# Patient Record
Sex: MALE | Race: ASIAN | NOT HISPANIC OR LATINO | Employment: OTHER | URBAN - METROPOLITAN AREA
[De-identification: names, ages, dates, MRNs, and addresses within clinical notes are randomized per-mention and may not be internally consistent; named-entity substitution may affect disease eponyms.]

---

## 2017-01-21 ENCOUNTER — TRANSFERRED RECORDS (OUTPATIENT)
Dept: CARDIOLOGY | Facility: CLINIC | Age: 82
End: 2017-01-21

## 2017-01-23 ENCOUNTER — TELEPHONE (OUTPATIENT)
Dept: FAMILY MEDICINE | Facility: CLINIC | Age: 82
End: 2017-01-23

## 2017-01-23 NOTE — TELEPHONE ENCOUNTER
Forms received via mail for health care summary   Pt need physical appointment , spoke to son on 01.23.17 Maeve Coleman Medical Assistant   Pt son will call back to schedule

## 2017-02-02 ENCOUNTER — OFFICE VISIT (OUTPATIENT)
Dept: FAMILY MEDICINE | Facility: CLINIC | Age: 82
End: 2017-02-02
Payer: COMMERCIAL

## 2017-02-02 ENCOUNTER — TELEPHONE (OUTPATIENT)
Dept: FAMILY MEDICINE | Facility: CLINIC | Age: 82
End: 2017-02-02

## 2017-02-02 VITALS
BODY MASS INDEX: 24.59 KG/M2 | DIASTOLIC BLOOD PRESSURE: 70 MMHG | HEART RATE: 84 BPM | SYSTOLIC BLOOD PRESSURE: 119 MMHG | OXYGEN SATURATION: 99 % | WEIGHT: 153 LBS | HEIGHT: 66 IN | TEMPERATURE: 98 F

## 2017-02-02 DIAGNOSIS — I48.91 ATRIAL FIBRILLATION, UNSPECIFIED TYPE (H): ICD-10-CM

## 2017-02-02 DIAGNOSIS — I10 HYPERTENSION GOAL BP (BLOOD PRESSURE) < 140/90: ICD-10-CM

## 2017-02-02 DIAGNOSIS — Z00.00 MEDICARE ANNUAL WELLNESS VISIT, SUBSEQUENT: ICD-10-CM

## 2017-02-02 DIAGNOSIS — E11.21 TYPE 2 DIABETES MELLITUS WITH DIABETIC NEPHROPATHY, WITHOUT LONG-TERM CURRENT USE OF INSULIN (H): Primary | ICD-10-CM

## 2017-02-02 DIAGNOSIS — L25.8 CONTACT DERMATITIS DUE TO OTHER AGENT, UNSPECIFIED CONTACT DERMATITIS TYPE: ICD-10-CM

## 2017-02-02 PROCEDURE — 99397 PER PM REEVAL EST PAT 65+ YR: CPT | Performed by: FAMILY MEDICINE

## 2017-02-02 PROCEDURE — 93000 ELECTROCARDIOGRAM COMPLETE: CPT | Performed by: FAMILY MEDICINE

## 2017-02-02 RX ORDER — HYDROCORTISONE VALERATE CREAM 2 MG/G
CREAM TOPICAL
Qty: 30 G | Refills: 0 | Status: SHIPPED | OUTPATIENT
Start: 2017-02-02 | End: 2017-03-22

## 2017-02-02 NOTE — PROGRESS NOTES
SUBJECTIVE:                                                            Manfred Caraballo is a 81 year old male who presents for Preventive Visit.    Are you in the first 12 months of your Medicare Part B coverage?  No    Healthy Habits:    Do you get at least three servings of calcium containing foods daily (dairy, green leafy vegetables, etc.)? yes    Amount of exercise or daily activities, outside of work: 7 day(s) per week walking inside the house and I attend the senior group and use the treadmill     Problems taking medications regularly No    Medication side effects: No    Have you had an eye exam in the past two years? yes    Do you see a dentist twice per year? Every 2 years - once     Do you have sleep apnea, excessive snoring or daytime drowsiness?no    Skin:  Patient reports pruritic bumps on arms which started on the left arm one month ago, but also began on the right arm two weeks ago.    Diabetes:  Patient reports he is managing his diabetes. He checks his blood glucose levels everyday, ranges in the low 100s.     Appetite:  Patient reports he has a decreased appetite, and does not think he eats well. He notes some weight loss.     Patient needs paperwork filled out forHis adult . He enjoys going.    Patient denies any palpitations. He denies any chest pain. He went to the ER recently was found to be in A. Fib. He has not followed up with cardiology.    COGNITIVE SCREEN  1) Repeat 3 items (Banana, Sunrise, Chair)    2) Clock draw: NORMAL  3) 3 item recall: Recalls 2 objects   Results: NORMAL clock, 1-2 items recalled: COGNITIVE IMPAIRMENT LESS LIKELY    Mini-CogTM Copyright DEBBIE Guzman. Licensed by the author for use in [facility]; reprinted with permission (fina@[facility]). All rights reserved.      Lab work done at [facility] 12/21/16     All Histories reviewed and updated in [facility] as appropriate.  Social History   Substance Use Topics     Smoking status: Former Smoker -- 0.50 packs/day  for 20 years     Types: Cigarettes     Quit date: 05/13/1981     Smokeless tobacco: Never Used      Comment: quit in 1981      Alcohol Use: No      Comment: quit in 1981       No alcohol use     Today's PHQ-2 Score:   PHQ-2 ( 1999 [employer]) 2/2/2017 11/17/2016   Q1: Little interest or pleasure in doing things 0 0   Q2: Feeling down, depressed or hopeless 0 0   PHQ-2 Score 0 0       Do you feel safe in your environment - Yes    Do you have a Health Care Directive?: No: Advance care planning reviewed with patient; information given to patient to review.    Current providers sharing in care for this patient include:   Patient Care Team:  Weiler, Karen, MD as PCP - General (Family Practice)      Hearing impairment: Yes, Difficulty following a conversation in a noisy restaurant or crowded room.    Feel that people are mumbling or not speaking clearly.    Left ear is worse     Ability to successfully perform activities of daily living: Yes, no assistance needed     Fall risk:       Home safety:  none identified      The following health maintenance items are reviewed in Epic and correct as of today:  Health Maintenance   Topic Date Due     TETANUS Q10 YR  02/09/2015     COLONOSCOPY Q5 YR INBASKET MESSAGE  05/20/2015     MICROALBUMIN Q1 YEAR( NO INBASKET)  11/20/2016     FOOT EXAM Q1 YEAR( NO INBASKET)  05/13/2017     BMP Q1 YR (NO INBASKET)  05/13/2017     FALL RISK ASSESSMENT  05/13/2017     LIPID MONITORING Q1 YEAR( NO INBASKET)  05/13/2017     PSA Q1 YR  05/13/2017     A1C Q6 MO( NO INBASKET)  05/17/2017     EYE EXAM Q1 YEAR( NO INBASKET)  06/22/2017     INFLUENZA VACCINE (SYSTEM ASSIGNED)  09/01/2017     COPD ACTION PLAN Q1 YR  11/17/2017     HEMOGLOBIN Q1 YR (NO INBASKET)  11/17/2017     TSH W/ FREE T4 REFLEX Q2 YEAR (NO INBASKET)  11/20/2017     WELLNESS VISIT Q1 YR (NO INBASKET)  02/02/2018     ADVANCE DIRECTIVE PLANNING Q5 YRS (NO INBASKET)  05/13/2021     SPIROMETRY ONETIME  Completed     PNEUMOCOCCAL  Completed  "      ROS:  C: NEGATIVE for fever, chills, change in weight  I: NEGATIVE for worrisome rashes, moles or lesions  E: NEGATIVE for vision changes or irritation  E/M: NEGATIVE for ear, mouth and throat problems  R: NEGATIVE for significant cough or SOB  B: NEGATIVE for masses, tenderness or discharge  CV: NEGATIVE for chest pain, palpitations or peripheral edema  GI: NEGATIVE for nausea, abdominal pain, heartburn, or change in bowel habits  : NEGATIVE for frequency, dysuria, or hematuria  M: NEGATIVE for significant arthralgias or myalgia  N: NEGATIVE for weakness, dizziness or paresthesias  E: NEGATIVE for temperature intolerance, skin/hair changes  H: NEGATIVE for bleeding problems  P: NEGATIVE for changes in mood or affect  SKIN: POSITIVE for pruritic bumps on arms and legs    Problem list, Medication list, Allergies, and Medical/Social/Surgical histories reviewed in [facility] and updated as appropriate.    This document serves as a record of the services and decisions personally performed and made by Karen Weiler, MD. It was created on her behalf by Edda Gonzalez, a trained medical scribe. The creation of this document is based the provider's statements to the medical scribe.  Edda Gonzalez February 2, 2017 10:07 AM    OBJECTIVE:                                                            /70 mmHg  Pulse 84  Temp(Src) 98  F (36.7  C) (Oral)  Ht 5' 5.5\" (1.664 m)  Wt 153 lb (69.4 kg)  BMI 25.06 kg/m2  SpO2 99% Estimated body mass index is 25.06 kg/(m^2) as calculated from the following:    Height as of this encounter: 5' 5.5\" (1.664 m).    Weight as of this encounter: 153 lb (69.4 kg).  EXAM:   GENERAL: healthy, alert and no distress  EYES: Eyes grossly normal to inspection, PERRL and conjunctivae and sclerae normal  HENT: ear canals and TM's normal, nose and mouth without ulcers or lesions  NECK: no adenopathy, no asymmetry, masses, or scars and thyroid normal to palpation  RESP: lungs clear to auscultation - no " "rales, rhonchi or wheezes  CV: irregularly irregular, no S3 or S4, no murmur, click or rub, no peripheral edema and peripheral pulses strong  ABDOMEN: soft, nontender, no hepatosplenomegaly, no masses and bowel sounds normal  MS: no gross musculoskeletal defects noted, no edema  SKIN: no suspicious lesions or rashes  NEURO: Normal strength and tone, mentation intact and speech normal  PSYCH: mentation appears normal, affect normal/bright    ASSESSMENT / PLAN:                                                            (E11.21) Type 2 diabetes mellitus with diabetic nephropathy, without long-term current use of insulin (H)  (primary encounter diagnosis)  Comment: Diabetes has been under good control.      (Z00.00) Medicare annual wellness visit, subsequent  Paperwork filled out today for adult .    (I10) Hypertension goal BP (blood pressure) < 140/90  Comment: Blood pressure is under good control.    (L25.8) Contact dermatitis due to other agent, unspecified contact dermatitis type  Plan: hydrocortisone (WESTCORT) 0.2 % cream          (I48.91) Atrial fibrillation, unspecified type (H)  Comment: rate controlled. Patient is asymptomatic. We'll have him see cardiology.  Plan: EKG 12-lead complete w/read - Clinics,         CARDIOLOGY EVAL ADULT REFERRAL            End of Life Planning:  Patient currently has an advanced directive: No.  I have verified the patient's ablity to prepare an advanced directive/make health care decisions.  Literature was provided to assist patient in preparing an advanced directive.    COUNSELING:  Reviewed preventive health counseling, as reflected in patient instructions       Vision screening       Hearing screening      Estimated body mass index is 25.06 kg/(m^2) as calculated from the following:    Height as of this encounter: 5' 5.5\" (1.664 m).    Weight as of this encounter: 153 lb (69.4 kg).     reports that he quit smoking about 35 years ago. His smoking use included Cigarettes. " He has a 10 pack-year smoking history. He has never used smokeless tobacco.      Appropriate preventive services were discussed with this patient, including applicable screening as appropriate for cardiovascular disease, diabetes, osteopenia/osteoporosis, and glaucoma.  As appropriate for age/gender, discussed screening for colorectal cancer, prostate cancer, breast cancer, and cervical cancer. Checklist reviewing preventive services available has been given to the patient.    Reviewed patients plan of care and provided an AVS. The Basic Care Plan (routine screening as documented in Health Maintenance) for Manfred meets the Care Plan requirement. This Care Plan has been established and reviewed with the Patient and .    Counseling Resources:  ATP IV Guidelines  Pooled Cohorts Equation Calculator  Breast Cancer Risk Calculator  FRAX Risk Assessment  ICSI Preventive Guidelines  Dietary Guidelines for Americans, 2010  USDA's MyPlate  ASA Prophylaxis  Lung CA Screening    The information in this document, created by the medical scribe for me, accurately reflects the services I personally performed and the decisions made by me. I have reviewed and approved this document for accuracy prior to leaving the patient care area.  2/2/2017 10:07 AM       Karen Weiler, MD  [facility]

## 2017-02-02 NOTE — TELEPHONE ENCOUNTER
Per request from MD GREGG, placed call to patient's son, Phuc. Patient was referred to Cardiology today - Phuc is typically in charge of making appointments for patient, but was not with patient at his appointment today. Would like to make sure Phuc has Cardiology's contact information to get patient scheduled for a consult.    Phuc verbalized understanding and agrees with plan.    Will call back if further questions or concerns.    Kaur Hernandez RN, BSN

## 2017-02-02 NOTE — NURSING NOTE
"Chief Complaint   Patient presents with     Wellness Visit       Initial /70 mmHg  Pulse 84  Temp(Src) 98  F (36.7  C) (Oral)  Ht 5' 5.5\" (1.664 m)  Wt 153 lb (69.4 kg)  BMI 25.06 kg/m2  SpO2 99% Estimated body mass index is 25.06 kg/(m^2) as calculated from the following:    Height as of this encounter: 5' 5.5\" (1.664 m).    Weight as of this encounter: 153 lb (69.4 kg).  BP completed using cuff size: makayla Coleman Medical Assistant      "

## 2017-02-05 PROCEDURE — G0179 MD RECERTIFICATION HHA PT: HCPCS | Performed by: FAMILY MEDICINE

## 2017-02-15 ENCOUNTER — PRE VISIT (OUTPATIENT)
Dept: CARDIOLOGY | Facility: CLINIC | Age: 82
End: 2017-02-15

## 2017-02-17 ENCOUNTER — OFFICE VISIT (OUTPATIENT)
Dept: CARDIOLOGY | Facility: CLINIC | Age: 82
End: 2017-02-17
Attending: FAMILY MEDICINE
Payer: COMMERCIAL

## 2017-02-17 ENCOUNTER — TELEPHONE (OUTPATIENT)
Dept: FAMILY MEDICINE | Facility: CLINIC | Age: 82
End: 2017-02-17

## 2017-02-17 VITALS
HEIGHT: 66 IN | DIASTOLIC BLOOD PRESSURE: 72 MMHG | HEART RATE: 91 BPM | BODY MASS INDEX: 25.04 KG/M2 | WEIGHT: 155.8 LBS | SYSTOLIC BLOOD PRESSURE: 132 MMHG

## 2017-02-17 DIAGNOSIS — I48.19 PERSISTENT ATRIAL FIBRILLATION (H): ICD-10-CM

## 2017-02-17 DIAGNOSIS — R00.2 PALPITATIONS: Primary | ICD-10-CM

## 2017-02-17 PROCEDURE — 99204 OFFICE O/P NEW MOD 45 MIN: CPT | Performed by: INTERNAL MEDICINE

## 2017-02-17 PROCEDURE — 93000 ELECTROCARDIOGRAM COMPLETE: CPT | Performed by: INTERNAL MEDICINE

## 2017-02-17 RX ORDER — DILTIAZEM HYDROCHLORIDE 120 MG/1
120 CAPSULE, COATED, EXTENDED RELEASE ORAL DAILY
Qty: 90 CAPSULE | Refills: 3 | Status: SHIPPED | OUTPATIENT
Start: 2017-02-17 | End: 2018-02-05

## 2017-02-17 NOTE — TELEPHONE ENCOUNTER
Date Received: 02/17/17    Sent by: FAX    For: HOME HEALTH CERTIFICATION AND PLAN OF CARE      Last Office Visit: 02/02/17    Return By: FAX    Placed in K[facility] In Box

## 2017-02-17 NOTE — PROGRESS NOTES
"HPI and Plan:   See dictation  [id number]    Physical Exam:  Vitals: /72 (BP Location: Right arm, Cuff Size: Adult Regular)  Pulse 91  Ht 1.664 m (5' 5.5\")  Wt 70.7 kg (155 lb 12.8 oz)  BMI 25.53 kg/m2    Constitutional:  AAO x3.  Pt is in NAD.  HEAD: normocephalic.  SKIN: Skin normal color, texture and turgor with no lesions or eruptions.  Eyes: PERRL, EOMI.  ENT:  Supple, normal JVP. No lymphadenopathy or thyroid enlargement.  Chest:  Course in right base.  Cardiac:  IIR, variable sound of S1 and Normal S2.   No murmurs rubs or gallop.    Abdomen:  Normal BS.  Soft, non-tender and non-distended.  No rebound or guarding.    Extremities:  Pedious pulses palpable B/L.  No LE edema noticed.   Neurological: Strength and sensation grossly symmetric and intact throughout.       CURRENT MEDICATIONS:  Current Outpatient Prescriptions   Medication Sig Dispense Refill     diltiazem (CARDIZEM CD) 120 MG 24 hr capsule Take 1 capsule (120 mg) by mouth daily 90 capsule 3     hydrocortisone (WESTCORT) 0.2 % cream Apply sparingly to affected area three times daily for 14 days. 30 g 0     potassium chloride SA (K-DUR/KLOR-CON M) 20 MEQ CR tablet Take 1 tablet (20 mEq) by mouth daily 90 tablet 1     albuterol (PROAIR HFA, PROVENTIL HFA, VENTOLIN HFA) 108 (90 BASE) MCG/ACT inhaler Inhale 2 puffs into the lungs every 6 hours as needed for shortness of breath / dyspnea 1 Inhaler 11     polyethylene glycol (MIRALAX) powder Take 17 g (1 capful) by mouth daily 510 g 1     losartan-hydrochlorothiazide (HYZAAR) 100-25 MG per tablet Take 1 tablet by mouth daily 90 tablet 1     ipratropium - albuterol 0.5 mg/2.5 mg/3 mL (DUONEB) 0.5-2.5 (3) MG/3ML nebulization Take 1 vial (3 mLs) by nebulization every 6 hours as needed for shortness of breath / dyspnea or wheezing 30 vial 1     metFORMIN (GLUCOPHAGE) 1000 MG tablet Take 1 tablet (1,000 mg) by mouth 2 times daily (with meals) 180 tablet 3     atorvastatin (LIPITOR) 20 MG tablet TAKE 0.5 " TABLETS (10 MG) BY MOUTH DAILY 90 tablet 0     guaiFENesin-codeine (ROBITUSSIN AC) 100-10 MG/5ML SOLN Take 10 mLs by mouth every 4 hours as needed for cough 120 mL 0     blood glucose monitoring (NO BRAND SPECIFIED) test strip Use to test blood sugars 1  times daily or as directed 100 each 3     pantoprazole (PROTONIX) 40 MG enteric coated tablet TAKE 1 TABLET (40 MG) BY MOUTH DAILY 90 tablet 3     fluticasone (FLONASE) 50 MCG/ACT nasal spray Spray 1-2 sprays into both nostrils daily 16 g 3     KEITH CONTOUR test strip USE TO TEST BLOOD SUGAR 1 TIMES DAILY OR AS DIRECTED. 300 strip 1     blood glucose monitoring (KEITH CONTOUR MONITOR) meter device kit Use to test blood sugars  1 times daily or as directed. 1 kit 0     glucose blood VI test strips (ACCU-CHEK COMFORT CURVE) strip 1 strip by In Vitro route 2 times daily Or whatever meter he has please 100 strip 3     ASPIRIN NOT PRESCRIBED (INTENTIONAL) not prescribed - ulcer history 1 Tab 0       ALLERGIES     Allergies   Allergen Reactions     Aspirin      Salicylates      ASA/ Ulcers       PAST MEDICAL HISTORY:  Past Medical History   Diagnosis Date     Acute duodenal ulcer with hemorrhage, without mention of obstruction 11/15/03     and pyloric also - required hosp and transfusion 2 units- H. pylori negative     Allergic rhinitis, cause unspecified      Calculus of gallbladder without mention of cholecystitis or obstruction 11/15/03     no residual pain- transient cholestasis for 2 days     Colon Polyps normal 5/2010 2008 = one hyperplastic & one tubular adenoma - no high grade dysplasis - repeat in 2013      Diverticulosis of colon (without mention of hemorrhage) 11/03     Dyspepsia and other specified disorders of function of stomach      dyspepsia,  h/o bleeding stomach ulcer in 1985     Esophageal reflux      Genital herpes, unspecified      Paroxysmal atrial fibrillation (H) 12/21/2016     [facility].     Pneumonia, organism unspecified  11/15/03     right middle and lower lobe     Pure hypercholesterolemia      Pure hyperglyceridemia      Tear meniscus knee 2009     degenerative on right      Type II or unspecified type diabetes mellitus without mention of complication, not stated as uncontrolled at age 63     Unspecified essential hypertension        PAST SURGICAL HISTORY:  Past Surgical History   Procedure Laterality Date     Hc colonoscopy thru stoma, diagnostic  11/03     for GI bleed - diverticulosis      Hc ugi endoscopy diag w or w/o brush/wash  11/03     for GI bleed - showed pyloric and duodenal  ulcer      Hc colonoscopy thru stoma, diagnostic  5/2010     normal - repeat in 5 years        FAMILY HISTORY:  Family History   Problem Relation Age of Onset     DIABETES No family hx of      C.A.D. No family hx of      Hypertension No family hx of      Cancer - colorectal No family hx of      Prostate Cancer No family hx of        SOCIAL HISTORY:  Social History     Social History     Marital status:      Spouse name: Constanza     Number of children: 7     Years of education: N/A     Occupational History     retired - was in food processing - packaging hamburger, etc.        None      Social History Main Topics     Smoking status: Former Smoker     Packs/day: 0.50     Years: 20.00     Types: Cigarettes     Quit date: 5/13/1981     Smokeless tobacco: Never Used      Comment: quit in 1981      Alcohol use No      Comment: quit in 1981     Drug use: No      Comment: no herbal meds either     Sexual activity: Yes     Partners: Female      Comment:      Other Topics Concern      Service Yes     was in  in [facility] for 5 years and  for 15 years     Blood Transfusions Yes     in 1985 after bleeding stomach ulcer     Caffeine Concern No     stopped all coffee and tea after ulcers 11/03     Exercise Yes     walks at least one mile every day     Seat Belt Yes     always     Self-Exams Yes     KARINA encouraged monthly      Parent/Sibling W/ Cabg, Mi Or Angioplasty Before 65f 55m? No     Social History Narrative    no longer taking one 81mg asa qday - secondary to bleeding  ulcers 11/03    PSA checking every 6 months - one year.                    Review of Systems:  Skin:  not assessed     Eyes:  not assessed    ENT:  not assessed    Respiratory:  Positive for shortness of breath  Cardiovascular:  Negative    Gastroenterology: Negative    Genitourinary:  not assessed    Musculoskeletal:  not assessed    Neurologic:  Negative    Psychiatric:  Negative    Heme/Lymph/Imm:  not assessed    Endocrine:  Positive for diabetes      Recent Lab Results:  LIPID RESULTS:  Lab Results   Component Value Date    CHOL 149 05/13/2016    HDL 46 05/13/2016    LDL 57 05/13/2016    LDL 77 03/10/2010    TRIG 231 (H) 05/13/2016    CHOLHDLRATIO 4.2 05/07/2015       LIVER ENZYME RESULTS:  Lab Results   Component Value Date    AST 18 05/13/2016    ALT 22 05/13/2016       CBC RESULTS:  Lab Results   Component Value Date    WBC 10.1 11/17/2016    RBC 5.40 11/17/2016    HGB 13.1 (L) 11/17/2016    HCT 42.1 11/17/2016    MCV 78 11/17/2016    MCH 24.3 (L) 11/17/2016    MCHC 31.1 (L) 11/17/2016    RDW 12.8 11/17/2016     11/17/2016       BMP RESULTS:  Lab Results   Component Value Date     (L) 05/13/2016    POTASSIUM 4.0 05/13/2016    CHLORIDE 93 (L) 05/13/2016    CO2 29 05/13/2016    ANIONGAP 7 05/13/2016     (H) 05/13/2016    BUN 12 05/13/2016    CR 0.90 05/13/2016    GFRESTIMATED 81 05/13/2016    GFRESTBLACK >90   GFR Calc   05/13/2016    BRITTNY 9.3 05/13/2016        A1C RESULTS:  Lab Results   Component Value Date    A1C 5.8 11/17/2016       INR RESULTS:  No results found for: INR      ECHOCARDIOGRAM  No results found for this or any previous visit (from the past 8760 hour(s)).      Orders Placed This Encounter   Procedures     Follow-Up with Cardiac Advanced Practice Provider     EKG 12-lead complete w/read - Clinics (performed  today)     Holter Monitor 48 hour - Adult     Echocardiogram     Orders Placed This Encounter   Medications     diltiazem (CARDIZEM CD) 120 MG 24 hr capsule     Sig: Take 1 capsule (120 mg) by mouth daily     Dispense:  90 capsule     Refill:  3     There are no discontinued medications.      Encounter Diagnoses   Name Primary?     Palpitations Yes     Persistent atrial fibrillation (H)          CC  Karen Weiler, MD  [facility]  [address] [place] [address]

## 2017-02-17 NOTE — LETTER
2/17/2017    Karen Weiler, MD  [facility]  [address] [place] [address]    RE: Manfred Caraballo       Dear Colleague,    I had the pleasure of seeing Manfred Caraballo in the [facility].    REASON FOR VISIT:  Evaluation of persistent atrial fibrillation.      Ms. Caraballo is a delightful 82-year-old gentleman with a history of hypertension, hyperlipidemia, diabetes, chronic kidney disease and COPD who is here for evaluation of atrial fibrillation.      The patient informs in the last 3 weeks he started noticing dyspnea on exertion and palpitations.  He saw his primary care physician, which confirmed that he was having atrial fibrillation (EKG revealed AFib with heart rate of 90 beats per minute and left anterior fascicular block).  He was then referred here for further evaluation.      At the moment, he is doing well.  He actually informs that his symptoms are now improved.  He denies any other symptoms such as chest pain, lightheadedness, near-syncope or syncopal episode.      EKG done here confirmed AFib with controlled ventricular response.     Outpatient Encounter Prescriptions as of 2/17/2017   Medication Sig Dispense Refill     diltiazem (CARDIZEM CD) 120 MG 24 hr capsule Take 1 capsule (120 mg) by mouth daily 90 capsule 3     hydrocortisone (WESTCORT) 0.2 % cream Apply sparingly to affected area three times daily for 14 days. 30 g 0     potassium chloride SA (K-DUR/KLOR-CON M) 20 MEQ CR tablet Take 1 tablet (20 mEq) by mouth daily 90 tablet 1     albuterol (PROAIR HFA, PROVENTIL HFA, VENTOLIN HFA) 108 (90 BASE) MCG/ACT inhaler Inhale 2 puffs into the lungs every 6 hours as needed for shortness of breath / dyspnea 1 Inhaler 11     polyethylene glycol (MIRALAX) powder Take 17 g (1 capful) by mouth daily 510 g 1     losartan-hydrochlorothiazide (HYZAAR) 100-25 MG per tablet Take 1 tablet by mouth daily 90 tablet 1     ipratropium - albuterol 0.5 mg/2.5 mg/3 mL  (DUONEB) 0.5-2.5 (3) MG/3ML nebulization Take 1 vial (3 mLs) by nebulization every 6 hours as needed for shortness of breath / dyspnea or wheezing 30 vial 1     metFORMIN (GLUCOPHAGE) 1000 MG tablet Take 1 tablet (1,000 mg) by mouth 2 times daily (with meals) 180 tablet 3     atorvastatin (LIPITOR) 20 MG tablet TAKE 0.5 TABLETS (10 MG) BY MOUTH DAILY 90 tablet 0     guaiFENesin-codeine (ROBITUSSIN AC) 100-10 MG/5ML SOLN Take 10 mLs by mouth every 4 hours as needed for cough 120 mL 0     blood glucose monitoring (NO BRAND SPECIFIED) test strip Use to test blood sugars 1  times daily or as directed 100 each 3     pantoprazole (PROTONIX) 40 MG enteric coated tablet TAKE 1 TABLET (40 MG) BY MOUTH DAILY 90 tablet 3     fluticasone (FLONASE) 50 MCG/ACT nasal spray Spray 1-2 sprays into both nostrils daily 16 g 3     KEITH CONTOUR test strip USE TO TEST BLOOD SUGAR 1 TIMES DAILY OR AS DIRECTED. 300 strip 1     blood glucose monitoring (KEITH CONTOUR MONITOR) meter device kit Use to test blood sugars  1 times daily or as directed. 1 kit 0     glucose blood VI test strips (ACCU-CHEK COMFORT CURVE) strip 1 strip by In Vitro route 2 times daily Or whatever meter he has please 100 strip 3     ASPIRIN NOT PRESCRIBED (INTENTIONAL) not prescribed - ulcer history 1 Tab 0     No facility-administered encounter medications on file as of 2/17/2017.       ASSESSMENT AND PLAN:   1.  Persistent atrial fibrillation.  Heart rate seems to be well controlled.  He is not taking any AV yesenia agents.  I recommended starting diltiazem 120 mg daily.  We would like to obtain a 48-hour Holter to evaluate heart rates.  He should follow up here in 3 months to reevaluate his symptoms.   2.  Embolic prevention.  CHADS-VASc score of 4.  I recommended anticoagulation.  We went over NOACs versus Coumadin.  I gave him the name of several different NOACs.  He is going to research about price and get back to us.   3.  Hypertension.  Blood pressure is well  controlled.     Again, thank you for allowing me to participate in the care of your patient.      Sincerely,    Tato Au MD

## 2017-02-17 NOTE — MR AVS SNAPSHOT
After Visit Summary   2/17/2017    Manfred Caraballo    MRN: [id number]           Patient Information     Date Of Birth          1935        Visit Information        Provider Department      2/17/2017 1:30 PM Tato Au MD; [employer] [facility] [place]        Today's Diagnoses     Palpitations    -  1    Persistent atrial fibrillation (H)           Follow-ups after your visit        Additional Services     Follow-Up with Cardiac Advanced Practice Provider                 Your next 10 appointments already scheduled     Feb 28, 2017  9:00 AM CST   Ech Complete with [id number]   [facility] ([facility])    [address]  [facility] [id number] [phone]           1.  Please bring or wear a comfortable two-piece outfit. 2.  You may eat, drink and take your normal medicines. 3.  For any questions that cannot be answered, please contact the ordering physician ***Please check-in at the [place] Registration Office located in [address] in the [facility] building. When you are finished registering, please go to [address] and have a seat. The technician will call your name for the test.            Feb 28, 2017 10:00 AM CST   Holter Monitor with RSCC DEVICE [facility] ([facility])    [address]  [place] [facility] [id number] [phone]           LOCATION - [address], [address] [place] [address] **Please check-in at the [place] Registration Office, [address], in the [facility] building. When you are finished registering, please go to [address] and have a seat.            May 17, 2017 12:30 PM CDT   UMP EP RETURN with WILBERT Rincon CNP   [facility] [place] ([facility])    [address]  [place] [facility] [place]  "[phone]   [phone]              Future tests that were ordered for you today     Open Future Orders        Priority Expected Expires Ordered    Follow-Up with Cardiac Advanced Practice Provider Routine 2017    Holter Monitor 48 hour - Adult Routine 2017    Echocardiogram Routine 2017            Who to contact     If you have questions or need follow up information about today's clinic visit or your schedule please contact [facility] [place] directly at [phone].  Normal or non-critical lab and imaging results will be communicated to you by iMemorieshart, letter or phone within 4 business days after the clinic has received the results. If you do not hear from us within 7 days, please contact the clinic through iMemorieshart or phone. If you have a critical or abnormal lab result, we will notify you by phone as soon as possible.  Submit refill requests through [employer] or call your pharmacy and they will forward the refill request to us. Please allow 3 business days for your refill to be completed.          Additional Information About Your Visit        iMemorieshart Information     [employer] lets you send messages to your doctor, view your test results, renew your prescriptions, schedule appointments and more. To sign up, go to [email or web address] . Click on \"Log in\" on the left side of the screen, which will take you to the Welcome page. Then click on \"Sign up Now\" on the right side of the page.     You will be asked to enter the access code listed below, as well as some personal information. Please follow the directions to create your username and password.     Your access code is: [email or web address]  Expires: 2017  2:31 PM     Your access code will  in 90 days. If you need help or a new code, please call your [facility] clinic or [phone].        Care EveryWhere ID     This is your Care EveryWhere ID. This " "could be used by other organizations to access your [place] medical records  [id number]        Your Vitals Were     Pulse Height BMI (Body Mass Index)             91 1.664 m (5' 5.5\") 25.53 kg/m2          Blood Pressure from Last 3 Encounters:   02/17/17 132/72   02/02/17 119/70   11/17/16 138/88    Weight from Last 3 Encounters:   02/17/17 70.7 kg (155 lb 12.8 oz)   02/02/17 69.4 kg (153 lb)   11/17/16 72.6 kg (160 lb)              We Performed the Following     EKG 12-lead complete w/read - Clinics (performed today)          Today's Medication Changes          These changes are accurate as of: 2/17/17  2:31 PM.  If you have any questions, ask your nurse or doctor.               Start taking these medicines.        Dose/Directions    diltiazem 120 MG 24 hr capsule   Commonly known as:  CARDIZEM CD   Used for:  Persistent atrial fibrillation (H)   Started by:  Tato Au MD        Dose:  120 mg   Take 1 capsule (120 mg) by mouth daily   Quantity:  90 capsule   Refills:  3            Where to get your medicines      These medications were sent to [facility]/pharmacy #[id number] - [place] - [address]  [address], [place] [address]     Phone:  [phone]     diltiazem 120 MG 24 hr capsule                Primary Care Provider Office Phone # Fax #    Karen Weiler, [phone]       [facility] [address] [address]        Thank you!     Thank you for choosing [facility] AT [place]  for your care. Our goal is always to provide you with excellent care. Hearing back from our patients is one way we can continue to improve our services. Please take a few minutes to complete the written survey that you may receive in the mail after your visit with us. Thank you!             Your Updated Medication List - Protect others around you: Learn how to safely use, store and throw away your medicines at www.disposemymeds.org.          This " list is accurate as of: 2/17/17  2:31 PM.  Always use your most recent med list.                   Brand Name Dispense Instructions for use    albuterol 108 (90 BASE) MCG/ACT Inhaler    PROAIR HFA/PROVENTIL HFA/VENTOLIN HFA    1 Inhaler    Inhale 2 puffs into the lungs every 6 hours as needed for shortness of breath / dyspnea       ASPIRIN NOT PRESCRIBED    INTENTIONAL    1 Tab    not prescribed - ulcer history       atorvastatin 20 MG tablet    LIPITOR    90 tablet    TAKE 0.5 TABLETS (10 MG) BY MOUTH DAILY       blood glucose monitoring meter device kit     1 kit    Use to test blood sugars  1 times daily or as directed.       * blood glucose monitoring test strip    ACCU-CHEK COMFORT CURVE    100 strip    1 strip by In Vitro route 2 times daily Or whatever meter he has please       * [employer] X-Change CONTOUR test strip   Generic drug:  blood glucose monitoring     300 strip    USE TO TEST BLOOD SUGAR 1 TIMES DAILY OR AS DIRECTED.       * blood glucose monitoring test strip    no brand specified    100 each    Use to test blood sugars 1  times daily or as directed       diltiazem 120 MG 24 hr capsule    CARDIZEM CD    90 capsule    Take 1 capsule (120 mg) by mouth daily       fluticasone 50 MCG/ACT spray    FLONASE    16 g    Spray 1-2 sprays into both nostrils daily       guaiFENesin-codeine 100-10 MG/5ML Soln solution    ROBITUSSIN AC    120 mL    Take 10 mLs by mouth every 4 hours as needed for cough       hydrocortisone 0.2 % cream    WESTCORT    30 g    Apply sparingly to affected area three times daily for 14 days.       ipratropium - albuterol 0.5 mg/2.5 mg/3 mL 0.5-2.5 (3) MG/3ML neb solution    DUONEB    30 vial    Take 1 vial (3 mLs) by nebulization every 6 hours as needed for shortness of breath / dyspnea or wheezing       losartan-hydrochlorothiazide 100-25 MG per tablet    HYZAAR    90 tablet    Take 1 tablet by mouth daily       metFORMIN 1000 MG tablet    GLUCOPHAGE    180 tablet    Take 1 tablet (1,000 mg) by  mouth 2 times daily (with meals)       pantoprazole 40 MG EC tablet    PROTONIX    90 tablet    TAKE 1 TABLET (40 MG) BY MOUTH DAILY       polyethylene glycol powder    MIRALAX    510 g    Take 17 g (1 capful) by mouth daily       potassium chloride SA 20 MEQ CR tablet    K-DUR/KLOR-CON M    90 tablet    Take 1 tablet (20 mEq) by mouth daily       * Notice:  This list has 3 medication(s) that are the same as other medications prescribed for you. Read the directions carefully, and ask your doctor or other care provider to review them with you.

## 2017-02-18 NOTE — PROGRESS NOTES
REASON FOR VISIT:  Evaluation of persistent atrial fibrillation.      HISTORY OF PRESENT ILLNESS:  Ms. Caraballo is a delightful 82-year-old gentleman with a history of hypertension, hyperlipidemia, diabetes, chronic kidney disease and COPD who is here for evaluation of atrial fibrillation.      The patient informs in the last 3 weeks he started noticing dyspnea on exertion and palpitations.  He saw his primary care physician, which confirmed that he was having atrial fibrillation (EKG revealed AFib with heart rate of 90 beats per minute and left anterior fascicular block).  He was then referred here for further evaluation.      At the moment, he is doing well.  He actually informs that his symptoms are now improved.  He denies any other symptoms such as chest pain, lightheadedness, near-syncope or syncopal episode.      EKG done here confirmed AFib with controlled ventricular response.      ASSESSMENT AND PLAN:   1.  Persistent atrial fibrillation.  Heart rate seems to be well controlled.  He is not taking any AV yesenia agents.  I recommended starting diltiazem 120 mg daily.  We would like to obtain a 48-hour Holter to evaluate heart rates.  He should follow up here in 3 months to reevaluate his symptoms.   2.  Embolic prevention.  CHADS-VASc score of 4.  I recommended anticoagulation.  We went over NOACs versus Coumadin.  I gave him the name of several different NOACs.  He is going to research about price and get back to us.   3.  Hypertension.  Blood pressure is well controlled.         JUWAN SETH MD             D: 2017 14:30   T: 2017 21:27   MT: ADELFO      Name:     ALVA CARABALLO   MRN:      [id number]        Account:      [id number]   :      1935           Service Date: 2017      Document: [id number]

## 2017-02-21 ENCOUNTER — TELEPHONE (OUTPATIENT)
Dept: CARDIOLOGY | Facility: CLINIC | Age: 82
End: 2017-02-21

## 2017-02-21 DIAGNOSIS — I48.91 ATRIAL FIBRILLATION (H): Primary | ICD-10-CM

## 2017-02-21 NOTE — TELEPHONE ENCOUNTER
Son calling to report pts insurance covers Xarelto for OAC. Sent Rx to [facility] pharmacy in [place].

## 2017-02-28 ENCOUNTER — HOSPITAL ENCOUNTER (OUTPATIENT)
Dept: CARDIOLOGY | Facility: CLINIC | Age: 82
Discharge: HOME | End: 2017-02-28
Attending: INTERNAL MEDICINE | Admitting: INTERNAL MEDICINE
Payer: COMMERCIAL

## 2017-02-28 DIAGNOSIS — R00.2 PALPITATIONS: ICD-10-CM

## 2017-02-28 PROCEDURE — 93227 XTRNL ECG REC<48 HR R&I: CPT | Performed by: INTERNAL MEDICINE

## 2017-02-28 PROCEDURE — 93226 XTRNL ECG REC<48 HR SCAN A/R: CPT

## 2017-02-28 PROCEDURE — 93306 TTE W/DOPPLER COMPLETE: CPT | Mod: 26 | Performed by: INTERNAL MEDICINE

## 2017-02-28 PROCEDURE — 93306 TTE W/DOPPLER COMPLETE: CPT

## 2017-03-03 ENCOUNTER — TELEPHONE (OUTPATIENT)
Dept: CARDIOLOGY | Facility: CLINIC | Age: 82
End: 2017-03-03

## 2017-03-03 NOTE — TELEPHONE ENCOUNTER
Echo showed normal LV function.  Please update pt on results.       Tato Au MD      Called and left message on son Phuc' phone with normal echo results.

## 2017-03-06 ENCOUNTER — TELEPHONE (OUTPATIENT)
Dept: FAMILY MEDICINE | Facility: CLINIC | Age: 82
End: 2017-03-06

## 2017-03-06 NOTE — TELEPHONE ENCOUNTER
Date Received: 03/06/17    Sent by: fax    For: orders     Last Office Visit: 02/02/17    Return By: fax    Placed in KW In Box

## 2017-03-07 ENCOUNTER — TELEPHONE (OUTPATIENT)
Dept: CARDIOLOGY | Facility: CLINIC | Age: 82
End: 2017-03-07

## 2017-03-07 NOTE — TELEPHONE ENCOUNTER
Message  Received: Today       Tato Au MD  P [facility] Heart Ep Nurse                     Holter revealed Afib with good control.  We will continue current therapy.  Please update pt on results.  Tato Au       Writer called pt's son, Phuc, with normal results as above, and he will update his father. AMARA Bingham.

## 2017-03-10 PROBLEM — Z87.01 HISTORY OF PNEUMONIA: Status: ACTIVE | Noted: 2017-03-10

## 2017-03-22 DIAGNOSIS — L25.8 CONTACT DERMATITIS DUE TO OTHER AGENT, UNSPECIFIED CONTACT DERMATITIS TYPE: ICD-10-CM

## 2017-03-22 RX ORDER — HYDROCORTISONE VALERATE CREAM 2 MG/G
CREAM TOPICAL
Qty: 30 G | Refills: 0 | Status: SHIPPED | OUTPATIENT
Start: 2017-03-22 | End: 2017-05-31

## 2017-03-22 NOTE — TELEPHONE ENCOUNTER
hydrocortisone (WESTCORT) 0.2 % cream      Last Written Prescription Date: 2/2/2017  Last Fill Quantity: 30 g,  # refills: 0   Last Office Visit with FMG, U[facility] or [facility] prescribing provider: 2/2/2017

## 2017-03-22 NOTE — TELEPHONE ENCOUNTER
appt up to date.  Refill request approved per [facility] protocol    Bree Begum RN

## 2017-03-30 ENCOUNTER — TELEPHONE (OUTPATIENT)
Dept: FAMILY MEDICINE | Facility: CLINIC | Age: 82
End: 2017-03-30

## 2017-03-30 NOTE — TELEPHONE ENCOUNTER
Date Forms was received: March 30, 2017    Forms received by: Fax     Last office visit: 2-2-17    Purpose of Form:  Home Health orders    When the form is due:  asap    How the form needs to be returned for patient:  Fax    Form currently placed  KW forms file  Roula Pablo REDMOND

## 2017-04-05 ENCOUNTER — TELEPHONE (OUTPATIENT)
Dept: FAMILY MEDICINE | Facility: CLINIC | Age: 82
End: 2017-04-05

## 2017-04-05 NOTE — TELEPHONE ENCOUNTER
Date Received: 04/05/17    Sent by: fax    For: physical health for [facility]      Last Office Visit: 02/02/17    Return By: fax/mail     Placed in KW In Box

## 2017-04-27 ENCOUNTER — TELEPHONE (OUTPATIENT)
Dept: FAMILY MEDICINE | Facility: CLINIC | Age: 82
End: 2017-04-27

## 2017-04-27 NOTE — TELEPHONE ENCOUNTER
Date Received: 04/24/17    Sent by: fax    For: orders      Last Office Visit: 02/17/17    Return By: fax    Placed in KW In Box

## 2017-04-29 DIAGNOSIS — K21.9 GASTROESOPHAGEAL REFLUX DISEASE WITHOUT ESOPHAGITIS: ICD-10-CM

## 2017-04-29 DIAGNOSIS — E11.21 TYPE 2 DIABETES MELLITUS WITH DIABETIC NEPHROPATHY (H): ICD-10-CM

## 2017-04-29 DIAGNOSIS — I10 HYPERTENSION GOAL BP (BLOOD PRESSURE) < 140/90: ICD-10-CM

## 2017-05-01 NOTE — TELEPHONE ENCOUNTER
Metformin         Last Written Prescription Date: 5/13/2016  Last Fill Quantity: 180, # refills: 3  Last Office Visit with FMG, P or  Health prescribing provider:  2/2/2017   Next 5 appointments (look out 90 days)     May 31, 2017  3:00 PM CDT   Office Visit with Karen Weiler, MD   [facility] [place] ([facility])    [address] [place] [address] [phone]                   BP Readings from Last 3 Encounters:   02/17/17 132/72   02/02/17 119/70   11/17/16 138/88     Lab Results   Component Value Date    MICROL 61 11/20/2015     Lab Results   Component Value Date    UMALCR 94.98 11/20/2015     Creatinine   Date Value Ref Range Status   05/13/2016 0.90 0.66 - 1.25 mg/dL Final   ]  GFR Estimate   Date Value Ref Range Status   05/13/2016 81 >60 mL/min/1.7m2 Final     Comment:     Non  GFR Calc   11/20/2015 76 >60 mL/min/1.7m2 Final     Comment:     Non  GFR Calc   05/07/2015 88 >60 mL/min/1.7m2 Final     Comment:     Non  GFR Calc     GFR Estimate If Black   Date Value Ref Range Status   05/13/2016 >90   GFR Calc   >60 mL/min/1.7m2 Final   11/20/2015 >90   GFR Calc   >60 mL/min/1.7m2 Final   05/07/2015 >90   GFR Calc   >60 mL/min/1.7m2 Final     Lab Results   Component Value Date    CHOL 149 05/13/2016     Lab Results   Component Value Date    HDL 46 05/13/2016     Lab Results   Component Value Date    LDL 57 05/13/2016     Lab Results   Component Value Date    TRIG 231 05/13/2016     Lab Results   Component Value Date    CHOLHDLRATIO 4.2 05/07/2015     Lab Results   Component Value Date    AST 18 05/13/2016     Lab Results   Component Value Date    ALT 22 05/13/2016     Lab Results   Component Value Date    A1C 5.8 11/17/2016    A1C 6.5 05/13/2016    A1C 6.8 11/20/2015    A1C 6.6 05/07/2015    A1C 6.0 11/13/2014     Potassium   Date Value Ref Range Status   05/13/2016 4.0 3.4 - 5.3 mmol/L  Final     Losartan-HCTZ      Last Written Prescription Date: 11/4/2016  Last Fill Quantity: 90, # refills: 1    Last Office Visit with FMG, UMP or [facility] prescribing provider:  2/2/2017   Future Office Visit:    Next 5 appointments (look out 90 days)     May 31, 2017  3:00 PM CDT   Office Visit with Karen Weiler, MD   [facility] [place] ([facility] [place])    [address]  [place] [address] [phone]                    BP Readings from Last 3 Encounters:   02/17/17 132/72   02/02/17 119/70   11/17/16 138/88

## 2017-05-02 RX ORDER — PANTOPRAZOLE SODIUM 40 MG/1
TABLET, DELAYED RELEASE ORAL
Qty: 90 TABLET | Refills: 2 | Status: SHIPPED | OUTPATIENT
Start: 2017-05-02 | End: 2018-01-23

## 2017-05-02 RX ORDER — LOSARTAN POTASSIUM AND HYDROCHLOROTHIAZIDE 25; 100 MG/1; MG/1
TABLET ORAL
Qty: 90 TABLET | Refills: 0 | Status: SHIPPED | OUTPATIENT
Start: 2017-05-02 | End: 2017-06-21

## 2017-05-02 NOTE — TELEPHONE ENCOUNTER
Medication is being filled for 1 time refill only due to:  Patient needs to be seen because due for diabetes check.   Kaur Hernandez RN, BSN  [facility] [place]

## 2017-05-03 DIAGNOSIS — Z53.9 DIAGNOSIS NOT YET DEFINED: Primary | ICD-10-CM

## 2017-05-03 PROCEDURE — G0180 MD CERTIFICATION HHA PATIENT: HCPCS | Performed by: FAMILY MEDICINE

## 2017-05-15 ENCOUNTER — PRE VISIT (OUTPATIENT)
Dept: CARDIOLOGY | Facility: CLINIC | Age: 82
End: 2017-05-15

## 2017-05-30 ENCOUNTER — TELEPHONE (OUTPATIENT)
Dept: FAMILY MEDICINE | Facility: CLINIC | Age: 82
End: 2017-05-30

## 2017-05-30 NOTE — TELEPHONE ENCOUNTER
Date Forms was received: May 30, 2017    Forms received by: Fax    Last office visit: 2-2-17    Purpose of Form:  Orders for enteral/nutritional supplies    When the form is due:  asap    How the form needs to be returned for patient:  Fax    Form currently placed  KW forms  Roula Shah MA

## 2017-05-31 DIAGNOSIS — L25.8 CONTACT DERMATITIS DUE TO OTHER AGENT, UNSPECIFIED CONTACT DERMATITIS TYPE: ICD-10-CM

## 2017-06-01 RX ORDER — HYDROCORTISONE VALERATE CREAM 2 MG/G
CREAM TOPICAL
Qty: 30 G | Refills: 0 | Status: SHIPPED | OUTPATIENT
Start: 2017-06-01 | End: 2023-01-20

## 2017-06-01 NOTE — TELEPHONE ENCOUNTER
hydrocortisone (WESTCORT) 0.2 % cream      Last Written Prescription Date: 3/22/2017  Last Fill Quantity: 30 g,  # refills: 0   Last Office Visit with G, UMP or [facility] prescribing provider: 2/17/2017                                         Next 5 appointments (look out 90 days)     Jun 21, 2017  9:00 AM CDT   PHYSICAL with Karen Weiler, MD   [facility] [place] [facility]    [address] [place] [address] [phone]

## 2017-06-04 DIAGNOSIS — E11.21 TYPE 2 DIABETES MELLITUS WITH DIABETIC NEPHROPATHY, WITHOUT LONG-TERM CURRENT USE OF INSULIN (H): Primary | ICD-10-CM

## 2017-06-05 NOTE — TELEPHONE ENCOUNTER
Medication was discontinued - why does pt need this?     Called # [phone] with      Wilian  is not available at this time   Will attempt later   Paradise Khan RN, BSN  [facility]

## 2017-06-05 NOTE — TELEPHONE ENCOUNTER
chlorproPAMIDE (DIABINESE) 250 MG TABS (Discontinued)         Last Written Prescription Date: 5/13/2016  Last Fill Quantity: 90 tablet, # refills: 3  Last Office Visit with G, P or [facility] prescribing provider:  2/2/2017   Next 5 appointments (look out 90 days)     Jun 21, 2017  9:00 AM CDT   PHYSICAL with Karen Weiler, MD   [facility] [place] ([facility])    [address]  [place] [place] [id number] [phone]                   BP Readings from Last 3 Encounters:   02/17/17 132/72   02/02/17 119/70   11/17/16 138/88     Lab Results   Component Value Date    MICROL 61 11/20/2015     Lab Results   Component Value Date    UMALCR 94.98 11/20/2015     Creatinine   Date Value Ref Range Status   05/13/2016 0.90 0.66 - 1.25 mg/dL Final   ]  GFR Estimate   Date Value Ref Range Status   05/13/2016 81 >60 mL/min/1.7m2 Final     Comment:     Non  GFR Calc   11/20/2015 76 >60 mL/min/1.7m2 Final     Comment:     Non  GFR Calc   05/07/2015 88 >60 mL/min/1.7m2 Final     Comment:     Non  GFR Calc     GFR Estimate If Black   Date Value Ref Range Status   05/13/2016 >90   GFR Calc   >60 mL/min/1.7m2 Final   11/20/2015 >90   GFR Calc   >60 mL/min/1.7m2 Final   05/07/2015 >90   GFR Calc   >60 mL/min/1.7m2 Final     Lab Results   Component Value Date    CHOL 149 05/13/2016     Lab Results   Component Value Date    HDL 46 05/13/2016     Lab Results   Component Value Date    LDL 57 05/13/2016     Lab Results   Component Value Date    TRIG 231 05/13/2016     Lab Results   Component Value Date    CHOLHDLRATIO 4.2 05/07/2015     Lab Results   Component Value Date    AST 18 05/13/2016     Lab Results   Component Value Date    ALT 22 05/13/2016     Lab Results   Component Value Date    A1C 5.8 11/17/2016    A1C 6.5 05/13/2016    A1C 6.8 11/20/2015    A1C 6.6 05/07/2015    A1C 6.0 11/13/2014     Potassium   Date Value Ref  Range Status   05/13/2016 4.0 3.4 - 5.3 mmol/L Final       Routing refill request to provider for review/approval because:  Drug not active on patient's medication list

## 2017-06-08 NOTE — TELEPHONE ENCOUNTER
was unavailble again     Please review and advise     Thank you     Paradise Khan RN, BSN  [facility]

## 2017-06-14 DIAGNOSIS — E78.5 HYPERLIPIDEMIA WITH TARGET LDL LESS THAN 100: ICD-10-CM

## 2017-06-14 NOTE — TELEPHONE ENCOUNTER
atorvastatin (LIPITOR) 20 MG tablet     Last Written Prescription Date: 5/13/2016  Last Fill Quantity: 90 tablet, # refills: 0  Last Office Visit with G, P or [facility] prescribing provider: 2/2/2017  Next 5 appointments (look out 90 days)     Jun 21, 2017  9:00 AM CDT   PHYSICAL with Karen Weiler, MD   [facility] ([facility]    [address] [facility] [address]   [phone]                   Lab Results   Component Value Date    CHOL 149 05/13/2016     Lab Results   Component Value Date    HDL 46 05/13/2016     Lab Results   Component Value Date    LDL 57 05/13/2016     Lab Results   Component Value Date    TRIG 231 05/13/2016     Lab Results   Component Value Date    CHOLHDLRATIO 4.2 05/07/2015

## 2017-06-15 RX ORDER — ATORVASTATIN CALCIUM 20 MG/1
TABLET, FILM COATED ORAL
Qty: 15 TABLET | Refills: 0 | Status: SHIPPED | OUTPATIENT
Start: 2017-06-15 | End: 2017-06-21

## 2017-06-15 NOTE — TELEPHONE ENCOUNTER
Due for an Office visit for further refills, only fill for 30 days     Paradise Khan RN, BSN  [facility]

## 2017-06-21 ENCOUNTER — OFFICE VISIT (OUTPATIENT)
Dept: FAMILY MEDICINE | Facility: CLINIC | Age: 82
End: 2017-06-21
Payer: COMMERCIAL

## 2017-06-21 VITALS
SYSTOLIC BLOOD PRESSURE: 126 MMHG | DIASTOLIC BLOOD PRESSURE: 64 MMHG | WEIGHT: 156 LBS | TEMPERATURE: 97.8 F | HEIGHT: 66 IN | BODY MASS INDEX: 25.07 KG/M2 | HEART RATE: 72 BPM | OXYGEN SATURATION: 98 %

## 2017-06-21 DIAGNOSIS — M25.562 CHRONIC PAIN OF LEFT KNEE: ICD-10-CM

## 2017-06-21 DIAGNOSIS — I48.19 PERSISTENT ATRIAL FIBRILLATION (H): ICD-10-CM

## 2017-06-21 DIAGNOSIS — E11.21 TYPE 2 DIABETES MELLITUS WITH DIABETIC NEPHROPATHY, WITHOUT LONG-TERM CURRENT USE OF INSULIN (H): ICD-10-CM

## 2017-06-21 DIAGNOSIS — Z00.00 MEDICARE ANNUAL WELLNESS VISIT, SUBSEQUENT: Primary | ICD-10-CM

## 2017-06-21 DIAGNOSIS — G89.29 CHRONIC PAIN OF LEFT KNEE: ICD-10-CM

## 2017-06-21 DIAGNOSIS — E78.5 HYPERLIPIDEMIA WITH TARGET LDL LESS THAN 100: ICD-10-CM

## 2017-06-21 DIAGNOSIS — I10 HYPERTENSION GOAL BP (BLOOD PRESSURE) < 140/90: ICD-10-CM

## 2017-06-21 LAB
ALBUMIN SERPL-MCNC: 4.3 G/DL (ref 3.4–5)
ALP SERPL-CCNC: 78 U/L (ref 40–150)
ALT SERPL W P-5'-P-CCNC: 25 U/L (ref 0–70)
ANION GAP SERPL CALCULATED.3IONS-SCNC: 12 MMOL/L (ref 3–14)
AST SERPL W P-5'-P-CCNC: 16 U/L (ref 0–45)
BILIRUB SERPL-MCNC: 0.6 MG/DL (ref 0.2–1.3)
BUN SERPL-MCNC: 13 MG/DL (ref 7–30)
CALCIUM SERPL-MCNC: 9.5 MG/DL (ref 8.5–10.1)
CHLORIDE SERPL-SCNC: 93 MMOL/L (ref 94–109)
CHOLEST SERPL-MCNC: 128 MG/DL
CO2 SERPL-SCNC: 26 MMOL/L (ref 20–32)
CREAT SERPL-MCNC: 1.01 MG/DL (ref 0.66–1.25)
CREAT UR-MCNC: 39 MG/DL
ERYTHROCYTE [DISTWIDTH] IN BLOOD BY AUTOMATED COUNT: 12.3 % (ref 10–15)
GFR SERPL CREATININE-BSD FRML MDRD: 71 ML/MIN/1.7M2
GLUCOSE SERPL-MCNC: 90 MG/DL (ref 70–99)
HBA1C MFR BLD: 6.2 % (ref 4.3–6)
HCT VFR BLD AUTO: 42 % (ref 40–53)
HDLC SERPL-MCNC: 39 MG/DL
HGB BLD-MCNC: 13.5 G/DL (ref 13.3–17.7)
LDLC SERPL CALC-MCNC: 35 MG/DL
MCH RBC QN AUTO: 24 PG (ref 26.5–33)
MCHC RBC AUTO-ENTMCNC: 32.1 G/DL (ref 31.5–36.5)
MCV RBC AUTO: 75 FL (ref 78–100)
MICROALBUMIN UR-MCNC: 73 MG/L
MICROALBUMIN/CREAT UR: 186 MG/G CR (ref 0–17)
NONHDLC SERPL-MCNC: 89 MG/DL
PLATELET # BLD AUTO: 231 10E9/L (ref 150–450)
POTASSIUM SERPL-SCNC: 4 MMOL/L (ref 3.4–5.3)
PROT SERPL-MCNC: 7.9 G/DL (ref 6.8–8.8)
RBC # BLD AUTO: 5.62 10E12/L (ref 4.4–5.9)
SODIUM SERPL-SCNC: 131 MMOL/L (ref 133–144)
TRIGL SERPL-MCNC: 269 MG/DL
TSH SERPL DL<=0.005 MIU/L-ACNC: 1.72 MU/L (ref 0.4–4)
WBC # BLD AUTO: 6.6 10E9/L (ref 4–11)

## 2017-06-21 PROCEDURE — 99397 PER PM REEVAL EST PAT 65+ YR: CPT | Performed by: FAMILY MEDICINE

## 2017-06-21 PROCEDURE — 83036 HEMOGLOBIN GLYCOSYLATED A1C: CPT | Performed by: FAMILY MEDICINE

## 2017-06-21 PROCEDURE — 85027 COMPLETE CBC AUTOMATED: CPT | Performed by: FAMILY MEDICINE

## 2017-06-21 PROCEDURE — 82043 UR ALBUMIN QUANTITATIVE: CPT | Performed by: FAMILY MEDICINE

## 2017-06-21 PROCEDURE — 80061 LIPID PANEL: CPT | Performed by: FAMILY MEDICINE

## 2017-06-21 PROCEDURE — 36415 COLL VENOUS BLD VENIPUNCTURE: CPT | Performed by: FAMILY MEDICINE

## 2017-06-21 PROCEDURE — 80053 COMPREHEN METABOLIC PANEL: CPT | Performed by: FAMILY MEDICINE

## 2017-06-21 PROCEDURE — 84443 ASSAY THYROID STIM HORMONE: CPT | Performed by: FAMILY MEDICINE

## 2017-06-21 RX ORDER — ACETAMINOPHEN 325 MG/1
650 TABLET ORAL EVERY 6 HOURS PRN
Qty: 100 TABLET | Refills: 0 | Status: SHIPPED | OUTPATIENT
Start: 2017-06-21 | End: 2017-07-19

## 2017-06-21 RX ORDER — ATORVASTATIN CALCIUM 20 MG/1
TABLET, FILM COATED ORAL
Qty: 45 TABLET | Refills: 1 | Status: SHIPPED | OUTPATIENT
Start: 2017-06-21 | End: 2017-12-11

## 2017-06-21 RX ORDER — LOSARTAN POTASSIUM AND HYDROCHLOROTHIAZIDE 25; 100 MG/1; MG/1
1 TABLET ORAL DAILY
Qty: 90 TABLET | Refills: 1 | Status: SHIPPED | OUTPATIENT
Start: 2017-06-21 | End: 2017-12-11

## 2017-06-21 NOTE — LETTER
[facility] [place]          [address] [place] [address]                                           [phone]  June 23, 2017     aMnfred Caraballo  [address] [place] [id number]      Dear Manfred,    The results of your recent tests were reviewed and are as follows:    Dr. Weiler has reviewed your recent labs:   -Sodium level was slightly low, as it has been in the past. Kidney and liver tests were normal.   -Cholesterol levels are at your goal levels.  ADVISE: Continuing your medication, a regular exercise program with at least 30 minutes of aerobic exercise 3-4 days/week, and a low saturated fat/low carbohydrate diet are helpful to maintain this.  Rechecking your fasting cholesterol panel in 12 months is recommended.   -TSH (thyroid stimulating hormone) level is normal which indicates normal thyroid function.   -Normal red blood cell (hgb) levels, normal white blood cell count and normal platelet levels.   -A1C (test of diabetes control the last 2-3 months) is at your goal.   -Microalbumin (urine protein) level is elevated. 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 Recheck in 1 year     Enclosed is a copy of the results.     Thank you for choosing [facility].  We appreciate the opportunity to serve you and look forward to supporting your healthcare needs in the future.    If you have any questions or concerns, please call me or my staff at [phone].      Sincerely,    YEISON Paredes

## 2017-06-21 NOTE — NURSING NOTE
"Chief Complaint   Patient presents with     Wellness Visit       Initial /64  Pulse 72  Temp 97.8  F (36.6  C) (Oral)  Ht 5' 5.5\" (1.664 m)  Wt 156 lb (70.8 kg)  SpO2 98%  BMI 25.56 kg/m2 Estimated body mass index is 25.56 kg/(m^2) as calculated from the following:    Height as of this encounter: 5' 5.5\" (1.664 m).    Weight as of this encounter: 156 lb (70.8 kg).  Medication Reconciliation: complete   Maeve Coleman Certified Medical Assistant      "

## 2017-06-21 NOTE — PROGRESS NOTES
SUBJECTIVE:                                                            Manfred Caraballo is a 82 year old male who presents with an  for Preventive Visit.    Are you in the first 12 months of your Medicare Part B coverage?  No    Healthy Habits:    Do you get at least three servings of calcium containing foods daily (dairy, green leafy vegetables, etc.)? yes    Amount of exercise or daily activities, outside of work: 3 day(s) per week    Problems taking medications regularly No    Medication side effects: No    Have you had an eye exam in the past two years? yes    Do you see a dentist twice per year? no    Do you have sleep apnea, excessive snoring or daytime drowsiness?no    COGNITIVE SCREEN  1) Repeat 3 items (Banana, Sunrise, Chair)    2) Clock draw: NORMAL  3) 3 item recall: Recalls 2 objects   Results: NORMAL clock, 1-2 items recalled: COGNITIVE IMPAIRMENT LESS LIKELY    Mini-CogTM Copyright S Thomas. Licensed by the author for use in [facility]; reprinted with permission (fina@[facility]). All rights reserved.      Pt reports he is feeling well, denies chest pain or SOB, was given meds for is cardiovascular, and has helped, still taking them. He is seeing cardiologist in 1 year. Is taking Xarelto with no side affects for blood thinner. Pt has A. Fib and is taking Xarelto to help prevent CVA. He reports his blood sugar levels are around 103, lowest was 98. He is sleeping well, lives with his wife and kids. He is going to adult  3X weekly and likes the social life. Pt needs med refills. Denies problems with his current medications. Pt notes he has back and knee pain, has taken Tylenol for relief which helps, has had injections in his knee couple times. BP looks good today. No urinary problems, abdominal pain, or feet swelling.         Reviewed and updated as needed this visit by clinical staff  Tobacco  Allergies  Meds  Med Hx  Surg Hx  Fam Hx  Soc Hx        Reviewed and  updated as needed this visit by Provider        Social History   Substance Use Topics     Smoking status: Former Smoker     Packs/day: 0.50     Years: 20.00     Types: Cigarettes     Quit date: 5/13/1981     Smokeless tobacco: Never Used      Comment: quit in 1981      Alcohol use No      Comment: quit in 1981       No alcohol use     Today's PHQ-2 Score:   PHQ-2 ( 1999 Pf[employer]) 6/21/2017 2/2/2017   Q1: Little interest or pleasure in doing things 0 0   Q2: Feeling down, depressed or hopeless 0 0   PHQ-2 Score 0 0       Do you feel safe in your environment - Yes    Do you have a Health Care Directive?: No: Advance care planning was reviewed with patient; patient declined at this time.    Current providers sharing in care for this patient include:   Patient Care Team:  Weiler, Karen, MD as PCP - General (Family Practice)      Hearing impairment: No    Ability to successfully perform activities of daily living: Yes, no assistance needed     Fall risk:  Fallen 2 or more times in the past year?: No  Any fall with injury in the past year?: No    Home safety:  none identified      The following health maintenance items are reviewed in Epic and correct as of today:  Health Maintenance   Topic Date Due     TETANUS Q10 YR  02/09/2015     COLONOSCOPY Q5 YR  05/20/2015     MICROALBUMIN Q1 YEAR  11/20/2016     FOOT EXAM Q1 YEAR  05/13/2017     BMP Q1 YR  05/13/2017     FALL RISK ASSESSMENT  05/13/2017     LIPID MONITORING Q1 YEAR  05/13/2017     PSA Q1 YR  05/13/2017     A1C Q6 MO  05/17/2017     EYE EXAM Q1 YEAR  06/22/2017     INFLUENZA VACCINE (SYSTEM ASSIGNED)  09/01/2017     COPD ACTION PLAN Q1 YR  11/17/2017     HEMOGLOBIN Q1 YR  11/17/2017     TSH W/ FREE T4 REFLEX Q2 YEAR  11/20/2017     WELLNESS VISIT Q1 YR  06/21/2018     ADVANCE DIRECTIVE PLANNING Q5 YRS  05/13/2021     SPIROMETRY ONETIME  Completed     PNEUMOCOCCAL  Completed         Pneumonia Vaccine: Completed     ROS:  Constitutional, HEENT, cardiovascular,  "pulmonary, gi and gu systems are negative, except as otherwise noted.    This document serves as a record of the services and decisions personally performed and made by Karen Weiler, MD. It was created on her behalf by Eddie Dias, a trained medical scribe. The creation of this document is based on the provider's statements to the medical scribe.  Eddie Dias 10:00 AM June 21, 2017    Problem list, Medication list, Allergies, and Medical/Social/Surgical histories reviewed in [facility] and updated as appropriate.  OBJECTIVE:                                                            /64  Pulse 72  Temp 97.8  F (36.6  C) (Oral)  Ht 1.664 m (5' 5.5\")  Wt 70.8 kg (156 lb)  SpO2 98%  BMI 25.56 kg/m2 Estimated body mass index is 25.56 kg/(m^2) as calculated from the following:    Height as of this encounter: 1.664 m (5' 5.5\").    Weight as of this encounter: 70.8 kg (156 lb).  EXAM:   GENERAL APPEARANCE: healthy, alert and no distress  EYES: Eyes grossly normal to inspection, PERRL and conjunctivae and sclerae normal  HENT: ear canals and TM's normal, nose and mouth without ulcers or lesions, oropharynx clear and oral mucous membranes moist  NECK: no adenopathy, no asymmetry, masses, or scars and thyroid normal to palpation  RESP: lungs clear to auscultation - no rales, rhonchi or wheezes  CV: regular rate and rhythm, normal S1 S2, no S3 or S4, no murmur, click or rub, no peripheral edema and peripheral pulses strong  ABDOMEN: soft, nontender, no hepatosplenomegaly, no masses and bowel sounds normal  MS: no musculoskeletal defects are noted and gait is age appropriate without ataxia  SKIN: no suspicious lesions or rashes  NEURO: Normal strength and tone, sensory exam grossly normal, mentation intact and speech normal  PSYCH: mentation appears normal and affect normal/bright    Lab Results   Component Value Date    A1C 6.2 06/21/2017    A1C 5.8 11/17/2016    A1C 6.5 05/13/2016    A1C 6.8 11/20/2015    A1C 6.6 " 05/07/2015     Other labs currently updating.  ASSESSMENT / PLAN:                                                              (I10) Hypertension goal BP (blood pressure) < 140/90  (primary encounter diagnosis)  Comment: BP was good today (126/64). Meds refilled. Pt BP is well controlled with current medication.  Plan: CBC with platelets, Albumin Random Urine         Quantitative, losartan-hydrochlorothiazide         (HYZAAR) 100-25 MG per tablet        Pt will continue taking Hyzaar as prescribed to control his BP. Follow up if needed. Labs pending, will follow up with results.    (E78.5) Hyperlipidemia with target LDL less than 100  Comment: Patient's hyperlipidemia is well controlled with Lipitor, meds refilled.  Plan: Lipid panel reflex to direct LDL, atorvastatin         (LIPITOR) 20 MG tablet        Pt will continue with Lipitor as prescribed to control his cholesterol levels. Follow up if needed. Labs pending, will follow up with results.    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 His diabetes is well controlled with Metformin. Meds refilled.  Plan: TSH with free T4 reflex, Hemoglobin A1c,         Comprehensive metabolic panel (BMP + Alb, Alk         Phos, ALT, AST, Total. Bili, TP), Albumin         Random Urine Quantitative, metFORMIN         (GLUCOPHAGE) 1000 MG tablet        Labs pending, will follow up with results. Pt will continue with Metformin to control his diabetes. Follow up if needed.    (I48.1) Persistent atrial fibrillation (H)  Comment: Pt is seeing cardiologist Feels better  Plan: Pt will continue with Xarelto to thin his blood for CVA prevention, A. Fib is rate controlled.  advised pt he cannot take Ibuprofen while being on Xarelto, pt was in agreement with this.    (Z00.00) Medicare annual wellness visit, subsequent  Comment: Patient is doing well.  Plan: Pt will follow up in 1  "year for an annual wellness visit due 6/21/18.    (M25.562,  G89.29) Chronic pain of left knee  Comment: Pt has had knee injections in the past with relief, but declined at this time for an additional referral due to his knee pain being tolerable.  Plan: acetaminophen (TYLENOL) 325 MG tablet        Will give pt Tylenol prescription, will take as prescribed and as needed for his knee pain. Advised pt he cannot take Ibuprofen for knee pain while being on Xarelto. Advised pt to call clinic for a knee injection referral if his knee pain worsens.      End of Life Planning:  Patient currently has an advanced directive: No.  I have verified the patient's ablity to prepare an advanced directive/make health care decisions.  Literature was provided to assist patient in preparing an advanced directive.    COUNSELING:  Reviewed preventive health counseling, as reflected in patient instructions       Regular exercise       Healthy diet/nutrition       Advanced Planning       Estimated body mass index is 25.56 kg/(m^2) as calculated from the following:    Height as of this encounter: 1.664 m (5' 5.5\").    Weight as of this encounter: 70.8 kg (156 lb).      reports that he quit smoking about 36 years ago. His smoking use included Cigarettes. He has a 10.00 pack-year smoking history. He has never used smokeless tobacco.      Appropriate preventive services were discussed with this patient, including applicable screening as appropriate for cardiovascular disease, diabetes, osteopenia/osteoporosis, and glaucoma.  As appropriate for age/gender, discussed screening for colorectal cancer, prostate cancer, breast cancer, and cervical cancer. Checklist reviewing preventive services available has been given to the patient.    Reviewed patients plan of care and provided an AVS. The Basic Care Plan (routine screening as documented in Health Maintenance) for [facility] meets the Care Plan requirement. This Care Plan has been established and " reviewed with the Patient.    Counseling Resources:  ATP IV Guidelines  Pooled Cohorts Equation Calculator  Breast Cancer Risk Calculator  FRAX Risk Assessment  ICSI Preventive Guidelines  Dietary Guidelines for Americans, 2010  USDA's MyPlate  ASA Prophylaxis  Lung CA Screening    The information in this document, created by the medical scribe for me, accurately reflects the services I personally performed and the decisions made by me. I have reviewed and approved this document for accuracy prior to leaving the patient care area.  June 21, 2017 9:59 AM    Karen Weiler, MD  [facility]

## 2017-06-21 NOTE — MR AVS SNAPSHOT
After Visit Summary   6/21/2017    Manfred Caraballo    MRN: [id number]           Patient Information     Date Of Birth          1935        Visit Information        Provider Department      6/21/2017 8:45 AM Weiler, Karen, MD; [employer] [facility] [place]        Today's Diagnoses     Medicare annual wellness visit, subsequent    -  1    Hypertension goal BP (blood pressure) < 140/90        Hyperlipidemia with target LDL less than 100        Type 2 diabetes mellitus with diabetic nephropathy, without long-term current use of insulin (H)        Persistent atrial fibrillation (H)        Chronic pain of left knee          Care Instructions      Preventive Health Recommendations:       Male Ages 65 and over    Yearly exam:             See your health care provider every year in order to  o   Review health changes.   o   Discuss preventive care.    o   Review your medicines if your doctor has prescribed any.    Talk with your health care provider about whether you should have a test to screen for prostate cancer (PSA).    Every 3 years, have a diabetes test (fasting glucose). If you are at risk for diabetes, you should have this test more often.    Every 5 years, have a cholesterol test. Have this test more often if you are at risk for high cholesterol or heart disease.     Every 10 years, have a colonoscopy. Or, have a yearly FIT test (stool test). These exams will check for colon cancer.    Talk to with your health care provider about screening for Abdominal Aortic Aneurysm if you have a family history of AAA or have a history of smoking.  Shots:     Get a flu shot each year.     Get a tetanus shot every 10 years.     Talk to your doctor about your pneumonia vaccines. There are now two you should receive - Pneumovax (PPSV 23) and Prevnar (PCV 13).    Talk to your doctor about a shingles vaccine.     Talk to your doctor about the hepatitis B vaccine.  Nutrition:     Eat at least  5 servings of fruits and vegetables each day.     Eat whole-grain bread, whole-wheat pasta and brown rice instead of white grains and rice.     Talk to your doctor about Calcium and Vitamin D.   Lifestyle    Exercise for at least 150 minutes a week (30 minutes a day, 5 days a week). This will help you control your weight and prevent disease.     Limit alcohol to one drink per day.     No smoking.     Wear sunscreen to prevent skin cancer.     See your dentist every six months for an exam and cleaning.     See your eye doctor every 1 to 2 years to screen for conditions such as glaucoma, macular degeneration and cataracts.          Follow-ups after your visit        Your next 10 appointments already scheduled     Dec 11, 2017 11:00 AM CST   Office Visit with Karen Weiler, MD   [facility] [place] [facility]    [address] [address] [phone]           Bring a current list of meds and any records pertaining to this visit.  For Physicals, please bring immunization records and any forms needing to be filled out.  Please arrive 10 minutes early to complete paperwork.              Who to contact     If you have questions or need follow up information about today's clinic visit or your schedule please contact [facility] [place] directly at [phone].  Normal or non-critical lab and imaging results will be communicated to you by [employer], letter or phone within 4 business days after the clinic has received the results. If you do not hear from us within 7 days, please contact the clinic through [employer] or phone. If you have a critical or abnormal lab result, we will notify you by phone as soon as possible.  Submit refill requests through [employer] or call your pharmacy and they will forward the refill request to us. Please allow 3 business days for your refill to be completed.          Additional Information About Your Visit        [employer] Information     [employer] lets you send  "messages to your doctor, view your test results, renew your prescriptions, schedule appointments and more. To sign up, go to [email or web address] . Click on \"Log in\" on the left side of the screen, which will take you to the Welcome page. Then click on \"Sign up Now\" on the right side of the page.     You will be asked to enter the access code listed below, as well as some personal information. Please follow the directions to create your username and password.     Your access code is: [email or web address]  Expires: 2017 10:05 PM     Your access code will  in 90 days. If you need help or a new code, please call your [place] clinic or [phone].        Care EveryWhere ID     This is your Care EveryWhere ID. This could be used by other organizations to access your [place] medical records  [id number]        Your Vitals Were     Pulse Temperature Height Pulse Oximetry BMI (Body Mass Index)       72 97.8  F (36.6  C) (Oral) 5' 5.5\" (1.664 m) 98% 25.56 kg/m2        Blood Pressure from Last 3 Encounters:   17 126/64   17 132/72   17 119/70    Weight from Last 3 Encounters:   17 156 lb (70.8 kg)   17 155 lb 12.8 oz (70.7 kg)   17 153 lb (69.4 kg)              We Performed the Following     Albumin Random Urine Quantitative     CBC with platelets     Comprehensive metabolic panel (BMP + Alb, Alk Phos, ALT, AST, Total. Bili, TP)     Hemoglobin A1c     Lipid panel reflex to direct LDL     TSH with free T4 reflex          Today's Medication Changes          These changes are accurate as of: 17 11:59 PM.  If you have any questions, ask your nurse or doctor.               Start taking these medicines.        Dose/Directions    acetaminophen 325 MG tablet   Commonly known as:  TYLENOL   Used for:  Chronic pain of left knee   Started by:  Weiler, Karen, MD        Dose:  650 mg   Take 2 tablets (650 mg) by mouth every 6 hours as needed for mild pain   Quantity:  100 tablet "   Refills:  0         These medicines have changed or have updated prescriptions.        Dose/Directions    atorvastatin 20 MG tablet   Commonly known as:  LIPITOR   This may have changed:  See the new instructions.   Used for:  Hyperlipidemia with target LDL less than 100   Changed by:  Weiler, Karen, MD        TAKE 0.5 TABLETS (10 MG) BY MOUTH DAILY   Quantity:  45 tablet   Refills:  1       losartan-hydrochlorothiazide 100-25 MG per tablet   Commonly known as:  HYZAAR   This may have changed:  See the new instructions.   Used for:  Hypertension goal BP (blood pressure) < 140/90   Changed by:  Weiler, Karen, MD        Dose:  1 tablet   Take 1 tablet by mouth daily   Quantity:  90 tablet   Refills:  1       metFORMIN 1000 MG tablet   Commonly known as:  GLUCOPHAGE   This may have changed:  See the new instructions.   Used for:  Type 2 diabetes mellitus with diabetic nephropathy, without long-term current use of insulin (H)   Changed by:  Weiler, Karen, MD        TAKE 1 TABLET BY MOUTH 2 TIMES DAILY (WITH MEALS)   Quantity:  180 tablet   Refills:  1            Where to get your medicines      These medications were sent to [facility]/pharmacy #[id number] - [place] - [address]  [address] [place] [address]     Phone:  [phone]     acetaminophen 325 MG tablet    atorvastatin 20 MG tablet    losartan-hydrochlorothiazide 100-25 MG per tablet    metFORMIN 1000 MG tablet                Primary Care Provider Office Phone # Fax #    Karen Weiler, [phone]       [facility] [place] [address]  [place] [address]        Equal Access to Services     CHARLOTTE WHITING AH: Hadii kenneth watson hadgeovannao Soreynaldo, waaxda luqadaha, qaybta kaalmada adeegyada, gali hall. So [facility] [phone].    ATENCIÓN: Si habla español, tiene a padilla disposición servicios gratuitos de asistencia lingüística. Llchris al [phone].    We comply with applicable federal civil rights laws and  [place] laws. We do not discriminate on the basis of race, color, national origin, age, disability sex, sexual orientation or gender identity.            Thank you!     Thank you for choosing [facility] [place]  for your care. Our goal is always to provide you with excellent care. Hearing back from our patients is one way we can continue to improve our services. Please take a few minutes to complete the written survey that you may receive in the mail after your visit with us. Thank you!             Your Updated Medication List - Protect others around you: Learn how to safely use, store and throw away your medicines at www.disposemymeds.org.          This list is accurate as of: 6/21/17 11:59 PM.  Always use your most recent med list.                   Brand Name Dispense Instructions for use Diagnosis    acetaminophen 325 MG tablet    TYLENOL    100 tablet    Take 2 tablets (650 mg) by mouth every 6 hours as needed for mild pain    Chronic pain of left knee       albuterol 108 (90 BASE) MCG/ACT Inhaler    PROAIR HFA/PROVENTIL HFA/VENTOLIN HFA    1 Inhaler    Inhale 2 puffs into the lungs every 6 hours as needed for shortness of breath / dyspnea    Chronic obstructive pulmonary disease, unspecified COPD type (H)       ASPIRIN NOT PRESCRIBED    INTENTIONAL    1 Tab    not prescribed - ulcer history    Type II or unspecified type diabetes mellitus without mention of complication, not stated as uncontrolled       atorvastatin 20 MG tablet    LIPITOR    45 tablet    TAKE 0.5 TABLETS (10 MG) BY MOUTH DAILY    Hyperlipidemia with target LDL less than 100       blood glucose monitoring meter device kit     1 kit    Use to test blood sugars  1 times daily or as directed.    Type 2 diabetes mellitus (H)       * blood glucose monitoring test strip    ACCU-CHEK COMFORT CURVE    100 strip    1 strip by In Vitro route 2 times daily Or whatever meter he has please    Type 2 diabetes, HbA1C goal < 8% (H)       * KEITH  CONTOUR test strip   Generic drug:  blood glucose monitoring     300 strip    USE TO TEST BLOOD SUGAR 1 TIMES DAILY OR AS DIRECTED.    Type 2 diabetes mellitus with diabetic nephropathy (H)       * blood glucose monitoring test strip    no brand specified    100 each    Use to test blood sugars 1  times daily or as directed    Type 2 diabetes mellitus with diabetic nephropathy (H)       diltiazem 120 MG 24 hr capsule    CARDIZEM CD    90 capsule    Take 1 capsule (120 mg) by mouth daily    Persistent atrial fibrillation (H)       fluticasone 50 MCG/ACT spray    FLONASE    16 g    Spray 1-2 sprays into both nostrils daily    Chronic obstructive pulmonary disease, unspecified COPD type (H)       guaiFENesin-codeine 100-10 MG/5ML Soln solution    ROBITUSSIN AC    120 mL    Take 10 mLs by mouth every 4 hours as needed for cough    Cough       hydrocortisone 0.2 % cream    WESTCORT    30 g    APPLY SPARINGLY TO AFFECTED AREA THREE TIMES DAILY FOR 14 DAYS.    Contact dermatitis due to other agent, unspecified contact dermatitis type       ipratropium - albuterol 0.5 mg/2.5 mg/3 mL 0.5-2.5 (3) MG/3ML neb solution    DUONEB    30 vial    Take 1 vial (3 mLs) by nebulization every 6 hours as needed for shortness of breath / dyspnea or wheezing    Cough       losartan-hydrochlorothiazide 100-25 MG per tablet    HYZAAR    90 tablet    Take 1 tablet by mouth daily    Hypertension goal BP (blood pressure) < 140/90       metFORMIN 1000 MG tablet    GLUCOPHAGE    180 tablet    TAKE 1 TABLET BY MOUTH 2 TIMES DAILY (WITH MEALS)    Type 2 diabetes mellitus with diabetic nephropathy, without long-term current use of insulin (H)       pantoprazole 40 MG EC tablet    PROTONIX    90 tablet    TAKE 1 TABLET (40 MG) BY MOUTH DAILY    Gastroesophageal reflux disease without esophagitis       polyethylene glycol powder    MIRALAX    510 g    Take 17 g (1 capful) by mouth daily    Constipation, unspecified constipation type       potassium  chloride SA 20 MEQ CR tablet    K-DUR/KLOR-CON M    90 tablet    Take 1 tablet (20 mEq) by mouth daily    Hypertension goal BP (blood pressure) < 140/90       rivaroxaban ANTICOAGULANT 20 MG Tabs tablet    XARELTO    30 tablet    Take 1 tablet (20 mg) by mouth daily (with dinner)    Atrial fibrillation (H)       * Notice:  This list has 3 medication(s) that are the same as other medications prescribed for you. Read the directions carefully, and ask your doctor or other care provider to review them with you.

## 2017-06-22 NOTE — PROGRESS NOTES
Please send the following letter:    Dear Manfred,    Dr. Weiler has reviewed your recent labs:  -Sodium level was slightly low, as it has been in the past. Kidney and liver tests were normal.  -Cholesterol levels are at your goal levels.  ADVISE: Continuing your medication, a regular exercise program with at least 30 minutes of aerobic exercise 3-4 days/week, and a low saturated fat/low carbohydrate diet are helpful to maintain this.  Rechecking your fasting cholesterol panel in 12 months is recommended.  -TSH (thyroid stimulating hormone) level is normal which indicates normal thyroid function.  -Normal red blood cell (hgb) levels, normal white blood cell count and normal platelet levels.  -A1C (test of diabetes control the last 2-3 months) is at your goal.   -Microalbumin (urine protein) level is elevated. 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 Recheck in 1 year    Please contact the clinic if you have additional questions.  Thank you.    Sincerely,    Susan Dias PA-C  Physician extender for Dr. Karen Weiler

## 2017-06-24 DIAGNOSIS — I10 HYPERTENSION GOAL BP (BLOOD PRESSURE) < 140/90: ICD-10-CM

## 2017-06-26 DIAGNOSIS — K59.00 CONSTIPATION, UNSPECIFIED CONSTIPATION TYPE: ICD-10-CM

## 2017-06-26 RX ORDER — POTASSIUM CHLORIDE 1500 MG/1
TABLET, EXTENDED RELEASE ORAL
Qty: 90 TABLET | Refills: 3 | Status: SHIPPED | OUTPATIENT
Start: 2017-06-26 | End: 2018-09-07

## 2017-06-26 NOTE — TELEPHONE ENCOUNTER
potassium chloride SA (K-DUR/KLOR-CON M) 20 MEQ CR tablet   Last Written Prescription Date: 12/29/16  Last Fill Quantity: 90, # refills: 1  Last Office Visit with [facility], [facility] or [facility] prescribing provider: 6/21/17    Prescription approved per [facility] Refill Protocol.  Ada Evans RN- Triage [employer]         Potassium   Date Value Ref Range Status   06/21/2017 4.0 3.4 - 5.3 mmol/L Final     Creatinine   Date Value Ref Range Status   06/21/2017 1.01 0.66 - 1.25 mg/dL Final     BP Readings from Last 3 Encounters:   06/21/17 126/64   02/17/17 132/72   02/02/17 119/70

## 2017-06-27 ENCOUNTER — TELEPHONE (OUTPATIENT)
Dept: FAMILY MEDICINE | Facility: CLINIC | Age: 82
End: 2017-06-27

## 2017-06-27 RX ORDER — POLYETHYLENE GLYCOL 3350 17 G/17G
POWDER, FOR SOLUTION ORAL
Qty: 527 G | Refills: 3 | Status: SHIPPED | OUTPATIENT
Start: 2017-06-27 | End: 2018-08-27

## 2017-06-27 NOTE — TELEPHONE ENCOUNTER
Date Forms was received: June 27, 2017    Forms received by: Fax    Last office visit: 6/21/2017    Purpose of Form:  Nursing Home Orders Plan of Care and Home Health Certification    When the form is due:  ASAP    How the form needs to be returned for patient:  Fax    Form currently placed  KW inbox

## 2017-06-27 NOTE — TELEPHONE ENCOUNTER
Miralax      Last Written Prescription Date: 11/17/2017  Last Fill Quantity: 510,  # refills: 1   Last Office Visit with [facility], [facility] or [facility] prescribing provider: 6/21/2017    Prescription approved per [facility], [facility] or [facility] refill protocol.  Sherry Long RN  Triage [employer]

## 2017-06-28 PROCEDURE — G0179 MD RECERTIFICATION HHA PT: HCPCS | Performed by: FAMILY MEDICINE

## 2017-07-12 ENCOUNTER — TRANSFERRED RECORDS (OUTPATIENT)
Dept: HEALTH INFORMATION MANAGEMENT | Facility: CLINIC | Age: 82
End: 2017-07-12

## 2017-07-19 DIAGNOSIS — M25.562 CHRONIC PAIN OF LEFT KNEE: ICD-10-CM

## 2017-07-19 DIAGNOSIS — G89.29 CHRONIC PAIN OF LEFT KNEE: ICD-10-CM

## 2017-07-19 NOTE — TELEPHONE ENCOUNTER
acetaminophen (TYLENOL) 325 MG tablet      Last Written Prescription Date: 6/21/2017  Last Fill Quantity: 100 tablet,  # refills: 0   Last Office Visit with [facility], [facility] or [facility] prescribing provider: 6/21/2017                                         Next 5 appointments (look out 90 days)     Jul 31, 2017 10:40 AM CDT   Office Visit with Amandeep Rosario Jr., MD, [facility]    [address] [facility] [id number] [phone]

## 2017-07-21 RX ORDER — ACETAMINOPHEN 325 MG/1
TABLET ORAL
Qty: 100 TABLET | Refills: 0 | Status: SHIPPED | OUTPATIENT
Start: 2017-07-21 | End: 2017-09-19

## 2017-07-21 NOTE — TELEPHONE ENCOUNTER
Medication is being filled for 1 time refill only due to:  for coverage until next scheduled appt     Bree Begum RN

## 2017-07-31 ENCOUNTER — OFFICE VISIT (OUTPATIENT)
Dept: FAMILY MEDICINE | Facility: CLINIC | Age: 82
End: 2017-07-31
Payer: COMMERCIAL

## 2017-07-31 ENCOUNTER — RADIANT APPOINTMENT (OUTPATIENT)
Dept: GENERAL RADIOLOGY | Facility: CLINIC | Age: 82
End: 2017-07-31
Attending: FAMILY MEDICINE
Payer: COMMERCIAL

## 2017-07-31 VITALS
DIASTOLIC BLOOD PRESSURE: 70 MMHG | BODY MASS INDEX: 25.83 KG/M2 | TEMPERATURE: 98.2 F | HEIGHT: 65 IN | SYSTOLIC BLOOD PRESSURE: 124 MMHG | HEART RATE: 93 BPM | WEIGHT: 155 LBS | OXYGEN SATURATION: 97 %

## 2017-07-31 DIAGNOSIS — M25.562 CHRONIC PAIN OF LEFT KNEE: Primary | ICD-10-CM

## 2017-07-31 DIAGNOSIS — G89.29 CHRONIC PAIN OF LEFT KNEE: Primary | ICD-10-CM

## 2017-07-31 DIAGNOSIS — Z23 NEED FOR TDAP VACCINATION: ICD-10-CM

## 2017-07-31 DIAGNOSIS — M25.562 CHRONIC PAIN OF LEFT KNEE: ICD-10-CM

## 2017-07-31 DIAGNOSIS — G89.29 CHRONIC PAIN OF LEFT KNEE: ICD-10-CM

## 2017-07-31 PROCEDURE — 90471 IMMUNIZATION ADMIN: CPT | Performed by: FAMILY MEDICINE

## 2017-07-31 PROCEDURE — 90715 TDAP VACCINE 7 YRS/> IM: CPT | Performed by: FAMILY MEDICINE

## 2017-07-31 PROCEDURE — 73562 X-RAY EXAM OF KNEE 3: CPT | Mod: LT

## 2017-07-31 PROCEDURE — 99214 OFFICE O/P EST MOD 30 MIN: CPT | Mod: 25 | Performed by: FAMILY MEDICINE

## 2017-07-31 NOTE — PROGRESS NOTES
"  SUBJECTIVE:                                                    Manfred Caraballo is a 82 year old male who presents to clinic today for the following health issues:      Knee Pain    Onset: 2 week(s) ago     Description:   Left knee  Location: lateral (to the side)  Character: Dull ache  Any injury: no       Describe injury:  na  Did pain occur at time of injury: not applicable  Was activity continued after injury: not applicable  Able to bear weight immediately after injury: not applicable    Accompanying Signs and Symptoms:         Swelling: YES          Popping: YES          Locking: no        Does knee feel like it is going to give out: YES - perhaps that it's weak more than it's going to give out.            Redness or warmth: no    History:          Any previous injury to knee(s): no        Any other joint pain: YES Rt knee    Any previous knee surgery: no  Any previous MRI or X-ray: none  Therapies tried and outcome: Herbal ointment and tylenol with minor relief    Reports injection in his right knee about 8 years ago that doing marvelous job of taking care of his pain. He is hoping he can have an injection today with similar results.        Problem list and histories reviewed & adjusted, as indicated.  Additional history: as documented    Labs reviewed in EPIC    Reviewed and updated as needed this visit by clinical staff  Tobacco  Allergies  Meds  Med Hx  Soc Hx      Reviewed and updated as needed this visit by Provider         ROS:  Constitutional, HEENT, cardiovascular, pulmonary, gi and gu systems are negative, except as otherwise noted.      OBJECTIVE:   /70  Pulse 93  Temp 98.2  F (36.8  C) (Oral)  Ht 5' 5\" (1.651 m)  Wt 155 lb (70.3 kg)  SpO2 97%  BMI 25.79 kg/m2  Body mass index is 25.79 kg/(m^2).  GENERAL APPEARANCE: healthy, alert, no distress and appears significantly younger than stated age.  ORTHO: Left Knee Exam: Inspection: AP/lateral alignment normal, small effusion, No quad " atrophy  Tender: medial joint line, popliteal region  Non-tender: MCL, LCL, lateral joint line, medial femoral condyle, lateral femoral condyle  Active Range of Motion: full flexion, no pain with flexion, full extension, no pain with extension  Strength: not tested  Special tests: normal Valgus stress test, normal Varus, negative Lachman's test, positive Israel's, no apprehension with lateral stress of the patella    Also examined: hip full range of motion     SKIN: no suspicious lesions or rashes  Diagnostic Test Results:  Xray - mild osteoarthritis.  No previous for comparison.    ASSESSMENT/PLAN:             1. Chronic pain of left knee  I'm concerned that this may be a medial meniscus tear rather than osteoarthritis of the knee. Therefore, I'm apprehensive simply to inject him with corticosteroids today. Instead, I have urged him to proceed with an MRI of the knee. Should this demonstrate a medial meniscal tear as feared, would refer him to orthopedics for further evaluation. If his MRI is negative for a tear, I advised him I'd be happy to see him back and could inject his knee or weakness to have one of our sports medicine physicians do this for us.  - XR Knee Left 3 Views; Future  - MR Knee Left w/o Contrast; Future    2. Need for Tdap vaccination  Tdap is updated today.  - TDAP VACCINE (ADACEL)    See Patient Instructions    Amandeep Rosario Jr, MD  [facility]

## 2017-07-31 NOTE — NURSING NOTE
"Chief Complaint   Patient presents with     Knee Pain       Initial /70  Pulse 93  Temp 98.2  F (36.8  C) (Oral)  Ht 5' 5\" (1.651 m)  Wt 155 lb (70.3 kg)  SpO2 97%  BMI 25.79 kg/m2 Estimated body mass index is 25.79 kg/(m^2) as calculated from the following:    Height as of this encounter: 5' 5\" (1.651 m).    Weight as of this encounter: 155 lb (70.3 kg).  Medication Reconciliation: complete   Prior to injection verified patient identity using patient's name and date of birth.  Per orders of Dr. Lopez, injection of TDaP given by Karine Chacko. Patient instructed to remain in clinic for 20 minutes afterwards, and to report any adverse reaction to me immediately. VIS given. Karine Chacko CMA        "

## 2017-08-08 ENCOUNTER — HOSPITAL ENCOUNTER (OUTPATIENT)
Dept: MRI IMAGING | Facility: CLINIC | Age: 82
Discharge: HOME | End: 2017-08-08
Attending: FAMILY MEDICINE | Admitting: FAMILY MEDICINE
Payer: COMMERCIAL

## 2017-08-08 DIAGNOSIS — G89.29 CHRONIC PAIN OF LEFT KNEE: ICD-10-CM

## 2017-08-08 DIAGNOSIS — M25.562 CHRONIC PAIN OF LEFT KNEE: ICD-10-CM

## 2017-08-08 DIAGNOSIS — S83.242A TEAR OF MEDIAL MENISCUS OF LEFT KNEE, UNSPECIFIED TEAR TYPE, UNSPECIFIED WHETHER OLD OR CURRENT TEAR, INITIAL ENCOUNTER: Primary | ICD-10-CM

## 2017-08-08 PROCEDURE — 73721 MRI JNT OF LWR EXTRE W/O DYE: CPT | Mod: LT

## 2017-08-08 NOTE — PROGRESS NOTES
Please send the following letter:    Mr. Caraballo,    As we feared, the MRI of your left knee shows a tear in the medial meniscus (cartilage) of the knee.  I think this is what is causing your pain and swelling.  I've referred you to our orthopedic surgeons Wilbert Duenas and Piter to see if they think surgery will improve things.  Someone from their office will be contacting you to schedule an appointment with one of them.      If you have further questions about the interpretation of your labs, [email or web address] is a good website to check out for further information.      Please contact the clinic if you have additional questions.  Thank you.    Sincerely,    Amandeep Rosario MD

## 2017-08-08 NOTE — LETTER
Manfred Caraballo  [address] [place] [id number]        August 8, 2017          Dear ,    We are writing to inform you of your test results.    As we feared, the MRI of your left knee shows a tear in the medial meniscus (cartilage) of the knee.  I think this is what is causing your pain and swelling.  I've referred you to our orthopedic surgeons Wilbert Duenas and Piter to see if they think surgery will improve things.  Someone from their office will be contacting you to schedule an appointment with one of them.       If you have further questions about the interpretation of your labs, [email or web address] is a good website to check out for further information.     Resulted Orders   MR Knee Left w/o Contrast    Narrative    MR KNEE LEFT WITHOUT CONTRAST   8/8/2017 8:39 AM    HISTORY: Pain in left knee. Other chronic pain.    TECHNIQUE: Sagittal proton density and T2, coronal T1, and coronal and  transverse fat suppressed T2 weighted images.    FINDINGS:   Medial Meniscus: There is a horizontal tear in the posterior horn  extending from the tip of the meniscus to the peripheral third.  Peripheral intrasubstance degeneration is also noted in the body. No  displaced meniscal flap. There is a lobulated septated cyst extending  caudally from the joint line beneath and posterior to the MCL which  could represent a dissecting meniscal cyst.       Lateral Meniscus: No tear, displaced fragment, or extrusion.       Anterior Cruciate Ligament: The ACL is mildly thickened. This could be  related to mild or early mucoid degeneration. Intact fibers are  present from the femur to the tibia.     Posterior Cruciate Ligament: Intact. No thickening or intrasubstance  signal abnormality.     Medial Collateral Ligament: No sprain or tear identified.    Lateral Collateral Ligament Complex, Popliteus Tendon: The fibular  collateral ligament, biceps femoris tendon, popliteal tendon, and  iliotibial band are  intact.    Osseous Structures and Cartilaginous Surfaces: Moderate diffuse  articular cartilage thinning is noted along the weightbearing aspect  of the medial femoral condyle and medial tibial plateau. Chondral  surfaces in the patellofemoral and lateral compartments appear within  normal limits.    Extensor Mechanism: The quadriceps and infrapatellar tendons are  intact. The medial and lateral patellar retinacula appear  unremarkable.    Joint Space: There is a moderate joint effusion and small popliteal  cyst.    Additional Findings: No semimembranosus-tibial collateral ligament or  pes anserine bursitis.      Impression    IMPRESSION:    1. Medial meniscus posterior horn horizontal tear. Adjacent caudally  dissecting cyst likely represents the meniscal cyst. No displaced  meniscal flap.  2. ACL mild thickening, likely representing mild or early  intrasubstance mucoid degeneration. Intact fibers are present from the  femur to the tibia.  3. Medial compartment grade II-III chondromalacia.  4. Joint effusion and popliteal cyst.    DEBO BRADLEY MD       If you have any questions or concerns, please call the clinic at the number listed above.       Sincerely,    Amandeep Rosario MD

## 2017-08-24 ENCOUNTER — TELEPHONE (OUTPATIENT)
Dept: FAMILY MEDICINE | Facility: CLINIC | Age: 82
End: 2017-08-24

## 2017-08-24 PROCEDURE — G0179 MD RECERTIFICATION HHA PT: HCPCS | Performed by: FAMILY MEDICINE

## 2017-09-13 ENCOUNTER — TELEPHONE (OUTPATIENT)
Dept: FAMILY MEDICINE | Facility: CLINIC | Age: 82
End: 2017-09-13

## 2017-09-13 DIAGNOSIS — S83.242A: Primary | ICD-10-CM

## 2017-09-13 NOTE — TELEPHONE ENCOUNTER
Please call son and let him know the referral has been placed and he can call and make an appointment for patient.  [phone]    Karen Weiler, MD

## 2017-09-13 NOTE — TELEPHONE ENCOUNTER
Reason for Call: Request for an order or referral:    Order or referral being requested: Orthopedic Surgeon, Dr. Duenas and Piter      Date needed: as soon as possible    Has the patient been seen by the PCP for this problem? YES    Additional comments: Pts son states that he is waiting for a referal for an orthopedic surgeron    Phone number Patient can be reached at:  Cell number on file:    Telephone Information:   Mobile [phone]       Best Time:  anytime    Can we leave a detailed message on this number?  YES    Call taken on 9/13/2017 at 3:05 PM by Betsy Burkett

## 2017-09-13 NOTE — TELEPHONE ENCOUNTER
Dr. Weiler please see below patient says he is waiting for ortho referral. Do not see in last OV notes. Dr. Rosario did see patient for knee pain. Please advise. Thank you, Darleen Garcia R.N.

## 2017-09-19 ENCOUNTER — OFFICE VISIT (OUTPATIENT)
Dept: ORTHOPEDICS | Facility: CLINIC | Age: 82
End: 2017-09-19
Payer: COMMERCIAL

## 2017-09-19 VITALS — HEART RATE: 80 BPM | BODY MASS INDEX: 25.83 KG/M2 | WEIGHT: 155 LBS | HEIGHT: 65 IN

## 2017-09-19 DIAGNOSIS — M17.12 PRIMARY LOCALIZED OSTEOARTHROSIS, LOWER LEG, LEFT: ICD-10-CM

## 2017-09-19 DIAGNOSIS — G89.29 CHRONIC PAIN OF LEFT KNEE: ICD-10-CM

## 2017-09-19 DIAGNOSIS — S83.242A TEAR OF MEDIAL MENISCUS OF LEFT KNEE, CURRENT, UNSPECIFIED TEAR TYPE, INITIAL ENCOUNTER: Primary | ICD-10-CM

## 2017-09-19 DIAGNOSIS — M25.562 CHRONIC PAIN OF LEFT KNEE: ICD-10-CM

## 2017-09-19 DIAGNOSIS — M71.22 BAKER'S CYST, LEFT: ICD-10-CM

## 2017-09-19 PROCEDURE — 99203 OFFICE O/P NEW LOW 30 MIN: CPT | Performed by: ORTHOPAEDIC SURGERY

## 2017-09-19 NOTE — NURSING NOTE
"Chief Complaint   Patient presents with     Musculoskeletal Problem     left knee pain       Initial Pulse 80  Ht 5' 5\" (1.651 m)  Wt 155 lb (70.3 kg)  BMI 25.79 kg/m2 Estimated body mass index is 25.79 kg/(m^2) as calculated from the following:    Height as of this encounter: 5' 5\" (1.651 m).    Weight as of this encounter: 155 lb (70.3 kg).  Medication Reconciliation: complete     Andres Carbajal MS, ATC      "

## 2017-09-19 NOTE — TELEPHONE ENCOUNTER
[facility] ACETAMINOPHEN 325 MG tablet      Last Written Prescription Date: 7/21/2017  Last Fill Quantity: 100 tablet, # refills: 0  Last Office Visit with [facility], [facility] or [facility] prescribing provider: 7/31/2017   Next 5 appointments (look out 90 days)     Dec 11, 2017 11:00 AM CST   Office Visit with Karen Weiler, MD   [facility] ([facility])    [address]  [facility] [id number] [phone]                   BP Readings from Last 3 Encounters:   07/31/17 124/70   06/21/17 126/64   02/17/17 132/72     Lab Results   Component Value Date    AST 16 06/21/2017     Lab Results   Component Value Date    ALT 25 06/21/2017     Creatinine   Date Value Ref Range Status   06/21/2017 1.01 0.66 - 1.25 mg/dL Final

## 2017-09-19 NOTE — PATIENT INSTRUCTIONS
What Is Osteoarthritis?    There are about 100 different types of arthritis. In general, arthritis means problems with the joints. A joint is a point in the body where two or more bones come together. Arthritis may also cause problems in the tissue near the joints, including muscles, tendons, and ligaments. And, in some types of arthritis, the entire body can be affected.  Osteoarthritis (OA) is sometimes called degenerative joint disease, or wear-and-tear arthritis. It's the most common type of arthritis. In OA, the cartilage wears away. Cartilage is a slick tissue that covers the ends of the bones. It acts as a cushion and allows them to glide smoothly against each other. When the cartilage wears away, bone rubs against bone. This causes pain, swelling, stiffness, and difficulty moving. Risk factors for developing OA include obesity, being older than 40, past joint trauma, repetitive joint use, and a family history of OA.  Symptoms  OA can affect any joint. Weight-bearing joints, such as the hips and knees, are often affected. Common symptoms are joint pain and stiffness. Pain and stiffness may get worse with periods of inactivity or overuse. For example, you may have more stiffness first thing in the morning, usually for less than 30 minutes. Or you may have stiffness after sitting for a long period of time. This might be while sitting at a movie. You may also have more pain in your hips or knees if you walk farther than you usually do.  Other common symptoms are:    Weak muscles    Unstable or wobbly joints    Grinding or crackling noises with motion    Joints with swelling or bumps    Loss of range of motion, or the ability to bend and straighten them  If you have any of these joint changes, make an appointment to see your healthcare provider. The two of you can work together to create a treatment plan that may help lessen your pain and stiffness and prevent symptoms from getting worse.  Date Last Reviewed:  2/14/2016 2000-2017 [employer]. [address] [place] [address], [place] [address]. All rights reserved. This information is not intended as a substitute for professional medical care. Always follow your healthcare professional's instructions.        Understanding Baker s Cyst (Popliteal Cyst)  A Baker s cyst (popliteal cyst) is a fluid-filled sac that forms behind the knee.  Parts of the knee  The knee is a complex joint that has many parts. The lower end of the thighbone (femur) rotates on the upper end of the shinbone (tibia). There are several small bursae around the knee joint. These are small sacs filled with a special fluid (synovial fluid) that cushions the rest of the joint. Between the bones is a space that also contains this fluid.  What causes a Baker s cyst?  It is caused when extra fluid from the knee joint flows into the small bursa that sits behind the knee. When this sac fills with too much fluid, it s called a Baker s cyst. This might happen when an injury or disease irritates the knee joint.   In adults, other problems with the knee joint often cause the Baker s cyst. Injury or a knee disorder can change the normal structure of the knee joint. This can cause a cyst to form.  The synovial fluid inside the joint space may build up as a result of injury or disease. As the pressure builds up, the fluid may bulge into the back of the knee. This can cause the cyst.  Symptoms of a Baker s cyst  A Baker s cyst often doesn t cause symptoms. A cyst will more often be seen on an imaging test, like MRI, done for other reasons. If you do have symptoms, they may include:    Pain in the back of the knee    Knee stiffness    Sense of swelling or fullness behind the knee, especially when you straighten your leg    A swelling behind the knee that goes away when you bend your knee  These symptoms tend to get worse when standing for a long time, or being active.  Diagnosing a Baker s cyst  Your healthcare  provider will ask you about your medical history and your symptoms. He or she will give you a physical exam, which will include a careful exam of your knee. It s important to make sure your symptoms are caused by a Baker s cyst and not a tumor or a blood clot.  If the cause of your symptoms is not clear, you may have imaging tests, such as:    Ultrasound, to look at the cyst in more detail    X-ray, to get more information about the bones of the joint    MRI, if the diagnosis is still unclear after ultrasound, or if your health care provider is considering surgery  Date Last Reviewed: 4/1/2017 2000-2017 [employer]. [address] [place] [address], [place] [address]. All rights reserved. This information is not intended as a substitute for professional medical care. Always follow your healthcare professional's instructions.

## 2017-09-19 NOTE — LETTER
9/19/2017         RE: Manfred Caraballo  [address] [facility] [address]        Dear Colleague,    Thank you for referring your patient, Manfred Caraballo, to the [facility] ORTHOPEDIC SURGERY. Please see a copy of my visit note below.    HISTORY OF PRESENT ILLNESS:    Manfred Caraballo is a 82 year old male who is seen as self referral for left knee pain    Present symptoms: was walking about 3 weeks ago, felt crack in knee.  Had immediate pain and swelling.  Pain has been improving 8/10 currently, down from 10/10, swelling has been improving.  Was seen by primary care, sent for MRI, here for MRI.  Cold pack improves pain  Right after the event in question, he noted significant swelling  Not only in the knee but also in the calf. Swelling has gotten better gradually.  He still feels the swelling has persisted.  He used to be able to walk 3 miles without difficulty before the event. Now his very limited. He feels unsteady even with a cane.  He denies any loose body sensation.He denies specific medial or lateral joint line pain. Most of his pain is in the popliteal space.    Orthopedic PMH: Previous medial meniscus tear of the right knee    Past Medical History:   Diagnosis Date     Acute duodenal ulcer with hemorrhage, without mention of obstruction 11/15/03    and pyloric also - required hosp and transfusion 2 units- H. pylori negative     Allergic rhinitis, cause unspecified      Calculus of gallbladder without mention of cholecystitis or obstruction 11/15/03    no residual pain- transient cholestasis for 2 days     Colon Polyps normal 5/2010 2008 = one hyperplastic & one tubular adenoma - no high grade dysplasis - repeat in 2013      Diverticulosis of colon (without mention of hemorrhage) 11/03     Dyspepsia and other specified disorders of function of stomach     dyspepsia,  h/o bleeding stomach ulcer in 1985     Esophageal reflux      Genital herpes, unspecified      Paroxysmal atrial fibrillation  (H) 12/21/2016    [facility].     Pneumonia, organism 11/15/03    right middle and lower lobe     Pure hypercholesterolemia      Pure hyperglyceridemia      Tear meniscus knee 2009    degenerative on right      Type II or unspecified type diabetes mellitus without mention of complication, not stated as uncontrolled at age 63     Unspecified essential hypertension        Past Surgical History:   Procedure Laterality Date     HC COLONOSCOPY THRU STOMA, DIAGNOSTIC  11/03    for GI bleed - diverticulosis      HC COLONOSCOPY THRU STOMA, DIAGNOSTIC  5/2010    normal - repeat in 5 years      HC UGI ENDOSCOPY DIAG W OR W/O BRUSH/WASH  11/03    for GI bleed - showed pyloric and duodenal  ulcer        Family History   Problem Relation Age of Onset     DIABETES No family hx of      C.A.D. No family hx of      Hypertension No family hx of      Cancer - colorectal No family hx of      Prostate Cancer No family hx of        Social History     Social History     Marital status:      Spouse name: Constanza     Number of children: 7     Years of education: N/A     Occupational History     retired - was in food processing - packaging hamburger, etc.        None      Social History Main Topics     Smoking status: Former Smoker     Packs/day: 0.50     Years: 20.00     Types: Cigarettes     Quit date: 5/13/1981     Smokeless tobacco: Never Used      Comment: quit in 1981      Alcohol use No      Comment: quit in 1981     Drug use: No      Comment: no herbal meds either     Sexual activity: Yes     Partners: Female      Comment:      Other Topics Concern      Service Yes     was in  in [facility] for 5 years and  for 15 years     Blood Transfusions Yes     in 1985 after bleeding stomach ulcer     Caffeine Concern No     stopped all coffee and tea after ulcers 11/03     Exercise Yes     walks at least one mile every day     Seat Belt Yes     always     Self-Exams Yes     KARINA encouraged monthly      Parent/Sibling W/ Cabg, Mi Or Angioplasty Before 65f 55m? No     Social History Narrative    no longer taking one 81mg asa qday - secondary to bleeding  ulcers 11/03    PSA checking every 6 months - one year.                    Current Outpatient Prescriptions   Medication Sig Dispense Refill     [facility] ACETAMINOPHEN 325 MG tablet TAKE 2 TABLETS (650 MG) BY MOUTH EVERY 6 HOURS AS NEEDED FOR MILD PAIN 100 tablet 0     polyethylene glycol (MIRALAX/GLYCOLAX) powder MIX 17 GRAMS (1 CAPFUL) OF POWDER IN 8 OUNCES LIQUID AND DRINK ONCE DAILY 527 g 3     POTASSIUM CHLORIDE 20 MEQ CR tablet TAKE 1 TABLET BY MOUTH DAILY 90 tablet 3     atorvastatin (LIPITOR) 20 MG tablet TAKE 0.5 TABLETS (10 MG) BY MOUTH DAILY 45 tablet 1     losartan-hydrochlorothiazide (HYZAAR) 100-25 MG per tablet Take 1 tablet by mouth daily 90 tablet 1     metFORMIN (GLUCOPHAGE) 1000 MG tablet TAKE 1 TABLET BY MOUTH 2 TIMES DAILY (WITH MEALS) 180 tablet 1     hydrocortisone (WESTCORT) 0.2 % cream APPLY SPARINGLY TO AFFECTED AREA THREE TIMES DAILY FOR 14 DAYS. 30 g 0     pantoprazole (PROTONIX) 40 MG EC tablet TAKE 1 TABLET (40 MG) BY MOUTH DAILY 90 tablet 2     rivaroxaban ANTICOAGULANT (XARELTO) 20 MG TABS tablet Take 1 tablet (20 mg) by mouth daily (with dinner) 30 tablet 11     diltiazem (CARDIZEM CD) 120 MG 24 hr capsule Take 1 capsule (120 mg) by mouth daily 90 capsule 3     albuterol (PROAIR HFA, PROVENTIL HFA, VENTOLIN HFA) 108 (90 BASE) MCG/ACT inhaler Inhale 2 puffs into the lungs every 6 hours as needed for shortness of breath / dyspnea 1 Inhaler 11     ipratropium - albuterol 0.5 mg/2.5 mg/3 mL (DUONEB) 0.5-2.5 (3) MG/3ML nebulization Take 1 vial (3 mLs) by nebulization every 6 hours as needed for shortness of breath / dyspnea or wheezing 30 vial 1     guaiFENesin-codeine (ROBITUSSIN AC) 100-10 MG/5ML SOLN Take 10 mLs by mouth every 4 hours as needed for cough 120 mL 0     blood glucose monitoring (NO BRAND SPECIFIED) test strip Use to test blood  "sugars 1  times daily or as directed 100 each 3     fluticasone (FLONASE) 50 MCG/ACT nasal spray Spray 1-2 sprays into both nostrils daily 16 g 3     KEITH CONTOUR test strip USE TO TEST BLOOD SUGAR 1 TIMES DAILY OR AS DIRECTED. 300 strip 1     blood glucose monitoring (KEITH CONTOUR MONITOR) meter device kit Use to test blood sugars  1 times daily or as directed. 1 kit 0     glucose blood VI test strips (ACCU-CHEK COMFORT CURVE) strip 1 strip by In Vitro route 2 times daily Or whatever meter he has please 100 strip 3     ASPIRIN NOT PRESCRIBED (INTENTIONAL) not prescribed - ulcer history 1 Tab 0       Allergies   Allergen Reactions     Aspirin      Salicylates      ASA/ Ulcers       REVIEW OF SYSTEMS:  CONSTITUTIONAL:  NEGATIVE for fever, chills, change in weight  INTEGUMENTARY/SKIN:  NEGATIVE for worrisome rashes, moles or lesions  EYES:  NEGATIVE for vision changes or irritation  ENT/MOUTH:  Allergic rhinitis  RESP:  History pneumonia  BREAST:  NEGATIVE for masses, tenderness or discharge  CV:  Paroxysmal atrial fibrillation  GI:  Reflux and ulcer  :  Negative   MUSCULOSKELETAL:  See HPI above  NEURO:  NEGATIVE for weakness, dizziness or paresthesias  ENDOCRINE:  NEGATIVE for temperature intolerance, skin/hair changes  HEME/ALLERGY/IMMUNE:  NEGATIVE for bleeding problems  PSYCHIATRIC:  NEGATIVE for changes in mood or affect      PHYSICAL EXAM:  Pulse 80  Ht 5' 5\" (1.651 m)  Wt 155 lb (70.3 kg)  BMI 25.79 kg/m2  Body mass index is 25.79 kg/(m^2).   GENERAL APPEARANCE: healthy, alert and no distress   SKIN: no suspicious lesions or rashes  NEURO: Normal strength and tone, mentation intact and speech normal  VASCULAR: Good pulses, and capillary refill   LYMPH: no lymphadenopathy   PSYCH:  mentation appears normal and affect normal/bright    MSK:  Young looking 82-year-old demented  He walks with a significant limp  Obvious swelling of the knee as well as proximal calf, left  Limited range of motion by about 10  " in both directions, left knee  Pain is mostly in the popliteal space including hamstring structures and proximal gastroc  Specific patellofemoral pain with a compression is not noted  No specific medial or lateral joint line pain  Ligaments are stable  Extensor mechanism is intact  Left calf is somewhat tender         MRI scan of left knee, August 8, 2017:    IMPRESSION:    1. Medial meniscus posterior horn horizontal tear. Adjacent caudally  dissecting cyst likely represents the meniscal cyst. No displaced  meniscal flap.  2. ACL mild thickening, likely representing mild or early  intrasubstance mucoid degeneration. Intact fibers are present from the  femur to the tibia.  3. Medial compartment grade II-III chondromalacia.  4. Joint effusion and popliteal cyst.     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 MRI scan images and a plain x-ray images were visualized From  August 8, 2017 and July 31, 2017 respectively. We also discussed the nature of her osteoarthritis and popliteal cyst. A written information regarding these pathologies was provided.  At the end of her discussion, I did not see any indication for immediate surgical intervention. The next step might be a consideration of cortisone injection if he has continuing pain.  However at the moment, the pain is mostly from reactive muscle tightness secondary to ruptured Baker's cyst.  Further observation with stretching exercises for the next 6-8 weeks would be appropriate.  He does not feel confident with using a cane. For that reason a prescription for a walker will be provided. All the questions were answered.      Imaging Interpretation:   None taken today    Dominguez Duenas MD  Department of Orthopedic Surgery         Disclaimer: This note consists of symbols derived from keyboarding, dictation and/or voice recognition software. As a result, there may be errors in the script that have gone undetected. Please consider this when interpreting information found in this chart.      Again, thank you for allowing me to participate in the care of your patient.        Sincerely,        Dominguez Duenas MD

## 2017-09-19 NOTE — MR AVS SNAPSHOT
After Visit Summary   9/19/2017    Manfred Caraballo    MRN: [id number]           Patient Information     Date Of Birth          1935        Visit Information        Provider Department      9/19/2017 9:15 AM Dominguez Duenas MD; [facility] ORTHOPEDIC SURGERY        Care Instructions      What Is Osteoarthritis?    There are about 100 different types of arthritis. In general, arthritis means problems with the joints. A joint is a point in the body where two or more bones come together. Arthritis may also cause problems in the tissue near the joints, including muscles, tendons, and ligaments. And, in some types of arthritis, the entire body can be affected.  Osteoarthritis (OA) is sometimes called degenerative joint disease, or wear-and-tear arthritis. It's the most common type of arthritis. In OA, the cartilage wears away. Cartilage is a slick tissue that covers the ends of the bones. It acts as a cushion and allows them to glide smoothly against each other. When the cartilage wears away, bone rubs against bone. This causes pain, swelling, stiffness, and difficulty moving. Risk factors for developing OA include obesity, being older than 40, past joint trauma, repetitive joint use, and a family history of OA.  Symptoms  OA can affect any joint. Weight-bearing joints, such as the hips and knees, are often affected. Common symptoms are joint pain and stiffness. Pain and stiffness may get worse with periods of inactivity or overuse. For example, you may have more stiffness first thing in the morning, usually for less than 30 minutes. Or you may have stiffness after sitting for a long period of time. This might be while sitting at a movie. You may also have more pain in your hips or knees if you walk farther than you usually do.  Other common symptoms are:    Weak muscles    Unstable or wobbly joints    Grinding or crackling noises with motion    Joints with swelling or  bumps    Loss of range of motion, or the ability to bend and straighten them  If you have any of these joint changes, make an appointment to see your healthcare provider. The two of you can work together to create a treatment plan that may help lessen your pain and stiffness and prevent symptoms from getting worse.  Date Last Reviewed: 2/14/2016 2000-2017 [employer]. [address] [place] [address], [place] [address]. All rights reserved. This information is not intended as a substitute for professional medical care. Always follow your healthcare professional's instructions.        Understanding Baker s Cyst (Popliteal Cyst)  A Baker s cyst (popliteal cyst) is a fluid-filled sac that forms behind the knee.  Parts of the knee  The knee is a complex joint that has many parts. The lower end of the thighbone (femur) rotates on the upper end of the shinbone (tibia). There are several small bursae around the knee joint. These are small sacs filled with a special fluid (synovial fluid) that cushions the rest of the joint. Between the bones is a space that also contains this fluid.  What causes a Baker s cyst?  It is caused when extra fluid from the knee joint flows into the small bursa that sits behind the knee. When this sac fills with too much fluid, it s called a Baker s cyst. This might happen when an injury or disease irritates the knee joint.   In adults, other problems with the knee joint often cause the Baker s cyst. Injury or a knee disorder can change the normal structure of the knee joint. This can cause a cyst to form.  The synovial fluid inside the joint space may build up as a result of injury or disease. As the pressure builds up, the fluid may bulge into the back of the knee. This can cause the cyst.  Symptoms of a Baker s cyst  A Baker s cyst often doesn t cause symptoms. A cyst will more often be seen on an imaging test, like MRI, done for other reasons. If you do have symptoms, they may  include:    Pain in the back of the knee    Knee stiffness    Sense of swelling or fullness behind the knee, especially when you straighten your leg    A swelling behind the knee that goes away when you bend your knee  These symptoms tend to get worse when standing for a long time, or being active.  Diagnosing a Baker s cyst  Your healthcare provider will ask you about your medical history and your symptoms. He or she will give you a physical exam, which will include a careful exam of your knee. It s important to make sure your symptoms are caused by a Baker s cyst and not a tumor or a blood clot.  If the cause of your symptoms is not clear, you may have imaging tests, such as:    Ultrasound, to look at the cyst in more detail    X-ray, to get more information about the bones of the joint    MRI, if the diagnosis is still unclear after ultrasound, or if your health care provider is considering surgery  Date Last Reviewed: 4/1/2017 2000-2017 [employer]. [address] [place] [address]. All rights reserved. This information is not intended as a substitute for professional medical care. Always follow your healthcare professional's instructions.                Follow-ups after your visit        Your next 10 appointments already scheduled     Dec 11, 2017 11:00 AM CST   [facility]    [address] [facility] [address] [phone]           Bring a current list of meds and any records pertaining to this visit. For Physicals, please bring immunization records and any forms needing to be filled out. Please arrive 10 minutes early to complete paperwork.              Who to contact     If you have questions or need follow up information about today's clinic visit or your schedule please contact [facility] ORTHOPEDIC SURGERY directly at [phone].  Normal or non-critical lab and imaging results will be  "communicated to you by MyChart, letter or phone within 4 business days after the clinic has received the results. If you do not hear from us within 7 days, please contact the clinic through MyChart or phone. If you have a critical or abnormal lab result, we will notify you by phone as soon as possible.  Submit refill requests through [employer] or call your pharmacy and they will forward the refill request to us. Please allow 3 business days for your refill to be completed.          Additional Information About Your Visit        Care EveryWhere ID     This is your Care EveryWhere ID. This could be used by other organizations to access your [place] medical records  [id number]        Your Vitals Were     Pulse Height BMI (Body Mass Index)             80 5' 5\" (1.651 m) 25.79 kg/m2          Blood Pressure from Last 3 Encounters:   07/31/17 124/70   06/21/17 126/64   02/17/17 132/72    Weight from Last 3 Encounters:   09/19/17 155 lb (70.3 kg)   07/31/17 155 lb (70.3 kg)   06/21/17 156 lb (70.8 kg)              Today, you had the following     No orders found for display       Primary Care Provider Office Phone # Fax #    Karen Weiler, [phone] [address]  [place] [address]        Equal Access to Services     SHAI WHITING AH: Hadii kenneth ku hadasho Soomaali, waaxda luqadaha, qaybta kaalmada adeegyada, gali powell haymichelle mathias . So [facility] [phone].    ATENCIÓN: Si habla español, tiene a padilla disposición servicios gratuitos de asistencia lingüística. Blankame al [phone].    We comply with applicable federal civil rights laws and [place] laws. We do not discriminate on the basis of race, color, national origin, age, disability sex, sexual orientation or gender identity.            Thank you!     Thank you for choosing [facility] ORTHOPEDIC SURGERY  for your care. Our goal is always to provide you with excellent care. Hearing back from our patients is one way we can " continue to improve our services. Please take a few minutes to complete the written survey that you may receive in the mail after your visit with us. Thank you!             Your Updated Medication List - Protect others around you: Learn how to safely use, store and throw away your medicines at www.disposemymeds.org.          This list is accurate as of: 9/19/17  9:38 AM.  Always use your most recent med list.                   Brand Name Dispense Instructions for use Diagnosis    albuterol 108 (90 BASE) MCG/ACT Inhaler    PROAIR HFA/PROVENTIL HFA/VENTOLIN HFA    1 Inhaler    Inhale 2 puffs into the lungs every 6 hours as needed for shortness of breath / dyspnea    Chronic obstructive pulmonary disease, unspecified COPD type (H)       ASPIRIN NOT PRESCRIBED    INTENTIONAL    1 Tab    not prescribed - ulcer history    Type II or unspecified type diabetes mellitus without mention of complication, not stated as uncontrolled       atorvastatin 20 MG tablet    LIPITOR    45 tablet    TAKE 0.5 TABLETS (10 MG) BY MOUTH DAILY    Hyperlipidemia with target LDL less than 100       blood glucose monitoring meter device kit     1 kit    Use to test blood sugars  1 times daily or as directed.    Type 2 diabetes mellitus (H)       * blood glucose monitoring test strip    ACCU-CHEK COMFORT CURVE    100 strip    1 strip by In Vitro route 2 times daily Or whatever meter he has please    Type 2 diabetes, HbA1C goal < 8% (H)       * KEITH CONTOUR test strip   Generic drug:  blood glucose monitoring     300 strip    USE TO TEST BLOOD SUGAR 1 TIMES DAILY OR AS DIRECTED.    Type 2 diabetes mellitus with diabetic nephropathy (H)       * blood glucose monitoring test strip    no brand specified    100 each    Use to test blood sugars 1  times daily or as directed    Type 2 diabetes mellitus with diabetic nephropathy (H)       [facility] ACETAMINOPHEN 325 MG tablet   Generic drug:  acetaminophen     100 tablet    TAKE 2 TABLETS (650 MG) BY MOUTH  EVERY 6 HOURS AS NEEDED FOR MILD PAIN    Chronic pain of left knee       diltiazem 120 MG 24 hr capsule    CARDIZEM CD    90 capsule    Take 1 capsule (120 mg) by mouth daily    Persistent atrial fibrillation (H)       fluticasone 50 MCG/ACT spray    FLONASE    16 g    Spray 1-2 sprays into both nostrils daily    Chronic obstructive pulmonary disease, unspecified COPD type (H)       guaiFENesin-codeine 100-10 MG/5ML Soln solution    ROBITUSSIN AC    120 mL    Take 10 mLs by mouth every 4 hours as needed for cough    Cough       hydrocortisone 0.2 % cream    WESTCORT    30 g    APPLY SPARINGLY TO AFFECTED AREA THREE TIMES DAILY FOR 14 DAYS.    Contact dermatitis due to other agent, unspecified contact dermatitis type       ipratropium - albuterol 0.5 mg/2.5 mg/3 mL 0.5-2.5 (3) MG/3ML neb solution    DUONEB    30 vial    Take 1 vial (3 mLs) by nebulization every 6 hours as needed for shortness of breath / dyspnea or wheezing    Cough       losartan-hydrochlorothiazide 100-25 MG per tablet    HYZAAR    90 tablet    Take 1 tablet by mouth daily    Hypertension goal BP (blood pressure) < 140/90       metFORMIN 1000 MG tablet    GLUCOPHAGE    180 tablet    TAKE 1 TABLET BY MOUTH 2 TIMES DAILY (WITH MEALS)    Type 2 diabetes mellitus with diabetic nephropathy, without long-term current use of insulin (H)       pantoprazole 40 MG EC tablet    PROTONIX    90 tablet    TAKE 1 TABLET (40 MG) BY MOUTH DAILY    Gastroesophageal reflux disease without esophagitis       polyethylene glycol powder    MIRALAX/GLYCOLAX    527 g    MIX 17 GRAMS (1 CAPFUL) OF POWDER IN 8 OUNCES LIQUID AND DRINK ONCE DAILY    Constipation, unspecified constipation type       potassium chloride 20 MEQ CR tablet   Generic drug:  potassium chloride SA     90 tablet    TAKE 1 TABLET BY MOUTH DAILY    Hypertension goal BP (blood pressure) < 140/90       rivaroxaban ANTICOAGULANT 20 MG Tabs tablet    XARELTO    30 tablet    Take 1 tablet (20 mg) by mouth  daily (with dinner)    Atrial fibrillation (H)       * Notice:  This list has 3 medication(s) that are the same as other medications prescribed for you. Read the directions carefully, and ask your doctor or other care provider to review them with you.

## 2017-09-19 NOTE — PROGRESS NOTES
HISTORY OF PRESENT ILLNESS:    Manfred Caraballo is a 82 year old male who is seen as self referral for left knee pain    Present symptoms: was walking about 3 weeks ago, felt crack in knee.  Had immediate pain and swelling.  Pain has been improving 8/10 currently, down from 10/10, swelling has been improving.  Was seen by primary care, sent for MRI, here for MRI.  Cold pack improves pain  Right after the event in question, he noted significant swelling  Not only in the knee but also in the calf. Swelling has gotten better gradually.  He still feels the swelling has persisted.  He used to be able to walk 3 miles without difficulty before the event. Now his very limited. He feels unsteady even with a cane.  He denies any loose body sensation.He denies specific medial or lateral joint line pain. Most of his pain is in the popliteal space.    Orthopedic PMH: Previous medial meniscus tear of the right knee    Past Medical History:   Diagnosis Date     Acute duodenal ulcer with hemorrhage, without mention of obstruction 11/15/03    and pyloric also - required hosp and transfusion 2 units- H. pylori negative     Allergic rhinitis, cause unspecified      Calculus of gallbladder without mention of cholecystitis or obstruction 11/15/03    no residual pain- transient cholestasis for 2 days     Colon Polyps normal 5/2010 2008 = one hyperplastic & one tubular adenoma - no high grade dysplasis - repeat in 2013      Diverticulosis of colon (without mention of hemorrhage) 11/03     Dyspepsia and other specified disorders of function of stomach     dyspepsia,  h/o bleeding stomach ulcer in 1985     Esophageal reflux      Genital herpes, unspecified      Paroxysmal atrial fibrillation (H) 12/21/2016    [facility] ER.     Pneumonia, organism 11/15/03    right middle and lower lobe     Pure hypercholesterolemia      Pure hyperglyceridemia      Tear meniscus knee 2009    degenerative on right      Type II or unspecified  type diabetes mellitus without mention of complication, not stated as uncontrolled at age 63     Unspecified essential hypertension        Past Surgical History:   Procedure Laterality Date     HC COLONOSCOPY THRU STOMA, DIAGNOSTIC  11/03    for GI bleed - diverticulosis      HC COLONOSCOPY THRU STOMA, DIAGNOSTIC  5/2010    normal - repeat in 5 years      HC UGI ENDOSCOPY DIAG W OR W/O BRUSH/WASH  11/03    for GI bleed - showed pyloric and duodenal  ulcer        Family History   Problem Relation Age of Onset     DIABETES No family hx of      C.A.D. No family hx of      Hypertension No family hx of      Cancer - colorectal No family hx of      Prostate Cancer No family hx of        Social History     Social History     Marital status:      Spouse name: Constanza     Number of children: 7     Years of education: N/A     Occupational History     retired - was in food processing - packaging hamburger, etc.        None      Social History Main Topics     Smoking status: Former Smoker     Packs/day: 0.50     Years: 20.00     Types: Cigarettes     Quit date: 5/13/1981     Smokeless tobacco: Never Used      Comment: quit in 1981      Alcohol use No      Comment: quit in 1981     Drug use: No      Comment: no herbal meds either     Sexual activity: Yes     Partners: Female      Comment:      Other Topics Concern      Service Yes     was in  in [facility] for 5 years and  for 15 years     Blood Transfusions Yes     in 1985 after bleeding stomach ulcer     Caffeine Concern No     stopped all coffee and tea after ulcers 11/03     Exercise Yes     walks at least one mile every day     Seat Belt Yes     always     Self-Exams Yes     KARINA encouraged monthly     Parent/Sibling W/ Cabg, Mi Or Angioplasty Before 65f 55m? No     Social History Narrative    no longer taking one 81mg asa qday - secondary to bleeding  ulcers 11/03    PSA checking every 6 months - one year.                    Current Outpatient  Prescriptions   Medication Sig Dispense Refill     [facility] ACETAMINOPHEN 325 MG tablet TAKE 2 TABLETS (650 MG) BY MOUTH EVERY 6 HOURS AS NEEDED FOR MILD PAIN 100 tablet 0     polyethylene glycol (MIRALAX/GLYCOLAX) powder MIX 17 GRAMS (1 CAPFUL) OF POWDER IN 8 OUNCES LIQUID AND DRINK ONCE DAILY 527 g 3     POTASSIUM CHLORIDE 20 MEQ CR tablet TAKE 1 TABLET BY MOUTH DAILY 90 tablet 3     atorvastatin (LIPITOR) 20 MG tablet TAKE 0.5 TABLETS (10 MG) BY MOUTH DAILY 45 tablet 1     losartan-hydrochlorothiazide (HYZAAR) 100-25 MG per tablet Take 1 tablet by mouth daily 90 tablet 1     metFORMIN (GLUCOPHAGE) 1000 MG tablet TAKE 1 TABLET BY MOUTH 2 TIMES DAILY (WITH MEALS) 180 tablet 1     hydrocortisone (WESTCORT) 0.2 % cream APPLY SPARINGLY TO AFFECTED AREA THREE TIMES DAILY FOR 14 DAYS. 30 g 0     pantoprazole (PROTONIX) 40 MG EC tablet TAKE 1 TABLET (40 MG) BY MOUTH DAILY 90 tablet 2     rivaroxaban ANTICOAGULANT (XARELTO) 20 MG TABS tablet Take 1 tablet (20 mg) by mouth daily (with dinner) 30 tablet 11     diltiazem (CARDIZEM CD) 120 MG 24 hr capsule Take 1 capsule (120 mg) by mouth daily 90 capsule 3     albuterol (PROAIR HFA, PROVENTIL HFA, VENTOLIN HFA) 108 (90 BASE) MCG/ACT inhaler Inhale 2 puffs into the lungs every 6 hours as needed for shortness of breath / dyspnea 1 Inhaler 11     ipratropium - albuterol 0.5 mg/2.5 mg/3 mL (DUONEB) 0.5-2.5 (3) MG/3ML nebulization Take 1 vial (3 mLs) by nebulization every 6 hours as needed for shortness of breath / dyspnea or wheezing 30 vial 1     guaiFENesin-codeine (ROBITUSSIN AC) 100-10 MG/5ML SOLN Take 10 mLs by mouth every 4 hours as needed for cough 120 mL 0     blood glucose monitoring (NO BRAND SPECIFIED) test strip Use to test blood sugars 1  times daily or as directed 100 each 3     fluticasone (FLONASE) 50 MCG/ACT nasal spray Spray 1-2 sprays into both nostrils daily 16 g 3     KEITH CONTOUR test strip USE TO TEST BLOOD SUGAR 1 TIMES DAILY OR AS DIRECTED. 300 strip 1      "blood glucose monitoring (KEITH CONTOUR MONITOR) meter device kit Use to test blood sugars  1 times daily or as directed. 1 kit 0     glucose blood VI test strips (ACCU-CHEK COMFORT CURVE) strip 1 strip by In Vitro route 2 times daily Or whatever meter he has please 100 strip 3     ASPIRIN NOT PRESCRIBED (INTENTIONAL) not prescribed - ulcer history 1 Tab 0       Allergies   Allergen Reactions     Aspirin      Salicylates      ASA/ Ulcers       REVIEW OF SYSTEMS:  CONSTITUTIONAL:  NEGATIVE for fever, chills, change in weight  INTEGUMENTARY/SKIN:  NEGATIVE for worrisome rashes, moles or lesions  EYES:  NEGATIVE for vision changes or irritation  ENT/MOUTH:  Allergic rhinitis  RESP:  History pneumonia  BREAST:  NEGATIVE for masses, tenderness or discharge  CV:  Paroxysmal atrial fibrillation  GI:  Reflux and ulcer  :  Negative   MUSCULOSKELETAL:  See HPI above  NEURO:  NEGATIVE for weakness, dizziness or paresthesias  ENDOCRINE:  NEGATIVE for temperature intolerance, skin/hair changes  HEME/ALLERGY/IMMUNE:  NEGATIVE for bleeding problems  PSYCHIATRIC:  NEGATIVE for changes in mood or affect      PHYSICAL EXAM:  Pulse 80  Ht 5' 5\" (1.651 m)  Wt 155 lb (70.3 kg)  BMI 25.79 kg/m2  Body mass index is 25.79 kg/(m^2).   GENERAL APPEARANCE: healthy, alert and no distress   SKIN: no suspicious lesions or rashes  NEURO: Normal strength and tone, mentation intact and speech normal  VASCULAR: Good pulses, and capillary refill   LYMPH: no lymphadenopathy   PSYCH:  mentation appears normal and affect normal/bright    MSK:  Young looking 82-year-old demented  He walks with a significant limp  Obvious swelling of the knee as well as proximal calf, left  Limited range of motion by about 10  in both directions, left knee  Pain is mostly in the popliteal space including hamstring structures and proximal gastroc  Specific patellofemoral pain with a compression is not noted  No specific medial or lateral joint line pain  Ligaments are " stable  Extensor mechanism is intact  Left calf is somewhat tender         MRI scan of left knee, August 8, 2017:    IMPRESSION:    1. Medial meniscus posterior horn horizontal tear. Adjacent caudally  dissecting cyst likely represents the meniscal cyst. No displaced  meniscal flap.  2. ACL mild thickening, likely representing mild or early  intrasubstance mucoid degeneration. Intact fibers are present from the  femur to the tibia.  3. Medial compartment grade II-III chondromalacia.  4. Joint effusion and popliteal cyst.     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 MRI scan images and a plain x-ray images were visualized From  August 8, 2017 and July 31, 2017 respectively. We also discussed the nature of her osteoarthritis and popliteal cyst. A written information regarding these pathologies was provided.  At the end of her discussion, I did not see any indication for immediate surgical intervention. The next step might be a consideration of cortisone injection if he has continuing pain.  However at the moment, the pain is mostly from reactive muscle tightness secondary to ruptured Baker's cyst.  Further observation with stretching exercises for the next 6-8 weeks would be appropriate.  He does not feel confident with using a cane. For that reason a prescription for a walker will be provided. All the questions were answered.      Imaging Interpretation:   None taken today    Dominguez Duenas MD  Department of Orthopedic Surgery        Disclaimer: This note consists of symbols derived from keyboarding, dictation and/or voice recognition software. As a result, there may be errors in the script that have gone undetected. Please consider this when interpreting information found in this  chart.

## 2017-09-21 RX ORDER — ACETAMINOPHEN 325 MG/1
TABLET ORAL
Qty: 100 TABLET | Refills: 3 | Status: SHIPPED | OUTPATIENT
Start: 2017-09-21 | End: 2018-01-16

## 2017-09-21 NOTE — TELEPHONE ENCOUNTER
Prescription approved per [facility], [facility] or [facility] refill protocol.  Sherry Long RN - Triage  [facility]

## 2017-10-12 DIAGNOSIS — Z53.9 DIAGNOSIS NOT YET DEFINED: Primary | ICD-10-CM

## 2017-10-12 PROCEDURE — G0179 MD RECERTIFICATION HHA PT: HCPCS | Performed by: FAMILY MEDICINE

## 2017-10-13 ENCOUNTER — TELEPHONE (OUTPATIENT)
Dept: FAMILY MEDICINE | Facility: CLINIC | Age: 82
End: 2017-10-13

## 2017-10-13 NOTE — TELEPHONE ENCOUNTER
Date Received: fax    Sent by: fax     For: plan of care      Last Office Visit: 06/21/17    Return By: fax     Signed by GREGG and faxed   Maeve Coleman Certified Medical Assistant

## 2017-11-06 ENCOUNTER — TELEPHONE (OUTPATIENT)
Dept: FAMILY MEDICINE | Facility: CLINIC | Age: 82
End: 2017-11-06

## 2017-11-06 NOTE — TELEPHONE ENCOUNTER
Date Forms was received: November 6, 2017    Forms received by: Fax    Last office visit: 7-31-17    Purpose of Form:  Incontinence supply order    When the form is due:  asap    How the form needs to be returned for patient:  Fax    Form currently placed  [facility] form file  Roula Aujuancarlos REDMOND

## 2017-12-11 ENCOUNTER — OFFICE VISIT (OUTPATIENT)
Dept: FAMILY MEDICINE | Facility: CLINIC | Age: 82
End: 2017-12-11
Payer: COMMERCIAL

## 2017-12-11 VITALS
DIASTOLIC BLOOD PRESSURE: 70 MMHG | WEIGHT: 150 LBS | HEART RATE: 99 BPM | TEMPERATURE: 98 F | OXYGEN SATURATION: 96 % | HEIGHT: 65 IN | SYSTOLIC BLOOD PRESSURE: 152 MMHG | BODY MASS INDEX: 24.99 KG/M2

## 2017-12-11 DIAGNOSIS — E78.5 HYPERLIPIDEMIA WITH TARGET LDL LESS THAN 100: ICD-10-CM

## 2017-12-11 DIAGNOSIS — E11.21 TYPE 2 DIABETES MELLITUS WITH DIABETIC NEPHROPATHY, WITHOUT LONG-TERM CURRENT USE OF INSULIN (H): ICD-10-CM

## 2017-12-11 DIAGNOSIS — I10 HYPERTENSION GOAL BP (BLOOD PRESSURE) < 140/90: ICD-10-CM

## 2017-12-11 DIAGNOSIS — Z13.89 SCREENING FOR DIABETIC PERIPHERAL NEUROPATHY: Primary | ICD-10-CM

## 2017-12-11 LAB — HBA1C MFR BLD: 7.8 % (ref 4.3–6)

## 2017-12-11 PROCEDURE — 36415 COLL VENOUS BLD VENIPUNCTURE: CPT | Performed by: FAMILY MEDICINE

## 2017-12-11 PROCEDURE — 83036 HEMOGLOBIN GLYCOSYLATED A1C: CPT | Performed by: FAMILY MEDICINE

## 2017-12-11 PROCEDURE — 99214 OFFICE O/P EST MOD 30 MIN: CPT | Performed by: FAMILY MEDICINE

## 2017-12-11 PROCEDURE — 99207 C FOOT EXAM  NO CHARGE: CPT | Mod: 25 | Performed by: FAMILY MEDICINE

## 2017-12-11 RX ORDER — LOSARTAN POTASSIUM AND HYDROCHLOROTHIAZIDE 25; 100 MG/1; MG/1
1 TABLET ORAL DAILY
Qty: 90 TABLET | Refills: 1 | Status: SHIPPED | OUTPATIENT
Start: 2017-12-11 | End: 2018-06-19

## 2017-12-11 RX ORDER — ATORVASTATIN CALCIUM 20 MG/1
TABLET, FILM COATED ORAL
Qty: 45 TABLET | Refills: 1 | Status: SHIPPED | OUTPATIENT
Start: 2017-12-11 | End: 2018-06-25

## 2017-12-11 RX ORDER — GLIMEPIRIDE 1 MG/1
1 TABLET ORAL
Qty: 30 TABLET | Refills: 1 | Status: SHIPPED | OUTPATIENT
Start: 2017-12-11 | End: 2018-01-30

## 2017-12-11 NOTE — NURSING NOTE
"Chief Complaint   Patient presents with     Diabetes       Initial /70  Pulse 99  Temp 98  F (36.7  C) (Oral)  Ht 5' 5\" (1.651 m)  Wt 150 lb (68 kg)  SpO2 96%  BMI 24.96 kg/m2 Estimated body mass index is 24.96 kg/(m^2) as calculated from the following:    Height as of this encounter: 5' 5\" (1.651 m).    Weight as of this encounter: 150 lb (68 kg).  Medication Reconciliation: complete   Maeve Coleman Certified Medical Assistant    "

## 2017-12-11 NOTE — PROGRESS NOTES
SUBJECTIVE:   Manfred Caraballo is a 82 year old male who presents to clinic today for the following health issues:      Diabetes Follow-up    Patient is checking blood sugars: once daily.  Results are as follows:  AM - 90,115,110    PM - 200s in the afternoon.    Diabetic concerns: None     Symptoms of hypoglycemia (low blood sugar): none     Paresthesias (numbness or burning in feet) or sores: No     Date of last diabetic eye exam: July 2017  BP Readings from Last 2 Encounters:   12/11/17 152/70   07/31/17 124/70     Hemoglobin A1C (%)   Date Value   12/11/2017 7.8 (H)   06/21/2017 6.2 (H)     LDL Cholesterol Calculated (mg/dL)   Date Value   06/21/2017 35   05/13/2016 57       Amount of exercise or physical activity: 1 day/week for an average of 15-30 minutes    Problems taking medications regularly: No    Medication side effects: none    Diet: low salt and diabetic    Diabetes medications include metformin 1000 mg BID.    Blood sugars have increased, feels like he is eating too much and too much sweets. Doing less exercise      Problem list and histories reviewed & adjusted, as indicated.  Additional history: as documented    Patient Active Problem List   Diagnosis     Hypertension goal BP (blood pressure) < 140/90     Allergic rhinitis     Dyspepsia and other specified disorders of function of stomach     Genital herpes     Calculus of gallbladder     ACUTE PROSTATITIS- mild- follow up- 10/02     Acute duodenal ulcer with hemorrhage     Diverticulosis of large intestine     Iron deficiency anemia     Impotence of organic origin     Esophageal reflux     History of colonic polyps     Colon Polyps     Tear meniscus knee     Hyperlipidemia with target LDL less than 100     CKD (chronic kidney disease) stage 3, GFR 30-59 ml/min     Advanced directives, counseling/discussion     Microalbuminuria- very mild      Type 2 diabetes mellitus with diabetic nephropathy (H)     GERD (gastroesophageal reflux disease)     COPD  "(chronic obstructive pulmonary disease) (H)     History of pneumonia     Persistent atrial fibrillation (H)     Past Surgical History:   Procedure Laterality Date     HC COLONOSCOPY THRU STOMA, DIAGNOSTIC  11/03    for GI bleed - diverticulosis      HC COLONOSCOPY THRU STOMA, DIAGNOSTIC  5/2010    normal - repeat in 5 years      HC UGI ENDOSCOPY DIAG W OR W/O BRUSH/WASH  11/03    for GI bleed - showed pyloric and duodenal  ulcer        Social History   Substance Use Topics     Smoking status: Former Smoker     Packs/day: 0.50     Years: 20.00     Types: Cigarettes     Quit date: 5/13/1981     Smokeless tobacco: Never Used      Comment: quit in 1981      Alcohol use No      Comment: quit in 1981     Family History   Problem Relation Age of Onset     DIABETES No family hx of      C.A.D. No family hx of      Hypertension No family hx of      Cancer - colorectal No family hx of      Prostate Cancer No family hx of              Reviewed and updated as needed this visit by clinical staffTobacco  Allergies  Meds  Problems  Med Hx  Surg Hx  Fam Hx  Soc Hx        Reviewed and updated as needed this visit by Provider  Allergies  Meds  Problems         ROS:  Constitutional, HEENT, cardiovascular, pulmonary, gi and gu systems are negative, except as otherwise noted.      OBJECTIVE:   /70  Pulse 99  Temp 98  F (36.7  C) (Oral)  Ht 5' 5\" (1.651 m)  Wt 150 lb (68 kg)  SpO2 96%  BMI 24.96 kg/m2  Body mass index is 24.96 kg/(m^2).  GENERAL: healthy, alert and no distress  NECK: no adenopathy and no asymmetry, masses, or scars  RESP: lungs clear to auscultation - no rales, rhonchi or wheezes  CV: regular rate and rhythm, normal S1 S2, no S3 or S4, no murmur, click or rub, no peripheral edema and peripheral pulses strong  MS: no gross musculoskeletal defects noted, no edema  Diabetic foot exam: normal DP and PT pulses, no trophic changes or ulcerative lesions, normal sensory exam and normal monofilament " exam    Diagnostic Test Results:  none     ASSESSMENT/PLAN:   1. Screening for diabetic peripheral neuropathy: no neuropathy on monofilament exam.   - FOOT EXAM  NO CHARGE [17377.114]    2. Hyperlipidemia with target LDL less than 100: stable, continue statin.  - atorvastatin (LIPITOR) 20 MG tablet; TAKE 0.5 TABLETS (10 MG) BY MOUTH DAILY  Dispense: 45 tablet; Refill: 1    3. Hypertension goal BP (blood pressure) < 140/90: BP slightly above goal. Will continue to monitor, especially with history of diabetic nephropathy. Discussed diet changes and exercise to help control sugars but I think it will be beneficial for blood pressure as well. Will recheck at diabetes follow-up appointment.  - losartan-hydrochlorothiazide (HYZAAR) 100-25 MG per tablet; Take 1 tablet by mouth daily  Dispense: 90 tablet; Refill: 1    4. Type 2 diabetes mellitus with diabetic nephropathy, without long-term current use of insulin (H): A1c has increased to 7.8. Currently only taking metformin. Has history of microalbuminuria but creatinine normal. Continue to monitor. No neuropathy or history of retinopathy. Will continue metformin. Rather than restarting chlorpropamide (which is on BEERS list due to long half life and risk for hypoglycemia, particularly in geriatric patients), will start low dose glimepiride. Ideally, his diet and exercise changes will help blood sugars but I think additional medication is warranted at this time as well. He will monitor for low blood sugars or symptoms of hypoglycemia. Recheck A1c and diabetes follow-up in 3 months.  - glimepiride (AMARYL) 1 MG tablet; Take 1 tablet (1 mg) by mouth every morning (before breakfast)  Dispense: 30 tablet; Refill: 1  - metFORMIN (GLUCOPHAGE) 1000 MG tablet; TAKE 1 TABLET BY MOUTH 2 TIMES DAILY (WITH MEALS)  Dispense: 180 tablet; Refill: 1      Bradley Hopkins DO  [facility]

## 2017-12-11 NOTE — MR AVS SNAPSHOT
"              After Visit Summary   12/11/2017    Manfred Caraballo    MRN: [id number]           Patient Information     Date Of Birth          1935        Visit Information        Provider Department      12/11/2017 10:45 AM Bradley Hopkins DO; [employer] [facility] [place]        Today's Diagnoses     Screening for diabetic peripheral neuropathy    -  1    Hyperlipidemia with target LDL less than 100        Hypertension goal BP (blood pressure) < 140/90        Type 2 diabetes mellitus with diabetic nephropathy, without long-term current use of insulin (H)           Follow-ups after your visit        Follow-up notes from your care team     Return in about 3 months (around 3/11/2018) for Diabetes follow-up.      Who to contact     If you have questions or need follow up information about today's clinic visit or your schedule please [facility] at [phone].  Normal or non-critical lab and imaging results will be communicated to you by MyChart, letter or phone within 4 business days after the clinic has received the results. If you do not hear from us within 7 days, please contact the clinic through [employer] or phone. If you have a critical or abnormal lab result, we will notify you by phone as soon as possible.  Submit refill requests through [employer] or call your pharmacy and they will forward the refill request to us. Please allow 3 business days for your refill to be completed.          Additional Information About Your Visit        MyChart Information     [employer] lets you send messages to your doctor, view your test results, renew your prescriptions, schedule appointments and more. To sign up, go to [email or web address] . Click on \"Log in\" on the left side of the screen, which will take you to the Welcome page. Then click on \"Sign up Now\" on the right side of the page.     You will be asked to enter the access code listed below, as well as some " "personal information. Please follow the directions to create your username and password.     Your access code is: [email or web address]  Expires: 3/11/2018 11:49 AM     Your access code will  in 90 days. If you need help or a new code, please call your [facility] clinic or [phone].        Care EveryWhere ID     This is your Care EveryWhere ID. This could be used by other organizations to access your [facility] medical records  [id number]        Your Vitals Were     Pulse Temperature Height Pulse Oximetry BMI (Body Mass Index)       99 98  F (36.7  C) (Oral) 5' 5\" (1.651 m) 96% 24.96 kg/m2        Blood Pressure from Last 3 Encounters:   17 152/70   17 124/70   17 126/64    Weight from Last 3 Encounters:   17 150 lb (68 kg)   17 155 lb (70.3 kg)   17 155 lb (70.3 kg)              We Performed the Following     FOOT EXAM  NO CHARGE [30328.114]     Hemoglobin A1c     JUST IN CASE          Today's Medication Changes          These changes are accurate as of: 17 11:49 AM.  If you have any questions, ask your nurse or doctor.               Start taking these medicines.        Dose/Directions    glimepiride 1 MG tablet   Commonly known as:  AMARYL   Used for:  Type 2 diabetes mellitus with diabetic nephropathy, without long-term current use of insulin (H)   Started by:  Bradley Hopkins DO        Dose:  1 mg   Take 1 tablet (1 mg) by mouth every morning (before breakfast)   Quantity:  30 tablet   Refills:  1            Where to get your medicines      These medications were sent to [facility] #[id number] - [place] - [address]  [address] [place] [address]     Phone:  [phone]     atorvastatin 20 MG tablet    glimepiride 1 MG tablet    losartan-hydrochlorothiazide 100-25 MG per tablet    metFORMIN 1000 MG tablet                Primary Care Provider Office Phone # Fax #    Bradley Hopkins [phone] [address]  [place] [address]      "   Equal Access to Services     [facility] : Hadii aad ku hadgeovannalaura Dontaeali, wasathishda luqsantana, qatessta tashbonygali valenzuela. So [facility] [phone].    ATENCIÓN: Si habla español, tiene a padilla disposición servicios gratuitos de asistencia lingüística. Llame al [phone].    We comply with applicable federal civil rights laws and [place] laws. We do not discriminate on the basis of race, color, national origin, age, disability, sex, sexual orientation, or gender identity.            Thank you!     Thank you for choosing [facility]  for your care. Our goal is always to provide you with excellent care. Hearing back from our patients is one way we can continue to improve our services. Please take a few minutes to complete the written survey that you may receive in the mail after your visit with us. Thank you!             Your Updated Medication List - Protect others around you: Learn how to safely use, store and throw away your medicines at www.disposemymeds.org.          This list is accurate as of: 12/11/17 11:49 AM.  Always use your most recent med list.                   Brand Name Dispense Instructions for use Diagnosis    albuterol 108 (90 BASE) MCG/ACT Inhaler    PROAIR HFA/PROVENTIL HFA/VENTOLIN HFA    1 Inhaler    Inhale 2 puffs into the lungs every 6 hours as needed for shortness of breath / dyspnea    Chronic obstructive pulmonary disease, unspecified COPD type (H)       ASPIRIN NOT PRESCRIBED    INTENTIONAL    1 Tab    not prescribed - ulcer history    Type II or unspecified type diabetes mellitus without mention of complication, not stated as uncontrolled       atorvastatin 20 MG tablet    LIPITOR    45 tablet    TAKE 0.5 TABLETS (10 MG) BY MOUTH DAILY    Hyperlipidemia with target LDL less than 100       blood glucose monitoring meter device kit     1 kit    Use to test blood sugars  1 times daily or as directed.    Type 2 diabetes mellitus (H)       *  blood glucose monitoring test strip    ACCU-CHEK COMFORT CURVE    100 strip    1 strip by In Vitro route 2 times daily Or whatever meter he has please    Type 2 diabetes, HbA1C goal < 8% (H)       * KEITH CONTOUR test strip   Generic drug:  blood glucose monitoring     300 strip    USE TO TEST BLOOD SUGAR 1 TIMES DAILY OR AS DIRECTED.    Type 2 diabetes mellitus with diabetic nephropathy (H)       * blood glucose monitoring test strip    no brand specified    100 each    Use to test blood sugars 1  times daily or as directed    Type 2 diabetes mellitus with diabetic nephropathy (H)       [facility]S ACETAMINOPHEN 325 MG tablet   Generic drug:  acetaminophen     100 tablet    TAKE 2 TABLETS (650 MG) BY MOUTH EVERY 6 HOURS AS NEEDED FOR MILD PAIN    Chronic pain of left knee       diltiazem 120 MG 24 hr capsule    CARDIZEM CD    90 capsule    Take 1 capsule (120 mg) by mouth daily    Persistent atrial fibrillation (H)       fluticasone 50 MCG/ACT spray    FLONASE    16 g    Spray 1-2 sprays into both nostrils daily    Chronic obstructive pulmonary disease, unspecified COPD type (H)       glimepiride 1 MG tablet    AMARYL    30 tablet    Take 1 tablet (1 mg) by mouth every morning (before breakfast)    Type 2 diabetes mellitus with diabetic nephropathy, without long-term current use of insulin (H)       hydrocortisone 0.2 % cream    WESTCORT    30 g    APPLY SPARINGLY TO AFFECTED AREA THREE TIMES DAILY FOR 14 DAYS.    Contact dermatitis due to other agent, unspecified contact dermatitis type       ipratropium - albuterol 0.5 mg/2.5 mg/3 mL 0.5-2.5 (3) MG/3ML neb solution    DUONEB    30 vial    Take 1 vial (3 mLs) by nebulization every 6 hours as needed for shortness of breath / dyspnea or wheezing    Cough       KLOR-CON 20 MEQ CR tablet   Generic drug:  potassium chloride SA     90 tablet    TAKE 1 TABLET BY MOUTH DAILY    Hypertension goal BP (blood pressure) < 140/90       losartan-hydrochlorothiazide 100-25 MG per tablet     HYZAAR    90 tablet    Take 1 tablet by mouth daily    Hypertension goal BP (blood pressure) < 140/90       metFORMIN 1000 MG tablet    GLUCOPHAGE    180 tablet    TAKE 1 TABLET BY MOUTH 2 TIMES DAILY (WITH MEALS)    Type 2 diabetes mellitus with diabetic nephropathy, without long-term current use of insulin (H)       order for DME     1 Device    walker    Tear of medial meniscus of left knee, current, unspecified tear type, initial encounter       pantoprazole 40 MG EC tablet    PROTONIX    90 tablet    TAKE 1 TABLET (40 MG) BY MOUTH DAILY    Gastroesophageal reflux disease without esophagitis       polyethylene glycol powder    MIRALAX/GLYCOLAX    527 g    MIX 17 GRAMS (1 CAPFUL) OF POWDER IN 8 OUNCES LIQUID AND DRINK ONCE DAILY    Constipation, unspecified constipation type       rivaroxaban ANTICOAGULANT 20 MG Tabs tablet    XARELTO    30 tablet    Take 1 tablet (20 mg) by mouth daily (with dinner)    Atrial fibrillation (H)       * Notice:  This list has 3 medication(s) that are the same as other medications prescribed for you. Read the directions carefully, and ask your doctor or other care provider to review them with you.

## 2017-12-18 DIAGNOSIS — E11.21 TYPE 2 DIABETES MELLITUS WITH DIABETIC NEPHROPATHY, WITHOUT LONG-TERM CURRENT USE OF INSULIN (H): Primary | ICD-10-CM

## 2018-01-02 ENCOUNTER — TELEPHONE (OUTPATIENT)
Dept: FAMILY MEDICINE | Facility: CLINIC | Age: 83
End: 2018-01-02

## 2018-01-02 NOTE — TELEPHONE ENCOUNTER
Forms received by: Fax    Purpose of Form:  Home Health    How the form needs to be returned for patient:  Fax    Form currently placed: Dr Hopkins inbox    Karine Chacko CMA

## 2018-01-03 ENCOUNTER — TRANSFERRED RECORDS (OUTPATIENT)
Dept: HEALTH INFORMATION MANAGEMENT | Facility: CLINIC | Age: 83
End: 2018-01-03

## 2018-01-03 ENCOUNTER — TELEPHONE (OUTPATIENT)
Dept: FAMILY MEDICINE | Facility: CLINIC | Age: 83
End: 2018-01-03

## 2018-01-03 PROCEDURE — G0179 MD RECERTIFICATION HHA PT: HCPCS | Performed by: FAMILY MEDICINE

## 2018-01-03 NOTE — TELEPHONE ENCOUNTER
Date Received: 12/30/17    Sent by: fax    For: orders/pt     Last Office Visit: 12/11/17    Return By: fax    Placed in SW In Box

## 2018-01-08 ENCOUNTER — TELEPHONE (OUTPATIENT)
Dept: FAMILY MEDICINE | Facility: CLINIC | Age: 83
End: 2018-01-08

## 2018-01-08 NOTE — TELEPHONE ENCOUNTER
Date Received: 01/08/18    Sent by: FAX    For: ORDERS      Last Office Visit: 12/11/17    Return By:  FAX    Placed in SW In Box

## 2018-01-10 ENCOUNTER — TELEPHONE (OUTPATIENT)
Dept: FAMILY MEDICINE | Facility: CLINIC | Age: 83
End: 2018-01-10

## 2018-01-10 NOTE — TELEPHONE ENCOUNTER
Date Received: 01/08/18    Sent by: fax    For: diabetic forms      Last Office Visit: 12/11/17    Return By: fax    Placed in SW In Box

## 2018-01-12 ENCOUNTER — MEDICAL CORRESPONDENCE (OUTPATIENT)
Dept: HEALTH INFORMATION MANAGEMENT | Facility: CLINIC | Age: 83
End: 2018-01-12

## 2018-01-16 DIAGNOSIS — M25.562 CHRONIC PAIN OF LEFT KNEE: ICD-10-CM

## 2018-01-16 DIAGNOSIS — G89.29 CHRONIC PAIN OF LEFT KNEE: ICD-10-CM

## 2018-01-16 RX ORDER — ACETAMINOPHEN 325 MG/1
TABLET ORAL
Qty: 100 TABLET | Refills: 3 | Status: SHIPPED | OUTPATIENT
Start: 2018-01-16 | End: 2019-04-02

## 2018-01-16 NOTE — TELEPHONE ENCOUNTER
Prescription approved per [facility] Refill Protocol.  Kaur Hernanedz, RN, BSN  [facility]

## 2018-01-16 NOTE — TELEPHONE ENCOUNTER
"Requested Prescriptions   Pending Prescriptions Disp Refills     CVS ACETAMINOPHEN 325 MG tablet [Pharmacy Med Name: CVS ACETAMINOPHEN 325 MG TAB]  Last Written Prescription Date:  9/21/2017  Last Fill Quantity: 100 tablet,  # refills: 3   Last Office Visit with [facility], P or [facility] prescribing provider:  12/11/2017 Kellie   Future Office Visit:    Next 5 appointments (look out 90 days)     Apr 16, 2018 10:00 AM CDT   Office Visit with Bradley Hopkins,    [facility]    [address]  [facility] [address] [phone]                  100 tablet 3     Sig: TAKE 2 TABLETS (650 MG) BY MOUTH EVERY 6 HOURS AS NEEDED FOR MILD PAIN    Analgesics (Non-Narcotic Tylenol and ASA Only) Passed    1/16/2018 12:11 PM       Passed - Recent or future visit with authorizing provider's specialty    Patient had office visit in the last year or has a visit in the next 30 days with authorizing provider.  See \"Patient Info\" tab in inbasket, or \"Choose Columns\" in Meds & Orders section of the refill encounter.          Passed - Patient is 7 months old or older    If patient is a peds patient of the age 7 mos -12 years, ok to refill using weight-based dosing.     If >3g daily and/or sig is not \"prn\", check for liver enzymes. If normal in the last year, ok to refill.  If not, refer to the provider.            "

## 2018-01-23 DIAGNOSIS — K21.9 GASTROESOPHAGEAL REFLUX DISEASE WITHOUT ESOPHAGITIS: ICD-10-CM

## 2018-01-23 DIAGNOSIS — I48.91 ATRIAL FIBRILLATION, UNSPECIFIED TYPE (H): ICD-10-CM

## 2018-01-23 NOTE — TELEPHONE ENCOUNTER
"Requested Prescriptions   Pending Prescriptions Disp Refills     pantoprazole (PROTONIX) 40 MG EC tablet [Pharmacy Med Name: PANTOPRAZOLE SOD DR 40 MG TAB]  Last Written Prescription Date:  5/2/2017  Last Fill Quantity: 90 tablet,  # refills: 2   Last Office Visit with [facility], [facility] or [facility] prescribing provider:  12/11/2017   Future Office Visit:    Next 5 appointments (look out 90 days)     Apr 16, 2018 10:00 AM CDT   Office Visit with Bradley Hopkins DO   [facility]    [address] [facility] [address] [phone]                90 tablet 2     Sig: TAKE 1 TABLET (40 MG) BY MOUTH DAILY    PPI Protocol Passed    1/23/2018  1:49 AM       Passed - Not on Clopidogrel (unless Pantoprazole ordered)       Passed - No diagnosis of osteoporosis on record       Passed - Recent or future visit with authorizing provider's specialty    Patient had office visit in the last year or has a visit in the next 30 days with authorizing provider.  See \"Patient Info\" tab in inbasket, or \"Choose Columns\" in Meds & Orders section of the refill encounter.          Passed - Patient is age 18 or older          "

## 2018-01-24 RX ORDER — PANTOPRAZOLE SODIUM 40 MG/1
TABLET, DELAYED RELEASE ORAL
Qty: 90 TABLET | Refills: 1 | Status: SHIPPED | OUTPATIENT
Start: 2018-01-24 | End: 2018-08-02

## 2018-01-24 NOTE — TELEPHONE ENCOUNTER
Refill request approved per [facility] protocol with Dr. Hopkins as new PCP    Bree Begum RN

## 2018-01-30 DIAGNOSIS — E11.21 TYPE 2 DIABETES MELLITUS WITH DIABETIC NEPHROPATHY, WITHOUT LONG-TERM CURRENT USE OF INSULIN (H): ICD-10-CM

## 2018-01-31 RX ORDER — GLIMEPIRIDE 1 MG/1
1 TABLET ORAL
Qty: 30 TABLET | Refills: 1 | Status: SHIPPED | OUTPATIENT
Start: 2018-01-31 | End: 2018-04-06

## 2018-01-31 NOTE — TELEPHONE ENCOUNTER
OV notes with Dr. Hopkins from 12/11/17 reviewed. Patient has upcoming diabetic follow up appointment. Prescription approved per [facility] RN Refill Protocol. Darleen Garcia R.N.

## 2018-02-02 DIAGNOSIS — J44.9 CHRONIC OBSTRUCTIVE PULMONARY DISEASE, UNSPECIFIED COPD TYPE (H): ICD-10-CM

## 2018-02-02 NOTE — TELEPHONE ENCOUNTER
Spirometry was done 5/13/16.     Routing refill request to provider for review/approval because:  Last filled in 2016 so does not meet RN criteria.    Darleen Garcia R.N.

## 2018-02-02 NOTE — TELEPHONE ENCOUNTER
"Requested Prescriptions   Pending Prescriptions Disp Refills     albuterol (PROAIR HFA/PROVENTIL HFA/VENTOLIN HFA) 108 (90 BASE) MCG/ACT Inhaler  Last Written Prescription Date:  11/17/2016  Last Fill Quantity: 1 Inhaler,  # refills: 11   Last Office Visit with [facility], [facility] or [facility] prescribing provider:  12/11/2017   Future Office Visit:    Next 5 appointments (look out 90 days)     Apr 16, 2018 10:00 AM CDT   [facility] with Bradley Hopkins DO   [facility]    [address] [facility] [address] [phone]                  1 Inhaler 11     Sig: Inhale 2 puffs into the lungs every 6 hours as needed for shortness of breath / dyspnea    Asthma Maintenance Inhalers - Anticholinergics Passed    2/2/2018 11:20 AM  No flowsheet data found.-ACT3         Passed - Patient is age 12 years or older       Passed - Recent or future visit with authorizing provider's specialty    Patient had office visit in the last year or has a visit in the next 30 days with authorizing provider.  See \"Patient Info\" tab in inbasket, or \"Choose Columns\" in Meds & Orders section of the refill encounter.               "

## 2018-02-05 DIAGNOSIS — I48.19 PERSISTENT ATRIAL FIBRILLATION (H): ICD-10-CM

## 2018-02-05 RX ORDER — DILTIAZEM HYDROCHLORIDE 120 MG/1
120 CAPSULE, COATED, EXTENDED RELEASE ORAL DAILY
Qty: 90 CAPSULE | Refills: 0 | Status: SHIPPED | OUTPATIENT
Start: 2018-02-05 | End: 2018-06-25

## 2018-02-06 RX ORDER — ALBUTEROL SULFATE 90 UG/1
2 AEROSOL, METERED RESPIRATORY (INHALATION) EVERY 6 HOURS PRN
Qty: 1 INHALER | Refills: 11 | Status: SHIPPED | OUTPATIENT
Start: 2018-02-06 | End: 2019-12-31

## 2018-04-02 ENCOUNTER — TELEPHONE (OUTPATIENT)
Dept: FAMILY MEDICINE | Facility: CLINIC | Age: 83
End: 2018-04-02

## 2018-04-02 NOTE — TELEPHONE ENCOUNTER
Date Forms was received: April 2, 2018    Forms received by: Fax    Purpose of Form:  Nursing Home Orders [facility]    When the form is due:  ASAP    How the form needs to be returned for patient:  Fax    Form currently placed  SW inbox

## 2018-04-03 ENCOUNTER — MEDICAL CORRESPONDENCE (OUTPATIENT)
Dept: HEALTH INFORMATION MANAGEMENT | Facility: CLINIC | Age: 83
End: 2018-04-03

## 2018-04-05 ENCOUNTER — MEDICAL CORRESPONDENCE (OUTPATIENT)
Dept: HEALTH INFORMATION MANAGEMENT | Facility: CLINIC | Age: 83
End: 2018-04-05

## 2018-04-05 ENCOUNTER — TELEPHONE (OUTPATIENT)
Dept: FAMILY MEDICINE | Facility: CLINIC | Age: 83
End: 2018-04-05

## 2018-04-05 NOTE — TELEPHONE ENCOUNTER
Date Received: 04/04/18    Sent by: fax    For: plan of care      Last Office Visit: 12/11/17    Return By: fax    Placed in SW In Box

## 2018-04-06 DIAGNOSIS — E11.21 TYPE 2 DIABETES MELLITUS WITH DIABETIC NEPHROPATHY, WITHOUT LONG-TERM CURRENT USE OF INSULIN (H): ICD-10-CM

## 2018-04-06 NOTE — TELEPHONE ENCOUNTER
"Requested Prescriptions   Pending Prescriptions Disp Refills     glimepiride (AMARYL) 1 MG tablet  Last Written Prescription Date:  1/31/2018  Last Fill Quantity: 30 tablet,  # refills: 1   Last office visit: 12/11/2017 Kellie   Future Office Visit:   Next 5 appointments (look out 90 days)     Apr 16, 2018 10:00 AM CDT   Office Visit with Bradley Hopkins,    [facility]    [address]  [facility] [address] [phone]                    30 tablet 1     Sig: Take 1 tablet (1 mg) by mouth every morning (before breakfast)    Sulfonylurea Agents Failed    4/6/2018  2:44 PM       Failed - Blood pressure less than 140/90 in past 6 months    BP Readings from Last 3 Encounters:   12/11/17 152/70   07/31/17 124/70   06/21/17 126/64          Passed - Patient has documented LDL within the past 12 mos.    Recent Labs   Lab Test  06/21/17   0913   LDL  35          Passed - Patient has had a Microalbumin in the past 12 mos.    Recent Labs   Lab Test  06/21/17   0914   MICROL  73   UMALCR  186.00*          Passed - Patient has documented A1c within the specified period of time.    Recent Labs   Lab Test  12/11/17   1107   A1C  7.8*          Passed - Patient is age 18 or older       Passed - Patient has a recent creatinine (normal) within the past 12 mos.    Recent Labs   Lab Test  06/21/17   0913   CR  1.01          Passed - Recent (6 mo) or future (30 days) visit within the authorizing provider's specialty    Patient had office visit in the last 6 months or has a visit in the next 30 days with authorizing provider or within the authorizing provider's specialty.  See \"Patient Info\" tab in inbasket, or \"Choose Columns\" in Meds & Orders section of the refill encounter.              "

## 2018-04-09 RX ORDER — GLIMEPIRIDE 1 MG/1
1 TABLET ORAL
Qty: 30 TABLET | Refills: 0 | Status: SHIPPED | OUTPATIENT
Start: 2018-04-09 | End: 2018-05-08

## 2018-04-09 NOTE — TELEPHONE ENCOUNTER
Medication is being filled for 1 time refill only due to:  Patient needs to be seen because due for diabetes check - appt scheduled for 4/16/18.   Kaur Hernandez, RN, BSN  [facility]

## 2018-04-16 ENCOUNTER — OFFICE VISIT (OUTPATIENT)
Dept: FAMILY MEDICINE | Facility: CLINIC | Age: 83
End: 2018-04-16
Payer: COMMERCIAL

## 2018-04-16 VITALS
OXYGEN SATURATION: 97 % | WEIGHT: 154 LBS | TEMPERATURE: 98 F | HEIGHT: 65 IN | BODY MASS INDEX: 25.66 KG/M2 | HEART RATE: 85 BPM | DIASTOLIC BLOOD PRESSURE: 70 MMHG | SYSTOLIC BLOOD PRESSURE: 136 MMHG

## 2018-04-16 DIAGNOSIS — I10 HYPERTENSION GOAL BP (BLOOD PRESSURE) < 140/90: ICD-10-CM

## 2018-04-16 DIAGNOSIS — E78.5 HYPERLIPIDEMIA WITH TARGET LDL LESS THAN 100: ICD-10-CM

## 2018-04-16 DIAGNOSIS — E11.21 TYPE 2 DIABETES MELLITUS WITH DIABETIC NEPHROPATHY, WITHOUT LONG-TERM CURRENT USE OF INSULIN (H): Primary | ICD-10-CM

## 2018-04-16 DIAGNOSIS — J44.9 CHRONIC OBSTRUCTIVE PULMONARY DISEASE, UNSPECIFIED COPD TYPE (H): ICD-10-CM

## 2018-04-16 DIAGNOSIS — N18.2 CKD (CHRONIC KIDNEY DISEASE) STAGE 2, GFR 60-89 ML/MIN: ICD-10-CM

## 2018-04-16 LAB
ANION GAP SERPL CALCULATED.3IONS-SCNC: 6 MMOL/L (ref 3–14)
BUN SERPL-MCNC: 15 MG/DL (ref 7–30)
CALCIUM SERPL-MCNC: 9.5 MG/DL (ref 8.5–10.1)
CHLORIDE SERPL-SCNC: 102 MMOL/L (ref 94–109)
CHOLEST SERPL-MCNC: 144 MG/DL
CO2 SERPL-SCNC: 30 MMOL/L (ref 20–32)
CREAT SERPL-MCNC: 1.07 MG/DL (ref 0.66–1.25)
CREAT UR-MCNC: 21 MG/DL
GFR SERPL CREATININE-BSD FRML MDRD: 66 ML/MIN/1.7M2
GLUCOSE SERPL-MCNC: 123 MG/DL (ref 70–99)
HBA1C MFR BLD: 6.4 % (ref 0–6.4)
HDLC SERPL-MCNC: 40 MG/DL
HGB BLD-MCNC: 12.2 G/DL (ref 13.3–17.7)
LDLC SERPL CALC-MCNC: 66 MG/DL
MICROALBUMIN UR-MCNC: 118 MG/L
MICROALBUMIN/CREAT UR: 559.24 MG/G CR (ref 0–17)
NONHDLC SERPL-MCNC: 104 MG/DL
POTASSIUM SERPL-SCNC: 4.1 MMOL/L (ref 3.4–5.3)
SODIUM SERPL-SCNC: 138 MMOL/L (ref 133–144)
TRIGL SERPL-MCNC: 191 MG/DL

## 2018-04-16 PROCEDURE — 82043 UR ALBUMIN QUANTITATIVE: CPT | Performed by: FAMILY MEDICINE

## 2018-04-16 PROCEDURE — 85018 HEMOGLOBIN: CPT | Performed by: FAMILY MEDICINE

## 2018-04-16 PROCEDURE — 99214 OFFICE O/P EST MOD 30 MIN: CPT | Performed by: FAMILY MEDICINE

## 2018-04-16 PROCEDURE — 36415 COLL VENOUS BLD VENIPUNCTURE: CPT | Performed by: FAMILY MEDICINE

## 2018-04-16 PROCEDURE — 80061 LIPID PANEL: CPT | Performed by: FAMILY MEDICINE

## 2018-04-16 PROCEDURE — 83036 HEMOGLOBIN GLYCOSYLATED A1C: CPT | Performed by: FAMILY MEDICINE

## 2018-04-16 PROCEDURE — 80048 BASIC METABOLIC PNL TOTAL CA: CPT | Performed by: FAMILY MEDICINE

## 2018-04-16 NOTE — MR AVS SNAPSHOT
"              After Visit Summary   4/16/2018    Manfred Caraballo    MRN: [id number]           Patient Information     Date Of Birth          1935        Visit Information        Provider Department      4/16/2018 9:45 AM Bradley Hopkins, DO; [facility]        Today's Diagnoses     Type 2 diabetes mellitus with diabetic nephropathy, without long-term current use of insulin (H)    -  1    Hypertension goal BP (blood pressure) < 140/90        Hyperlipidemia with target LDL less than 100        CKD (chronic kidney disease) stage 3, GFR 30-59 ml/min        Chronic obstructive pulmonary disease, unspecified COPD type (H)           Follow-ups after your visit        Follow-up notes from your care team     Return in about 6 months (around 10/16/2018) for Diabetes follow-up.      Who to contact     If you have questions or need follow up information about today's clinic visit or your schedule please contact [facility] directly at [phone].  Normal or non-critical lab and imaging results will be communicated to you by MyChart, letter or phone within 4 business days after the clinic has received the results. If you do not hear from us within 7 days, please contact the clinic through [employer] or phone. If you have a critical or abnormal lab result, we will notify you by phone as soon as possible.  Submit refill requests through [employer] or call your pharmacy and they will forward the refill request to us. Please allow 3 business days for your refill to be completed.          Additional Information About Your Visit        [employer] Information     [employer] lets you send messages to your doctor, view your test results, renew your prescriptions, schedule appointments and more. To sign up, go to [email or web address] . Click on \"Log in\" on the left side of the screen, which will take you to the Welcome page. Then click on \"Sign up Now\" on the right side of the page.     You will " "be asked to enter the access code listed below, as well as some personal information. Please follow the directions to create your username and password.     Your access code is: [email or web address]  Expires: 7/15/2018 10:40 AM     Your access code will  in 90 days. If you need help or a new code, please call your [place] clinic or [phone].        Care EveryWhere ID     This is your Care EveryWhere ID. This could be used by other organizations to access your [place] medical records  [id number]        Your Vitals Were     Pulse Temperature Height Pulse Oximetry BMI (Body Mass Index)       85 98  F (36.7  C) (Oral) 5' 5\" (1.651 m) 97% 25.63 kg/m2        Blood Pressure from Last 3 Encounters:   18 136/70   17 152/70   17 124/70    Weight from Last 3 Encounters:   18 154 lb (69.9 kg)   17 150 lb (68 kg)   17 155 lb (70.3 kg)              We Performed the Following     Albumin Random Urine Quantitative with Creat Ratio     Basic metabolic panel     COPD ACTION PLAN     Hemoglobin A1c     Hemoglobin     Lipid panel reflex to direct LDL Fasting        Primary Care Provider Office Phone # Fax #    Bradley Hopkins [phone] [address] [facility] [address]        Equal Access to Services     SHAI WHITING AH: Hadii aad ku hadasho Soomaali, waaxda luqadaha, qaybta kaalmada adeegyada, gali mathias . So [facility] [phone].    ATENCIÓN: Si habla español, tiene a padilla disposición servicios gratuitos de asistencia lingüística. Rodolfo al [phone].    We comply with applicable federal civil rights laws and [place] laws. We do not discriminate on the basis of race, color, national origin, age, disability, sex, sexual orientation, or gender identity.            Thank you!     Thank you for choosing [facility]  for your care. Our goal is always to provide you with excellent care. Hearing back from our patients is one way we can " continue to improve our services. Please take a few minutes to complete the written survey that you may receive in the mail after your visit with us. Thank you!             Your Updated Medication List - Protect others around you: Learn how to safely use, store and throw away your medicines at www.disposemymeds.org.          This list is accurate as of 4/16/18 10:40 AM.  Always use your most recent med list.                   Brand Name Dispense Instructions for use Diagnosis    albuterol 108 (90 Base) MCG/ACT Inhaler    PROAIR HFA/PROVENTIL HFA/VENTOLIN HFA    1 Inhaler    Inhale 2 puffs into the lungs every 6 hours as needed for shortness of breath / dyspnea    Chronic obstructive pulmonary disease, unspecified COPD type (H)       ASPIRIN NOT PRESCRIBED    INTENTIONAL    1 Tab    not prescribed - ulcer history    Type II or unspecified type diabetes mellitus without mention of complication, not stated as uncontrolled       atorvastatin 20 MG tablet    LIPITOR    45 tablet    TAKE 0.5 TABLETS (10 MG) BY MOUTH DAILY    Hyperlipidemia with target LDL less than 100       blood glucose monitoring meter device kit     1 kit    Use to test blood sugars  1 times daily or as directed.    Type 2 diabetes mellitus (H)       * blood glucose monitoring test strip    ACCU-CHEK COMFORT CURVE    100 strip    1 strip by In Vitro route 2 times daily Or whatever meter he has please    Type 2 diabetes, HbA1C goal < 8% (H)       * KEITH CONTOUR test strip   Generic drug:  blood glucose monitoring     300 strip    USE TO TEST BLOOD SUGAR 1 TIMES DAILY OR AS DIRECTED.    Type 2 diabetes mellitus with diabetic nephropathy (H)       * blood glucose monitoring test strip    no brand specified    100 each    Use to test blood sugars 1  times daily or as directed    Type 2 diabetes mellitus with diabetic nephropathy (H)       [facility] ACETAMINOPHEN 325 MG tablet   Generic drug:  acetaminophen     100 tablet    TAKE 2 TABLETS (650 MG) BY MOUTH  EVERY 6 HOURS AS NEEDED FOR MILD PAIN    Chronic pain of left knee       diltiazem 120 MG 24 hr capsule    CARDIZEM CD    90 capsule    Take 1 capsule (120 mg) by mouth daily    Persistent atrial fibrillation (H)       fluticasone 50 MCG/ACT spray    FLONASE    16 g    Spray 1-2 sprays into both nostrils daily    Chronic obstructive pulmonary disease, unspecified COPD type (H)       glimepiride 1 MG tablet    AMARYL    30 tablet    Take 1 tablet (1 mg) by mouth every morning (before breakfast)    Type 2 diabetes mellitus with diabetic nephropathy, without long-term current use of insulin (H)       hydrocortisone 0.2 % cream    WESTCORT    30 g    APPLY SPARINGLY TO AFFECTED AREA THREE TIMES DAILY FOR 14 DAYS.    Contact dermatitis due to other agent, unspecified contact dermatitis type       ipratropium - albuterol 0.5 mg/2.5 mg/3 mL 0.5-2.5 (3) MG/3ML neb solution    DUONEB    30 vial    Take 1 vial (3 mLs) by nebulization every 6 hours as needed for shortness of breath / dyspnea or wheezing    Cough       KLOR-CON 20 MEQ CR tablet   Generic drug:  potassium chloride SA     90 tablet    TAKE 1 TABLET BY MOUTH DAILY    Hypertension goal BP (blood pressure) < 140/90       losartan-hydrochlorothiazide 100-25 MG per tablet    HYZAAR    90 tablet    Take 1 tablet by mouth daily    Hypertension goal BP (blood pressure) < 140/90       metFORMIN 1000 MG tablet    GLUCOPHAGE    180 tablet    TAKE 1 TABLET BY MOUTH 2 TIMES DAILY (WITH MEALS)    Type 2 diabetes mellitus with diabetic nephropathy, without long-term current use of insulin (H)       order for DME     1 Device    walker    Tear of medial meniscus of left knee, current, unspecified tear type, initial encounter       pantoprazole 40 MG EC tablet    PROTONIX    90 tablet    TAKE 1 TABLET (40 MG) BY MOUTH DAILY    Gastroesophageal reflux disease without esophagitis       polyethylene glycol powder    MIRALAX/GLYCOLAX    527 g    MIX 17 GRAMS (1 CAPFUL) OF POWDER IN 8  OUNCES LIQUID AND DRINK ONCE DAILY    Constipation, unspecified constipation type       rivaroxaban ANTICOAGULANT 20 MG Tabs tablet    XARELTO    30 tablet    Take 1 tablet (20 mg) by mouth daily (with dinner)    Atrial fibrillation, unspecified type (H)       * Notice:  This list has 3 medication(s) that are the same as other medications prescribed for you. Read the directions carefully, and ask your doctor or other care provider to review them with you.

## 2018-04-16 NOTE — LETTER
[facility]                     ( [phone]   April 18, 2018    Manfred Caraballo  [address] [facility] [address]      Dear Manfred,    Here is a summary of your recent test results:    -Kidney function (GFR) is normal.   -Sodium is normal.   -Potassium is normal.   -LDL(bad) cholesterol level is normal.   -HDL(good) cholesterol level is normal.   -Triglycerides are elevated which can increase your heart disease risk.  A diet high in fat and simple carbohydrates, genetics and being overweight can contribute to this. ADVISE: Exercise, weight control, and omega-3 fatty acids (fish oil) 3957-4299 mg daily are helpful to improve this.  Rechecking your cholesterol in 12 months is recommended (lipid w/ LDL reflex, DX: hypertriglyceridemia - 272.1).   -Hemoglobin is decreased indicating anemia.  Anemia can cause fatigue and, occasionally, light-headedness.  ADVISE: additional studies to determine the exact cause of your anemia.  Please make a lab appointment within the next 4-6 weeks to recheck hemoglobin level as well as additional labs to determine cause of anemia. I will place future orders.   -A1C (test of diabetes control the last 2-3 months) is at your goal. Please continue with current plan. Also, see me and recheck your A1C test in 6 months.   -Microalbumin (urine protein) level is elevated. 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  ADVISE: avoiding anti-inflamatory agents such as ibuprofen (Advil, Motrin) or naproxen (Aleve) as much as possible, keeping your blood pressure in a normal range, and continuing your medication that helps protect your kidneys.  Recheck in 1 year.     Your test results are enclosed.      Please contact me if you have any questions.    Healthy regards,  Bradley Hopkins, DO           Results for orders placed or performed in visit on 04/16/18   Albumin Random Urine Quantitative with Creat Ratio   Result Value  Ref Range    Creatinine Urine 21 mg/dL    Albumin Urine mg/L 118 mg/L    Albumin Urine mg/g Cr 559.24 (H) 0 - 17 mg/g Cr   Lipid panel reflex to direct LDL Fasting   Result Value Ref Range    Cholesterol 144 <200 mg/dL    Triglycerides 191 (H) <150 mg/dL    HDL Cholesterol 40 >39 mg/dL    LDL Cholesterol Calculated 66 <100 mg/dL    Non HDL Cholesterol 104 <130 mg/dL   Hemoglobin   Result Value Ref Range    Hemoglobin 12.2 (L) 13.3 - 17.7 g/dL   Basic metabolic panel   Result Value Ref Range    Sodium 138 133 - 144 mmol/L    Potassium 4.1 3.4 - 5.3 mmol/L    Chloride 102 94 - 109 mmol/L    Carbon Dioxide 30 20 - 32 mmol/L    Anion Gap 6 3 - 14 mmol/L    Glucose 123 (H) 70 - 99 mg/dL    Urea Nitrogen 15 7 - 30 mg/dL    Creatinine 1.07 0.66 - 1.25 mg/dL    GFR Estimate 66 >60 mL/min/1.7m2    GFR Estimate If Black 80 >60 mL/min/1.7m2    Calcium 9.5 8.5 - 10.1 mg/dL   Hemoglobin A1c   Result Value Ref Range    Hemoglobin A1C 6.4 0 - 6.4 %

## 2018-04-16 NOTE — PROGRESS NOTES
SUBJECTIVE:   Manfred Caraballo is a 83 year old male who presents to clinic today for the following health issues:      Diabetes Follow-up    Patient is checking blood sugars: twice daily.    Blood sugar testing frequency justification: Uncontrolled diabetes  Results are as follows:         am - 140,130         bedtime - 200 range sometimes over     Diabetic concerns: None and blood sugar frequently over 200     Symptoms of hypoglycemia (low blood sugar): none     Paresthesias (numbness or burning in feet) or sores: No     Date of last diabetic eye exam: July 2017     BP Readings from Last 2 Encounters:   04/16/18 136/70   12/11/17 152/70     Hemoglobin A1C (%)   Date Value   04/16/2018 6.4   12/11/2017 7.8 (H)     LDL Cholesterol Calculated (mg/dL)   Date Value   06/21/2017 35   05/13/2016 57       Amount of exercise or physical activity: Attending adult care and engaging in activities for 30 minutes 4 times per week     Problems taking medications regularly: No    Medication side effects: none    Diet: low salt and diabetic    He is feeling well. Denies any headaches, lightheadedness, dizziness, vision changes, chest pain/discomfort, palpitations, shortness of breath, dyspnea, abdominal pain, nausea, vomiting, diarrhea, or constipation. No urinary symptoms.      Problem list and histories reviewed & adjusted, as indicated.  Additional history: as documented    Patient Active Problem List   Diagnosis     Hypertension goal BP (blood pressure) < 140/90     Allergic rhinitis     Dyspepsia and other specified disorders of function of stomach     Genital herpes     Calculus of gallbladder     ACUTE PROSTATITIS- mild- follow up- 10/02     Acute duodenal ulcer with hemorrhage     Diverticulosis of large intestine     Iron deficiency anemia     Impotence of organic origin     Esophageal reflux     History of colonic polyps     Colon Polyps     Tear meniscus knee     Hyperlipidemia with target LDL less than 100     CKD  "(chronic kidney disease) stage 3, GFR 30-59 ml/min     Advanced directives, counseling/discussion     Microalbuminuria- very mild      Type 2 diabetes mellitus with diabetic nephropathy (H)     GERD (gastroesophageal reflux disease)     COPD (chronic obstructive pulmonary disease) (H)     History of pneumonia     Persistent atrial fibrillation (H)     Past Surgical History:   Procedure Laterality Date     HC COLONOSCOPY THRU STOMA, DIAGNOSTIC  11/03    for GI bleed - diverticulosis      HC COLONOSCOPY THRU STOMA, DIAGNOSTIC  5/2010    normal - repeat in 5 years      HC UGI ENDOSCOPY DIAG W OR W/O BRUSH/WASH  11/03    for GI bleed - showed pyloric and duodenal  ulcer        Social History   Substance Use Topics     Smoking status: Former Smoker     Packs/day: 0.50     Years: 20.00     Types: Cigarettes     Quit date: 5/13/1981     Smokeless tobacco: Never Used      Comment: quit in 1981      Alcohol use No      Comment: quit in 1981     Family History   Problem Relation Age of Onset     DIABETES No family hx of      C.A.D. No family hx of      Hypertension No family hx of      Cancer - colorectal No family hx of      Prostate Cancer No family hx of            Reviewed and updated as needed this visit by clinical staff  Tobacco  Allergies  Meds  Problems  Med Hx  Surg Hx  Fam Hx  Soc Hx        Reviewed and updated as needed this visit by Provider  Allergies  Meds  Problems         ROS:  Constitutional, HEENT, cardiovascular, pulmonary, gi and gu systems are negative, except as otherwise noted.    OBJECTIVE:     /70  Pulse 85  Temp 98  F (36.7  C) (Oral)  Ht 5' 5\" (1.651 m)  Wt 154 lb (69.9 kg)  SpO2 97%  BMI 25.63 kg/m2  Body mass index is 25.63 kg/(m^2).  GENERAL: healthy, alert and no distress  RESP: lungs clear to auscultation - no rales, rhonchi or wheezes  CV: regular rate and rhythm, normal S1 S2, no S3 or S4, no murmur, click or rub, no peripheral edema and peripheral pulses strong  MS: no " gross musculoskeletal defects noted, no edema  SKIN: no suspicious lesions or rashes  NEURO: Normal strength and tone, mentation intact and speech normal  PSYCH: mentation appears normal, affect normal/bright    Diagnostic Test Results:  none     ASSESSMENT/PLAN:   1. Type 2 diabetes mellitus with diabetic nephropathy, without long-term current use of insulin (H): well-controlled. Hgb A1c today of 6.4%. Continue metformin 1000 mg BID, glimepiride 1 mg daily. He has history of CKD -- will recheck creatinine and urin microalbumin. Continue statin. He is on an ARB.  - Albumin Random Urine Quantitative with Creat Ratio  - Basic metabolic panel  - Hemoglobin A1c    2. Hypertension goal BP (blood pressure) < 140/90: stable, recheck kidney function and electrolytes. Continue Hyzaar 100-25 mg daily.  - Basic metabolic panel    3. Hyperlipidemia with target LDL less than 100: stable on lipitor.Recheck lipid panel.  - Lipid panel reflex to direct LDL Fasting    4. CKD (chronic kidney disease) stage 2, GFR 60-89 ml/min: creatinine and GFR last checked in June 2017 -- creatinine normal at 1.01. GFR was 71.  Recheck today.  - Hemoglobin  - Basic metabolic panel    5. Chronic obstructive pulmonary disease, unspecified COPD type (H): breathing stable. Not on any controller medication. Only uses albuterol inhaler as needed. Uses rarely -- more if sick or with changing of seasons.  - COPD ACTION PLAN    Bradley Hopkins,   [facility]

## 2018-04-16 NOTE — LETTER
My COPD Action Plan     Name: Manfred Caraballo    YOB: 1935   Date: 4/16/2018    My doctor: Bradley Hopkins, DO   My clinic: [address] [address] [phone]  My Controller Medicine: none   Dose:      My Rescue Medicine: Albuterol (Proair/Ventolin/Proventil) inhaler   Dose: 2 puffs every 6 hours as needed     My Flare Up Medicine: Prednisone   Dose:      My COPD Severity:       Use of Oxygen: Oxygen Not Prescribed      Make sure you've had your pneumonia   vaccines.          GREEN ZONE       Doing well today      Usual level of activity and exercise    Usual amount of cough and mucus    No shortness of breath    Usual level of health (thinking clearly, sleeping well, feel like eating) Actions:      Take daily medicines    Use oxygen as prescribed    Follow regular exercise and diet plan    Avoid cigarette smoke and other irritants that harm the lungs           YELLOW ZONE          Having a bad day or flare up      Short of breath more than usual    A lot more sputum (mucus) than usual    Sputum looks yellow, green, tan, brown or bloody    More coughing or wheezing    Fever or chills    Less energy; trouble completing activities    Trouble thinking or focusing    Using quick relief inhaler or nebulizer more often    Poor sleep; symptoms wake me up    Do not feel like eating Actions:      Get plenty of rest    Take daily medicines    Use quick relief inhaler every 6 hours    If you use oxygen, call you doctor to see if you should adjust your oxygen    Do breathing exercises or other things to help you relax    Let a loved one, friend or neighbor know you are feeling worse    Call your care team if you have 2 or more symptoms.  Start taking steroids or antibiotics if directed by your care team           RED ZONE       Need medical care now      Severe shortness of breath (feel you can't breathe)    Fever, chills    Not enough breath to do any  activity    Trouble coughing up mucus, walking or talking    Blood in mucus    Frequent coughing   Rescue medicines are not working    Not able to sleep because of breathing    Feel confused or drowsy    Chest pain    Actions:      Call your health care team.  If you cannot reach your care team, call 911 or go to the emergency room.        Annual Reminders:  Meet with Care Team, Flu Shot every Fall  Pharmacy: [id number] - [place], [place] - [address]

## 2018-04-16 NOTE — NURSING NOTE
"Chief Complaint   Patient presents with     Diabetes       Initial /70  Pulse 85  Temp 98  F (36.7  C) (Oral)  Ht 5' 5\" (1.651 m)  Wt 154 lb (69.9 kg)  SpO2 97%  BMI 25.63 kg/m2 Estimated body mass index is 25.63 kg/(m^2) as calculated from the following:    Height as of this encounter: 5' 5\" (1.651 m).    Weight as of this encounter: 154 lb (69.9 kg).  Medication Reconciliation: complete   Maeve Coleman Certified Medical Assistant    "

## 2018-04-18 DIAGNOSIS — D64.9 ANEMIA, UNSPECIFIED TYPE: Primary | ICD-10-CM

## 2018-05-07 ENCOUNTER — TELEPHONE (OUTPATIENT)
Dept: FAMILY MEDICINE | Facility: CLINIC | Age: 83
End: 2018-05-07

## 2018-05-07 NOTE — TELEPHONE ENCOUNTER
Date Received: 05/07/18    Sent by: HAND DELIVERED     For: DIABETIC DIET      Last Office Visit: 04/16/18    Return By:  BY PT  STEWART STOCK [phone]    Placed in SW In Box

## 2018-05-08 DIAGNOSIS — E11.21 TYPE 2 DIABETES MELLITUS WITH DIABETIC NEPHROPATHY, WITHOUT LONG-TERM CURRENT USE OF INSULIN (H): ICD-10-CM

## 2018-05-08 NOTE — TELEPHONE ENCOUNTER
"Requested Prescriptions   Pending Prescriptions Disp Refills     glimepiride (AMARYL) 1 MG tablet  Last Written Prescription Date:  4/9/2018  Last Fill Quantity: 30 tablet,  # refills: 0   Last office visit: 4/16/2018 with prescribing provider:  Kellie   Future Office Visit:       30 tablet 0     Sig: Take 1 tablet (1 mg) by mouth every morning (before breakfast)    Sulfonylurea Agents Passed    5/8/2018  9:00 AM       Passed - Blood pressure less than 140/90 in past 6 months    BP Readings from Last 3 Encounters:   04/16/18 136/70   12/11/17 152/70   07/31/17 124/70            Passed - Patient has documented LDL within the past 12 mos.    Recent Labs   Lab Test  04/16/18   1011   LDL  66            Passed - Patient has had a Microalbumin in the past 12 mos.    Recent Labs   Lab Test  04/16/18   1011   MICROL  118   UMALCR  559.24*            Passed - Patient has documented A1c within the specified period of time.    Recent Labs   Lab Test  04/16/18   1011   A1C  6.4            Passed - Patient is age 18 or older       Passed - Patient has a recent creatinine (normal) within the past 12 mos.    Recent Labs   Lab Test  04/16/18   1011   CR  1.07            Passed - Recent (6 mo) or future (30 days) visit within the authorizing provider's specialty    Patient had office visit in the last 6 months or has a visit in the next 30 days with authorizing provider or within the authorizing provider's specialty.  See \"Patient Info\" tab in inbasket, or \"Choose Columns\" in Meds & Orders section of the refill encounter.              "

## 2018-05-08 NOTE — TELEPHONE ENCOUNTER
Drop off slip says form is to be picked up, but they gave us a SASE to mail it back in as well. Called son and left message asking if they want this mailed.  Will wait to hear back.  Bell Reid

## 2018-05-09 RX ORDER — GLIMEPIRIDE 1 MG/1
1 TABLET ORAL
Qty: 90 TABLET | Refills: 1 | Status: SHIPPED | OUTPATIENT
Start: 2018-05-09 | End: 2018-10-16

## 2018-05-09 NOTE — TELEPHONE ENCOUNTER
Prescription approved per [facility], [facility] or [facility] refill protocol.  Sherry Long RN - Triage  [facility]

## 2018-06-05 ENCOUNTER — TELEPHONE (OUTPATIENT)
Dept: FAMILY MEDICINE | Facility: CLINIC | Age: 83
End: 2018-06-05

## 2018-06-05 NOTE — TELEPHONE ENCOUNTER
Date Forms was received: June 5, 2018    Forms received by: Fax    Purpose of Form:  Nursing Home Orders Plan of Care    When the form is due:  ASAP    How the form needs to be returned for patient:  Fax    Form currently placed  SW inbox

## 2018-06-06 ENCOUNTER — MEDICAL CORRESPONDENCE (OUTPATIENT)
Dept: HEALTH INFORMATION MANAGEMENT | Facility: CLINIC | Age: 83
End: 2018-06-06

## 2018-06-19 DIAGNOSIS — I10 HYPERTENSION GOAL BP (BLOOD PRESSURE) < 140/90: ICD-10-CM

## 2018-06-19 RX ORDER — LOSARTAN POTASSIUM AND HYDROCHLOROTHIAZIDE 25; 100 MG/1; MG/1
1 TABLET ORAL DAILY
Qty: 90 TABLET | Refills: 1 | Status: SHIPPED | OUTPATIENT
Start: 2018-06-19 | End: 2018-12-27

## 2018-06-19 NOTE — TELEPHONE ENCOUNTER
Prescription approved per [facility] Refill Protocol.  Kaur Hernandez, RN, BSN  [facility]

## 2018-06-19 NOTE — TELEPHONE ENCOUNTER
"Requested Prescriptions   Pending Prescriptions Disp Refills     losartan-hydrochlorothiazide (HYZAAR) 100-25 MG per tablet  Last Written Prescription Date:  12/11/2017  Last Fill Quantity: 90 tablet,  # refills: 1   Last office visit: 4/16/2018 with prescribing provider:  Kellie     Future Office Visit:   Next 5 appointments (look out 90 days)     Jun 25, 2018 10:00 AM CDT   PHYSICAL with Bradley Hopkins,    [facility] ([facility]    [address] [facility] [address] [phone]                    90 tablet 1     Sig: Take 1 tablet by mouth daily    Angiotensin-II Receptors Passed    6/19/2018  9:16 AM       Passed - Blood pressure under 140/90 in past 12 months    BP Readings from Last 3 Encounters:   04/16/18 136/70   12/11/17 152/70   07/31/17 124/70            Passed - Recent (12 mo) or future (30 days) visit within the authorizing provider's specialty    Patient had office visit in the last 12 months or has a visit in the next 30 days with authorizing provider or within the authorizing provider's specialty.  See \"Patient Info\" tab in inbasket, or \"Choose Columns\" in Meds & Orders section of the refill encounter.           Passed - Patient is age 18 or older       Passed - Normal serum creatinine on file in past 12 months    Recent Labs   Lab Test  04/16/18   1011   CR  1.07            Passed - Normal serum potassium on file in past 12 months    Recent Labs   Lab Test  04/16/18   1011   POTASSIUM  4.1              "

## 2018-06-25 ENCOUNTER — OFFICE VISIT (OUTPATIENT)
Dept: FAMILY MEDICINE | Facility: CLINIC | Age: 83
End: 2018-06-25
Payer: COMMERCIAL

## 2018-06-25 VITALS
WEIGHT: 156 LBS | TEMPERATURE: 97.7 F | HEIGHT: 65 IN | DIASTOLIC BLOOD PRESSURE: 82 MMHG | OXYGEN SATURATION: 97 % | SYSTOLIC BLOOD PRESSURE: 132 MMHG | HEART RATE: 103 BPM | BODY MASS INDEX: 25.99 KG/M2

## 2018-06-25 DIAGNOSIS — D64.9 ANEMIA, UNSPECIFIED TYPE: ICD-10-CM

## 2018-06-25 DIAGNOSIS — I48.19 PERSISTENT ATRIAL FIBRILLATION (H): ICD-10-CM

## 2018-06-25 DIAGNOSIS — E11.21 TYPE 2 DIABETES MELLITUS WITH DIABETIC NEPHROPATHY, WITHOUT LONG-TERM CURRENT USE OF INSULIN (H): ICD-10-CM

## 2018-06-25 DIAGNOSIS — Z00.00 MEDICARE ANNUAL WELLNESS VISIT, SUBSEQUENT: Primary | ICD-10-CM

## 2018-06-25 DIAGNOSIS — E78.5 HYPERLIPIDEMIA WITH TARGET LDL LESS THAN 100: ICD-10-CM

## 2018-06-25 LAB
ERYTHROCYTE [DISTWIDTH] IN BLOOD BY AUTOMATED COUNT: 13.1 % (ref 10–15)
FERRITIN SERPL-MCNC: 70 NG/ML (ref 26–388)
HCT VFR BLD AUTO: 39.7 % (ref 40–53)
HGB BLD-MCNC: 12.6 G/DL (ref 13.3–17.7)
MCH RBC QN AUTO: 23.3 PG (ref 26.5–33)
MCHC RBC AUTO-ENTMCNC: 31.7 G/DL (ref 31.5–36.5)
MCV RBC AUTO: 74 FL (ref 78–100)
PLATELET # BLD AUTO: 229 10E9/L (ref 150–450)
RBC # BLD AUTO: 5.4 10E12/L (ref 4.4–5.9)
VIT B12 SERPL-MCNC: 506 PG/ML (ref 193–986)
WBC # BLD AUTO: 8.6 10E9/L (ref 4–11)

## 2018-06-25 PROCEDURE — 99397 PER PM REEVAL EST PAT 65+ YR: CPT | Performed by: FAMILY MEDICINE

## 2018-06-25 PROCEDURE — 82728 ASSAY OF FERRITIN: CPT | Performed by: FAMILY MEDICINE

## 2018-06-25 PROCEDURE — 36415 COLL VENOUS BLD VENIPUNCTURE: CPT | Performed by: FAMILY MEDICINE

## 2018-06-25 PROCEDURE — 82607 VITAMIN B-12: CPT | Performed by: FAMILY MEDICINE

## 2018-06-25 PROCEDURE — 85027 COMPLETE CBC AUTOMATED: CPT | Performed by: FAMILY MEDICINE

## 2018-06-25 RX ORDER — ATORVASTATIN CALCIUM 10 MG/1
10 TABLET, FILM COATED ORAL DAILY
Qty: 90 TABLET | Refills: 3 | Status: SHIPPED | OUTPATIENT
Start: 2018-06-25 | End: 2019-06-15

## 2018-06-25 RX ORDER — DILTIAZEM HYDROCHLORIDE 120 MG/1
120 CAPSULE, COATED, EXTENDED RELEASE ORAL DAILY
Qty: 90 CAPSULE | Refills: 1 | Status: SHIPPED | OUTPATIENT
Start: 2018-06-25 | End: 2018-11-16

## 2018-06-25 NOTE — MR AVS SNAPSHOT
After Visit Summary   6/25/2018    Manfred Caraballo    MRN: [id number]           Patient Information     Date Of Birth          1935        Visit Information        Provider Department      6/25/2018 9:45 AM Bradley Hopkins DO; [employer] [facility] [place]        Today's Diagnoses     Medicare annual wellness visit, subsequent    -  1    Hyperlipidemia with target LDL less than 100        Persistent atrial fibrillation (H)        Type 2 diabetes mellitus with diabetic nephropathy, without long-term current use of insulin (H)        Anemia, unspecified type          Care Instructions      Preventive Health Recommendations:       Male Ages 65 and over    Yearly exam:             See your health care provider every year in order to  o   Review health changes.   o   Discuss preventive care.    o   Review your medicines if your doctor has prescribed any.    Talk with your health care provider about whether you should have a test to screen for prostate cancer (PSA).    Every 3 years, have a diabetes test (fasting glucose). If you are at risk for diabetes, you should have this test more often.    Every 5 years, have a cholesterol test. Have this test more often if you are at risk for high cholesterol or heart disease.     Every 10 years, have a colonoscopy. Or, have a yearly FIT test (stool test). These exams will check for colon cancer.    Talk to with your health care provider about screening for Abdominal Aortic Aneurysm if you have a family history of AAA or have a history of smoking.  Shots:     Get a flu shot each year.     Get a tetanus shot every 10 years.     Talk to your doctor about your pneumonia vaccines. There are now two you should receive - Pneumovax (PPSV 23) and Prevnar (PCV 13).    Talk to your doctor about a shingles vaccine.     Talk to your doctor about the hepatitis B vaccine.  Nutrition:     Eat at least 5 servings of fruits and vegetables each day.  "    Eat whole-grain bread, whole-wheat pasta and brown rice instead of white grains and rice.     Talk to your doctor about Calcium and Vitamin D.   Lifestyle    Exercise for at least 150 minutes a week (30 minutes a day, 5 days a week). This will help you control your weight and prevent disease.     Limit alcohol to one drink per day.     No smoking.     Wear sunscreen to prevent skin cancer.     See your dentist every six months for an exam and cleaning.     See your eye doctor every 1 to 2 years to screen for conditions such as glaucoma, macular degeneration and cataracts.          Follow-ups after your visit        Follow-up notes from your care team     Return in about 4 months (around 10/25/2018) for Diabetes follow-up.      Who to contact     If you have questions or need follow up information about today's clinic visit or your schedule please contact [facility] directly at [phone].  Normal or non-critical lab and imaging results will be communicated to you by MyChart, letter or phone within 4 business days after the clinic has received the results. If you do not hear from us within 7 days, please contact the clinic through MyChart or phone. If you have a critical or abnormal lab result, we will notify you by phone as soon as possible.  Submit refill requests through [employer] or call your pharmacy and they will forward the refill request to us. Please allow 3 business days for your refill to be completed.          Additional Information About Your Visit        Care EveryWhere ID     This is your Care EveryWhere ID. This could be used by other organizations to access your [facility] medical records  [id number]        Your Vitals Were     Pulse Temperature Height Pulse Oximetry BMI (Body Mass Index)       103 97.7  F (36.5  C) (Oral) 5' 5\" (1.651 m) 97% 25.96 kg/m2        Blood Pressure from Last 3 Encounters:   06/25/18 132/82   04/16/18 136/70   12/11/17 152/70    Weight from Last 3 " Encounters:   06/25/18 156 lb (70.8 kg)   04/16/18 154 lb (69.9 kg)   12/11/17 150 lb (68 kg)              We Performed the Following     CBC with platelets     Ferritin     Vitamin B12          Today's Medication Changes          These changes are accurate as of 6/25/18 10:39 AM.  If you have any questions, ask your nurse or doctor.               These medicines have changed or have updated prescriptions.        Dose/Directions    atorvastatin 10 MG tablet   Commonly known as:  LIPITOR   This may have changed:    - medication strength  - how much to take  - how to take this  - when to take this  - additional instructions   Used for:  Hyperlipidemia with target LDL less than 100, Medicare annual wellness visit, subsequent, Persistent atrial fibrillation (H), Type 2 diabetes mellitus with diabetic nephropathy, without long-term current use of insulin (H), Anemia, unspecified type   Changed by:  Bradley Hopkins DO        Dose:  10 mg   Take 1 tablet (10 mg) by mouth daily   Quantity:  90 tablet   Refills:  3            Where to get your medicines      These medications were sent to [facility]/pharmacy #[id number] - [place] - [address]  [address] [facility] [address]     Phone:  [phone]     atorvastatin 10 MG tablet    diltiazem 120 MG 24 hr capsule    metFORMIN 1000 MG tablet                Primary Care Provider Office Phone # Fax #    Bradley Hopkins [phone] [address]  [place] [address]        Equal Access to Services     [facility] : Hadii kenneth ku hadasho Soreynaldo, waaxda luqadaha, qaybta kaalmada adeegyada, gali powell haymichelle mathias . So [facility] [phone].    ATENCIÓN: Si habla español, tiene a padilla disposición servicios gratuitos de asistencia lingüística. Blankame al [phone].    We comply with applicable federal civil rights laws and [place] laws. We do not discriminate on the basis of race, color, national origin, age, disability, sex, sexual  orientation, or gender identity.            Thank you!     Thank you for choosing [facility] [place]  for your care. Our goal is always to provide you with excellent care. Hearing back from our patients is one way we can continue to improve our services. Please take a few minutes to complete the written survey that you may receive in the mail after your visit with us. Thank you!             Your Updated Medication List - Protect others around you: Learn how to safely use, store and throw away your medicines at www.disposemymeds.org.          This list is accurate as of 6/25/18 10:39 AM.  Always use your most recent med list.                   Brand Name Dispense Instructions for use Diagnosis    albuterol 108 (90 Base) MCG/ACT Inhaler    PROAIR HFA/PROVENTIL HFA/VENTOLIN HFA    1 Inhaler    Inhale 2 puffs into the lungs every 6 hours as needed for shortness of breath / dyspnea    Chronic obstructive pulmonary disease, unspecified COPD type (H)       ASPIRIN NOT PRESCRIBED    INTENTIONAL    1 Tab    not prescribed - ulcer history    Type II or unspecified type diabetes mellitus without mention of complication, not stated as uncontrolled       atorvastatin 10 MG tablet    LIPITOR    90 tablet    Take 1 tablet (10 mg) by mouth daily    Hyperlipidemia with target LDL less than 100, Medicare annual wellness visit, subsequent, Persistent atrial fibrillation (H), Type 2 diabetes mellitus with diabetic nephropathy, without long-term current use of insulin (H), Anemia, unspecified type       blood glucose monitoring meter device kit     1 kit    Use to test blood sugars  1 times daily or as directed.    Type 2 diabetes mellitus (H)       * blood glucose monitoring test strip    ACCU-CHEK COMFORT CURVE    100 strip    1 strip by In Vitro route 2 times daily Or whatever meter he has please    Type 2 diabetes, HbA1C goal < 8% (H)       * KEITH CONTOUR test strip   Generic drug:  blood glucose monitoring     300 strip     USE TO TEST BLOOD SUGAR 1 TIMES DAILY OR AS DIRECTED.    Type 2 diabetes mellitus with diabetic nephropathy (H)       * blood glucose monitoring test strip    no brand specified    100 each    Use to test blood sugars 1  times daily or as directed    Type 2 diabetes mellitus with diabetic nephropathy (H)       [facility] ACETAMINOPHEN 325 MG tablet   Generic drug:  acetaminophen     100 tablet    TAKE 2 TABLETS (650 MG) BY MOUTH EVERY 6 HOURS AS NEEDED FOR MILD PAIN    Chronic pain of left knee       diltiazem 120 MG 24 hr capsule    CARDIZEM CD    90 capsule    Take 1 capsule (120 mg) by mouth daily    Persistent atrial fibrillation (H), Medicare annual wellness visit, subsequent, Hyperlipidemia with target LDL less than 100, Type 2 diabetes mellitus with diabetic nephropathy, without long-term current use of insulin (H), Anemia, unspecified type       fluticasone 50 MCG/ACT spray    FLONASE    16 g    Spray 1-2 sprays into both nostrils daily    Chronic obstructive pulmonary disease, unspecified COPD type (H)       glimepiride 1 MG tablet    AMARYL    90 tablet    Take 1 tablet (1 mg) by mouth every morning (before breakfast)    Type 2 diabetes mellitus with diabetic nephropathy, without long-term current use of insulin (H)       hydrocortisone 0.2 % cream    WESTCORT    30 g    APPLY SPARINGLY TO AFFECTED AREA THREE TIMES DAILY FOR 14 DAYS.    Contact dermatitis due to other agent, unspecified contact dermatitis type       ipratropium - albuterol 0.5 mg/2.5 mg/3 mL 0.5-2.5 (3) MG/3ML neb solution    DUONEB    30 vial    Take 1 vial (3 mLs) by nebulization every 6 hours as needed for shortness of breath / dyspnea or wheezing    Cough       KLOR-CON 20 MEQ CR tablet   Generic drug:  potassium chloride SA     90 tablet    TAKE 1 TABLET BY MOUTH DAILY    Hypertension goal BP (blood pressure) < 140/90       losartan-hydrochlorothiazide 100-25 MG per tablet    HYZAAR    90 tablet    Take 1 tablet by mouth daily     Hypertension goal BP (blood pressure) < 140/90       metFORMIN 1000 MG tablet    GLUCOPHAGE    180 tablet    TAKE 1 TABLET BY MOUTH 2 TIMES DAILY (WITH MEALS)    Type 2 diabetes mellitus with diabetic nephropathy, without long-term current use of insulin (H), Medicare annual wellness visit, subsequent, Hyperlipidemia with target LDL less than 100, Persistent atrial fibrillation (H), Anemia, unspecified type       order for DME     1 Device    walker    Tear of medial meniscus of left knee, current, unspecified tear type, initial encounter       pantoprazole 40 MG EC tablet    PROTONIX    90 tablet    TAKE 1 TABLET (40 MG) BY MOUTH DAILY    Gastroesophageal reflux disease without esophagitis       polyethylene glycol powder    MIRALAX/GLYCOLAX    527 g    MIX 17 GRAMS (1 CAPFUL) OF POWDER IN 8 OUNCES LIQUID AND DRINK ONCE DAILY    Constipation, unspecified constipation type       rivaroxaban ANTICOAGULANT 20 MG Tabs tablet    XARELTO    30 tablet    Take 1 tablet (20 mg) by mouth daily (with dinner)    Atrial fibrillation, unspecified type (H)       * Notice:  This list has 3 medication(s) that are the same as other medications prescribed for you. Read the directions carefully, and ask your doctor or other care provider to review them with you.

## 2018-06-25 NOTE — PROGRESS NOTES
SUBJECTIVE:   Manfred Caraballo is a 83 year old male who presents for Preventive Visit.    Are you in the first 12 months of your Medicare Part B coverage?  No    Healthy Habits:    Do you get at least three servings of calcium containing foods daily (dairy, green leafy vegetables, etc.)? yes    Amount of exercise or daily activities, outside of work: 4 day(s) per week, 20 minutes    Problems taking medications regularly No    Medication side effects: No    Have you had an eye exam in the past two years? yes    Do you see a dentist twice per year? no    Do you have sleep apnea, excessive snoring or daytime drowsiness?no    Ability to successfully perform activities of daily living: Yes, no assistance needed    Home safety:  none identified     Hearing impairment: No    Fall risk:  Fallen 2 or more times in the past year?: No  Any fall with injury in the past year?: No      COGNITIVE SCREEN  1) Repeat 3 items (Banana, Sunrise, Chair)    2) Clock draw: NORMAL  3) 3 item recall: Recalls 1 object   Results: NORMAL clock, 1-2 items recalled: COGNITIVE IMPAIRMENT LESS LIKELY    Mini-CogTM Copyright DEBBIE Guzman. Licensed by the author for use in [facility]; reprinted with permission (fina@[facility]). All rights reserved.        Reviewed and updated as needed this visit by clinical staff  Tobacco  Allergies  Meds         Reviewed and updated as needed this visit by Provider        Social History   Substance Use Topics     Smoking status: Former Smoker     Packs/day: 0.50     Years: 20.00     Types: Cigarettes     Quit date: 5/13/1981     Smokeless tobacco: Never Used      Comment: quit in 1981      Alcohol use No      Comment: quit in 1981       If you drink alcohol do you typically have >3 drinks per day or >7 drinks per week? Not Applicable                        Today's PHQ-2 Score:   PHQ-2 ( 1999 [employer]) 6/25/2018 4/16/2018   Q1: Little interest or pleasure in doing things 0 0   Q2: Feeling down, depressed  "or hopeless 0 0   PHQ-2 Score 0 0       Do you feel safe in your environment - Yes    Do you have a Health Care Directive?: No: Advance care planning was reviewed with patient; patient declined at this time.    Current providers sharing in care for this patient include:   Patient Care Team:  Bradley Hopkins DO as PCP - General (Family Practice)    The following health maintenance items are reviewed in Epic and correct as of today:  Health Maintenance   Topic Date Due     FALL RISK ASSESSMENT  06/21/2018     WELLNESS VISIT Q1 YR  06/21/2018     EYE EXAM Q1 YEAR  07/12/2018     A1C Q6 MO  10/16/2018     FOOT EXAM Q1 YEAR  12/11/2018     BMP Q1 YR  04/16/2019     COPD ACTION PLAN Q1 YR  04/16/2019     HEMOGLOBIN Q1 YR  04/16/2019     LIPID MONITORING Q1 YEAR  04/16/2019     MICROALBUMIN Q1 YEAR  04/16/2019     PHQ-2 Q1 YR  04/16/2019     TSH W/ FREE T4 REFLEX Q2 YEAR  06/21/2019     ADVANCE DIRECTIVE PLANNING Q5 YRS  05/13/2021     TETANUS Q10 YR  07/31/2027     SPIROMETRY ONETIME  Completed     PNEUMOCOCCAL  Completed     INFLUENZA VACCINE  Completed     BP Readings from Last 3 Encounters:   06/25/18 132/82   04/16/18 136/70   12/11/17 152/70    Wt Readings from Last 3 Encounters:   06/25/18 156 lb (70.8 kg)   04/16/18 154 lb (69.9 kg)   12/11/17 150 lb (68 kg)              Pneumonia Vaccine:up to date    ROS:  Constitutional, HEENT, cardiovascular, pulmonary, gi and gu systems are negative, except as otherwise noted.    OBJECTIVE:   /82 (BP Location: Right arm, Patient Position: Sitting, Cuff Size: Adult Regular)  Pulse 103  Temp 97.7  F (36.5  C) (Oral)  Ht 5' 5\" (1.651 m)  Wt 156 lb (70.8 kg)  SpO2 97%  BMI 25.96 kg/m2 Estimated body mass index is 25.96 kg/(m^2) as calculated from the following:    Height as of this encounter: 5' 5\" (1.651 m).    Weight as of this encounter: 156 lb (70.8 kg).  EXAM:   GENERAL: healthy, alert and no distress  EYES: Eyes grossly normal to inspection, PERRL and " conjunctivae and sclerae normal  HENT: ear canals and TM's normal, nose and mouth without ulcers or lesions  NECK: no adenopathy and no asymmetry, masses, or scars  RESP: lungs clear to auscultation - no rales, rhonchi or wheezes  CV: regular rate and rhythm, normal S1 S2, no S3 or S4, no murmur, click or rub, no peripheral edema and peripheral pulses strong  ABDOMEN: soft, nontender, no hepatosplenomegaly, no masses and bowel sounds normal  MS: no gross musculoskeletal defects noted, no edema  SKIN: no suspicious lesions or rashes    ASSESSMENT / PLAN:   1. Medicare annual wellness visit, subsequent  - atorvastatin (LIPITOR) 10 MG tablet; Take 1 tablet (10 mg) by mouth daily  Dispense: 90 tablet; Refill: 3  - diltiazem (CARDIZEM CD) 120 MG 24 hr capsule; Take 1 capsule (120 mg) by mouth daily  Dispense: 90 capsule; Refill: 1  - metFORMIN (GLUCOPHAGE) 1000 MG tablet; TAKE 1 TABLET BY MOUTH 2 TIMES DAILY (WITH MEALS)  Dispense: 180 tablet; Refill: 1    2. Hyperlipidemia with target LDL less than 100  - atorvastatin (LIPITOR) 10 MG tablet; Take 1 tablet (10 mg) by mouth daily  Dispense: 90 tablet; Refill: 3  - diltiazem (CARDIZEM CD) 120 MG 24 hr capsule; Take 1 capsule (120 mg) by mouth daily  Dispense: 90 capsule; Refill: 1  - metFORMIN (GLUCOPHAGE) 1000 MG tablet; TAKE 1 TABLET BY MOUTH 2 TIMES DAILY (WITH MEALS)  Dispense: 180 tablet; Refill: 1    3. Persistent atrial fibrillation (H)  - atorvastatin (LIPITOR) 10 MG tablet; Take 1 tablet (10 mg) by mouth daily  Dispense: 90 tablet; Refill: 3  - diltiazem (CARDIZEM CD) 120 MG 24 hr capsule; Take 1 capsule (120 mg) by mouth daily  Dispense: 90 capsule; Refill: 1  - metFORMIN (GLUCOPHAGE) 1000 MG tablet; TAKE 1 TABLET BY MOUTH 2 TIMES DAILY (WITH MEALS)  Dispense: 180 tablet; Refill: 1    4. Type 2 diabetes mellitus with diabetic nephropathy, without long-term current use of insulin (H)  - atorvastatin (LIPITOR) 10 MG tablet; Take 1 tablet (10 mg) by mouth daily   "Dispense: 90 tablet; Refill: 3  - diltiazem (CARDIZEM CD) 120 MG 24 hr capsule; Take 1 capsule (120 mg) by mouth daily  Dispense: 90 capsule; Refill: 1  - metFORMIN (GLUCOPHAGE) 1000 MG tablet; TAKE 1 TABLET BY MOUTH 2 TIMES DAILY (WITH MEALS)  Dispense: 180 tablet; Refill: 1    5. Anemia, unspecified type  - atorvastatin (LIPITOR) 10 MG tablet; Take 1 tablet (10 mg) by mouth daily  Dispense: 90 tablet; Refill: 3  - diltiazem (CARDIZEM CD) 120 MG 24 hr capsule; Take 1 capsule (120 mg) by mouth daily  Dispense: 90 capsule; Refill: 1  - metFORMIN (GLUCOPHAGE) 1000 MG tablet; TAKE 1 TABLET BY MOUTH 2 TIMES DAILY (WITH MEALS)  Dispense: 180 tablet; Refill: 1  - CBC with platelets  - Ferritin  - Vitamin B12    End of Life Planning:  Patient currently has an advanced directive: No.  I have verified the patient's ablity to prepare an advanced directive/make health care decisions.  Literature was provided to assist patient in preparing an advanced directive.    COUNSELING:  Reviewed preventive health counseling, as reflected in patient instructions       Regular exercise       Healthy diet/nutrition    BP Screening:   Last 3 BP Readings:    BP Readings from Last 3 Encounters:   06/25/18 132/82   04/16/18 136/70   12/11/17 152/70       The following was recommended to the patient:  Re-screen BP within a year and recommended lifestyle modifications    Estimated body mass index is 25.96 kg/(m^2) as calculated from the following:    Height as of this encounter: 5' 5\" (1.651 m).    Weight as of this encounter: 156 lb (70.8 kg).       reports that he quit smoking about 37 years ago. His smoking use included Cigarettes. He has a 10.00 pack-year smoking history. He has never used smokeless tobacco.      Appropriate preventive services were discussed with this patient, including applicable screening as appropriate for cardiovascular disease, diabetes, osteopenia/osteoporosis, and glaucoma.  As appropriate for age/gender, discussed " screening for colorectal cancer, prostate cancer, breast cancer, and cervical cancer. Checklist reviewing preventive services available has been given to the patient.    Reviewed patients plan of care and provided an AVS. The Basic Care Plan (routine screening as documented in Health Maintenance) for Manfred meets the Care Plan requirement. This Care Plan has been established and reviewed with the Patient.    Counseling Resources:  ATP IV Guidelines  Pooled Cohorts Equation Calculator  Breast Cancer Risk Calculator  FRAX Risk Assessment  ICSI Preventive Guidelines  Dietary Guidelines for Americans, 2010  USDA's MyPlate  ASA Prophylaxis  Lung CA Screening    Bradley Hopkins DO  [facility]

## 2018-06-25 NOTE — LETTER
June 26, 2018      Manfred Caraballo  [address] [place] [id number]        Dear ,    We are writing to inform you of your test results.    -Hemoglobin is decreased indicating anemia. Lets recheck this level in 3 months.   -White blood cell and platelet counts are normal.   -Ferritin (iron) level is normal.     Resulted Orders   CBC with platelets   Result Value Ref Range    WBC 8.6 4.0 - 11.0 10e9/L    RBC Count 5.40 4.4 - 5.9 10e12/L    Hemoglobin 12.6 (L) 13.3 - 17.7 g/dL    Hematocrit 39.7 (L) 40.0 - 53.0 %    MCV 74 (L) 78 - 100 fl    MCH 23.3 (L) 26.5 - 33.0 pg    MCHC 31.7 31.5 - 36.5 g/dL    RDW 13.1 10.0 - 15.0 %    Platelet Count 229 150 - 450 10e9/L   Ferritin   Result Value Ref Range    Ferritin 70 26 - 388 ng/mL   Vitamin B12   Result Value Ref Range    Vitamin B12 506 193 - 986 pg/mL       If you have any questions or concerns, please call the clinic at the number listed above.       Sincerely,        Bradley Hopkins, DO

## 2018-06-26 DIAGNOSIS — D50.9 MICROCYTIC ANEMIA: Primary | ICD-10-CM

## 2018-07-30 ENCOUNTER — TELEPHONE (OUTPATIENT)
Dept: FAMILY MEDICINE | Facility: CLINIC | Age: 83
End: 2018-07-30

## 2018-07-30 NOTE — TELEPHONE ENCOUNTER
Date Forms was received: July 30, 2018    Forms received by: Fax    Purpose of Form:  Nursing Home Orders plan of care    When the form is due:  ASAP    How the form needs to be returned for patient:  Fax    Form currently placed  SW inbox

## 2018-07-31 ENCOUNTER — MEDICAL CORRESPONDENCE (OUTPATIENT)
Dept: HEALTH INFORMATION MANAGEMENT | Facility: CLINIC | Age: 83
End: 2018-07-31

## 2018-08-01 ENCOUNTER — TRANSFERRED RECORDS (OUTPATIENT)
Dept: HEALTH INFORMATION MANAGEMENT | Facility: CLINIC | Age: 83
End: 2018-08-01

## 2018-08-02 DIAGNOSIS — K21.9 GASTROESOPHAGEAL REFLUX DISEASE WITHOUT ESOPHAGITIS: ICD-10-CM

## 2018-08-02 RX ORDER — PANTOPRAZOLE SODIUM 40 MG/1
TABLET, DELAYED RELEASE ORAL
Qty: 90 TABLET | Refills: 0 | Status: SHIPPED | OUTPATIENT
Start: 2018-08-02 | End: 2018-10-30

## 2018-08-02 NOTE — TELEPHONE ENCOUNTER
Prescription approved per [facility] Refill Protocol.  Kaur Hernandez, RN, BSN  [facility]

## 2018-08-02 NOTE — TELEPHONE ENCOUNTER
"Requested Prescriptions   Pending Prescriptions Disp Refills     pantoprazole (PROTONIX) 40 MG EC tablet  Last Written Prescription Date:  1/24/2018  Last Fill Quantity: 90 tablet,  # refills: 1   Last office visit: 6/25/2018 with prescribing provider:  Kellie     Future Office Visit:   Next 5 appointments (look out 90 days)     Oct 25, 2018 10:00 AM CDT   Office Visit with Bradley Hopkins,    [facility] ([facility]    [address] [facility] [address] [phone]                    90 tablet 1    PPI Protocol Passed    8/2/2018  9:42 AM       Passed - Not on Clopidogrel (unless Pantoprazole ordered)       Passed - No diagnosis of osteoporosis on record       Passed - Recent (12 mo) or future (30 days) visit within the authorizing provider's specialty    Patient had office visit in the last 12 months or has a visit in the next 30 days with authorizing provider or within the authorizing provider's specialty.  See \"Patient Info\" tab in inbasket, or \"Choose Columns\" in Meds & Orders section of the refill encounter.           Passed - Patient is age 18 or older          "

## 2018-08-06 ENCOUNTER — TELEPHONE (OUTPATIENT)
Dept: FAMILY MEDICINE | Facility: CLINIC | Age: 83
End: 2018-08-06

## 2018-08-06 NOTE — TELEPHONE ENCOUNTER
Date Received: 8/6/18    Sent by: Home Health Care    For: Plan of Care     Last Office Visit: 6/25/18    Return By: Fax #[phone]    Placed in SW In Box

## 2018-08-07 ENCOUNTER — MEDICAL CORRESPONDENCE (OUTPATIENT)
Dept: HEALTH INFORMATION MANAGEMENT | Facility: CLINIC | Age: 83
End: 2018-08-07

## 2018-08-20 ENCOUNTER — TELEPHONE (OUTPATIENT)
Dept: FAMILY MEDICINE | Facility: CLINIC | Age: 83
End: 2018-08-20

## 2018-08-20 NOTE — TELEPHONE ENCOUNTER
Date Forms was received: August 20, 2018    Forms received by: Fax    Purpose of Form:  Nursing Home Orders plan of care    When the form is due:  ASAP    How the form needs to be returned for patient:  Fax    Form currently placed  SW inbox

## 2018-08-21 ENCOUNTER — MEDICAL CORRESPONDENCE (OUTPATIENT)
Dept: HEALTH INFORMATION MANAGEMENT | Facility: CLINIC | Age: 83
End: 2018-08-21

## 2018-08-27 DIAGNOSIS — K59.00 CONSTIPATION, UNSPECIFIED CONSTIPATION TYPE: ICD-10-CM

## 2018-08-27 NOTE — TELEPHONE ENCOUNTER
"Requested Prescriptions   Pending Prescriptions Disp Refills     polyethylene glycol (MIRALAX/GLYCOLAX) powder [Pharmacy Med Name: POLYETHYLENE GLYCOL 3350 POWD]  Last Written Prescription Date:  6/27/2017  Last Fill Quantity: 527 g,  # refills: 3   Last office visit: 6/25/2018 with prescribing provider:  Kellie     Future Office Visit:   Next 5 appointments (look out 90 days)     Oct 25, 2018 10:00 AM CDT   Office Visit with Bradley Hopkins,    [facility] ([facility])    [address] [id number]   [phone]                    527 g 2     Sig: MIX 17 GRAMS (1 CAPFUL) OF POWDER IN 8 OUNCES LIQUID AND DRINK ONCE DAILY    Laxatives Protocol Passed    8/27/2018  3:05 PM       Passed - Patient is age 6 or older       Passed - Recent (12 mo) or future (30 days) visit within the authorizing provider's specialty    Patient had office visit in the last 12 months or has a visit in the next 30 days with authorizing provider or within the authorizing provider's specialty.  See \"Patient Info\" tab in inbasket, or \"Choose Columns\" in Meds & Orders section of the refill encounter.              "

## 2018-08-28 RX ORDER — POLYETHYLENE GLYCOL 3350 17 G/17G
POWDER, FOR SOLUTION ORAL
Qty: 527 G | Refills: 3 | Status: SHIPPED | OUTPATIENT
Start: 2018-08-28 | End: 2019-04-02

## 2018-08-28 NOTE — TELEPHONE ENCOUNTER
Prescription approved per [facility] [place].  Kaur Hernandez, RN, BSN  [facility]

## 2018-09-07 DIAGNOSIS — I48.91 ATRIAL FIBRILLATION, UNSPECIFIED TYPE (H): ICD-10-CM

## 2018-09-07 DIAGNOSIS — E11.21 TYPE 2 DIABETES MELLITUS WITH DIABETIC NEPHROPATHY, WITHOUT LONG-TERM CURRENT USE OF INSULIN (H): Primary | ICD-10-CM

## 2018-09-07 DIAGNOSIS — I10 HYPERTENSION GOAL BP (BLOOD PRESSURE) < 140/90: ICD-10-CM

## 2018-09-07 NOTE — TELEPHONE ENCOUNTER
Routing refill request to provider for review/approval because:  A break in medication ? Last filled June 2017.    Darleen Garcia R.N.

## 2018-09-07 NOTE — TELEPHONE ENCOUNTER
"Requested Prescriptions   Pending Prescriptions Disp Refills     rivaroxaban ANTICOAGULANT (XARELTO) 20 MG TABS tablet  Last Written Prescription Date:  1/23/2018  Last Fill Quantity: 30 tablet,  # refills: 6   Last office visit: 6/25/2018 with prescribing provider:  Kellie     Future Office Visit:   Next 5 appointments (look out 90 days)     Oct 16, 2018 10:00 AM CDT   Office Visit with Bradley Hopkins DO   [facility] ([facility]    [address] [facility] [id number] [phone]                    30 tablet 6     Sig: Take 1 tablet (20 mg) by mouth daily (with dinner)    Platelet Inhibitors Failed    9/7/2018  1:50 PM       Failed - Normal ALT on file in past 12 months    Recent Labs   Lab Test  06/21/17   0913   ALT  25            Failed - Normal HGB on file in past 12 months    Recent Labs   Lab Test  06/25/18   1042   HGB  12.6*              Failed - Normal AST on file in past 12 months    Recent Labs   Lab Test  06/21/17   0913   AST  16            Passed - Normal Platelets on file in past 12 months    Recent Labs   Lab Test  06/25/18   1042   PLT  229              Passed - Recent (12 mo) or future (30 days) visit within the authorizing provider's specialty    Patient had office visit in the last 12 months or has a visit in the next 30 days with authorizing provider or within the authorizing provider's specialty.  See \"Patient Info\" tab in inbasket, or \"Choose Columns\" in Meds & Orders section of the refill encounter.           Passed - Patient is age 18 or older       Passed - Normal serum creatinine on file in past 12 months    Recent Labs   Lab Test  04/16/18   1011   CR  1.07               "

## 2018-09-07 NOTE — TELEPHONE ENCOUNTER
Received request for Xarelto from [email or web address]. Pt has not been seen since 2/17/17. Letter prev sent to pt to call and schedule appt F/U. Pharmacy advised refill declined and to send refill request to Primary. Pharm states will send to PMD: Dr Hopkins.

## 2018-09-10 RX ORDER — POTASSIUM CHLORIDE 1500 MG/1
20 TABLET, EXTENDED RELEASE ORAL DAILY
Qty: 90 TABLET | Refills: 3 | Status: SHIPPED | OUTPATIENT
Start: 2018-09-10 | End: 2019-07-11

## 2018-09-10 NOTE — TELEPHONE ENCOUNTER
Temporary refill supplied. Manfred is due for labs. Recommend getting these done in the next month (future labs are ordered). I would also recommend he schedule follow up with cardiology.    Bradley Hopkins,   9/10/2018 12:55 PM

## 2018-09-10 NOTE — TELEPHONE ENCOUNTER
Refill signed. Recommend rechecking BMP in a couple weeks since it looks like he has probably been off potassium for the past several months. We can recheck A1c at that time as well. Future orders placed.    Bradley Hopkins DO  9/10/2018 10:23 AM

## 2018-09-10 NOTE — TELEPHONE ENCOUNTER
Please see alternate refill encounter from 9/7/18.  Kaur Hernandez, RN, BSN  [facility]

## 2018-09-10 NOTE — TELEPHONE ENCOUNTER
TC--please see below note from Dr. Hopkins and baltazar Shearer, please also remind him to follow up with cardiology. Thank you, Darleen Garcia R.N.

## 2018-09-10 NOTE — TELEPHONE ENCOUNTER
Routing refill request to provider for review/approval because:  Labs out of range:  Hgb  Labs not current:  AST, ALT    Darleen Garcia R.N.

## 2018-09-13 NOTE — TELEPHONE ENCOUNTER
Called [phone] and spoke with son Phuc.  Pt has appt in October with WOLF, DO so labs can be done same day.  Phuc does not know who his father sees for cardiology.  I can see that he has seen Dr. Au, but it has been a while.  Do we need to do a new referral for this or can Phuc just call [facility] cardiology to get appt made?

## 2018-09-14 NOTE — TELEPHONE ENCOUNTER
I don't know if he would need a new referral or not. I placed a new one just in case.    Bradley Hopkins,   9/14/2018 12:37 AM

## 2018-09-28 ENCOUNTER — TELEPHONE (OUTPATIENT)
Dept: FAMILY MEDICINE | Facility: CLINIC | Age: 83
End: 2018-09-28

## 2018-09-28 NOTE — TELEPHONE ENCOUNTER
Date Forms was received: September 28, 2018    Forms received by: Fax    Purpose of Form:  Nursing Home Orders plan of care    When the form is due:  ASAP    How the form needs to be returned for patient:  Fax    Form currently placed  SW inbox

## 2018-10-01 ENCOUNTER — MEDICAL CORRESPONDENCE (OUTPATIENT)
Dept: HEALTH INFORMATION MANAGEMENT | Facility: CLINIC | Age: 83
End: 2018-10-01

## 2018-10-16 ENCOUNTER — OFFICE VISIT (OUTPATIENT)
Dept: FAMILY MEDICINE | Facility: CLINIC | Age: 83
End: 2018-10-16
Payer: COMMERCIAL

## 2018-10-16 VITALS
TEMPERATURE: 97.9 F | OXYGEN SATURATION: 96 % | BODY MASS INDEX: 26.13 KG/M2 | HEART RATE: 100 BPM | DIASTOLIC BLOOD PRESSURE: 74 MMHG | SYSTOLIC BLOOD PRESSURE: 136 MMHG | WEIGHT: 157 LBS

## 2018-10-16 DIAGNOSIS — Z13.5 SCREENING FOR DIABETIC RETINOPATHY: Primary | ICD-10-CM

## 2018-10-16 DIAGNOSIS — I10 HYPERTENSION GOAL BP (BLOOD PRESSURE) < 140/90: ICD-10-CM

## 2018-10-16 DIAGNOSIS — D64.9 ANEMIA, UNSPECIFIED TYPE: ICD-10-CM

## 2018-10-16 DIAGNOSIS — E11.21 TYPE 2 DIABETES MELLITUS WITH DIABETIC NEPHROPATHY, WITHOUT LONG-TERM CURRENT USE OF INSULIN (H): ICD-10-CM

## 2018-10-16 DIAGNOSIS — G89.29 CHRONIC PAIN OF LEFT KNEE: ICD-10-CM

## 2018-10-16 DIAGNOSIS — I48.91 ATRIAL FIBRILLATION, UNSPECIFIED TYPE (H): ICD-10-CM

## 2018-10-16 DIAGNOSIS — M25.562 CHRONIC PAIN OF LEFT KNEE: ICD-10-CM

## 2018-10-16 LAB
ERYTHROCYTE [DISTWIDTH] IN BLOOD BY AUTOMATED COUNT: 13.3 % (ref 10–15)
FOLATE SERPL-MCNC: 23.2 NG/ML
HBA1C MFR BLD: 6.9 % (ref 0–5.6)
HCT VFR BLD AUTO: 39.8 % (ref 40–53)
HGB BLD-MCNC: 12.2 G/DL (ref 13.3–17.7)
MCH RBC QN AUTO: 22.8 PG (ref 26.5–33)
MCHC RBC AUTO-ENTMCNC: 30.7 G/DL (ref 31.5–36.5)
MCV RBC AUTO: 75 FL (ref 78–100)
PLATELET # BLD AUTO: 216 10E9/L (ref 150–450)
RBC # BLD AUTO: 5.34 10E12/L (ref 4.4–5.9)
TRANSFERRIN SERPL-MCNC: 280 MG/DL (ref 210–360)
WBC # BLD AUTO: 7.6 10E9/L (ref 4–11)

## 2018-10-16 PROCEDURE — 82746 ASSAY OF FOLIC ACID SERUM: CPT | Performed by: FAMILY MEDICINE

## 2018-10-16 PROCEDURE — 99207 C FOOT EXAM  NO CHARGE: CPT | Mod: 25 | Performed by: FAMILY MEDICINE

## 2018-10-16 PROCEDURE — 80048 BASIC METABOLIC PNL TOTAL CA: CPT | Performed by: FAMILY MEDICINE

## 2018-10-16 PROCEDURE — 36415 COLL VENOUS BLD VENIPUNCTURE: CPT | Performed by: FAMILY MEDICINE

## 2018-10-16 PROCEDURE — 83540 ASSAY OF IRON: CPT | Performed by: FAMILY MEDICINE

## 2018-10-16 PROCEDURE — 84466 ASSAY OF TRANSFERRIN: CPT | Performed by: FAMILY MEDICINE

## 2018-10-16 PROCEDURE — 80076 HEPATIC FUNCTION PANEL: CPT | Performed by: FAMILY MEDICINE

## 2018-10-16 PROCEDURE — 82728 ASSAY OF FERRITIN: CPT | Performed by: FAMILY MEDICINE

## 2018-10-16 PROCEDURE — 85027 COMPLETE CBC AUTOMATED: CPT | Performed by: FAMILY MEDICINE

## 2018-10-16 PROCEDURE — 83036 HEMOGLOBIN GLYCOSYLATED A1C: CPT | Performed by: FAMILY MEDICINE

## 2018-10-16 PROCEDURE — 99214 OFFICE O/P EST MOD 30 MIN: CPT | Performed by: FAMILY MEDICINE

## 2018-10-16 RX ORDER — ACETAMINOPHEN 500 MG
500-1000 TABLET ORAL EVERY 6 HOURS PRN
Qty: 360 TABLET | Refills: 1 | Status: SHIPPED | OUTPATIENT
Start: 2018-10-16 | End: 2019-04-04

## 2018-10-16 RX ORDER — GLIMEPIRIDE 1 MG/1
1 TABLET ORAL
Qty: 90 TABLET | Refills: 1 | Status: SHIPPED | OUTPATIENT
Start: 2018-10-16 | End: 2019-04-24

## 2018-10-16 NOTE — LETTER
October 19, 2018      Manfred Caraballo  [address] [place] [address]        Dear ,    We are writing to inform you of your test results.    -Kidney function (GFR) is normal.   -Sodium is normal.   -Potassium is normal.   -Glucose is elevated due to your diabetes.   -Hemoglobin is decreased indicating anemia.  However, level is stable. Recommend rechecking in 6 months.   -White blood cell and platelet counts are normal.   -A1C (test of diabetes control the last 2-3 months) is at your goal. Please continue with your current plan. Also, you should make an appointment to see me and recheck your A1C test in 6 months.   -Liver and gallbladder tests (ALT,AST, Alk phos,bilirubin) are normal.   -Iron studies are is normal.     Resulted Orders   HEMOGLOBIN A1C   Result Value Ref Range    Hemoglobin A1C 6.9 (H) 0 - 5.6 %      Comment:      Normal <5.7% Prediabetes 5.7-6.4%  Diabetes 6.5% or higher - adopted from ADA   consensus guidelines.     CBC with platelets   Result Value Ref Range    WBC 7.6 4.0 - 11.0 10e9/L    RBC Count 5.34 4.4 - 5.9 10e12/L    Hemoglobin 12.2 (L) 13.3 - 17.7 g/dL    Hematocrit 39.8 (L) 40.0 - 53.0 %    MCV 75 (L) 78 - 100 fl    MCH 22.8 (L) 26.5 - 33.0 pg    MCHC 30.7 (L) 31.5 - 36.5 g/dL    RDW 13.3 10.0 - 15.0 %    Platelet Count 216 150 - 450 10e9/L   Ferritin   Result Value Ref Range    Ferritin 53 26 - 388 ng/mL   Folate   Result Value Ref Range    Folate 23.2 >5.4 ng/mL   Transferrin   Result Value Ref Range    Transferrin 280 210 - 360 mg/dL   Iron and iron binding capacity   Result Value Ref Range    Iron 106 35 - 180 ug/dL    Iron Binding Cap 361 240 - 430 ug/dL    Iron Saturation Index 29 15 - 46 %   Hepatic panel (Albumin, ALT, AST, Bili, Alk Phos, TP)   Result Value Ref Range    Bilirubin Direct 0.2 0.0 - 0.2 mg/dL    Bilirubin Total 0.9 0.2 - 1.3 mg/dL    Albumin 4.3 3.4 - 5.0 g/dL    Protein Total 8.0 6.8 - 8.8 g/dL    Alkaline Phosphatase 75 40 - 150 U/L    ALT 22 0  - 70 U/L    AST 17 0 - 45 U/L   **Basic metabolic panel FUTURE anytime   Result Value Ref Range    Sodium 135 133 - 144 mmol/L    Potassium 4.0 3.4 - 5.3 mmol/L    Chloride 100 94 - 109 mmol/L    Carbon Dioxide 24 20 - 32 mmol/L    Anion Gap 11 3 - 14 mmol/L    Glucose 103 (H) 70 - 99 mg/dL      Comment:      Fasting specimen    Urea Nitrogen 17 7 - 30 mg/dL    Creatinine 1.13 0.66 - 1.25 mg/dL    GFR Estimate 62 >60 mL/min/1.7m2      Comment:      Non  GFR Calc    GFR Estimate If Black 75 >60 mL/min/1.7m2      Comment:       GFR Calc    Calcium 9.8 8.5 - 10.1 mg/dL       If you have any questions or concerns, please call the clinic at the number listed above.       Sincerely,        Bradley Hopkins, DO

## 2018-10-16 NOTE — MR AVS SNAPSHOT
After Visit Summary   10/16/2018    Manfred Caraballo    MRN: [id number]           Patient Information     Date Of Birth          1935        Visit Information        Provider Department      10/16/2018 10:00 AM Bradley Hopkins DO; [employer] [facility]        Today's Diagnoses     Screening for diabetic retinopathy    -  1    Type 2 diabetes mellitus with diabetic nephropathy, without long-term current use of insulin (H)        Chronic pain of left knee        Anemia, unspecified type        Atrial fibrillation, unspecified type (H)        Hypertension goal BP (blood pressure) < 140/90           Follow-ups after your visit        Follow-up notes from your care team     Return in about 6 months (around 4/16/2019) for Diabetes follow-up.      Your next 10 appointments already scheduled     Nov 16, 2018  4:15 PM CST   [facility] EP RETURN with Tato Au MD   [facility] ([facility])    [address] [place] [id number] [phone] OPT 2              Who to contact     If you have questions or need follow up information about today's clinic visit or your schedule please contact [facility] [place] directly at [phone].  Normal or non-critical lab and imaging results will be communicated to you by MyChart, letter or phone within 4 business days after the clinic has received the results. If you do not hear from us within 7 days, please contact the clinic through MyChart or phone. If you have a critical or abnormal lab result, we will notify you by phone as soon as possible.  Submit refill requests through [employer] or call your pharmacy and they will forward the refill request to us. Please allow 3 business days for your refill to be completed.          Additional Information About Your Visit        Care EveryWhere ID     This is your Care EveryWhere ID. This could be used by other  organizations to access your [facility] medical records  [id number]        Your Vitals Were     Pulse Temperature Pulse Oximetry BMI (Body Mass Index)          100 97.9  F (36.6  C) (Oral) 96% 26.13 kg/m2         Blood Pressure from Last 3 Encounters:   10/16/18 136/74   06/25/18 132/82   04/16/18 136/70    Weight from Last 3 Encounters:   10/16/18 157 lb (71.2 kg)   06/25/18 156 lb (70.8 kg)   04/16/18 154 lb (69.9 kg)              We Performed the Following     **Basic metabolic panel FUTURE anytime     CBC with platelets     EYE EXAM (SIMPLE-NONBILLABLE)       Ferritin     Folate     FOOT EXAM     HEMOGLOBIN A1C     Hepatic panel (Albumin, ALT, AST, Bili, Alk Phos, TP)     Iron and iron binding capacity     Transferrin          Today's Medication Changes          These changes are accurate as of 10/16/18 10:29 AM.  If you have any questions, ask your nurse or doctor.               These medicines have changed or have updated prescriptions.        Dose/Directions    * [facility] ACETAMINOPHEN 325 MG tablet   This may have changed:  Another medication with the same name was added. Make sure you understand how and when to take each.   Used for:  Chronic pain of left knee   Generic drug:  acetaminophen   Changed by:  Bradley Hopkins DO        TAKE 2 TABLETS (650 MG) BY MOUTH EVERY 6 HOURS AS NEEDED FOR MILD PAIN   Quantity:  100 tablet   Refills:  3       * acetaminophen 500 MG tablet   Commonly known as:  TYLENOL   This may have changed:  You were already taking a medication with the same name, and this prescription was added. Make sure you understand how and when to take each.   Used for:  Chronic pain of left knee   Changed by:  Bradley Hopkins DO        Dose:  500-1000 mg   Take 1-2 tablets (500-1,000 mg) by mouth every 6 hours as needed for mild pain   Quantity:  360 tablet   Refills:  1       * Notice:  This list has 2 medication(s) that are the same as other medications prescribed for you. Read the directions  carefully, and ask your doctor or other care provider to review them with you.         Where to get your medicines      These medications were sent to [facility]/pharmacy #[id number] - [place] [facility] - [address]  [address], [place] [address]     Phone:  [phone]     acetaminophen 500 MG tablet    glimepiride 1 MG tablet                Primary Care Provider Office Phone # Fax #    Bradley Hopkins [phone] [address]  [place] [address]        Equal Access to Services     [facility]: Hadii aad ku hadasho Soomaali, waaxda luqadaha, qaybta kaalmada adeegyada, waxay idiin hayaan adeciarra mathias . So [facility] [phone].    ATENCIÓN: Si habla español, tiene a padilla disposición servicios gratuitos de asistencia lingüística. [facility] [phone].    We comply with applicable federal civil rights laws and [place] laws. We do not discriminate on the basis of race, color, national origin, age, disability, sex, sexual orientation, or gender identity.            Thank you!     Thank you for choosing [facility]  for your care. Our goal is always to provide you with excellent care. Hearing back from our patients is one way we can continue to improve our services. Please take a few minutes to complete the written survey that you may receive in the mail after your visit with us. Thank you!             Your Updated Medication List - Protect others around you: Learn how to safely use, store and throw away your medicines at www.disposemymeds.org.          This list is accurate as of 10/16/18 10:29 AM.  Always use your most recent med list.                   Brand Name Dispense Instructions for use Diagnosis    albuterol 108 (90 Base) MCG/ACT inhaler    PROAIR HFA/PROVENTIL HFA/VENTOLIN HFA    1 Inhaler    Inhale 2 puffs into the lungs every 6 hours as needed for shortness of breath / dyspnea    Chronic obstructive pulmonary disease, unspecified COPD type (H)       ASPIRIN NOT  PRESCRIBED    INTENTIONAL    1 Tab    not prescribed - ulcer history    Type II or unspecified type diabetes mellitus without mention of complication, not stated as uncontrolled       atorvastatin 10 MG tablet    LIPITOR    90 tablet    Take 1 tablet (10 mg) by mouth daily    Hyperlipidemia with target LDL less than 100, Medicare annual wellness visit, subsequent, Persistent atrial fibrillation (H), Type 2 diabetes mellitus with diabetic nephropathy, without long-term current use of insulin (H), Anemia, unspecified type       blood glucose monitoring meter device kit     1 kit    Use to test blood sugars  1 times daily or as directed.    Type 2 diabetes mellitus (H)       * blood glucose monitoring test strip    ACCU-CHEK COMFORT CURVE    100 strip    1 strip by In Vitro route 2 times daily Or whatever meter he has please    Type 2 diabetes, HbA1C goal < 8% (H)       * [employer] CONTOUR test strip   Generic drug:  blood glucose monitoring     300 strip    USE TO TEST BLOOD SUGAR 1 TIMES DAILY OR AS DIRECTED.    Type 2 diabetes mellitus with diabetic nephropathy (H)       * blood glucose monitoring test strip    no brand specified    100 each    Use to test blood sugars 1  times daily or as directed    Type 2 diabetes mellitus with diabetic nephropathy (H)       * [facility] ACETAMINOPHEN 325 MG tablet   Generic drug:  acetaminophen     100 tablet    TAKE 2 TABLETS (650 MG) BY MOUTH EVERY 6 HOURS AS NEEDED FOR MILD PAIN    Chronic pain of left knee       * acetaminophen 500 MG tablet    TYLENOL    360 tablet    Take 1-2 tablets (500-1,000 mg) by mouth every 6 hours as needed for mild pain    Chronic pain of left knee       diltiazem 120 MG 24 hr capsule    CARDIZEM CD    90 capsule    Take 1 capsule (120 mg) by mouth daily    Persistent atrial fibrillation (H), Medicare annual wellness visit, subsequent, Hyperlipidemia with target LDL less than 100, Type 2 diabetes mellitus with diabetic nephropathy, without long-term current  use of insulin (H), Anemia, unspecified type       fluticasone 50 MCG/ACT spray    FLONASE    16 g    Spray 1-2 sprays into both nostrils daily    Chronic obstructive pulmonary disease, unspecified COPD type (H)       glimepiride 1 MG tablet    AMARYL    90 tablet    Take 1 tablet (1 mg) by mouth every morning (before breakfast)    Type 2 diabetes mellitus with diabetic nephropathy, without long-term current use of insulin (H)       hydrocortisone 0.2 % cream    WESTCORT    30 g    APPLY SPARINGLY TO AFFECTED AREA THREE TIMES DAILY FOR 14 DAYS.    Contact dermatitis due to other agent, unspecified contact dermatitis type       ipratropium - albuterol 0.5 mg/2.5 mg/3 mL 0.5-2.5 (3) MG/3ML neb solution    DUONEB    30 vial    Take 1 vial (3 mLs) by nebulization every 6 hours as needed for shortness of breath / dyspnea or wheezing    Cough       losartan-hydrochlorothiazide 100-25 MG per tablet    HYZAAR    90 tablet    Take 1 tablet by mouth daily    Hypertension goal BP (blood pressure) < 140/90       metFORMIN 1000 MG tablet    GLUCOPHAGE    180 tablet    TAKE 1 TABLET BY MOUTH 2 TIMES DAILY (WITH MEALS)    Type 2 diabetes mellitus with diabetic nephropathy, without long-term current use of insulin (H), Medicare annual wellness visit, subsequent, Hyperlipidemia with target LDL less than 100, Persistent atrial fibrillation (H), Anemia, unspecified type       order for DME     1 Device    walker    Tear of medial meniscus of left knee, current, unspecified tear type, initial encounter       pantoprazole 40 MG EC tablet    PROTONIX    90 tablet    TAKE 1 TABLET (40 MG) BY MOUTH DAILY    Gastroesophageal reflux disease without esophagitis       polyethylene glycol powder    MIRALAX/GLYCOLAX    527 g    MIX 17 GRAMS (1 CAPFUL) OF POWDER IN 8 OUNCES LIQUID AND DRINK ONCE DAILY    Constipation, unspecified constipation type       potassium chloride SA 20 MEQ CR tablet    KLOR-CON    90 tablet    Take 1 tablet (20 mEq) by  mouth daily    Hypertension goal BP (blood pressure) < 140/90       rivaroxaban ANTICOAGULANT 20 MG Tabs tablet    XARELTO    30 tablet    Take 1 tablet (20 mg) by mouth daily (with dinner)    Atrial fibrillation, unspecified type (H)       * Notice:  This list has 5 medication(s) that are the same as other medications prescribed for you. Read the directions carefully, and ask your doctor or other care provider to review them with you.

## 2018-10-16 NOTE — PROGRESS NOTES
SUBJECTIVE:   Manfred Caraballo is a 83 year old male who presents to clinic today for the following health issues:      Diabetes Follow-up    Patient is checking blood sugars: once daily.  Results are as follows:         am - 120s-130s    Diabetic concerns: None     Symptoms of hypoglycemia (low blood sugar): none     Paresthesias (numbness or burning in feet) or sores: No     Date of last diabetic eye exam: June 2018    Current diabetes medications include: metformin 1000 mg BID, glimepiride 1 mg PO daily    Diabetes Management Resources    Hyperlipidemia Follow-Up      Rate your low fat/cholesterol diet?: good    Taking statin?  Yes, no muscle aches from statin    Other lipid medications/supplements?:  none    Hypertension Follow-up      Outpatient blood pressures are being checked at pharmacy.  Results are 120s/70s-80s.    Low Salt Diet: not monitoring salt    BP Readings from Last 2 Encounters:   06/25/18 132/82   04/16/18 136/70     Hemoglobin A1C (%)   Date Value   04/16/2018 6.4   12/11/2017 7.8 (H)     LDL Cholesterol Calculated (mg/dL)   Date Value   04/16/2018 66   06/21/2017 35       Amount of exercise or physical activity: 6-7 days/week for an average of 30-45 minutes    Problems taking medications regularly: No    Medication side effects: none    Diet: regular (no restrictions)    Denies any headaches, lightheadedness, vision changes, chest pain, shortness of breath, dyspnea, abdominal pain, nausea, or vomiting. No shakiness.        Flue vaccine  Got flu shot at [facility]      Problem list and histories reviewed & adjusted, as indicated.  Additional history: as documented    Patient Active Problem List   Diagnosis     Hypertension goal BP (blood pressure) < 140/90     Allergic rhinitis     Dyspepsia and other specified disorders of function of stomach     Genital herpes     Calculus of gallbladder     ACUTE PROSTATITIS- mild- follow up- 10/02     Acute duodenal ulcer with hemorrhage     Diverticulosis of  large intestine     Iron deficiency anemia     Impotence of organic origin     Esophageal reflux     History of colonic polyps     Colon Polyps     Tear meniscus knee     Hyperlipidemia with target LDL less than 100     Advanced directives, counseling/discussion     Microalbuminuria- very mild      Type 2 diabetes mellitus with diabetic nephropathy (H)     GERD (gastroesophageal reflux disease)     COPD (chronic obstructive pulmonary disease) (H)     History of pneumonia     Persistent atrial fibrillation (H)     CKD (chronic kidney disease) stage 2, GFR 60-89 ml/min     Past Surgical History:   Procedure Laterality Date     HC COLONOSCOPY THRU STOMA, DIAGNOSTIC  11/03    for GI bleed - diverticulosis      HC COLONOSCOPY THRU STOMA, DIAGNOSTIC  5/2010    normal - repeat in 5 years      HC UGI ENDOSCOPY DIAG W OR W/O BRUSH/WASH  11/03    for GI bleed - showed pyloric and duodenal  ulcer        Social History   Substance Use Topics     Smoking status: Former Smoker     Packs/day: 0.50     Years: 20.00     Types: Cigarettes     Quit date: 5/13/1981     Smokeless tobacco: Never Used      Comment: quit in 1981      Alcohol use No      Comment: quit in 1981     Family History   Problem Relation Age of Onset     Diabetes No family hx of      C.A.D. No family hx of      Hypertension No family hx of      Cancer - colorectal No family hx of      Prostate Cancer No family hx of            Reviewed and updated as needed this visit by clinical staff       Reviewed and updated as needed this visit by Provider         ROS:  Constitutional, HEENT, cardiovascular, pulmonary, gi and gu systems are negative, except as otherwise noted.    OBJECTIVE:     /74  Pulse 100  Temp 97.9  F (36.6  C) (Oral)  Wt 157 lb (71.2 kg)  SpO2 96%  BMI 26.13 kg/m2  Body mass index is 26.13 kg/(m^2).  GENERAL: healthy, alert and no distress  RESP: lungs clear to auscultation - no rales, rhonchi or wheezes  CV: regular rate and rhythm, normal S1  S2, no S3 or S4, no murmur, click or rub, no peripheral edema and peripheral pulses strong  MS: no gross musculoskeletal defects noted, no edema  SKIN: no suspicious lesions or rashes  Diabetic foot exam: normal DP and PT pulses, no trophic changes or ulcerative lesions, normal sensory exam and normal monofilament exam    Diagnostic Test Results:  none     ASSESSMENT/PLAN:   1. Type 2 diabetes mellitus with diabetic nephropathy, without long-term current use of insulin (H): historically well-controlled. Last A1c of 6.4%. Continue metformin and glimepiride. He is on a statin. He does have history of microalbuminuria and is on an ARB. Had eye exam this year and no report of diabetic retinopathy. No neuropathic symptoms. Follow up in 6 months.  - HEMOGLOBIN A1C  - FOOT EXAM  - glimepiride (AMARYL) 1 MG tablet; Take 1 tablet (1 mg) by mouth every morning (before breakfast)  Dispense: 90 tablet; Refill: 1  - **Basic metabolic panel FUTURE anytime    2. Screening for diabetic retinopathy  - EYE EXAM (SIMPLE-NONBILLABLE)      3. Chronic pain of left knee: stable, refill acetaminophen prescription.  - acetaminophen (TYLENOL) 500 MG tablet; Take 1-2 tablets (500-1,000 mg) by mouth every 6 hours as needed for mild pain  Dispense: 360 tablet; Refill: 1    4. Anemia, unspecified type: history of anemia. Recheck blood count and iron studies.  - CBC with platelets  - Ferritin  - Folate  - Transferrin  - Iron and iron binding capacity    5. Atrial fibrillation, unspecified type (H)  - Hepatic panel (Albumin, ALT, AST, Bili, Alk Phos, TP)    6. Hypertension goal BP (blood pressure) < 140/90: BP within goal. Continue losartan-hydrochlorothiazide. Check kidney function and electrolytes today.  - **Basic metabolic panel FUTURE anytime      Bradley Hopkins,   [facility]AGE

## 2018-10-17 LAB
ALBUMIN SERPL-MCNC: 4.3 G/DL (ref 3.4–5)
ALP SERPL-CCNC: 75 U/L (ref 40–150)
ALT SERPL W P-5'-P-CCNC: 22 U/L (ref 0–70)
ANION GAP SERPL CALCULATED.3IONS-SCNC: 11 MMOL/L (ref 3–14)
AST SERPL W P-5'-P-CCNC: 17 U/L (ref 0–45)
BILIRUB DIRECT SERPL-MCNC: 0.2 MG/DL (ref 0–0.2)
BILIRUB SERPL-MCNC: 0.9 MG/DL (ref 0.2–1.3)
BUN SERPL-MCNC: 17 MG/DL (ref 7–30)
CALCIUM SERPL-MCNC: 9.8 MG/DL (ref 8.5–10.1)
CHLORIDE SERPL-SCNC: 100 MMOL/L (ref 94–109)
CO2 SERPL-SCNC: 24 MMOL/L (ref 20–32)
CREAT SERPL-MCNC: 1.13 MG/DL (ref 0.66–1.25)
FERRITIN SERPL-MCNC: 53 NG/ML (ref 26–388)
GFR SERPL CREATININE-BSD FRML MDRD: 62 ML/MIN/1.7M2
GLUCOSE SERPL-MCNC: 103 MG/DL (ref 70–99)
IRON SATN MFR SERPL: 29 % (ref 15–46)
IRON SERPL-MCNC: 106 UG/DL (ref 35–180)
POTASSIUM SERPL-SCNC: 4 MMOL/L (ref 3.4–5.3)
PROT SERPL-MCNC: 8 G/DL (ref 6.8–8.8)
SODIUM SERPL-SCNC: 135 MMOL/L (ref 133–144)
TIBC SERPL-MCNC: 361 UG/DL (ref 240–430)

## 2018-10-30 DIAGNOSIS — K21.9 GASTROESOPHAGEAL REFLUX DISEASE WITHOUT ESOPHAGITIS: ICD-10-CM

## 2018-10-30 RX ORDER — PANTOPRAZOLE SODIUM 40 MG/1
TABLET, DELAYED RELEASE ORAL
Qty: 90 TABLET | Refills: 3 | Status: SHIPPED | OUTPATIENT
Start: 2018-10-30 | End: 2019-10-18

## 2018-10-30 NOTE — TELEPHONE ENCOUNTER
Prescription approved per [facility] Refill Protocol.  Kaur Hernandez, RN, BSN  [facility]

## 2018-10-30 NOTE — TELEPHONE ENCOUNTER
"Requested Prescriptions   Pending Prescriptions Disp Refills     pantoprazole (PROTONIX) 40 MG EC tablet  Last Written Prescription Date:  8/2/2018  Last Fill Quantity: 90 tablet,  # refills: 0   Last office visit: 10/16/2018 with prescribing provider:  Kellie   Future Office Visit:       90 tablet 0     Sig: TAKE 1 TABLET (40 MG) BY MOUTH DAILY    PPI Protocol Passed    10/30/2018  9:17 AM       Passed - Not on Clopidogrel (unless Pantoprazole ordered)       Passed - No diagnosis of osteoporosis on record       Passed - Recent (12 mo) or future (30 days) visit within the authorizing provider's specialty    Patient had office visit in the last 12 months or has a visit in the next 30 days with authorizing provider or within the authorizing provider's specialty.  See \"Patient Info\" tab in inbasket, or \"Choose Columns\" in Meds & Orders section of the refill encounter.             Passed - Patient is age 18 or older          "

## 2018-11-16 ENCOUNTER — OFFICE VISIT (OUTPATIENT)
Dept: CARDIOLOGY | Facility: CLINIC | Age: 83
End: 2018-11-16
Attending: FAMILY MEDICINE
Payer: COMMERCIAL

## 2018-11-16 VITALS
HEART RATE: 74 BPM | WEIGHT: 155.7 LBS | BODY MASS INDEX: 25.94 KG/M2 | HEIGHT: 65 IN | SYSTOLIC BLOOD PRESSURE: 136 MMHG | DIASTOLIC BLOOD PRESSURE: 64 MMHG

## 2018-11-16 DIAGNOSIS — E11.21 TYPE 2 DIABETES MELLITUS WITH DIABETIC NEPHROPATHY, WITHOUT LONG-TERM CURRENT USE OF INSULIN (H): ICD-10-CM

## 2018-11-16 DIAGNOSIS — I48.19 PERSISTENT ATRIAL FIBRILLATION (H): Primary | ICD-10-CM

## 2018-11-16 DIAGNOSIS — Z00.00 MEDICARE ANNUAL WELLNESS VISIT, SUBSEQUENT: ICD-10-CM

## 2018-11-16 DIAGNOSIS — D64.9 ANEMIA, UNSPECIFIED TYPE: ICD-10-CM

## 2018-11-16 DIAGNOSIS — E78.5 HYPERLIPIDEMIA WITH TARGET LDL LESS THAN 100: ICD-10-CM

## 2018-11-16 DIAGNOSIS — I48.91 ATRIAL FIBRILLATION, UNSPECIFIED TYPE (H): ICD-10-CM

## 2018-11-16 PROCEDURE — 99213 OFFICE O/P EST LOW 20 MIN: CPT | Performed by: INTERNAL MEDICINE

## 2018-11-16 PROCEDURE — 93000 ELECTROCARDIOGRAM COMPLETE: CPT | Performed by: INTERNAL MEDICINE

## 2018-11-16 RX ORDER — DILTIAZEM HYDROCHLORIDE 120 MG/1
120 CAPSULE, COATED, EXTENDED RELEASE ORAL DAILY
Qty: 90 CAPSULE | Refills: 1 | Status: SHIPPED | OUTPATIENT
Start: 2018-11-16 | End: 2019-05-17

## 2018-11-16 NOTE — LETTER
"11/16/2018    Bradley Hopkins, DO  [address]  [place] [address]    RE: Manfred Caraballo       Dear Colleague,    I had the pleasure of seeing Manfred Caraballo in the [facility].    Electrophysiology/ Clinic Note         H&P and Plan:     REASON FOR VISIT:  Evaluation of persistent atrial fibrillation.       HISTORY OF PRESENT ILLNESS:  Ms. Caraballo is a delightful 83-year-old gentleman with a history of hypertension, hyperlipidemia, diabetes, chronic kidney disease, COPD and persistent atrial fibrillation, who is here for routine evaluation.     He is on rate control with Diltiazem and seems to be doing well.  He denies any other symptoms such as chest pain, lightheadedness, near-syncope or syncopal episode.       Holter obtained in 2017 revealed an average HR of 61 bpm (range of ).  No significant pauses were appreciated. Echo revealed normal LV function and mil MR/TR.    EKG done today  here confirmed AFib with controlled ventricular response (HR at 60 bpm)      ASSESSMENT AND PLAN:   1.  Persistent atrial fibrillation.  Heart rate is well controlled and he is asymptomatic.  We will continue therapy with diltiazem 120 mg daily.    2.  Embolic prevention.  CHADS-VASc score of 4.  BMP in 10/2018 was within normal limits.  We will continue xarelto indefinitely.  3.  Hypertension.  Blood pressure is well controlled.   4. Follow up care.  Follow up with PA in 1 year.      Tato Au MD    Physical Exam:  Vitals: /64  Pulse 74  Ht 1.651 m (5' 5\")  Wt 70.6 kg (155 lb 11.2 oz)  BMI 25.91 kg/m2    Constitutional:  AAO x3.  Pt is in NAD.  HEAD: normocephalic.  SKIN: Skin normal color, texture and turgor with no lesions or eruptions.  Eyes: PERRL, EOMI.  ENT:  Supple, normal JVP. No lymphadenopathy or thyroid enlargement.  Chest:  CTAB.  Cardiac:  IIR, variable sound of S1 and Normal S2.   No murmurs rubs or gallop.    Abdomen:  Normal BS.  Soft, non-tender and " non-distended.  No rebound or guarding.    Extremities:  Pedious pulses palpable B/L.  No LE edema noticed.   Neurological: Strength and sensation grossly symmetric and intact throughout.       CURRENT MEDICATIONS:  Current Outpatient Prescriptions   Medication Sig Dispense Refill     acetaminophen (TYLENOL) 500 MG tablet Take 1-2 tablets (500-1,000 mg) by mouth every 6 hours as needed for mild pain 360 tablet 1     albuterol (PROAIR HFA/PROVENTIL HFA/VENTOLIN HFA) 108 (90 BASE) MCG/ACT Inhaler Inhale 2 puffs into the lungs every 6 hours as needed for shortness of breath / dyspnea 1 Inhaler 11     ASPIRIN NOT PRESCRIBED (INTENTIONAL) not prescribed - ulcer history 1 Tab 0     atorvastatin (LIPITOR) 10 MG tablet Take 1 tablet (10 mg) by mouth daily 90 tablet 3     KEITH CONTOUR test strip USE TO TEST BLOOD SUGAR 1 TIMES DAILY OR AS DIRECTED. 300 strip 1     blood glucose monitoring ([employer] CONTOUR MONITOR) meter device kit Use to test blood sugars  1 times daily or as directed. 1 kit 0     blood glucose monitoring (NO BRAND SPECIFIED) test strip Use to test blood sugars 1  times daily or as directed 100 each 3     [employer] ACETAMINOPHEN 325 MG tablet TAKE 2 TABLETS (650 MG) BY MOUTH EVERY 6 HOURS AS NEEDED FOR MILD PAIN 100 tablet 3     diltiazem (CARDIZEM CD) 120 MG 24 hr capsule Take 1 capsule (120 mg) by mouth daily 90 capsule 1     fluticasone (FLONASE) 50 MCG/ACT nasal spray Spray 1-2 sprays into both nostrils daily 16 g 3     glimepiride (AMARYL) 1 MG tablet Take 1 tablet (1 mg) by mouth every morning (before breakfast) 90 tablet 1     glucose blood VI test strips (ACCU-CHEK COMFORT CURVE) strip 1 strip by In Vitro route 2 times daily Or whatever meter he has please 100 strip 3     hydrocortisone (WESTCORT) 0.2 % cream APPLY SPARINGLY TO AFFECTED AREA THREE TIMES DAILY FOR 14 DAYS. 30 g 0     ipratropium - albuterol 0.5 mg/2.5 mg/3 mL (DUONEB) 0.5-2.5 (3) MG/3ML nebulization Take 1 vial (3 mLs) by nebulization every  6 hours as needed for shortness of breath / dyspnea or wheezing 30 vial 1     losartan-hydrochlorothiazide (HYZAAR) 100-25 MG per tablet Take 1 tablet by mouth daily 90 tablet 1     metFORMIN (GLUCOPHAGE) 1000 MG tablet TAKE 1 TABLET BY MOUTH 2 TIMES DAILY (WITH MEALS) 180 tablet 1     order for DME walker 1 Device 0     pantoprazole (PROTONIX) 40 MG EC tablet TAKE 1 TABLET (40 MG) BY MOUTH DAILY 90 tablet 3     polyethylene glycol (MIRALAX/GLYCOLAX) powder MIX 17 GRAMS (1 CAPFUL) OF POWDER IN 8 OUNCES LIQUID AND DRINK ONCE DAILY 527 g 3     potassium chloride SA (KLOR-CON) 20 MEQ CR tablet Take 1 tablet (20 mEq) by mouth daily 90 tablet 3     rivaroxaban ANTICOAGULANT (XARELTO) 20 MG TABS tablet Take 1 tablet (20 mg) by mouth daily (with dinner) 30 tablet 2       ALLERGIES     Allergies   Allergen Reactions     Aspirin      Salicylates      ASA/ Ulcers       PAST MEDICAL HISTORY:  Past Medical History:   Diagnosis Date     Acute duodenal ulcer with hemorrhage, without mention of obstruction 11/15/03    and pyloric also - required hosp and transfusion 2 units- H. pylori negative     Allergic rhinitis, cause unspecified      Calculus of gallbladder without mention of cholecystitis or obstruction 11/15/03    no residual pain- transient cholestasis for 2 days     Colon Polyps normal 5/2010 2008 = one hyperplastic & one tubular adenoma - no high grade dysplasis - repeat in 2013      Diverticulosis of colon (without mention of hemorrhage) 11/03     Dyspepsia and other specified disorders of function of stomach     dyspepsia,  h/o bleeding stomach ulcer in 1985     Esophageal reflux      Genital herpes, unspecified      Paroxysmal atrial fibrillation (H) 12/21/2016    [facility].     Pneumonia, organism unspecified(486) 11/15/03    right middle and lower lobe     Pure hypercholesterolemia      Pure hyperglyceridemia      Tear meniscus knee 2009    degenerative on right      Type II or unspecified type  diabetes mellitus without mention of complication, not stated as uncontrolled at age 63     Unspecified essential hypertension        PAST SURGICAL HISTORY:  Past Surgical History:   Procedure Laterality Date     HC COLONOSCOPY THRU STOMA, DIAGNOSTIC  11/03    for GI bleed - diverticulosis      HC COLONOSCOPY THRU STOMA, DIAGNOSTIC  5/2010    normal - repeat in 5 years      HC UGI ENDOSCOPY DIAG W OR W/O BRUSH/WASH  11/03    for GI bleed - showed pyloric and duodenal  ulcer        FAMILY HISTORY:  Family History   Problem Relation Age of Onset     Diabetes No family hx of      C.A.D. No family hx of      Hypertension No family hx of      Cancer - colorectal No family hx of      Prostate Cancer No family hx of        SOCIAL HISTORY:  Social History     Social History     Marital status:      Spouse name: Constanza     Number of children: 7     Years of education: N/A     Occupational History     retired - was in food processing - packaging hamburger, etc.        None      Social History Main Topics     Smoking status: Former Smoker     Packs/day: 0.50     Years: 20.00     Types: Cigarettes     Quit date: 5/13/1981     Smokeless tobacco: Never Used      Comment: quit in 1981      Alcohol use No      Comment: quit in 1981     Drug use: No      Comment: no herbal meds either     Sexual activity: Yes     Partners: Female      Comment:      Other Topics Concern      Service Yes     was in  in [facility] for 5 years and  for 15 years     Blood Transfusions Yes     in 1985 after bleeding stomach ulcer     Caffeine Concern No     stopped all coffee and tea after ulcers 11/03     Exercise Yes     walks at least one mile every day     Seat Belt Yes     always     Self-Exams Yes     KARINA encouraged monthly     Parent/Sibling W/ Cabg, Mi Or Angioplasty Before 65f 55m? No     Social History Narrative    no longer taking one 81mg asa qday - secondary to bleeding  ulcers 11/03    PSA checking every 6  months - one year.                    Review of Systems:  Skin:  Negative     Eyes:  Negative    ENT:  Negative    Respiratory:  Negative    Cardiovascular:  Negative    Gastroenterology: Negative    Genitourinary:  Negative    Musculoskeletal:  Negative    Neurologic:  Negative    Psychiatric:  Negative    Heme/Lymph/Imm:  Negative    Endocrine:  Positive for diabetes      Recent Lab Results:  LIPID RESULTS:  Lab Results   Component Value Date    CHOL 144 04/16/2018    HDL 40 04/16/2018    LDL 66 04/16/2018    TRIG 191 (H) 04/16/2018    CHOLHDLRATIO 4.2 05/07/2015       LIVER ENZYME RESULTS:  Lab Results   Component Value Date    AST 17 10/16/2018    ALT 22 10/16/2018       CBC RESULTS:  Lab Results   Component Value Date    WBC 7.6 10/16/2018    RBC 5.34 10/16/2018    HGB 12.2 (L) 10/16/2018    HCT 39.8 (L) 10/16/2018    MCV 75 (L) 10/16/2018    MCH 22.8 (L) 10/16/2018    MCHC 30.7 (L) 10/16/2018    RDW 13.3 10/16/2018     10/16/2018       BMP RESULTS:  Lab Results   Component Value Date     10/16/2018    POTASSIUM 4.0 10/16/2018    CHLORIDE 100 10/16/2018    CO2 24 10/16/2018    ANIONGAP 11 10/16/2018     (H) 10/16/2018    BUN 17 10/16/2018    CR 1.13 10/16/2018    GFRESTIMATED 62 10/16/2018    GFRESTBLACK 75 10/16/2018    BRITTNY 9.8 10/16/2018        A1C RESULTS:  Lab Results   Component Value Date    A1C 6.9 (H) 10/16/2018       INR RESULTS:  No results found for: INR      ECHOCARDIOGRAM  No results found for this or any previous visit (from the past 8760 hour(s)).      Orders Placed This Encounter   Procedures     EKG 12-lead complete w/read - Clinics (performed today)     No orders of the defined types were placed in this encounter.    There are no discontinued medications.      Encounter Diagnosis   Name Primary?     Persistent atrial fibrillation (H) Yes         Thank you for allowing me to participate in the care of your patient.    Sincerely,     Tato Au MD     [facility]  [facility]

## 2018-11-16 NOTE — PROGRESS NOTES
"Electrophysiology/ Clinic Note         H&P and Plan:     REASON FOR VISIT:  Evaluation of persistent atrial fibrillation.       HISTORY OF PRESENT ILLNESS:  Ms. Caraballo is a delightful 83-year-old gentleman with a history of hypertension, hyperlipidemia, diabetes, chronic kidney disease, COPD and persistent atrial fibrillation, who is here for routine evaluation.     He is on rate control with Diltiazem and seems to be doing well.  He denies any other symptoms such as chest pain, lightheadedness, near-syncope or syncopal episode.       Holter obtained in 2017 revealed an average HR of 61 bpm (range of ).  No significant pauses were appreciated. Echo revealed normal LV function and mil MR/TR.    EKG done today  here confirmed AFib with controlled ventricular response (HR at 60 bpm)      ASSESSMENT AND PLAN:   1.  Persistent atrial fibrillation.  Heart rate is well controlled and he is asymptomatic.  We will continue therapy with diltiazem 120 mg daily.    2.  Embolic prevention.  CHADS-VASc score of 4.  BMP in 10/2018 was within normal limits.  We will continue xarelto indefinitely.  3.  Hypertension.  Blood pressure is well controlled.   4. Follow up care.  Follow up with PA in 1 year.      Tato Au MD    Physical Exam:  Vitals: /64  Pulse 74  Ht 1.651 m (5' 5\")  Wt 70.6 kg (155 lb 11.2 oz)  BMI 25.91 kg/m2    Constitutional:  AAO x3.  Pt is in NAD.  HEAD: normocephalic.  SKIN: Skin normal color, texture and turgor with no lesions or eruptions.  Eyes: PERRL, EOMI.  ENT:  Supple, normal JVP. No lymphadenopathy or thyroid enlargement.  Chest:  CTAB.  Cardiac:  IIR, variable sound of S1 and Normal S2.   No murmurs rubs or gallop.    Abdomen:  Normal BS.  Soft, non-tender and non-distended.  No rebound or guarding.    Extremities:  Pedious pulses palpable B/L.  No LE edema noticed.   Neurological: Strength and sensation grossly symmetric and intact throughout.       CURRENT MEDICATIONS:  Current " Outpatient Prescriptions   Medication Sig Dispense Refill     acetaminophen (TYLENOL) 500 MG tablet Take 1-2 tablets (500-1,000 mg) by mouth every 6 hours as needed for mild pain 360 tablet 1     albuterol (PROAIR HFA/PROVENTIL HFA/VENTOLIN HFA) 108 (90 BASE) MCG/ACT Inhaler Inhale 2 puffs into the lungs every 6 hours as needed for shortness of breath / dyspnea 1 Inhaler 11     ASPIRIN NOT PRESCRIBED (INTENTIONAL) not prescribed - ulcer history 1 Tab 0     atorvastatin (LIPITOR) 10 MG tablet Take 1 tablet (10 mg) by mouth daily 90 tablet 3     KEITH CONTOUR test strip USE TO TEST BLOOD SUGAR 1 TIMES DAILY OR AS DIRECTED. 300 strip 1     blood glucose monitoring (Kudarom CONTOUR MONITOR) meter device kit Use to test blood sugars  1 times daily or as directed. 1 kit 0     blood glucose monitoring (NO BRAND SPECIFIED) test strip Use to test blood sugars 1  times daily or as directed 100 each 3     [facility] ACETAMINOPHEN 325 MG tablet TAKE 2 TABLETS (650 MG) BY MOUTH EVERY 6 HOURS AS NEEDED FOR MILD PAIN 100 tablet 3     diltiazem (CARDIZEM CD) 120 MG 24 hr capsule Take 1 capsule (120 mg) by mouth daily 90 capsule 1     fluticasone (FLONASE) 50 MCG/ACT nasal spray Spray 1-2 sprays into both nostrils daily 16 g 3     glimepiride (AMARYL) 1 MG tablet Take 1 tablet (1 mg) by mouth every morning (before breakfast) 90 tablet 1     glucose blood VI test strips (ACCU-CHEK COMFORT CURVE) strip 1 strip by In Vitro route 2 times daily Or whatever meter he has please 100 strip 3     hydrocortisone (WESTCORT) 0.2 % cream APPLY SPARINGLY TO AFFECTED AREA THREE TIMES DAILY FOR 14 DAYS. 30 g 0     ipratropium - albuterol 0.5 mg/2.5 mg/3 mL (DUONEB) 0.5-2.5 (3) MG/3ML nebulization Take 1 vial (3 mLs) by nebulization every 6 hours as needed for shortness of breath / dyspnea or wheezing 30 vial 1     losartan-hydrochlorothiazide (HYZAAR) 100-25 MG per tablet Take 1 tablet by mouth daily 90 tablet 1     metFORMIN (GLUCOPHAGE) 1000 MG tablet TAKE  1 TABLET BY MOUTH 2 TIMES DAILY (WITH MEALS) 180 tablet 1     order for DME walker 1 Device 0     pantoprazole (PROTONIX) 40 MG EC tablet TAKE 1 TABLET (40 MG) BY MOUTH DAILY 90 tablet 3     polyethylene glycol (MIRALAX/GLYCOLAX) powder MIX 17 GRAMS (1 CAPFUL) OF POWDER IN 8 OUNCES LIQUID AND DRINK ONCE DAILY 527 g 3     potassium chloride SA (KLOR-CON) 20 MEQ CR tablet Take 1 tablet (20 mEq) by mouth daily 90 tablet 3     rivaroxaban ANTICOAGULANT (XARELTO) 20 MG TABS tablet Take 1 tablet (20 mg) by mouth daily (with dinner) 30 tablet 2       ALLERGIES     Allergies   Allergen Reactions     Aspirin      Salicylates      ASA/ Ulcers       PAST MEDICAL HISTORY:  Past Medical History:   Diagnosis Date     Acute duodenal ulcer with hemorrhage, without mention of obstruction 11/15/03    and pyloric also - required hosp and transfusion 2 units- H. pylori negative     Allergic rhinitis, cause unspecified      Calculus of gallbladder without mention of cholecystitis or obstruction 11/15/03    no residual pain- transient cholestasis for 2 days     Colon Polyps normal 5/2010 2008 = one hyperplastic & one tubular adenoma - no high grade dysplasis - repeat in 2013      Diverticulosis of colon (without mention of hemorrhage) 11/03     Dyspepsia and other specified disorders of function of stomach     dyspepsia,  h/o bleeding stomach ulcer in 1985     Esophageal reflux      Genital herpes, unspecified      Paroxysmal atrial fibrillation (H) 12/21/2016    [facility].     Pneumonia, organism unspecified(486) 11/15/03    right middle and lower lobe     Pure hypercholesterolemia      Pure hyperglyceridemia      Tear meniscus knee 2009    degenerative on right      Type II or unspecified type diabetes mellitus without mention of complication, not stated as uncontrolled at age 63     Unspecified essential hypertension        PAST SURGICAL HISTORY:  Past Surgical History:   Procedure Laterality Date     HC COLONOSCOPY  THRU STOMA, DIAGNOSTIC  11/03    for GI bleed - diverticulosis      HC COLONOSCOPY THRU STOMA, DIAGNOSTIC  5/2010    normal - repeat in 5 years      HC UGI ENDOSCOPY DIAG W OR W/O BRUSH/WASH  11/03    for GI bleed - showed pyloric and duodenal  ulcer        FAMILY HISTORY:  Family History   Problem Relation Age of Onset     Diabetes No family hx of      C.A.D. No family hx of      Hypertension No family hx of      Cancer - colorectal No family hx of      Prostate Cancer No family hx of        SOCIAL HISTORY:  Social History     Social History     Marital status:      Spouse name: Constanza     Number of children: 7     Years of education: N/A     Occupational History     retired - was in food processing - packaging hamburger, etc.        None      Social History Main Topics     Smoking status: Former Smoker     Packs/day: 0.50     Years: 20.00     Types: Cigarettes     Quit date: 5/13/1981     Smokeless tobacco: Never Used      Comment: quit in 1981      Alcohol use No      Comment: quit in 1981     Drug use: No      Comment: no herbal meds either     Sexual activity: Yes     Partners: Female      Comment:      Other Topics Concern      Service Yes     was in  in [facility] for 5 years and  for 15 years     Blood Transfusions Yes     in 1985 after bleeding stomach ulcer     Caffeine Concern No     stopped all coffee and tea after ulcers 11/03     Exercise Yes     walks at least one mile every day     Seat Belt Yes     always     Self-Exams Yes     KARINA encouraged monthly     Parent/Sibling W/ Cabg, Mi Or Angioplasty Before 65f 55m? No     Social History Narrative    no longer taking one 81mg asa qday - secondary to bleeding  ulcers 11/03    PSA checking every 6 months - one year.                    Review of Systems:  Skin:  Negative     Eyes:  Negative    ENT:  Negative    Respiratory:  Negative    Cardiovascular:  Negative    Gastroenterology: Negative    Genitourinary:  Negative     Musculoskeletal:  Negative    Neurologic:  Negative    Psychiatric:  Negative    Heme/Lymph/Imm:  Negative    Endocrine:  Positive for diabetes      Recent Lab Results:  LIPID RESULTS:  Lab Results   Component Value Date    CHOL 144 04/16/2018    HDL 40 04/16/2018    LDL 66 04/16/2018    TRIG 191 (H) 04/16/2018    CHOLHDLRATIO 4.2 05/07/2015       LIVER ENZYME RESULTS:  Lab Results   Component Value Date    AST 17 10/16/2018    ALT 22 10/16/2018       CBC RESULTS:  Lab Results   Component Value Date    WBC 7.6 10/16/2018    RBC 5.34 10/16/2018    HGB 12.2 (L) 10/16/2018    HCT 39.8 (L) 10/16/2018    MCV 75 (L) 10/16/2018    MCH 22.8 (L) 10/16/2018    MCHC 30.7 (L) 10/16/2018    RDW 13.3 10/16/2018     10/16/2018       BMP RESULTS:  Lab Results   Component Value Date     10/16/2018    POTASSIUM 4.0 10/16/2018    CHLORIDE 100 10/16/2018    CO2 24 10/16/2018    ANIONGAP 11 10/16/2018     (H) 10/16/2018    BUN 17 10/16/2018    CR 1.13 10/16/2018    GFRESTIMATED 62 10/16/2018    GFRESTBLACK 75 10/16/2018    BRITTNY 9.8 10/16/2018        A1C RESULTS:  Lab Results   Component Value Date    A1C 6.9 (H) 10/16/2018       INR RESULTS:  No results found for: INR      ECHOCARDIOGRAM  No results found for this or any previous visit (from the past 8760 hour(s)).      Orders Placed This Encounter   Procedures     EKG 12-lead complete w/read - Clinics (performed today)     No orders of the defined types were placed in this encounter.    There are no discontinued medications.      Encounter Diagnosis   Name Primary?     Persistent atrial fibrillation (H) Yes         CC  Bradley Hopkins DO  [address] [place] [address]

## 2018-11-16 NOTE — MR AVS SNAPSHOT
After Visit Summary   11/16/2018    Manfred Caraballo    MRN: [id number]           Patient Information     Date Of Birth          1935        Visit Information        Provider Department      11/16/2018 4:00 PM Tato Au MD; LANGUAGE [facility]   [place]        Today's Diagnoses     Persistent atrial fibrillation (H)    -  1    Medicare annual wellness visit, subsequent        Hyperlipidemia with target LDL less than 100        Type 2 diabetes mellitus with diabetic nephropathy, without long-term current use of insulin (H)        Anemia, unspecified type        Atrial fibrillation, unspecified type (H)           Follow-ups after your visit        Your next 10 appointments already scheduled     Apr 19, 2019 10:00 AM CDT   PHYSICAL with Bradley Hopkins,    [facility]    [address] [place] [address] [phone]              Who to contact     If you have questions or need follow up information about today's clinic visit or your schedule please contact [facility]   [place] directly at [phone].  Normal or non-critical lab and imaging results will be communicated to you by MyChart, letter or phone within 4 business days after the clinic has received the results. If you do not hear from us within 7 days, please contact the clinic through MyChart or phone. If you have a critical or abnormal lab result, we will notify you by phone as soon as possible.  Submit refill requests through [employer] or call your pharmacy and they will forward the refill request to us. Please allow 3 business days for your refill to be completed.          Additional Information About Your Visit        Care EveryWhere ID     This is your Care EveryWhere ID. This could be used by other organizations to access your [facility] medical records  [id number]        Your Vitals Were     Pulse Height BMI (Body  "Mass Index)             74 1.651 m (5' 5\") 25.91 kg/m2          Blood Pressure from Last 3 Encounters:   11/16/18 136/64   10/16/18 136/74   06/25/18 132/82    Weight from Last 3 Encounters:   11/16/18 70.6 kg (155 lb 11.2 oz)   10/16/18 71.2 kg (157 lb)   06/25/18 70.8 kg (156 lb)              We Performed the Following     EKG 12-lead complete w/read - Clinics (performed today)          Where to get your medicines      These medications were sent to [facility]/pharmacy #[id number] - [place], [place] - [address]  [address], [place] [address]     Phone:  [phone]     diltiazem 120 MG 24 hr capsule    rivaroxaban ANTICOAGULANT 20 MG Tabs tablet          Primary Care Provider Office Phone # Fax #    Bradley SALINAS DO Kellie [phone] [address]  [place] [address]        Equal Access to Services     [facility] AH: Hadii aad ku hadasho Soomaali, waaxda luqadaha, qaybta kaalmada adeegyada, gali powell haymichelle mathias . So [facility] [phone].    ATENCIÓN: Si habla español, tiene a padilla disposición servicios gratuitos de asistencia lingüística. Llame al [phone].    We comply with applicable federal civil rights laws and [place] laws. We do not discriminate on the basis of race, color, national origin, age, disability, sex, sexual orientation, or gender identity.            Thank you!     Thank you for choosing [facility]  for your care. Our goal is always to provide you with excellent care. Hearing back from our patients is one way we can continue to improve our services. Please take a few minutes to complete the written survey that you may receive in the mail after your visit with us. Thank you!             Your Updated Medication List - Protect others around you: Learn how to safely use, store and throw away your medicines at www.disposemymeds.org.          This list is accurate as of 11/16/18  4:37 PM.  Always use your most recent med " list.                   Brand Name Dispense Instructions for use Diagnosis    albuterol 108 (90 Base) MCG/ACT inhaler    PROAIR HFA/PROVENTIL HFA/VENTOLIN HFA    1 Inhaler    Inhale 2 puffs into the lungs every 6 hours as needed for shortness of breath / dyspnea    Chronic obstructive pulmonary disease, unspecified COPD type (H)       ASPIRIN NOT PRESCRIBED    INTENTIONAL    1 Tab    not prescribed - ulcer history    Type II or unspecified type diabetes mellitus without mention of complication, not stated as uncontrolled       atorvastatin 10 MG tablet    LIPITOR    90 tablet    Take 1 tablet (10 mg) by mouth daily    Hyperlipidemia with target LDL less than 100, Medicare annual wellness visit, subsequent, Persistent atrial fibrillation (H), Type 2 diabetes mellitus with diabetic nephropathy, without long-term current use of insulin (H), Anemia, unspecified type       blood glucose monitoring meter device kit     1 kit    Use to test blood sugars  1 times daily or as directed.    Type 2 diabetes mellitus (H)       * blood glucose monitoring test strip    ACCU-CHEK COMFORT CURVE    100 strip    1 strip by In Vitro route 2 times daily Or whatever meter he has please    Type 2 diabetes, HbA1C goal < 8% (H)       * KEITH CONTOUR test strip   Generic drug:  blood glucose monitoring     300 strip    USE TO TEST BLOOD SUGAR 1 TIMES DAILY OR AS DIRECTED.    Type 2 diabetes mellitus with diabetic nephropathy (H)       * blood glucose monitoring test strip    no brand specified    100 each    Use to test blood sugars 1  times daily or as directed    Type 2 diabetes mellitus with diabetic nephropathy (H)       * [facility]S ACETAMINOPHEN 325 MG tablet   Generic drug:  acetaminophen     100 tablet    TAKE 2 TABLETS (650 MG) BY MOUTH EVERY 6 HOURS AS NEEDED FOR MILD PAIN    Chronic pain of left knee       * acetaminophen 500 MG tablet    TYLENOL    360 tablet    Take 1-2 tablets (500-1,000 mg) by mouth every 6 hours as needed for mild  pain    Chronic pain of left knee       diltiazem 120 MG 24 hr capsule    CARDIZEM CD    90 capsule    Take 1 capsule (120 mg) by mouth daily    Persistent atrial fibrillation (H), Medicare annual wellness visit, subsequent, Hyperlipidemia with target LDL less than 100, Type 2 diabetes mellitus with diabetic nephropathy, without long-term current use of insulin (H), Anemia, unspecified type       fluticasone 50 MCG/ACT spray    FLONASE    16 g    Spray 1-2 sprays into both nostrils daily    Chronic obstructive pulmonary disease, unspecified COPD type (H)       glimepiride 1 MG tablet    AMARYL    90 tablet    Take 1 tablet (1 mg) by mouth every morning (before breakfast)    Type 2 diabetes mellitus with diabetic nephropathy, without long-term current use of insulin (H)       hydrocortisone 0.2 % cream    WESTCORT    30 g    APPLY SPARINGLY TO AFFECTED AREA THREE TIMES DAILY FOR 14 DAYS.    Contact dermatitis due to other agent, unspecified contact dermatitis type       ipratropium - albuterol 0.5 mg/2.5 mg/3 mL 0.5-2.5 (3) MG/3ML neb solution    DUONEB    30 vial    Take 1 vial (3 mLs) by nebulization every 6 hours as needed for shortness of breath / dyspnea or wheezing    Cough       losartan-hydrochlorothiazide 100-25 MG per tablet    HYZAAR    90 tablet    Take 1 tablet by mouth daily    Hypertension goal BP (blood pressure) < 140/90       metFORMIN 1000 MG tablet    GLUCOPHAGE    180 tablet    TAKE 1 TABLET BY MOUTH 2 TIMES DAILY (WITH MEALS)    Type 2 diabetes mellitus with diabetic nephropathy, without long-term current use of insulin (H), Medicare annual wellness visit, subsequent, Hyperlipidemia with target LDL less than 100, Persistent atrial fibrillation (H), Anemia, unspecified type       order for DME     1 Device    walker    Tear of medial meniscus of left knee, current, unspecified tear type, initial encounter       pantoprazole 40 MG EC tablet    PROTONIX    90 tablet    TAKE 1 TABLET (40 MG) BY  MOUTH DAILY    Gastroesophageal reflux disease without esophagitis       polyethylene glycol powder    MIRALAX/GLYCOLAX    527 g    MIX 17 GRAMS (1 CAPFUL) OF POWDER IN 8 OUNCES LIQUID AND DRINK ONCE DAILY    Constipation, unspecified constipation type       potassium chloride SA 20 MEQ CR tablet    KLOR-CON    90 tablet    Take 1 tablet (20 mEq) by mouth daily    Hypertension goal BP (blood pressure) < 140/90       rivaroxaban ANTICOAGULANT 20 MG Tabs tablet    XARELTO    30 tablet    Take 1 tablet (20 mg) by mouth daily (with dinner)    Atrial fibrillation, unspecified type (H)       * Notice:  This list has 5 medication(s) that are the same as other medications prescribed for you. Read the directions carefully, and ask your doctor or other care provider to review them with you.

## 2018-11-16 NOTE — LETTER
"11/16/2018    Bradley Hopkins, DO  [address]  [place] [address]    RE: Manfred Caraballo       Dear Colleague,    I had the pleasure of seeing Manfred Caraballo in the [facility].    Electrophysiology/ Clinic Note         H&P and Plan:     REASON FOR VISIT:  Evaluation of persistent atrial fibrillation.       HISTORY OF PRESENT ILLNESS:  Ms. Caraballo is a delightful 83-year-old gentleman with a history of hypertension, hyperlipidemia, diabetes, chronic kidney disease, COPD and persistent atrial fibrillation, who is here for routine evaluation.     He is on rate control with Diltiazem and seems to be doing well.  He denies any other symptoms such as chest pain, lightheadedness, near-syncope or syncopal episode.       Holter obtained in 2017 revealed an average HR of 61 bpm (range of ).  No significant pauses were appreciated. Echo revealed normal LV function and mil MR/TR.    EKG done today  here confirmed AFib with controlled ventricular response (HR at 60 bpm)      ASSESSMENT AND PLAN:   1.  Persistent atrial fibrillation.  Heart rate is well controlled and he is asymptomatic.  We will continue therapy with diltiazem 120 mg daily.    2.  Embolic prevention.  CHADS-VASc score of 4.  BMP in 10/2018 was within normal limits.  We will continue xarelto indefinitely.  3.  Hypertension.  Blood pressure is well controlled.   4. Follow up care.  Follow up with PA in 1 year.      Tato Au MD    Physical Exam:  Vitals: /64  Pulse 74  Ht 1.651 m (5' 5\")  Wt 70.6 kg (155 lb 11.2 oz)  BMI 25.91 kg/m2    Constitutional:  AAO x3.  Pt is in NAD.  HEAD: normocephalic.  SKIN: Skin normal color, texture and turgor with no lesions or eruptions.  Eyes: PERRL, EOMI.  ENT:  Supple, normal JVP. No lymphadenopathy or thyroid enlargement.  Chest:  CTAB.  Cardiac:  IIR, variable sound of S1 and Normal S2.   No murmurs rubs or gallop.    Abdomen:  Normal BS.  Soft, non-tender and " non-distended.  No rebound or guarding.    Extremities:  Pedious pulses palpable B/L.  No LE edema noticed.   Neurological: Strength and sensation grossly symmetric and intact throughout.       CURRENT MEDICATIONS:  Current Outpatient Prescriptions   Medication Sig Dispense Refill     acetaminophen (TYLENOL) 500 MG tablet Take 1-2 tablets (500-1,000 mg) by mouth every 6 hours as needed for mild pain 360 tablet 1     albuterol (PROAIR HFA/PROVENTIL HFA/VENTOLIN HFA) 108 (90 BASE) MCG/ACT Inhaler Inhale 2 puffs into the lungs every 6 hours as needed for shortness of breath / dyspnea 1 Inhaler 11     ASPIRIN NOT PRESCRIBED (INTENTIONAL) not prescribed - ulcer history 1 Tab 0     atorvastatin (LIPITOR) 10 MG tablet Take 1 tablet (10 mg) by mouth daily 90 tablet 3     KEITH CONTOUR test strip USE TO TEST BLOOD SUGAR 1 TIMES DAILY OR AS DIRECTED. 300 strip 1     blood glucose monitoring (Zigfu CONTOUR MONITOR) meter device kit Use to test blood sugars  1 times daily or as directed. 1 kit 0     blood glucose monitoring (NO BRAND SPECIFIED) test strip Use to test blood sugars 1  times daily or as directed 100 each 3     [facility]S ACETAMINOPHEN 325 MG tablet TAKE 2 TABLETS (650 MG) BY MOUTH EVERY 6 HOURS AS NEEDED FOR MILD PAIN 100 tablet 3     diltiazem (CARDIZEM CD) 120 MG 24 hr capsule Take 1 capsule (120 mg) by mouth daily 90 capsule 1     fluticasone (FLONASE) 50 MCG/ACT nasal spray Spray 1-2 sprays into both nostrils daily 16 g 3     glimepiride (AMARYL) 1 MG tablet Take 1 tablet (1 mg) by mouth every morning (before breakfast) 90 tablet 1     glucose blood VI test strips (ACCU-CHEK COMFORT CURVE) strip 1 strip by In Vitro route 2 times daily Or whatever meter he has please 100 strip 3     hydrocortisone (WESTCORT) 0.2 % cream APPLY SPARINGLY TO AFFECTED AREA THREE TIMES DAILY FOR 14 DAYS. 30 g 0     ipratropium - albuterol 0.5 mg/2.5 mg/3 mL (DUONEB) 0.5-2.5 (3) MG/3ML nebulization Take 1 vial (3 mLs) by nebulization every  6 hours as needed for shortness of breath / dyspnea or wheezing 30 vial 1     losartan-hydrochlorothiazide (HYZAAR) 100-25 MG per tablet Take 1 tablet by mouth daily 90 tablet 1     metFORMIN (GLUCOPHAGE) 1000 MG tablet TAKE 1 TABLET BY MOUTH 2 TIMES DAILY (WITH MEALS) 180 tablet 1     order for DME walker 1 Device 0     pantoprazole (PROTONIX) 40 MG EC tablet TAKE 1 TABLET (40 MG) BY MOUTH DAILY 90 tablet 3     polyethylene glycol (MIRALAX/GLYCOLAX) powder MIX 17 GRAMS (1 CAPFUL) OF POWDER IN 8 OUNCES LIQUID AND DRINK ONCE DAILY 527 g 3     potassium chloride SA (KLOR-CON) 20 MEQ CR tablet Take 1 tablet (20 mEq) by mouth daily 90 tablet 3     rivaroxaban ANTICOAGULANT (XARELTO) 20 MG TABS tablet Take 1 tablet (20 mg) by mouth daily (with dinner) 30 tablet 2       ALLERGIES     Allergies   Allergen Reactions     Aspirin      Salicylates      ASA/ Ulcers       PAST MEDICAL HISTORY:  Past Medical History:   Diagnosis Date     Acute duodenal ulcer with hemorrhage, without mention of obstruction 11/15/03    and pyloric also - required hosp and transfusion 2 units- H. pylori negative     Allergic rhinitis, cause unspecified      Calculus of gallbladder without mention of cholecystitis or obstruction 11/15/03    no residual pain- transient cholestasis for 2 days     Colon Polyps normal 5/2010 2008 = one hyperplastic & one tubular adenoma - no high grade dysplasis - repeat in 2013      Diverticulosis of colon (without mention of hemorrhage) 11/03     Dyspepsia and other specified disorders of function of stomach     dyspepsia,  h/o bleeding stomach ulcer in 1985     Esophageal reflux      Genital herpes, unspecified      Paroxysmal atrial fibrillation (H) 12/21/2016    [facility] ER.     Pneumonia, organism unspecified(486) 11/15/03    right middle and lower lobe     Pure hypercholesterolemia      Pure hyperglyceridemia      Tear meniscus knee 2009    degenerative on right      Type II or unspecified type  diabetes mellitus without mention of complication, not stated as uncontrolled at age 63     Unspecified essential hypertension        PAST SURGICAL HISTORY:  Past Surgical History:   Procedure Laterality Date     HC COLONOSCOPY THRU STOMA, DIAGNOSTIC  11/03    for GI bleed - diverticulosis      HC COLONOSCOPY THRU STOMA, DIAGNOSTIC  5/2010    normal - repeat in 5 years      HC UGI ENDOSCOPY DIAG W OR W/O BRUSH/WASH  11/03    for GI bleed - showed pyloric and duodenal  ulcer        FAMILY HISTORY:  Family History   Problem Relation Age of Onset     Diabetes No family hx of      C.A.D. No family hx of      Hypertension No family hx of      Cancer - colorectal No family hx of      Prostate Cancer No family hx of        SOCIAL HISTORY:  Social History     Social History     Marital status:      Spouse name: Constanza     Number of children: 7     Years of education: N/A     Occupational History     retired - was in food processing - packaging hamburger, etc.        None      Social History Main Topics     Smoking status: Former Smoker     Packs/day: 0.50     Years: 20.00     Types: Cigarettes     Quit date: 5/13/1981     Smokeless tobacco: Never Used      Comment: quit in 1981      Alcohol use No      Comment: quit in 1981     Drug use: No      Comment: no herbal meds either     Sexual activity: Yes     Partners: Female      Comment:      Other Topics Concern      Service Yes     was in  in [facility] for 5 years and  for 15 years     Blood Transfusions Yes     in 1985 after bleeding stomach ulcer     Caffeine Concern No     stopped all coffee and tea after ulcers 11/03     Exercise Yes     walks at least one mile every day     Seat Belt Yes     always     Self-Exams Yes     KARINA encouraged monthly     Parent/Sibling W/ Cabg, Mi Or Angioplasty Before 65f 55m? No     Social History Narrative    no longer taking one 81mg asa qday - secondary to bleeding  ulcers 11/03    PSA checking every 6  months - one year.                    Review of Systems:  Skin:  Negative     Eyes:  Negative    ENT:  Negative    Respiratory:  Negative    Cardiovascular:  Negative    Gastroenterology: Negative    Genitourinary:  Negative    Musculoskeletal:  Negative    Neurologic:  Negative    Psychiatric:  Negative    Heme/Lymph/Imm:  Negative    Endocrine:  Positive for diabetes      Recent Lab Results:  LIPID RESULTS:  Lab Results   Component Value Date    CHOL 144 04/16/2018    HDL 40 04/16/2018    LDL 66 04/16/2018    TRIG 191 (H) 04/16/2018    CHOLHDLRATIO 4.2 05/07/2015       LIVER ENZYME RESULTS:  Lab Results   Component Value Date    AST 17 10/16/2018    ALT 22 10/16/2018       CBC RESULTS:  Lab Results   Component Value Date    WBC 7.6 10/16/2018    RBC 5.34 10/16/2018    HGB 12.2 (L) 10/16/2018    HCT 39.8 (L) 10/16/2018    MCV 75 (L) 10/16/2018    MCH 22.8 (L) 10/16/2018    MCHC 30.7 (L) 10/16/2018    RDW 13.3 10/16/2018     10/16/2018       BMP RESULTS:  Lab Results   Component Value Date     10/16/2018    POTASSIUM 4.0 10/16/2018    CHLORIDE 100 10/16/2018    CO2 24 10/16/2018    ANIONGAP 11 10/16/2018     (H) 10/16/2018    BUN 17 10/16/2018    CR 1.13 10/16/2018    GFRESTIMATED 62 10/16/2018    GFRESTBLACK 75 10/16/2018    BRITTNY 9.8 10/16/2018        A1C RESULTS:  Lab Results   Component Value Date    A1C 6.9 (H) 10/16/2018       INR RESULTS:  No results found for: INR      ECHOCARDIOGRAM  No results found for this or any previous visit (from the past 8760 hour(s)).      Orders Placed This Encounter   Procedures     EKG 12-lead complete w/read - Clinics (performed today)     No orders of the defined types were placed in this encounter.    There are no discontinued medications.      Encounter Diagnosis   Name Primary?     Persistent atrial fibrillation (H) Yes         CC  Bradley Hopkins DO  [address] [place] [address]                Thank you for allowing me to participate in the care of  your patient.      Sincerely,     Tato Au MD     [facility]    cc:   Bradley Hopkins DO  [address] [place] [address]

## 2018-11-29 ENCOUNTER — MEDICAL CORRESPONDENCE (OUTPATIENT)
Dept: HEALTH INFORMATION MANAGEMENT | Facility: CLINIC | Age: 83
End: 2018-11-29

## 2018-11-29 ENCOUNTER — TELEPHONE (OUTPATIENT)
Dept: FAMILY MEDICINE | Facility: CLINIC | Age: 83
End: 2018-11-29

## 2018-11-29 NOTE — TELEPHONE ENCOUNTER
Date Forms was received: November 29, 2018    Forms received by: Fax    Purpose of Form:  Nursing Home Orders plan of care    When the form is due:  ASAP    How the form needs to be returned for patient:  Fax    Form currently placed  SW inbox

## 2018-12-05 ENCOUNTER — TELEPHONE (OUTPATIENT)
Dept: FAMILY MEDICINE | Facility: CLINIC | Age: 83
End: 2018-12-05

## 2018-12-05 NOTE — TELEPHONE ENCOUNTER
Date Forms was received: December 5, 2018    Forms received by: Fax    Purpose of Form:  [employer]    When the form is due:  ASAP    How the form needs to be returned for patient:  Fax    Form currently placed  SW inbox

## 2018-12-21 DIAGNOSIS — I48.91 ATRIAL FIBRILLATION, UNSPECIFIED TYPE (H): ICD-10-CM

## 2018-12-27 DIAGNOSIS — I10 HYPERTENSION GOAL BP (BLOOD PRESSURE) < 140/90: ICD-10-CM

## 2018-12-27 RX ORDER — LOSARTAN POTASSIUM AND HYDROCHLOROTHIAZIDE 25; 100 MG/1; MG/1
1 TABLET ORAL DAILY
Qty: 90 TABLET | Refills: 1 | Status: SHIPPED | OUTPATIENT
Start: 2018-12-27 | End: 2019-10-05

## 2019-01-25 ENCOUNTER — MEDICAL CORRESPONDENCE (OUTPATIENT)
Dept: HEALTH INFORMATION MANAGEMENT | Facility: CLINIC | Age: 84
End: 2019-01-25

## 2019-01-31 ENCOUNTER — TELEPHONE (OUTPATIENT)
Dept: FAMILY MEDICINE | Facility: CLINIC | Age: 84
End: 2019-01-31

## 2019-01-31 NOTE — TELEPHONE ENCOUNTER
Date Forms was received: January 31, 2019    Forms received by: Fax    Purpose of Form:  Nursing Home Orders [facility]    When the form is due:  ASAP    How the form needs to be returned for patient:  Fax    Form currently placed  SW inbox

## 2019-02-19 NOTE — TELEPHONE ENCOUNTER
"Requested Prescriptions   Pending Prescriptions Disp Refills     losartan-hydrochlorothiazide (HYZAAR) 100-25 MG tablet  Last Written Prescription Date:  6/19/2018  Last Fill Quantity: 90 tablet,  # refills: 1   Last office visit: 10/16/2018 with prescribing provider:  Kellie     Future Office Visit:       90 tablet 1     Sig: Take 1 tablet by mouth daily    Angiotensin-II Receptors Passed - 12/27/2018  9:34 AM       Passed - Blood pressure under 140/90 in past 12 months    BP Readings from Last 3 Encounters:   11/16/18 136/64   10/16/18 136/74   06/25/18 132/82            Passed - Recent (12 mo) or future (30 days) visit within the authorizing provider's specialty    Patient had office visit in the last 12 months or has a visit in the next 30 days with authorizing provider or within the authorizing provider's specialty.  See \"Patient Info\" tab in inbasket, or \"Choose Columns\" in Meds & Orders section of the refill encounter.             Passed - Patient is age 18 or older       Passed - Normal serum creatinine on file in past 12 months    Recent Labs   Lab Test 10/16/18  1029   CR 1.13            Passed - Normal serum potassium on file in past 12 months    Recent Labs   Lab Test 10/16/18  1029   POTASSIUM 4.0                " 2

## 2019-03-08 DIAGNOSIS — D64.9 ANEMIA, UNSPECIFIED TYPE: ICD-10-CM

## 2019-03-08 DIAGNOSIS — E78.5 HYPERLIPIDEMIA WITH TARGET LDL LESS THAN 100: ICD-10-CM

## 2019-03-08 DIAGNOSIS — E11.21 TYPE 2 DIABETES MELLITUS WITH DIABETIC NEPHROPATHY, WITHOUT LONG-TERM CURRENT USE OF INSULIN (H): ICD-10-CM

## 2019-03-08 DIAGNOSIS — Z00.00 MEDICARE ANNUAL WELLNESS VISIT, SUBSEQUENT: ICD-10-CM

## 2019-03-08 DIAGNOSIS — I48.19 PERSISTENT ATRIAL FIBRILLATION (H): ICD-10-CM

## 2019-03-08 NOTE — TELEPHONE ENCOUNTER
Requested Prescriptions   Pending Prescriptions Disp Refills     metFORMIN (GLUCOPHAGE) 1000 MG tablet [Pharmacy Med Name: METFORMIN HCL 1,000 MG TABLET]  Last Written Prescription Date:  6/25/2018  Last Fill Quantity: 180 tablet,  # refills: 1   Last office visit: 10/16/2018 with prescribing provider:  Kellie     Future Office Visit:   Next 5 appointments (look out 90 days)    Apr 18, 2019 10:00 AM CDT  PHYSICAL with Bradley Hopkins,   [facility] [address]  [facility] [place] [id number] [phone]            180 tablet 1     Sig: TAKE 1 TABLET BY MOUTH TWICE A DAY WITH FOOD    Biguanide Agents Passed - 3/8/2019  1:35 AM       Passed - Blood pressure less than 140/90 in past 6 months    BP Readings from Last 3 Encounters:   11/16/18 136/64   10/16/18 136/74   06/25/18 132/82            Passed - Patient has documented LDL within the past 12 mos.    Recent Labs   Lab Test 04/16/18  1011   LDL 66            Passed - Patient has had a Microalbumin in the past 15 mos.    Recent Labs   Lab Test 04/16/18  1011   MICROL 118   UMALCR 559.24*            Passed - Patient is age 10 or older       Passed - Patient has documented A1c within the specified period of time.    If HgbA1C is 8 or greater, it needs to be on file within the past 3 months.  If less than 8, must be on file within the past 6 months.     Recent Labs   Lab Test 10/16/18  1029   A1C 6.9*            Passed - Patient's CR is NOT>1.4 OR Patient's EGFR is NOT<45 within past 12 mos.    Recent Labs   Lab Test 10/16/18  1029   GFRESTIMATED 62   GFRESTBLACK 75       Recent Labs   Lab Test 10/16/18  1029   CR 1.13            Passed - Patient does NOT have a diagnosis of CHF.       Passed - Medication is active on med list       Passed - Recent (6 mo) or future (30 days) visit within the authorizing provider's specialty    Patient had office visit in the last 6 months or has a visit in the next 30 days with authorizing provider  "or within the authorizing provider's specialty.  See \"Patient Info\" tab in inbasket, or \"Choose Columns\" in Meds & Orders section of the refill encounter.            "

## 2019-03-08 NOTE — TELEPHONE ENCOUNTER
A 60 day supply is given, patient is due for an office visit. Patient has appointment scheduled for 4/18/2019.  KYREE Birch, RN  [employer] [facility]

## 2019-03-20 ENCOUNTER — TELEPHONE (OUTPATIENT)
Dept: FAMILY MEDICINE | Facility: CLINIC | Age: 84
End: 2019-03-20

## 2019-03-20 NOTE — TELEPHONE ENCOUNTER
Date Forms was received: March 20, 2019    Forms received by: Fax    Purpose of Form:  Special Diet Information    When the form is due:  4/8/2019    How the form needs to be returned for patient:  Mail to [id number] [address] [id number]    Form currently placed  SW inbox

## 2019-03-22 NOTE — TELEPHONE ENCOUNTER
Patient's form signed, dated, and placed in TC basket.    Bradley Hopkins DO  3/22/2019 8:11 AM

## 2019-04-02 ENCOUNTER — MEDICAL CORRESPONDENCE (OUTPATIENT)
Dept: HEALTH INFORMATION MANAGEMENT | Facility: CLINIC | Age: 84
End: 2019-04-02

## 2019-04-02 ENCOUNTER — TELEPHONE (OUTPATIENT)
Dept: FAMILY MEDICINE | Facility: CLINIC | Age: 84
End: 2019-04-02

## 2019-04-02 DIAGNOSIS — K59.00 CONSTIPATION, UNSPECIFIED CONSTIPATION TYPE: ICD-10-CM

## 2019-04-02 RX ORDER — POLYETHYLENE GLYCOL 3350 17 G/17G
POWDER, FOR SOLUTION ORAL
Qty: 527 G | Refills: 3 | COMMUNITY
Start: 2019-04-02 | End: 2020-02-04

## 2019-04-02 NOTE — TELEPHONE ENCOUNTER
Reason for Call:  Form, our goal is to have forms completed with 72 hours, however, some forms may require a visit or additional information.    Type of letter, form or note:  Home Health Certification    Who is the form from?: Home care    Where did the form come from: form was faxed in    What clinic location was the form placed at?: [place]    Where the form was placed: Given to MA/RN--Kaur MONIQUE RN    What number is listed as a contact on the form?: NA       Additional comments:     Call taken on 4/2/2019 at 10:05 AM by Bell Reid

## 2019-04-02 NOTE — TELEPHONE ENCOUNTER
MED REC DONE     Discrepancies:      atorvastatin           Epic: 10mg tablet, take one tablet by mouth daily           Form:  20mg tablet, take 0.5 tablet daily       Hydrocortisone cream   Epic: 0.2% cream, apply sparingly to affected area TID for 14 days    Form: 2% mL external 30gm daily       Glimepiride   Epic: 1mg tablet, take one tablet by mouth every morning   Form: 2mg tablet, take 0.5 tablet daily    Meds on Epic but NOT  on Form:       Acetaminophen 500mg - *PLEASE NOTE, acetaminophen is listed  twice, one for 500mg and one for 325mg - 325mg is on home care  list*     Miralax 3350 mix 17 grams (1 capful) of powder in 8oz liquid and drink  once daily     Potassium chloride Senia ER 20meQ, take 1 tablet by mouth daily      Meds on Form but NOT on Epic :     None       Routing to PCP for further review/recommendations/orders

## 2019-04-04 DIAGNOSIS — G89.29 CHRONIC PAIN OF LEFT KNEE: ICD-10-CM

## 2019-04-04 DIAGNOSIS — M25.562 CHRONIC PAIN OF LEFT KNEE: ICD-10-CM

## 2019-04-05 NOTE — TELEPHONE ENCOUNTER
"Requested Prescriptions   Pending Prescriptions Disp Refills     CVS NON-ASPIRIN EXTRA STRENGTH 500 MG tablet [Pharmacy Med Name: CVS NON-ASPIRIN 500 MG CAPLET]  Last Written Prescription Date:  10/16/2018  Last Fill Quantity: 360 tab,  # refills: 1   Last Office Visit: 10/16/2018 radames    Future Office Visit:    Next 5 appointments (look out 90 days)    Apr 24, 2019  9:00 AM CDT  [facility] with [facility] Hopkins,   [facility] [address]  [facility] [address] [phone]          360 tablet 1     Sig: TAKE 1-2 TABLETS BY MOUTH EVERY 6 HOURS AS NEEDED FOR MILD PAIN    Analgesics (Non-Narcotic Tylenol and ASA Only) Passed - 4/4/2019  2:58 PM       Passed - Recent (12 mo) or future (30 days) visit within the authorizing provider's specialty    Patient had office visit in the last 12 months or has a visit in the next 30 days with authorizing provider or within the authorizing provider's specialty.  See \"Patient Info\" tab in inbasket, or \"Choose Columns\" in Meds & Orders section of the refill encounter.             Passed - Patient is 7 months old or older    If patient is a peds patient of the age 7 mos -12 years, ok to refill using weight-based dosing.     If >3g daily and/or sig is not \"prn\", check for liver enzymes. If normal in the last year, ok to refill.  If not, refer to the provider.         Passed - Medication is active on med list        "

## 2019-04-08 RX ORDER — PYRITHIONE ZINC 1 %
SHAMPOO TOPICAL
Qty: 360 TABLET | Refills: 2 | Status: SHIPPED | OUTPATIENT
Start: 2019-04-08 | End: 2019-08-15

## 2019-04-24 ENCOUNTER — OFFICE VISIT (OUTPATIENT)
Dept: FAMILY MEDICINE | Facility: CLINIC | Age: 84
End: 2019-04-24
Payer: COMMERCIAL

## 2019-04-24 VITALS
DIASTOLIC BLOOD PRESSURE: 74 MMHG | SYSTOLIC BLOOD PRESSURE: 126 MMHG | OXYGEN SATURATION: 97 % | HEIGHT: 65 IN | WEIGHT: 155 LBS | BODY MASS INDEX: 25.83 KG/M2 | TEMPERATURE: 97.3 F | HEART RATE: 96 BPM

## 2019-04-24 DIAGNOSIS — E11.21 TYPE 2 DIABETES MELLITUS WITH DIABETIC NEPHROPATHY, WITHOUT LONG-TERM CURRENT USE OF INSULIN (H): Primary | ICD-10-CM

## 2019-04-24 DIAGNOSIS — I10 HYPERTENSION GOAL BP (BLOOD PRESSURE) < 140/90: ICD-10-CM

## 2019-04-24 DIAGNOSIS — I48.19 PERSISTENT ATRIAL FIBRILLATION (H): ICD-10-CM

## 2019-04-24 DIAGNOSIS — J44.9 CHRONIC OBSTRUCTIVE PULMONARY DISEASE, UNSPECIFIED COPD TYPE (H): ICD-10-CM

## 2019-04-24 DIAGNOSIS — E78.5 HYPERLIPIDEMIA WITH TARGET LDL LESS THAN 100: ICD-10-CM

## 2019-04-24 LAB — HBA1C MFR BLD: 7 % (ref 0–5.6)

## 2019-04-24 PROCEDURE — 82043 UR ALBUMIN QUANTITATIVE: CPT | Performed by: FAMILY MEDICINE

## 2019-04-24 PROCEDURE — 84443 ASSAY THYROID STIM HORMONE: CPT | Performed by: FAMILY MEDICINE

## 2019-04-24 PROCEDURE — 80061 LIPID PANEL: CPT | Performed by: FAMILY MEDICINE

## 2019-04-24 PROCEDURE — 99214 OFFICE O/P EST MOD 30 MIN: CPT | Performed by: FAMILY MEDICINE

## 2019-04-24 PROCEDURE — 83036 HEMOGLOBIN GLYCOSYLATED A1C: CPT | Performed by: FAMILY MEDICINE

## 2019-04-24 PROCEDURE — 36415 COLL VENOUS BLD VENIPUNCTURE: CPT | Performed by: FAMILY MEDICINE

## 2019-04-24 RX ORDER — LANCETS
EACH MISCELLANEOUS
Qty: 200 EACH | Refills: 6 | Status: SHIPPED | OUTPATIENT
Start: 2019-04-24 | End: 2023-02-27

## 2019-04-24 RX ORDER — GLUCOSAMINE HCL/CHONDROITIN SU 500-400 MG
CAPSULE ORAL
Qty: 100 EACH | Refills: 3 | Status: SHIPPED | OUTPATIENT
Start: 2019-04-24 | End: 2020-04-29

## 2019-04-24 RX ORDER — GLIMEPIRIDE 1 MG/1
1 TABLET ORAL
Qty: 90 TABLET | Refills: 1 | Status: SHIPPED | OUTPATIENT
Start: 2019-04-24 | End: 2019-10-18

## 2019-04-24 ASSESSMENT — MIFFLIN-ST. JEOR: SCORE: 1319.96

## 2019-04-24 NOTE — PROGRESS NOTES
SUBJECTIVE:   Manfred Caraballo is a 84 year old male who presents to clinic today for the following   health issues:      Diabetes Follow-up    Patient is checking blood sugars: twice daily.    Blood sugar testing frequency justification: Uncontrolled diabetes  Results are as follows:         am - 120s         suppertime - 200s - 210    Diabetic concerns: None     Symptoms of hypoglycemia (low blood sugar): none     Paresthesias (numbness or burning in feet) or sores: No     Date of last diabetic eye exam: June 2018    Diabetes Management Resources    Hyperlipidemia Follow-Up      Rate your low fat/cholesterol diet?: not monitoring fat    Taking statin?  Yes, no muscle aches from statin    Other lipid medications/supplements?:  none    Hypertension Follow-up      Outpatient blood pressures are not being checked.    Low Salt Diet: not monitoring salt    Denies any headaches, lightheadedness, dizziness, vision changes, chest pain, shortness of breath, or dyspnea.     BP Readings from Last 2 Encounters:   04/24/19 126/74   11/16/18 136/64     Hemoglobin A1C (%)   Date Value   10/16/2018 6.9 (H)   04/16/2018 6.4     LDL Cholesterol Calculated (mg/dL)   Date Value   04/16/2018 66   06/21/2017 35       Amount of exercise or physical activity: 2-3 days/week for an average of 30-45 minutes    Problems taking medications regularly: No    Medication side effects: none    Diet: regular (no restrictions)      Additional history: as documented    Reviewed  and updated as needed this visit by clinical staff         Reviewed and updated as needed this visit by Provider         Patient Active Problem List   Diagnosis     Hypertension goal BP (blood pressure) < 140/90     Allergic rhinitis     Dyspepsia and other specified disorders of function of stomach     Genital herpes     Calculus of gallbladder     ACUTE PROSTATITIS- mild- follow up- 10/02     Acute duodenal ulcer with hemorrhage     Diverticulosis of large intestine      "Iron deficiency anemia     Impotence of organic origin     Esophageal reflux     History of colonic polyps     Colon Polyps     Tear meniscus knee     Hyperlipidemia with target LDL less than 100     Advanced directives, counseling/discussion     Microalbuminuria- very mild      Type 2 diabetes mellitus with diabetic nephropathy (H)     GERD (gastroesophageal reflux disease)     COPD (chronic obstructive pulmonary disease) (H)     History of pneumonia     Persistent atrial fibrillation (H)     CKD (chronic kidney disease) stage 2, GFR 60-89 ml/min     Past Surgical History:   Procedure Laterality Date     HC COLONOSCOPY THRU STOMA, DIAGNOSTIC      for GI bleed - diverticulosis      HC COLONOSCOPY THRU STOMA, DIAGNOSTIC  2010    normal - repeat in 5 years      HC UGI ENDOSCOPY DIAG W OR W/O BRUSH/WASH      for GI bleed - showed pyloric and duodenal  ulcer        Social History     Tobacco Use     Smoking status: Former Smoker     Packs/day: 0.50     Years: 20.00     Pack years: 10.00     Types: Cigarettes     Last attempt to quit: 1981     Years since quittin.9     Smokeless tobacco: Never Used     Tobacco comment: quit in     Substance Use Topics     Alcohol use: No     Alcohol/week: 0.0 oz     Comment: quit in      Family History   Problem Relation Age of Onset     Diabetes No family hx of      C.A.D. No family hx of      Hypertension No family hx of      Cancer - colorectal No family hx of      Prostate Cancer No family hx of            ROS:  Constitutional, HEENT, cardiovascular, pulmonary, gi and gu systems are negative, except as otherwise noted.    OBJECTIVE:     /74   Pulse 96   Temp 97.3  F (36.3  C) (Oral)   Ht 1.651 m (5' 5\")   Wt 70.3 kg (155 lb)   SpO2 97%   BMI 25.79 kg/m    Body mass index is 25.79 kg/m .  GENERAL: healthy, alert and no distress  RESP: lungs clear to auscultation - no rales, rhonchi or wheezes  CV: regular rate and rhythm, normal S1 S2, no S3 " or S4, no murmur, click or rub, no peripheral edema and peripheral pulses strong  MS: no gross musculoskeletal defects noted, no edema  PSYCH: mentation appears normal, affect normal/bright    Diagnostic Test Results:  none     ASSESSMENT/PLAN:   1. Type 2 diabetes mellitus with diabetic nephropathy, without long-term current use of insulin (H): historically well-controlled. Goal A1c <8.0%. Continue metformin and glimepiride. He does have history of microalbuminuria and is on an ARB. Continue Lipitor. Recheck labs today. Follow up in 6 months.  - HEMOGLOBIN A1C  - Albumin Random Urine Quantitative with Creat Ratio  - metFORMIN (GLUCOPHAGE) 1000 MG tablet; Take 1 tablet (1,000 mg) by mouth 2 times daily (with meals)  Dispense: 180 tablet; Refill: 1  - glimepiride (AMARYL) 1 MG tablet; Take 1 tablet (1 mg) by mouth every morning (before breakfast)  Dispense: 90 tablet; Refill: 1  - TSH with free T4 reflex  - blood glucose (NO BRAND SPECIFIED) test strip; Use to test blood sugar 1 times daily or as directed. To accompany: Blood Glucose Monitor Brands: per insurance.  Dispense: 200 strip; Refill: 6  - blood glucose calibration (NO BRAND SPECIFIED) solution; Use to calibrate blood glucose monitor as needed as directed. To accompany: Blood Glucose Monitor Brands: per insurance.  Dispense: 1 Bottle; Refill: 3  - thin (NO BRAND SPECIFIED) lancets; Use to test blood sugar 1 times daily or as directed. To accompany: Blood Glucose Monitor Brands: per insurance.  Dispense: 200 each; Refill: 6  - alcohol swab prep pads; Use to swab area of injection/daniel as directed  Dispense: 100 each; Refill: 3    2. Hyperlipidemia with target LDL less than 100: stable, continue Lipitor. Recheck lipids today.  - Lipid panel reflex to direct LDL Fasting    3. Chronic obstructive pulmonary disease, unspecified COPD type (H): stable, not using any daily medications. Only needing nebs every couple weeks.    4. Persistent atrial fibrillation (H):  stable, on Xarelto. Rate is controlled    5. Hypertension goal BP (blood pressure) < 140/90: BP at goal, asymptomatic. Continue current regimen    Bradley Hopkins,   [facility]

## 2019-04-24 NOTE — LETTER
My COPD Action Plan     Name: Manfred Caraballo    YOB: 1935   Date: 4/24/2019    My doctor: Bradley Hopkins, DO   My clinic: [address] [address] [phone]  My Controller Medicine: None     My Rescue Medicine: Albuterol nebulizer solution , Duoneb      My Flare Up Medicine: Prednisone        My COPD Severity:       Use of Oxygen: Oxygen Not Prescribed      Make sure you've had your pneumonia   vaccines.          GREEN ZONE       Doing well today      Usual level of activity and exercise    Usual amount of cough and mucus    No shortness of breath    Usual level of health (thinking clearly, sleeping well, feel like eating) Actions:      Take daily medicines    Use oxygen as prescribed    Follow regular exercise and diet plan    Avoid cigarette smoke and other irritants that harm the lungs           YELLOW ZONE          Having a bad day or flare up      Short of breath more than usual    A lot more sputum (mucus) than usual    Sputum looks yellow, green, tan, brown or bloody    More coughing or wheezing    Fever or chills    Less energy; trouble completing activities    Trouble thinking or focusing    Using quick relief inhaler or nebulizer more often    Poor sleep; symptoms wake me up    Do not feel like eating Actions:      Get plenty of rest    Take daily medicines    Use quick relief inhaler every 4 hours    If you use oxygen, call you doctor to see if you should adjust your oxygen    Do breathing exercises or other things to help you relax    Let a loved one, friend or neighbor know you are feeling worse    Call your care team if you have 2 or more symptoms.  Start taking steroids or antibiotics if directed by your care team           RED ZONE       Need medical care now      Severe shortness of breath (feel you can't breathe)    Fever, chills    Not enough breath to do any activity    Trouble coughing up mucus, walking or talking    Blood in  mucus    Frequent coughing   Rescue medicines are not working    Not able to sleep because of breathing    Feel confused or drowsy    Chest pain    Actions:      Call your health care team.  If you cannot reach your care team, call 911 or go to the emergency room.        Annual Reminders:  Meet with Care Team, Flu Shot every Fall  Pharmacy: [id number] - [place] - [address]

## 2019-04-24 NOTE — LETTER
May 2, 2019      Manfred Caraballo  [address] [place] [address]        Dear ,    We are writing to inform you of your test results.    -Cholesterol levels are at your goal levels.  ADVISE: continuing your medication, a regular exercise program with at least 150 minutes of aerobic exercise per week, and eating a low saturated fat/low carbohydrate diet.  Also, you should recheck this fasting cholesterol panel in 12 months.   -A1C (test of diabetes control the last 2-3 months) is at your goal. Please continue with your current plan. Also, you should make an appointment to see me and recheck your A1C test in 6 months.   -TSH (thyroid stimulating hormone) level is normal which indicates normal thyroid function.   -Microalbumin (urine protein) level is elevated. 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  ADVISE: avoiding anti-inflamatory agents such as ibuprofen (Advil, Motrin) or naproxen (Aleve) as much as possible, keeping your blood pressure in a normal range, and continuing your medication (losartan) that helps protect your kidneys.  Also, this should be rechecked in 1 year.     Resulted Orders   HEMOGLOBIN A1C   Result Value Ref Range    Hemoglobin A1C 7.0 (H) 0 - 5.6 %      Comment:      Normal <5.7% Prediabetes 5.7-6.4%  Diabetes 6.5% or higher - adopted from ADA   consensus guidelines.     Lipid panel reflex to direct LDL Fasting   Result Value Ref Range    Cholesterol 136 <200 mg/dL    Triglycerides 179 (H) <150 mg/dL      Comment:      Borderline high:  150-199 mg/dl  High:             200-499 mg/dl  Very high:       >499 mg/dl  Fasting specimen      HDL Cholesterol 42 >39 mg/dL    LDL Cholesterol Calculated 58 <100 mg/dL      Comment:      Desirable:       <100 mg/dl    Non HDL Cholesterol 94 <130 mg/dL   Albumin Random Urine Quantitative with Creat Ratio   Result Value Ref Range    Creatinine Urine 68 mg/dL    Albumin Urine mg/L 106 mg/L     Albumin Urine mg/g Cr 154.97 (H) 0 - 17 mg/g Cr   TSH with free T4 reflex   Result Value Ref Range    TSH 1.98 0.40 - 4.00 mU/L       If you have any questions or concerns, please call the clinic at the number listed above.       Sincerely,        Bradley Hopkins, DO

## 2019-04-25 LAB
CHOLEST SERPL-MCNC: 136 MG/DL
CREAT UR-MCNC: 68 MG/DL
HDLC SERPL-MCNC: 42 MG/DL
LDLC SERPL CALC-MCNC: 58 MG/DL
MICROALBUMIN UR-MCNC: 106 MG/L
MICROALBUMIN/CREAT UR: 154.97 MG/G CR (ref 0–17)
NONHDLC SERPL-MCNC: 94 MG/DL
TRIGL SERPL-MCNC: 179 MG/DL
TSH SERPL DL<=0.005 MIU/L-ACNC: 1.98 MU/L (ref 0.4–4)

## 2019-05-13 ENCOUNTER — TELEPHONE (OUTPATIENT)
Dept: FAMILY MEDICINE | Facility: CLINIC | Age: 84
End: 2019-05-13

## 2019-05-13 NOTE — TELEPHONE ENCOUNTER
Date Forms was received: May 13, 2019    Forms received by: Fax    Last office visit: 4/24/2019    Purpose of Form:  Adult  forms    When the form is due:  ASAP    How the form needs to be returned for patient:  Fax    Form currently placed  SW inbox

## 2019-05-14 NOTE — TELEPHONE ENCOUNTER
Patient's form signed, dated, and placed in TC basket.    Bradley Hopkins DO  5/14/2019 12:22 PM

## 2019-05-17 DIAGNOSIS — D64.9 ANEMIA, UNSPECIFIED TYPE: ICD-10-CM

## 2019-05-17 DIAGNOSIS — I48.19 PERSISTENT ATRIAL FIBRILLATION (H): ICD-10-CM

## 2019-05-17 DIAGNOSIS — E78.5 HYPERLIPIDEMIA WITH TARGET LDL LESS THAN 100: ICD-10-CM

## 2019-05-17 DIAGNOSIS — Z00.00 MEDICARE ANNUAL WELLNESS VISIT, SUBSEQUENT: ICD-10-CM

## 2019-05-17 DIAGNOSIS — E11.21 TYPE 2 DIABETES MELLITUS WITH DIABETIC NEPHROPATHY, WITHOUT LONG-TERM CURRENT USE OF INSULIN (H): ICD-10-CM

## 2019-05-17 RX ORDER — DILTIAZEM HYDROCHLORIDE 120 MG/1
120 CAPSULE, COATED, EXTENDED RELEASE ORAL DAILY
Qty: 90 CAPSULE | Refills: 1 | Status: SHIPPED | OUTPATIENT
Start: 2019-05-17 | End: 2019-11-13

## 2019-06-04 ENCOUNTER — TELEPHONE (OUTPATIENT)
Dept: FAMILY MEDICINE | Facility: CLINIC | Age: 84
End: 2019-06-04

## 2019-06-04 ENCOUNTER — MEDICAL CORRESPONDENCE (OUTPATIENT)
Dept: HEALTH INFORMATION MANAGEMENT | Facility: CLINIC | Age: 84
End: 2019-06-04

## 2019-06-04 NOTE — TELEPHONE ENCOUNTER
Date Forms was received: June 4, 2019    Forms received by: Fax    Purpose of Form:  Home care orders    When the form is due:  ASAP    How the form needs to be returned for patient:  Fax    Form currently placed  SW inbox

## 2019-06-06 ENCOUNTER — TELEPHONE (OUTPATIENT)
Dept: FAMILY MEDICINE | Facility: CLINIC | Age: 84
End: 2019-06-06

## 2019-06-06 NOTE — TELEPHONE ENCOUNTER
Patient's form signed, dated, and placed in TC basket.    Bradley Hopkins DO  6/5/2019 11:25 PM

## 2019-06-06 NOTE — TELEPHONE ENCOUNTER
MED REC DONE     Discrepancies:      Acetaminophen           Epic: 500mg, take 1-2 tabs PO Q6H PRN for mild pain           Form:  325mg, 2 PRN Q6H for pain    Meds on Epic but NOT  on Form:       Miralax, mix 17g of powder in 8 ounces of liquid and drink once daily PRN for constipation     Potassium chloride 20meq, take one tab PO daily      Meds on Form but NOT on Epic :    None      Routing to PCP for further review/recommendations/orders    KYREE Szymanski, RN  [facility]

## 2019-06-10 ENCOUNTER — MEDICAL CORRESPONDENCE (OUTPATIENT)
Dept: HEALTH INFORMATION MANAGEMENT | Facility: CLINIC | Age: 84
End: 2019-06-10

## 2019-06-11 NOTE — TELEPHONE ENCOUNTER
Patient's form signed, dated, and placed in TC basket.    Bradley Hopkins DO  6/10/2019 11:34 PM

## 2019-06-15 DIAGNOSIS — E11.21 TYPE 2 DIABETES MELLITUS WITH DIABETIC NEPHROPATHY, WITHOUT LONG-TERM CURRENT USE OF INSULIN (H): ICD-10-CM

## 2019-06-15 DIAGNOSIS — I48.19 PERSISTENT ATRIAL FIBRILLATION (H): ICD-10-CM

## 2019-06-15 DIAGNOSIS — E78.5 HYPERLIPIDEMIA WITH TARGET LDL LESS THAN 100: ICD-10-CM

## 2019-06-15 DIAGNOSIS — D64.9 ANEMIA, UNSPECIFIED TYPE: ICD-10-CM

## 2019-06-15 DIAGNOSIS — Z00.00 MEDICARE ANNUAL WELLNESS VISIT, SUBSEQUENT: ICD-10-CM

## 2019-06-17 RX ORDER — ATORVASTATIN CALCIUM 10 MG/1
TABLET, FILM COATED ORAL
Qty: 90 TABLET | Refills: 2 | Status: SHIPPED | OUTPATIENT
Start: 2019-06-17 | End: 2020-03-04

## 2019-06-17 NOTE — TELEPHONE ENCOUNTER
"Requested Prescriptions   Pending Prescriptions Disp Refills     atorvastatin (LIPITOR) 10 MG tablet [Pharmacy Med Name: ATORVASTATIN 10 MG TABLET]  Last Written Prescription Date:  6/25/2018  Last Fill Quantity: 90 tablet,  # refills: 3   Last office visit: 4/24/2019 with prescribing provider:  Maci     Future Office Visit:       90 tablet 3     Sig: TAKE 1 TABLET BY MOUTH EVERY DAY       Statins Protocol Passed - 6/15/2019  8:32 AM        Passed - LDL on file in past 12 months     Recent Labs   Lab Test 04/24/19  0949   LDL 58             Passed - No abnormal creatine kinase in past 12 months     No lab results found.             Passed - Recent (12 mo) or future (30 days) visit within the authorizing provider's specialty     Patient had office visit in the last 12 months or has a visit in the next 30 days with authorizing provider or within the authorizing provider's specialty.  See \"Patient Info\" tab in inbasket, or \"Choose Columns\" in Meds & Orders section of the refill encounter.              Passed - Medication is active on med list        Passed - Patient is age 18 or older        "

## 2019-06-21 ENCOUNTER — TELEPHONE (OUTPATIENT)
Dept: FAMILY MEDICINE | Facility: CLINIC | Age: 84
End: 2019-06-21

## 2019-06-21 ENCOUNTER — MEDICAL CORRESPONDENCE (OUTPATIENT)
Dept: HEALTH INFORMATION MANAGEMENT | Facility: CLINIC | Age: 84
End: 2019-06-21

## 2019-06-21 NOTE — TELEPHONE ENCOUNTER
Patient's form signed, dated, and placed in TC basket.    Bradley Hopkins DO  6/21/2019 4:55 PM

## 2019-06-21 NOTE — TELEPHONE ENCOUNTER
Forms filled out.  Placed in T.C.'s  box. Please fax.    Karen Weiler, MD       Recognizes caregiver/Age appropriate behavior

## 2019-06-21 NOTE — TELEPHONE ENCOUNTER
Date Forms was received: June 21, 2019    Forms received by: Fax    Purpose of Form:  Home care med rec order    When the form is due:  ASAP    How the form needs to be returned for patient:  Fax    Form currently placed  SW inbox

## 2019-07-05 ENCOUNTER — TELEPHONE (OUTPATIENT)
Dept: FAMILY MEDICINE | Facility: CLINIC | Age: 84
End: 2019-07-05

## 2019-07-05 NOTE — TELEPHONE ENCOUNTER
Date Forms was received: July 5, 2019    Forms received by: Fax    Purpose of Form:  Home care orders    When the form is due:  ASAP    How the form needs to be returned for patient:  Fax    Form currently placed SW inbox

## 2019-07-11 DIAGNOSIS — I10 HYPERTENSION GOAL BP (BLOOD PRESSURE) < 140/90: ICD-10-CM

## 2019-07-11 RX ORDER — POTASSIUM CHLORIDE 1500 MG/1
TABLET, EXTENDED RELEASE ORAL
Qty: 90 TABLET | Refills: 1 | Status: SHIPPED | OUTPATIENT
Start: 2019-07-11 | End: 2020-01-07

## 2019-07-11 NOTE — TELEPHONE ENCOUNTER
"Last Written Prescription Date: 9/10/18  Last Fill Quantity: 90,  # refills: 3   Last office visit: 4/24/2019 with prescribing provider: Kellie   Future Office Visit:      Requested Prescriptions   Pending Prescriptions Disp Refills     KLOR-CON 20 MEQ CR tablet [Pharmacy Med Name: KLOR-CON M20 TABLET] 90 tablet 3     Sig: TAKE 1 TABLET (20 MEQ) BY MOUTH DAILY       Potassium Supplements Protocol Passed - 7/11/2019  1:17 AM        Passed - Recent (12 mo) or future (30 days) visit within the authorizing provider's specialty     Patient had office visit in the last 12 months or has a visit in the next 30 days with authorizing provider or within the authorizing provider's specialty.  See \"Patient Info\" tab in inbasket, or \"Choose Columns\" in Meds & Orders section of the refill encounter.              Passed - Medication is active on med list        Passed - Patient is age 18 or older        Passed - Normal serum potassium in past 12 months     Recent Labs   Lab Test 10/16/18  1029   POTASSIUM 4.0                      "

## 2019-07-11 NOTE — TELEPHONE ENCOUNTER
Prescription approved per [facility] Refill Protocol. Refilling Rx for 6 months. Patient to due to veloz Diabetes recheck (around 10/24/19) per Kellie note along with needing updates labs for potassium, will  in 2019.     Haylie Posadas RN, BSN  [facility]

## 2019-07-31 ENCOUNTER — TELEPHONE (OUTPATIENT)
Dept: FAMILY MEDICINE | Facility: CLINIC | Age: 84
End: 2019-07-31

## 2019-08-08 ENCOUNTER — MEDICAL CORRESPONDENCE (OUTPATIENT)
Dept: HEALTH INFORMATION MANAGEMENT | Facility: CLINIC | Age: 84
End: 2019-08-08

## 2019-08-15 DIAGNOSIS — G89.29 CHRONIC PAIN OF LEFT KNEE: ICD-10-CM

## 2019-08-15 DIAGNOSIS — M25.562 CHRONIC PAIN OF LEFT KNEE: ICD-10-CM

## 2019-08-15 RX ORDER — ACETAMINOPHEN 500 MG/1
TABLET ORAL
Qty: 360 TABLET | Refills: 1 | Status: SHIPPED | OUTPATIENT
Start: 2019-08-15 | End: 2020-04-22

## 2019-08-15 NOTE — TELEPHONE ENCOUNTER
"Requested Prescriptions   Pending Prescriptions Disp Refills     [facility] PAIN RELIEF 500 MG tablet [Pharmacy Med Name: [facility] ACETAMINOPHEN 500 MG CAPLT] 360 tablet 2     Sig: TAKE 1-2 TABLETS BY MOUTH EVERY 6 HOURS AS NEEDED FOR MILD PAIN   Last Written Prescription Date:  4/8/19  Last Fill Quantity: 360,  # refills: 2   Last office visit: 4/24/2019 with prescribing provider:  Kellie   Future Office Visit:        Analgesics (Non-Narcotic Tylenol and ASA Only) Passed - 8/15/2019  1:19 AM        Passed - Recent (12 mo) or future (30 days) visit within the authorizing provider's specialty     Patient had office visit in the last 12 months or has a visit in the next 30 days with authorizing provider or within the authorizing provider's specialty.  See \"Patient Info\" tab in inbasket, or \"Choose Columns\" in Meds & Orders section of the refill encounter.              Passed - Patient is 7 months old or older     If patient is a peds patient of the age 7 mos -12 years, ok to refill using weight-based dosing.     If >3g daily and/or sig is not \"prn\", check for liver enzymes. If normal in the last year, ok to refill.  If not, refer to the provider.          Passed - Medication is active on med list        Prescription approved per [facility] Refill Protocol.  CARMINA SzymanskiN, RN  [facility]    "

## 2019-09-18 DIAGNOSIS — E11.21 TYPE 2 DIABETES MELLITUS WITH DIABETIC NEPHROPATHY, WITHOUT LONG-TERM CURRENT USE OF INSULIN (H): ICD-10-CM

## 2019-09-18 NOTE — TELEPHONE ENCOUNTER
"Requested Prescriptions   Pending Prescriptions Disp Refills     glimepiride (AMARYL) 1 MG tablet [Pharmacy Med Name: GLIMEPIRIDE 1 MG TABLET]  A little early.  Last Written Prescription Date:  4/24/2019  Last Fill Quantity: 90 tablet,  # refills: 1   Last office visit: 4/24/2019 with prescribing provider:  Kellie     Future Office Visit:       90 tablet 1     Sig: TAKE 1 TABLET (1 MG) BY MOUTH EVERY MORNING (BEFORE BREAKFAST)       Sulfonylurea Agents Passed - 9/18/2019 11:36 AM        Passed - Blood pressure less than 140/90 in past 6 months     BP Readings from Last 3 Encounters:   04/24/19 126/74   11/16/18 136/64   10/16/18 136/74                 Passed - Patient has documented LDL within the past 12 mos.     Recent Labs   Lab Test 04/24/19  0949   LDL 58             Passed - Patient has had a Microalbumin in the past 15 mos.     Recent Labs   Lab Test 04/24/19  0958   MICROL 106   UMALCR 154.97*             Passed - Patient has documented A1c within the specified period of time.     If HgbA1C is 8 or greater, it needs to be on file within the past 3 months.  If less than 8, must be on file within the past 6 months.     Recent Labs   Lab Test 04/24/19  0949   A1C 7.0*             Passed - Medication is active on med list        Passed - Patient is age 18 or older        Passed - Patient has a recent creatinine (normal) within the past 12 mos.     Recent Labs   Lab Test 10/16/18  1029   CR 1.13             Passed - Recent (6 mo) or future (30 days) visit within the authorizing provider's specialty     Patient had office visit in the last 6 months or has a visit in the next 30 days with authorizing provider or within the authorizing provider's specialty.  See \"Patient Info\" tab in inbasket, or \"Choose Columns\" in Meds & Orders section of the refill encounter.            "

## 2019-09-19 RX ORDER — GLIMEPIRIDE 1 MG/1
1 TABLET ORAL
Qty: 90 TABLET | Refills: 1 | OUTPATIENT
Start: 2019-09-19

## 2019-09-19 NOTE — TELEPHONE ENCOUNTER
Not due for refill - should have enough medication until mid to end of October at which time patient will be due for a diabetes follow up.  CARMINA SzymanskiN, RN  [facility]

## 2019-10-05 DIAGNOSIS — I10 HYPERTENSION GOAL BP (BLOOD PRESSURE) < 140/90: ICD-10-CM

## 2019-10-07 NOTE — TELEPHONE ENCOUNTER
"Requested Prescriptions   Pending Prescriptions Disp Refills     losartan-hydrochlorothiazide (HYZAAR) 50-12.5 MG tablet [Pharmacy Med Name: LOSARTAN-HCTZ 50-12.5 MG TAB]  Last Written Prescription Date:  12/27/2018  Last Fill Quantity: 90 tablet,  # refills: 1   Last office visit: 4/24/2019 with prescribing provider:  Kellie     Future Office Visit:   Next 5 appointments (look out 90 days)    Oct 18, 2019  8:40 AM CDT  Office Visit with Bradley Hopkins DO  [facility] [address]  [facility] [id number] [phone]            180 tablet 1     Sig: TAKE 2 TABLET BY MOUTH EVERY DAY       Angiotensin-II Receptors Passed - 10/5/2019  9:03 AM        Passed - Last blood pressure under 140/90 in past 12 months     BP Readings from Last 3 Encounters:   04/24/19 126/74   11/16/18 136/64   10/16/18 136/74             Passed - Recent (12 mo) or future (30 days) visit within the authorizing provider's specialty     Patient has had an office visit with the authorizing provider or a provider within the authorizing providers department within the previous 12 mos or has a future within next 30 days. See \"Patient Info\" tab in inbasket, or \"Choose Columns\" in Meds & Orders section of the refill encounter.              Passed - Medication is active on med list        Passed - Patient is age 18 or older        Passed - Normal serum creatinine on file in past 12 months     Recent Labs   Lab Test 10/16/18  1029   CR 1.13             Passed - Normal serum potassium on file in past 12 months     Recent Labs   Lab Test 10/16/18  1029   POTASSIUM 4.0              "

## 2019-10-07 NOTE — TELEPHONE ENCOUNTER
Routing refill request to provider for review/approval because:  A break in medication? Would have run out in June--patient does have follow up appointment next week.     Darleen Garcia R.N.

## 2019-10-08 RX ORDER — LOSARTAN POTASSIUM AND HYDROCHLOROTHIAZIDE 12.5; 5 MG/1; MG/1
TABLET ORAL
Qty: 60 TABLET | Refills: 0 | Status: SHIPPED | OUTPATIENT
Start: 2019-10-08 | End: 2019-10-18

## 2019-10-09 NOTE — TELEPHONE ENCOUNTER
Temporary refill signed. Will follow up with him on medication at upcoming appointment.    Bradley Hopkins DO  10/8/2019 9:57 PM

## 2019-10-16 DIAGNOSIS — E11.21 TYPE 2 DIABETES MELLITUS WITH DIABETIC NEPHROPATHY, WITHOUT LONG-TERM CURRENT USE OF INSULIN (H): ICD-10-CM

## 2019-10-16 DIAGNOSIS — K21.9 GASTROESOPHAGEAL REFLUX DISEASE WITHOUT ESOPHAGITIS: ICD-10-CM

## 2019-10-16 NOTE — TELEPHONE ENCOUNTER
Requested Prescriptions   Pending Prescriptions Disp Refills     metFORMIN (GLUCOPHAGE) 1000 MG tablet [Pharmacy Med Name: METFORMIN HCL 1,000 MG TABLET]  Last Written Prescription Date:  4/24/2019  Last Fill Quantity: 180 tablet,  # refills: 1   Last office visit: 4/24/2019 with prescribing provider:  Kellie     Future Office Visit:   Next 5 appointments (look out 90 days)    Oct 18, 2019  8:40 AM CDT  [facility]YSICAL with Bradley Hopkins,   [facility] [address]  [facility] [address] [phone]            180 tablet 1     Sig: TAKE 1 TABLET (1,000 MG) BY MOUTH 2 TIMES DAILY (WITH MEALS)       Biguanide Agents Passed - 10/16/2019  1:32 AM        Passed - Blood pressure less than 140/90 in past 6 months     BP Readings from Last 3 Encounters:   04/24/19 126/74   11/16/18 136/64   10/16/18 136/74             Passed - Patient has documented LDL within the past 12 mos.     Recent Labs   Lab Test 04/24/19  0949   LDL 58             Passed - Patient has had a Microalbumin in the past 15 mos.     Recent Labs   Lab Test 04/24/19  0958   MICROL 106   UMALCR 154.97*             Passed - Patient is age 10 or older        Passed - Patient has documented A1c within the specified period of time.     If HgbA1C is 8 or greater, it needs to be on file within the past 3 months.  If less than 8, must be on file within the past 6 months.     Recent Labs   Lab Test 04/24/19  0949   A1C 7.0*             Passed - Patient's CR is NOT>1.4 OR Patient's EGFR is NOT<45 within past 12 mos.     Recent Labs   Lab Test 10/16/18  1029   GFRESTIMATED 62   GFRESTBLACK 75       Recent Labs   Lab Test 10/16/18  1029   CR 1.13             Passed - Patient does NOT have a diagnosis of CHF.        Passed - Medication is active on med list        Passed - Recent (6 mo) or future (30 days) visit within the authorizing provider's specialty     Patient had office visit in the last 6 months or has a visit in the  "next 30 days with authorizing provider or within the authorizing provider's specialty.  See \"Patient Info\" tab in inbasket, or \"Choose Columns\" in Meds & Orders section of the refill encounter.                      pantoprazole (PROTONIX) 40 MG EC tablet [Pharmacy Med Name: PANTOPRAZOLE SOD DR 40 MG TAB]  Last Written Prescription Date:  10/30/2018  Last Fill Quantity: 90 tablet,  # refills: 3   Last office visit: 4/24/2019 with prescribing provider:  Kellie     Future Office Visit:   Next 5 appointments (look out 90 days)    Oct 18, 2019  8:40 AM CDT  PHYSICAL with Bradley Hopkins, DO  [facility] [address] [facility] [id number] [phone]            90 tablet 3     Sig: TAKE 1 TABLET BY MOUTH EVERY DAY       PPI Protocol Passed - 10/16/2019  1:32 AM        Passed - Not on Clopidogrel (unless Pantoprazole ordered)        Passed - No diagnosis of osteoporosis on record        Passed - Recent (12 mo) or future (30 days) visit within the authorizing provider's specialty     Patient has had an office visit with the authorizing provider or a provider within the authorizing providers department within the previous 12 mos or has a future within next 30 days. See \"Patient Info\" tab in inbasket, or \"Choose Columns\" in Meds & Orders section of the refill encounter.              Passed - Medication is active on med list        Passed - Patient is age 18 or older        "

## 2019-10-17 RX ORDER — PANTOPRAZOLE SODIUM 40 MG/1
TABLET, DELAYED RELEASE ORAL
Qty: 90 TABLET | Refills: 3 | OUTPATIENT
Start: 2019-10-17

## 2019-10-17 NOTE — TELEPHONE ENCOUNTER
Refills will be addressed at office visit on 10/18/19. Pharmacy notified.  KYREE Szymanski, RN  [facility]

## 2019-10-18 ENCOUNTER — OFFICE VISIT (OUTPATIENT)
Dept: FAMILY MEDICINE | Facility: CLINIC | Age: 84
End: 2019-10-18
Payer: COMMERCIAL

## 2019-10-18 VITALS
OXYGEN SATURATION: 99 % | BODY MASS INDEX: 25.46 KG/M2 | HEART RATE: 93 BPM | TEMPERATURE: 97.6 F | WEIGHT: 153 LBS | DIASTOLIC BLOOD PRESSURE: 78 MMHG | SYSTOLIC BLOOD PRESSURE: 140 MMHG

## 2019-10-18 DIAGNOSIS — Z00.00 MEDICARE ANNUAL WELLNESS VISIT, SUBSEQUENT: Primary | ICD-10-CM

## 2019-10-18 DIAGNOSIS — E78.5 HYPERLIPIDEMIA WITH TARGET LDL LESS THAN 100: ICD-10-CM

## 2019-10-18 DIAGNOSIS — D64.9 ANEMIA, UNSPECIFIED TYPE: ICD-10-CM

## 2019-10-18 DIAGNOSIS — I48.19 PERSISTENT ATRIAL FIBRILLATION (H): ICD-10-CM

## 2019-10-18 DIAGNOSIS — E11.21 TYPE 2 DIABETES MELLITUS WITH DIABETIC NEPHROPATHY, WITHOUT LONG-TERM CURRENT USE OF INSULIN (H): ICD-10-CM

## 2019-10-18 DIAGNOSIS — I10 HYPERTENSION GOAL BP (BLOOD PRESSURE) < 140/90: ICD-10-CM

## 2019-10-18 DIAGNOSIS — K21.9 GASTROESOPHAGEAL REFLUX DISEASE WITHOUT ESOPHAGITIS: ICD-10-CM

## 2019-10-18 DIAGNOSIS — Z23 NEED FOR IMMUNIZATION AGAINST INFLUENZA: ICD-10-CM

## 2019-10-18 DIAGNOSIS — N18.2 CKD (CHRONIC KIDNEY DISEASE) STAGE 2, GFR 60-89 ML/MIN: ICD-10-CM

## 2019-10-18 LAB
ANION GAP SERPL CALCULATED.3IONS-SCNC: 8 MMOL/L (ref 3–14)
BUN SERPL-MCNC: 20 MG/DL (ref 7–30)
CALCIUM SERPL-MCNC: 9.7 MG/DL (ref 8.5–10.1)
CHLORIDE SERPL-SCNC: 100 MMOL/L (ref 94–109)
CO2 SERPL-SCNC: 27 MMOL/L (ref 20–32)
CREAT SERPL-MCNC: 1.09 MG/DL (ref 0.66–1.25)
ERYTHROCYTE [DISTWIDTH] IN BLOOD BY AUTOMATED COUNT: 13.4 % (ref 10–15)
GFR SERPL CREATININE-BSD FRML MDRD: 62 ML/MIN/{1.73_M2}
GLUCOSE SERPL-MCNC: 110 MG/DL (ref 70–99)
HBA1C MFR BLD: 6.5 % (ref 0–5.6)
HCT VFR BLD AUTO: 38.9 % (ref 40–53)
HGB BLD-MCNC: 12 G/DL (ref 13.3–17.7)
MCH RBC QN AUTO: 23.2 PG (ref 26.5–33)
MCHC RBC AUTO-ENTMCNC: 30.8 G/DL (ref 31.5–36.5)
MCV RBC AUTO: 75 FL (ref 78–100)
PLATELET # BLD AUTO: 228 10E9/L (ref 150–450)
POTASSIUM SERPL-SCNC: 4 MMOL/L (ref 3.4–5.3)
RBC # BLD AUTO: 5.18 10E12/L (ref 4.4–5.9)
SODIUM SERPL-SCNC: 135 MMOL/L (ref 133–144)
WBC # BLD AUTO: 7.4 10E9/L (ref 4–11)

## 2019-10-18 PROCEDURE — 90662 IIV NO PRSV INCREASED AG IM: CPT | Performed by: FAMILY MEDICINE

## 2019-10-18 PROCEDURE — G0008 ADMIN INFLUENZA VIRUS VAC: HCPCS | Performed by: FAMILY MEDICINE

## 2019-10-18 PROCEDURE — 83036 HEMOGLOBIN GLYCOSYLATED A1C: CPT | Performed by: FAMILY MEDICINE

## 2019-10-18 PROCEDURE — 99214 OFFICE O/P EST MOD 30 MIN: CPT | Mod: 25 | Performed by: FAMILY MEDICINE

## 2019-10-18 PROCEDURE — 80048 BASIC METABOLIC PNL TOTAL CA: CPT | Performed by: FAMILY MEDICINE

## 2019-10-18 PROCEDURE — 99207 C PAF COMPLETED  NO CHARGE: CPT | Performed by: FAMILY MEDICINE

## 2019-10-18 PROCEDURE — 36415 COLL VENOUS BLD VENIPUNCTURE: CPT | Performed by: FAMILY MEDICINE

## 2019-10-18 PROCEDURE — 85027 COMPLETE CBC AUTOMATED: CPT | Performed by: FAMILY MEDICINE

## 2019-10-18 PROCEDURE — G0439 PPPS, SUBSEQ VISIT: HCPCS | Performed by: FAMILY MEDICINE

## 2019-10-18 RX ORDER — PANTOPRAZOLE SODIUM 40 MG/1
TABLET, DELAYED RELEASE ORAL
Qty: 90 TABLET | Refills: 3 | Status: SHIPPED | OUTPATIENT
Start: 2019-10-18 | End: 2020-10-23

## 2019-10-18 RX ORDER — GLIMEPIRIDE 1 MG/1
1 TABLET ORAL
Qty: 90 TABLET | Refills: 1 | Status: SHIPPED | OUTPATIENT
Start: 2019-10-18 | End: 2020-04-22

## 2019-10-18 RX ORDER — LOSARTAN POTASSIUM AND HYDROCHLOROTHIAZIDE 12.5; 5 MG/1; MG/1
TABLET ORAL
Qty: 60 TABLET | Refills: 0 | Status: SHIPPED | OUTPATIENT
Start: 2019-10-18 | End: 2020-04-22

## 2019-10-18 ASSESSMENT — ACTIVITIES OF DAILY LIVING (ADL)
CURRENT_FUNCTION: HOUSEWORK REQUIRES ASSISTANCE
CURRENT_FUNCTION: PREPARING MEALS REQUIRES ASSISTANCE

## 2019-10-18 NOTE — PROGRESS NOTES
"SUBJECTIVE:   Manfred Caraballo is a 84 year old male who presents for Preventive Visit.      Are you in the first 12 months of your Medicare coverage?  No    Healthy Habits:    In general, how would you rate your overall health?  Fair    Frequency of exercise:  2-3 days/week    Duration of exercise:  30-45 minutes    Do you usually eat at least 4 servings of fruit and vegetables a day, include whole grains    & fiber and avoid regularly eating high fat or \"junk\" foods?  No    Taking medications regularly:  Yes    Barriers to taking medications:  None    Medication side effects:  None    Ability to successfully perform activities of daily living:  Preparing meals requires assistance and housework requires assistance    Home Safety:  No safety concerns identified    Hearing Impairment:  No hearing concerns    In the past 6 months, have you been bothered by leaking of urine?  No    In general, how would you rate your overall mental or emotional health?  Good      PHQ-2 Total Score:    Additional concerns today:  No    Do you feel safe in your environment? Yes    Do you have a Health Care Directive? Yes: Patient states has Advance Directive and will bring in a copy to clinic.      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 Licensed by the author for use in [facility]; reprinted with permission (fina@.[facility]). All rights reserved.      Do you have sleep apnea, excessive snoring or daytime drowsiness?: no      Diabetes Follow-up      How often are you checking your blood sugar? A few times a week    What time of day are you checking your blood sugars (select all that apply)?  Before meals    Have " you had any blood sugars above 200?  No    Have you had any blood sugars below 70?  No    What symptoms do you notice when your blood sugar is low?  None    What concerns do you have today about your diabetes? None     Do you have any of these symptoms? (Select all that apply)  No numbness or tingling in feet.  No redness, sores or blisters on feet.  No complaints of excessive thirst.  No reports of blurry vision.  No significant changes to weight.     Have you had a diabetic eye exam in the last 12 months? No - due for eye exam    Diabetes Management Resources    Hyperlipidemia Follow-Up      Are you having any of the following symptoms? (Select all that apply)  No complaints of shortness of breath, chest pain or pressure.  No increased sweating or nausea with activity.  No left-sided neck or arm pain.  No complaints of pain in calves when walking 1-2 blocks.    Are you regularly taking any medication or supplement to lower your cholesterol?   Yes- atorvastatin    Are you having muscle aches or other side effects that you think could be caused by your cholesterol lowering medication?  No    Hypertension Follow-up      Do you check your blood pressure regularly outside of the clinic? No     Are you following a low salt diet? No    Are your blood pressures ever more than 140 on the top number (systolic) OR more   than 90 on the bottom number (diastolic), for example 140/90? No    BP Readings from Last 2 Encounters:   10/18/19 (!) 140/78   04/24/19 126/74     Hemoglobin A1C (%)   Date Value   04/24/2019 7.0 (H)   10/16/2018 6.9 (H)     LDL Cholesterol Calculated (mg/dL)   Date Value   04/24/2019 58   04/16/2018 66     Denies any headaches, lightheadedness, dizziness, vision changes, chest pain, palpitations, shortness of breath, dyspnea, numbness/tingling.        Reviewed and updated as needed this visit by clinical staff  Tobacco  Allergies  Meds         Reviewed and updated as needed this visit by Provider         Social History     Tobacco Use     Smoking status: Former Smoker     Packs/day: 0.50     Years: 20.00     Pack years: 10.00     Types: Cigarettes     Last attempt to quit: 1981     Years since quittin.4     Smokeless tobacco: Never Used     Tobacco comment: quit in     Substance Use Topics     Alcohol use: No     Alcohol/week: 0.0 standard drinks     Comment: quit in      If you drink alcohol do you typically have >3 drinks per day or >7 drinks per week? Not applicable    Alcohol Use 10/18/2019   Prescreen: >3 drinks/day or >7 drinks/week? Not Applicable               Current providers sharing in care for this patient include:   Patient Care Team:  Bradley Hopkins DO as PCP - General (Family Practice)  Bradley Hopkins DO as Assigned PCP    The following health maintenance items are reviewed in Epic and correct as of today:  Health Maintenance   Topic Date Due     ZOSTER IMMUNIZATION (1 of 2) 1985     EYE EXAM  2018     MEDICARE ANNUAL WELLNESS VISIT  2019     FALL RISK ASSESSMENT  2019     INFLUENZA VACCINE (1) 2019     BMP  10/16/2019     DIABETIC FOOT EXAM  10/16/2019     A1C  10/24/2019     LIPID  2020     MICROALBUMIN  2020     TSH W/FREE T4 REFLEX  2021     ADVANCE CARE PLANNING  2021     DTAP/TDAP/TD IMMUNIZATION (3 - Td) 2027     SPIROMETRY  Completed     COPD ACTION PLAN  Completed     PHQ-2  Completed     PNEUMOCOCCAL IMMUNIZATION 65+ LOW/MEDIUM RISK  Completed     IPV IMMUNIZATION  Aged Out     MENINGITIS IMMUNIZATION  Aged Out     Lab work is in process      Review of Systems  Constitutional, HEENT, cardiovascular, pulmonary, gi and gu systems are negative, except as otherwise noted.    OBJECTIVE:   BP (!) 140/78   Pulse 93   Temp 97.6  F (36.4  C) (Oral)   Wt 69.4 kg (153 lb)   SpO2 99%   BMI 25.46 kg/m   Estimated body mass index is 25.46 kg/m  as calculated from the following:    Height as of 19: 1.651 m (5'  "5\").    Weight as of this encounter: 69.4 kg (153 lb).  Physical Exam  GENERAL: healthy, alert and no distress  EYES: Eyes grossly normal to inspection, PERRL and conjunctivae and sclerae normal  HENT: ear canals and TM's normal, nose and mouth without ulcers or lesions  NECK: no adenopathy, no asymmetry, masses, or scars and thyroid normal to palpation  RESP: lungs clear to auscultation - no rales, rhonchi or wheezes  CV: irregularly irregular rhythm, normal S1 S2, no S3 or S4, no murmur, click or rub, peripheral pulses strong and no peripheral edema  ABDOMEN: soft, nontender, no hepatosplenomegaly, no masses and bowel sounds normal  MS: no gross musculoskeletal defects noted, no edema  SKIN: no suspicious lesions or rashes  NEURO: Normal strength and tone, mentation intact and speech normal  PSYCH: mentation appears normal, affect normal/bright    Diagnostic Test Results:  Labs reviewed in Epic    ASSESSMENT / PLAN:   1. Medicare annual wellness visit, subsequent: health maintenance reviewed and updated.    2. Type 2 diabetes mellitus with diabetic nephropathy, without long-term current use of insulin (H): well controlled. Continue metformin, glimepiride. He is on a statin. No history of diabetic retinopathy. Continue losartan with history of microalbuminuria and CKD 2. Follow up in 6 months.  - BASIC METABOLIC PANEL  - HEMOGLOBIN A1C  - metFORMIN (GLUCOPHAGE) 1000 MG tablet; Take 1 tablet (1,000 mg) by mouth 2 times daily (with meals)  Dispense: 180 tablet; Refill: 1  - glimepiride (AMARYL) 1 MG tablet; Take 1 tablet (1 mg) by mouth every morning (before breakfast)  Dispense: 90 tablet; Refill: 1  - OFFICE/OUTPT VISIT,EST,LEVL IV    3. Hyperlipidemia with target LDL less than 100: at goal, continue statin.   - OFFICE/OUTPT VISIT,EST,LEVL IV    4. CKD (chronic kidney disease) stage 2, GFR 60-89 ml/min: stable, recheck kidney function. Continue to avoid nephrotoxic agents, control BP and diabetes. Has history of " "microalbuminuria and is on an ARB.  - BASIC METABOLIC PANEL  - OFFICE/OUTPT VISIT,EST,LEVL IV    5. Gastroesophageal reflux disease without esophagitis: symptoms stable, refill Protonix.   - pantoprazole (PROTONIX) 40 MG EC tablet; TAKE 1 TABLET (40 MG) BY MOUTH DAILY  Dispense: 90 tablet; Refill: 3  - OFFICE/OUTPT VISIT,EST,LEVL IV    6. Hypertension goal BP (blood pressure) < 140/90: stable, recheck labs today. Refill antihypertensive medicatino  - BASIC METABOLIC PANEL  - losartan-hydrochlorothiazide (HYZAAR) 50-12.5 MG tablet; TAKE 2 TABLET BY MOUTH EVERY DAY  Dispense: 60 tablet; Refill: 0  - order for DME; Equipment being ordered: Digital home blood pressure monitor kit  Dispense: 1 each; Refill: 0  - OFFICE/OUTPT VISIT,EST,LEVL IV    7. Persistent atrial fibrillation: stable and asymptomatic. Rate controlled. Continue current medications.  - OFFICE/OUTPT VISIT,EST,LEVL IV    8. Anemia, unspecified type: recheck CBC today.  - CBC with platelets  - OFFICE/OUTPT VISIT,EST,LEVL IV    9. Need for immunization against influenza  - INFLUENZA (HIGH DOSE) 3 VALENT VACCINE [11426]  -      ADMIN VACCINE, FIRST [32019]    End of Life Planning:  Patient currently has an advanced directive: Yes.  Practitioner is supportive of decision.    COUNSELING:  Reviewed preventive health counseling, as reflected in patient instructions       Regular exercise       Healthy diet/nutrition    Estimated body mass index is 25.46 kg/m  as calculated from the following:    Height as of 4/24/19: 1.651 m (5' 5\").    Weight as of this encounter: 69.4 kg (153 lb).         reports that he quit smoking about 38 years ago. His smoking use included cigarettes. He has a 10.00 pack-year smoking history. He has never used smokeless tobacco.      Appropriate preventive services were discussed with this patient, including applicable screening as appropriate for cardiovascular disease, diabetes, osteopenia/osteoporosis, and glaucoma.  As appropriate for " age/gender, discussed screening for colorectal cancer, prostate cancer, breast cancer, and cervical cancer. Checklist reviewing preventive services available has been given to the patient.    Reviewed patients plan of care and provided an AVS. The Basic Care Plan (routine screening as documented in Health Maintenance) for Manfred meets the Care Plan requirement. This Care Plan has been established and reviewed with the Patient.    Counseling Resources:  ATP IV Guidelines  Pooled Cohorts Equation Calculator  Breast Cancer Risk Calculator  FRAX Risk Assessment  ICSI Preventive Guidelines  Dietary Guidelines for Americans, 2010  USDA's MyPlate  ASA Prophylaxis  Lung CA Screening    Bradley Hopkins DO  [facility]    Identified Health Risks:

## 2019-10-18 NOTE — LETTER
October 28, 2019      Manfred Caraballo  [address] [place] [address]        Dear ,    We are writing to inform you of your test results.    Hemoglobin is decreased indicating anemia but this is stable. Let's recheck in 6 months.   -White blood cell and platelet counts are normal.   -Kidney function (GFR) is normal.   -Sodium is normal.   -Potassium is normal.   -Calcium is normal.   -Glucose is elevated due to your diabetes.   -A1C (test of diabetes control the last 2-3 months) is at your goal. Please continue with your current plan. Also, you should make an appointment to see me and recheck your A1C test in 6 months.     Resulted Orders   BASIC METABOLIC PANEL   Result Value Ref Range    Sodium 135 133 - 144 mmol/L    Potassium 4.0 3.4 - 5.3 mmol/L    Chloride 100 94 - 109 mmol/L    Carbon Dioxide 27 20 - 32 mmol/L    Anion Gap 8 3 - 14 mmol/L    Glucose 110 (H) 70 - 99 mg/dL    Urea Nitrogen 20 7 - 30 mg/dL    Creatinine 1.09 0.66 - 1.25 mg/dL    GFR Estimate 62 >60 mL/min/[1.73_m2]      Comment:      Non  GFR Calc  Starting 12/18/2018, serum creatinine based estimated GFR (eGFR) will be   calculated using the Chronic Kidney Disease Epidemiology Collaboration   (CKD-EPI) equation.      GFR Estimate If Black 72 >60 mL/min/[1.73_m2]      Comment:       GFR Calc  Starting 12/18/2018, serum creatinine based estimated GFR (eGFR) will be   calculated using the Chronic Kidney Disease Epidemiology Collaboration   (CKD-EPI) equation.      Calcium 9.7 8.5 - 10.1 mg/dL   HEMOGLOBIN A1C   Result Value Ref Range    Hemoglobin A1C 6.5 (H) 0 - 5.6 %      Comment:      Normal <5.7% Prediabetes 5.7-6.4%  Diabetes 6.5% or higher - adopted from ADA   consensus guidelines.     CBC with platelets   Result Value Ref Range    WBC 7.4 4.0 - 11.0 10e9/L    RBC Count 5.18 4.4 - 5.9 10e12/L    Hemoglobin 12.0 (L) 13.3 - 17.7 g/dL    Hematocrit 38.9 (L) 40.0 - 53.0 %    MCV 75 (L) 78 - 100  fl    MCH 23.2 (L) 26.5 - 33.0 pg    MCHC 30.8 (L) 31.5 - 36.5 g/dL    RDW 13.4 10.0 - 15.0 %    Platelet Count 228 150 - 450 10e9/L       If you have any questions or concerns, please call the clinic at the number listed above.       Sincerely,        Bradley Hopkins, DO

## 2019-10-24 ENCOUNTER — TELEPHONE (OUTPATIENT)
Dept: FAMILY MEDICINE | Facility: CLINIC | Age: 84
End: 2019-10-24

## 2019-10-24 NOTE — TELEPHONE ENCOUNTER
Date Forms was received: October 24, 2019    Forms received by: Fax    Purpose of Form:  [employer] orders    When the form is due:  ASAP    How the form needs to be returned for patient:  Fax    Form currently placed  SW inbox

## 2019-10-25 NOTE — TELEPHONE ENCOUNTER
Patient's form signed, dated, and placed in TC basket.    Bradley Hopkins DO  10/24/2019 10:28 PM

## 2019-11-13 DIAGNOSIS — I48.19 PERSISTENT ATRIAL FIBRILLATION (H): ICD-10-CM

## 2019-11-13 DIAGNOSIS — Z00.00 MEDICARE ANNUAL WELLNESS VISIT, SUBSEQUENT: ICD-10-CM

## 2019-11-13 DIAGNOSIS — D64.9 ANEMIA, UNSPECIFIED TYPE: ICD-10-CM

## 2019-11-13 DIAGNOSIS — E78.5 HYPERLIPIDEMIA WITH TARGET LDL LESS THAN 100: ICD-10-CM

## 2019-11-13 DIAGNOSIS — E11.21 TYPE 2 DIABETES MELLITUS WITH DIABETIC NEPHROPATHY, WITHOUT LONG-TERM CURRENT USE OF INSULIN (H): ICD-10-CM

## 2019-11-13 RX ORDER — DILTIAZEM HYDROCHLORIDE 120 MG/1
120 CAPSULE, COATED, EXTENDED RELEASE ORAL DAILY
Qty: 90 CAPSULE | Refills: 0 | Status: SHIPPED | OUTPATIENT
Start: 2019-11-13 | End: 2020-03-04

## 2019-12-10 DIAGNOSIS — I48.91 ATRIAL FIBRILLATION, UNSPECIFIED TYPE (H): ICD-10-CM

## 2019-12-10 NOTE — LETTER
December 10, 2019       TO: Manfred Caraballo  [address] [place] [address]       Dear Manfred Caraballo,    You have requested a medication refill and our records indicate that you have not been seen by one of our providers for your annual office visit.  We will refill your medication for 3 months, which will allow you enough time to be seen by one of our providers.    Please call our clinic at [phone] to schedule your appointment as soon as possible.    Thank you,    [facility]

## 2019-12-10 NOTE — TELEPHONE ENCOUNTER
Got a warning for aspirin and Xarelto prescribed together, but ASA is not prescribed intentionally, so only Xarelto is being used.

## 2019-12-18 ENCOUNTER — TELEPHONE (OUTPATIENT)
Dept: FAMILY MEDICINE | Facility: CLINIC | Age: 84
End: 2019-12-18

## 2019-12-18 NOTE — TELEPHONE ENCOUNTER
Appt was not cancelled on our office so called son back to make sure it is okay to cancel appt in [facility].  Bell Reid

## 2019-12-18 NOTE — TELEPHONE ENCOUNTER
Called son Phuc and left message as pt has appt scheduled Friday and the note says travelling, immunizations.  We do not do travel immunizations so they may need to contact the travel clinic in  or [place].  Will wait to hear back from son in regards to appointment.  Bell Reid

## 2019-12-18 NOTE — TELEPHONE ENCOUNTER
Son, Phuc called back .  He states it is for traveling.  Told do not do at [place],  Phuc was given phone number for [employer]

## 2019-12-30 ENCOUNTER — TELEPHONE (OUTPATIENT)
Dept: FAMILY MEDICINE | Facility: CLINIC | Age: 84
End: 2019-12-30

## 2019-12-30 NOTE — TELEPHONE ENCOUNTER
Phuc, son calling for patient.    Patient is scheduled for this coming Monday 1/6/2020 for Travel. [place]    Now he is not traveling until June 2020.    Should he wait to get his shots.    Please call Phuc at [phone]

## 2019-12-30 NOTE — TELEPHONE ENCOUNTER
Called spoke with Phuc,  Let them know after checking with Christine the sooner the better so he can build up immunity.

## 2019-12-31 DIAGNOSIS — J44.9 CHRONIC OBSTRUCTIVE PULMONARY DISEASE, UNSPECIFIED COPD TYPE (H): ICD-10-CM

## 2019-12-31 RX ORDER — ALBUTEROL SULFATE 90 UG/1
AEROSOL, METERED RESPIRATORY (INHALATION)
Qty: 18 G | Refills: 1 | Status: SHIPPED | OUTPATIENT
Start: 2019-12-31 | End: 2020-04-22

## 2019-12-31 NOTE — TELEPHONE ENCOUNTER
"Requested Prescriptions   Pending Prescriptions Disp Refills     VENTOLIN  (90 Base) MCG/ACT inhaler [Pharmacy Med Name: VENTOLIN HFA 90 MCG INHALER] 18 Inhaler 11     Sig: INHALE 2 PUFFS INTO THE LUNGS EVERY 6 HOURS AS NEEDED FOR SHORTNESS OF BREATH / DYSPNEA   Last Written Prescription Date:  2/6/18  Last Fill Quantity: 1,  # refills: 11   Last office visit: 10/18/2019 with prescribing provider:  Kellie   Future Office Visit:   Next 5 appointments (look out 90 days)    Jan 06, 2020  3:45 PM CST  (Arrive by 3:25 PM)  Travel clinic visit with Arden Ledesma PA-C, [employer]  [facility] ([address] [address] [phone]   Jan 07, 2020  3:20 PM CST  Office Visit with Bradley Hopkins DO  [facility] ([facility]) [address] [place] [phone]             Asthma Maintenance Inhalers - Anticholinergics Passed - 12/31/2019  3:22 PM        Passed - Patient is age 12 years or older        Passed - Recent (12 mo) or future (30 days) visit within the authorizing provider's specialty     Patient has had an office visit with the authorizing provider or a provider within the authorizing providers department within the previous 12 mos or has a future within next 30 days. See \"Patient Info\" tab in inbasket, or \"Choose Columns\" in Meds & Orders section of the refill encounter.              Passed - Medication is active on med list        Prescription approved per [facility] Refill Protocol.  Kaur Hernandez, BSN, RN  [facility]    "

## 2020-01-02 DIAGNOSIS — J44.9 CHRONIC OBSTRUCTIVE PULMONARY DISEASE, UNSPECIFIED COPD TYPE (H): ICD-10-CM

## 2020-01-02 NOTE — TELEPHONE ENCOUNTER
"Pt is requesting a refill for flonase. Thank you.    Requested Prescriptions   Pending Prescriptions Disp Refills     fluticasone (FLONASE) 50 MCG/ACT nasal spray  Last Written Prescription Date:  11/20/2015  Last Fill Quantity: 16 g,  # refills: 3   Last Office Visit: 10/18/2019 Bradley Hopkins DO   Future Office Visit:    Next 5 appointments (look out 90 days)    Jan 06, 2020  3:45 PM CST  (Arrive by 3:25 PM)  Travel clinic visit with Arden Ledesma PA-C, [employer]  [facility] [address] [place] [phone]   Jan 07, 2020  3:20 PM CST  [facility] DO  [facility] [address] [facility] [address]  [phone]          16 g 3     Sig: Spray 1-2 sprays into both nostrils daily       Inhaled Steroids Protocol Passed - 1/2/2020 10:23 AM        Passed - Patient is age 12 or older        Passed - Recent (12 mo) or future (30 days) visit within the authorizing provider's specialty     Patient has had an office visit with the authorizing provider or a provider within the authorizing providers department within the previous 12 mos or has a future within next 30 days. See \"Patient Info\" tab in inbasket, or \"Choose Columns\" in Meds & Orders section of the refill encounter.              Passed - Medication is active on med list          "

## 2020-01-03 RX ORDER — FLUTICASONE PROPIONATE 50 MCG
1-2 SPRAY, SUSPENSION (ML) NASAL DAILY
Qty: 16 G | Refills: 3 | Status: SHIPPED | OUTPATIENT
Start: 2020-01-03 | End: 2021-11-30

## 2020-01-05 DIAGNOSIS — I10 HYPERTENSION GOAL BP (BLOOD PRESSURE) < 140/90: ICD-10-CM

## 2020-01-06 ENCOUNTER — OFFICE VISIT (OUTPATIENT)
Dept: FAMILY MEDICINE | Facility: CLINIC | Age: 85
End: 2020-01-06
Payer: COMMERCIAL

## 2020-01-06 VITALS
SYSTOLIC BLOOD PRESSURE: 104 MMHG | OXYGEN SATURATION: 98 % | HEART RATE: 78 BPM | BODY MASS INDEX: 26.29 KG/M2 | DIASTOLIC BLOOD PRESSURE: 58 MMHG | TEMPERATURE: 97.8 F | WEIGHT: 158 LBS

## 2020-01-06 DIAGNOSIS — Z23 NEED FOR IMMUNIZATION AGAINST TYPHOID: ICD-10-CM

## 2020-01-06 DIAGNOSIS — Z23 NEED FOR HEPATITIS A VACCINATION: ICD-10-CM

## 2020-01-06 DIAGNOSIS — Z71.84 ENCOUNTER FOR COUNSELING FOR TRAVEL: Primary | ICD-10-CM

## 2020-01-06 PROCEDURE — 90632 HEPA VACCINE ADULT IM: CPT | Performed by: PHYSICIAN ASSISTANT

## 2020-01-06 PROCEDURE — 90472 IMMUNIZATION ADMIN EACH ADD: CPT | Performed by: PHYSICIAN ASSISTANT

## 2020-01-06 PROCEDURE — 90471 IMMUNIZATION ADMIN: CPT | Performed by: PHYSICIAN ASSISTANT

## 2020-01-06 PROCEDURE — 99401 PREV MED CNSL INDIV APPRX 15: CPT | Mod: 25 | Performed by: PHYSICIAN ASSISTANT

## 2020-01-06 PROCEDURE — 90691 TYPHOID VACCINE IM: CPT | Performed by: PHYSICIAN ASSISTANT

## 2020-01-06 RX ORDER — AZITHROMYCIN 500 MG/1
TABLET, FILM COATED ORAL
Qty: 6 TABLET | Refills: 0 | Status: SHIPPED | OUTPATIENT
Start: 2020-01-06 | End: 2020-04-22

## 2020-01-06 NOTE — PATIENT INSTRUCTIONS
"See travel packet provided  Recommend ultrathon (mosquito repellant), pepto bismol and imodium  The food and drink choices you make while traveling can impact your likelihood of getting sick.   If you aren't sure if a food or drink is safe, the saying \" BOIL IT, COOK IT, PEEL IT, OR FORGET IT\" can help you decide whether it's okay to consume.   Also bring hand  and sun screen with you.  Safe Travels       Today January 6, 2020 you received the    Hepatitis A Vaccine - Please return on 7/6/20 or later for your 2nd and final dose.    Typhoid - injectable. This vaccine is valid for two years.   .    These appointments can be made as a NURSE ONLY visit.    **It is very important for the vaccinations to be given on the scheduled day(s), this helps ensure you receive the full effectiveness of the vaccine.**    Please call [facility] with any questions [phone]    Thank you for visiting [facility]    "

## 2020-01-06 NOTE — TELEPHONE ENCOUNTER
"Requested Prescriptions   Pending Prescriptions Disp Refills     KLOR-CON 20 MEQ CR tablet [Pharmacy Med Name: KLOR-CON M20 TABLET]    Last Written Prescription Date:  7/11/19  Last Fill Quantity: 90 tablet,  # refills: 1   Last office visit: 10/18/2019 with prescribing provider:  Kellie     Future Office Visit:   Next 5 appointments (look out 90 days)    Jan 06, 2020  3:45 PM CST  Travel clinic visit with Arden Ledesma PA-C, [facility] ([address] [phone]   Jan 07, 2020  3:15 PM CST  Office Visit with Bradley Hopkins DO, LANGUAGE [facility] ([facility] [address] [place] [phone]          90 tablet 1     Sig: TAKE 1 TABLET (20 MEQ) BY MOUTH DAILY       Potassium Supplements Protocol Passed - 1/5/2020 11:01 AM        Passed - Recent (12 mo) or future (30 days) visit within the authorizing provider's department     Patient has had an office visit with the authorizing provider or a provider within the authorizing providers department within the previous 12 mos or has a future within next 30 days. See \"Patient Info\" tab in inbasket, or \"Choose Columns\" in Meds & Orders section of the refill encounter.              Passed - Medication is active on med list        Passed - Patient is age 18 or older        Passed - Normal serum potassium in past 12 months     Recent Labs   Lab Test 10/18/19  0942   POTASSIUM 4.0                    "

## 2020-01-06 NOTE — PROGRESS NOTES
SUBJECTIVE: Manfred Caraballo , a 84 year old  male, presents for counseling and information regarding upcoming travel to [facility]. Special medical concerns include: none. He anticipates the following unusual exposures: none.    Itinerary:  [facility]     Departure Date: mid June  Return date: July (3-4 weeks)    Reason for travel (i.e. Business, pleasure): pleasure     Visiting an urban or rural area?: urban     Accommodations (i.e. hotel, hostel, friends, family, etc): hotel         IMMUNIZATION HISTORY  Have you received any vaccinations in the past 4 weeks?  No  Have you ever fainted from having your blood drawn or from an injection?  No  Have you ever had a fever reaction to vaccination?  No  Have you ever had any bad reaction or side effect from any vaccination?  No  Have you ever had hepatitis A or B vaccine?  No  Do you live (or work closely) with anyone who has AIDS, an AIDS-like condition, any other immune disorder or who is on chemotherapy for cancer?  No  Have you received any injection of immune globulin or any blood products during the past 12 months?  No    GENERAL MEDICAL HISTORY  Do you have a medical condition that warrants maintenance medication or physician follow-up?  No  Do you have a medical condition that is stable now, but that may recur while traveling?  No  Has your spleen been removed?  No  Have you had an acute illness or a fever in the past 48 hours?  No  Are you pregnant, or might you become pregnant on this trip?  Any chance of pregnancy?  No  Are you breastfeeding?  No  Do you have HIV, AIDS, an AIDS-like condition, any other immune disorder, leukemia or cancer?  No  Do you have a severe combined immunodeficiency disease?  No  Have you had your thymus gland removed or history of problems with your thymus, such as myasthenia gravis, DiGeorge syndrome, or thymoma?  No    Do you have severe thrombocytopenia (low platelet count) or a coagulation disorder?  No  Have you ever had a convulsion,  seizure, epilepsy, neurologic condition or brain infection?  No  Do you have any stomach conditions?  No  Do you have a G6PD deficiency?  No  Do you have severe renal or kidney impairment?  No  Do you have a history of psychiatric problems?  No  Do you have a problem with strange dreams and/or nightmares?  Yes   Do you have insomnia?  No  Do you have problems with vaginitis?  No  Do you have psoriasis?  No  Are you prone to motion sickness?  No  Have you ever had headaches, nausea, vomiting, or breathing problems from altitude exposure?  No      Past Medical History:   Diagnosis Date     Acute duodenal ulcer with hemorrhage, without mention of obstruction 11/15/03    and pyloric also - required hosp and transfusion 2 units- H. pylori negative     Allergic rhinitis, cause unspecified      Calculus of gallbladder without mention of cholecystitis or obstruction 11/15/03    no residual pain- transient cholestasis for 2 days     Colon Polyps normal 5/2010 2008 = one hyperplastic & one tubular adenoma - no high grade dysplasis - repeat in 2013      Diverticulosis of colon (without mention of hemorrhage) 11/03     Dyspepsia and other specified disorders of function of stomach     dyspepsia,  h/o bleeding stomach ulcer in 1985     Esophageal reflux      Genital herpes, unspecified      Paroxysmal atrial fibrillation (H) 12/21/2016    [facility] ER.     Pneumonia, organism unspecified(486) 11/15/03    right middle and lower lobe     Pure hypercholesterolemia      Pure hyperglyceridemia      Tear meniscus knee 2009    degenerative on right      Type II or unspecified type diabetes mellitus without mention of complication, not stated as uncontrolled at age 63     Unspecified essential hypertension       Immunization History   Administered Date(s) Administered     Influenza (High Dose) 3 valent vaccine 10/04/2011, 10/29/2012, 10/15/2015, 11/17/2016, 08/19/2017, 10/18/2019     Influenza (IIV3) PF 12/18/2001,  11/22/2002, 11/16/2004, 10/14/2005, 10/22/2007, 10/09/2008, 09/28/2009, 10/08/2013, 08/31/2014     Influenza Vaccine IM > 6 months Valent IIV4 11/13/2014, 10/02/2018     Pneumo Conj 13-V (2010&after) 11/20/2015     Pneumococcal 23 valent 11/22/2002     TD (ADULT, 7+) 02/09/2005     TDAP Vaccine (Adacel) 07/31/2017       Current Outpatient Medications   Medication Sig Dispense Refill     alcohol swab prep pads Use to swab area of injection/daniel as directed 100 each 3     ASPIRIN NOT PRESCRIBED (INTENTIONAL) not prescribed - ulcer history 1 Tab 0     atorvastatin (LIPITOR) 10 MG tablet TAKE 1 TABLET BY MOUTH EVERY DAY 90 tablet 2     blood glucose (NO BRAND SPECIFIED) test strip Use to test blood sugar 1 times daily or as directed. To accompany: Blood Glucose Monitor Brands: per insurance. 200 strip 6     blood glucose calibration (NO BRAND SPECIFIED) solution Use to calibrate blood glucose monitor as needed as directed. To accompany: Blood Glucose Monitor Brands: per insurance. 1 Bottle 3     blood glucose monitoring ([employer] CONTOUR MONITOR) meter device kit Use to test blood sugars  1 times daily or as directed. 1 kit 0     blood glucose monitoring (NO BRAND SPECIFIED) test strip Use to test blood sugars 1  times daily or as directed 100 each 3     [employer] PAIN RELIEF 500 MG tablet TAKE 1-2 TABLETS BY MOUTH EVERY 6 HOURS AS NEEDED FOR MILD PAIN 360 tablet 1     diltiazem ER COATED BEADS (CARDIZEM CD) 120 MG 24 hr capsule Take 1 capsule (120 mg) by mouth daily 90 capsule 0     fluticasone (FLONASE) 50 MCG/ACT nasal spray Spray 1-2 sprays into both nostrils daily 16 g 3     glimepiride (AMARYL) 1 MG tablet Take 1 tablet (1 mg) by mouth every morning (before breakfast) 90 tablet 1     hydrocortisone (WESTCORT) 0.2 % cream APPLY SPARINGLY TO AFFECTED AREA THREE TIMES DAILY FOR 14 DAYS. 30 g 0     ipratropium - albuterol 0.5 mg/2.5 mg/3 mL (DUONEB) 0.5-2.5 (3) MG/3ML nebulization Take 1 vial (3 mLs) by nebulization every 6  hours as needed for shortness of breath / dyspnea or wheezing 30 vial 1     KLOR-CON 20 MEQ CR tablet TAKE 1 TABLET (20 MEQ) BY MOUTH DAILY 90 tablet 1     losartan-hydrochlorothiazide (HYZAAR) 50-12.5 MG tablet TAKE 2 TABLET BY MOUTH EVERY DAY 60 tablet 0     metFORMIN (GLUCOPHAGE) 1000 MG tablet Take 1 tablet (1,000 mg) by mouth 2 times daily (with meals) 180 tablet 1     order for DME Equipment being ordered: Digital home blood pressure monitor kit 1 each 0     pantoprazole (PROTONIX) 40 MG EC tablet TAKE 1 TABLET (40 MG) BY MOUTH DAILY 90 tablet 3     polyethylene glycol (MIRALAX/GLYCOLAX) powder MIX 17 GRAMS (1 CAPFUL) OF POWDER IN 8 OUNCES LIQUID AND DRINK ONCE DAILY PRN FOR CONSTIPATION 527 g 3     rivaroxaban ANTICOAGULANT (XARELTO) 20 MG TABS tablet Take 1 tablet (20 mg) by mouth daily (with dinner) 90 tablet 0     thin (NO BRAND SPECIFIED) lancets Use to test blood sugar 1 times daily or as directed. To accompany: Blood Glucose Monitor Brands: per insurance. 200 each 6     VENTOLIN  (90 Base) MCG/ACT inhaler INHALE 2 PUFFS INTO THE LUNGS EVERY 6 HOURS AS NEEDED FOR SHORTNESS OF BREATH / DYSPNEA 18 g 1     Allergies   Allergen Reactions     Aspirin      Salicylates      ASA/ Ulcers        EXAM: deferred    Immunizations discussed include: Hepatitis A and Typhoid  Malaraia prophylaxis recommended: not needed- [place]  Symptomatic treatment for traveler's diarrhea: bismuth subsalicylate, loperamide/diphenoxylate and azithromycin    ASSESSMENT/PLAN:    (Z71.84) Encounter for counseling for travel  (primary encounter diagnosis)    Comment: Hepatitis A and typhoid vaccines today. Patient will return or follow-up with PCP in 6 months for Hep A booster. Prophylaxis given for Traveler's diarrhea and is not needed for Malaria. All questions were answered.     Plan: azithromycin (ZITHROMAX) 500 MG tablet            (Z23) Need for hepatitis A vaccination  Comment:   Plan: HEPA VACCINE ADULT IM             (Z23) Need for immunization against typhoid  Comment:   Plan: TYPHOID VACCINE, IM              I have reviewed general recommendations for safe travel   including: food/water precautions, insect avoidance, safe sex   practices given high prevalence of HIV and other STDs,   roadway safety. Educational materials and links to the CDC   Traveler's health website have been provided.    Total time 15 minutes, greater than 50 percent in counseling   and coordination of care.

## 2020-01-06 NOTE — TELEPHONE ENCOUNTER
Patient has appointment with provider tomorrow, routing to Dr. Hopkins to review at time of appointment. Darleen Garcia R.N.

## 2020-01-07 ENCOUNTER — OFFICE VISIT (OUTPATIENT)
Dept: FAMILY MEDICINE | Facility: CLINIC | Age: 85
End: 2020-01-07
Payer: COMMERCIAL

## 2020-01-07 VITALS
TEMPERATURE: 97.9 F | SYSTOLIC BLOOD PRESSURE: 122 MMHG | DIASTOLIC BLOOD PRESSURE: 68 MMHG | OXYGEN SATURATION: 96 % | WEIGHT: 159 LBS | HEART RATE: 77 BPM | BODY MASS INDEX: 26.46 KG/M2

## 2020-01-07 DIAGNOSIS — F51.5 NIGHTMARES: Primary | ICD-10-CM

## 2020-01-07 DIAGNOSIS — J06.9 VIRAL UPPER RESPIRATORY TRACT INFECTION: ICD-10-CM

## 2020-01-07 PROCEDURE — 99214 OFFICE O/P EST MOD 30 MIN: CPT | Performed by: FAMILY MEDICINE

## 2020-01-07 RX ORDER — POTASSIUM CHLORIDE 1500 MG/1
TABLET, EXTENDED RELEASE ORAL
Qty: 90 TABLET | Refills: 1 | Status: SHIPPED | OUTPATIENT
Start: 2020-01-07 | End: 2020-07-23

## 2020-01-07 RX ORDER — CODEINE PHOSPHATE AND GUAIFENESIN 10; 100 MG/5ML; MG/5ML
1-2 SOLUTION ORAL EVERY 6 HOURS PRN
Qty: 236 ML | Refills: 0 | Status: SHIPPED | OUTPATIENT
Start: 2020-01-07 | End: 2020-04-22

## 2020-01-07 NOTE — PATIENT INSTRUCTIONS
If cough worsening over the next 2-3 days, please call and we will start an antibiotic and steroid.

## 2020-01-07 NOTE — PROGRESS NOTES
Subjective     Manfred Caraballo is a 84 year old male who presents to clinic today for the following health issues:    HPI   Concern - sleep problems  Onset: 1 year    Description:   Will wake up at night with nightmares - screaming - will happen once a month    Intensity: mild    Progression of Symptoms:  worsening    Accompanying Signs & Symptoms:  none    Previous history of similar problem:   YES - has had this in the past    Precipitating factors:   Worsened by: none    Alleviating factors:  Improved by: none    Therapies Tried and outcome: none    Per his son, states Manfred has been having nightmares every 2-3 months  For the past few years. However, it has increased in frequency to once per month. He states he has nightmares where people are jumping off a [employer], a tiger attacking someone, or that people are in trouble. He will wake up screaming. He typically goes to bed around 8-9PM and wakes up around 5-5:30 AM. Feels well rested during the day. Denies any snoring, waking up short of breath, or any apparent apneic episodes. 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 Denies any nightmares regarding service in the .      Acute Illness   Acute illness concerns: Cough  Onset: 2 days    Fever: no    Chills/Sweats: no    Headache (location?): no    Sinus Pressure:no    Conjunctivitis:  no    Ear Pain: no    Rhinorrhea: no    Congestion: no    Sore Throat: yes     Cough: YES-productive of clear sputum    Wheeze: no     Decreased Appetite: no    Nausea: no    Vomiting: no    Diarrhea:  no    Dysuria/Freq.: no    Fatigue/Achiness: no    Sick/Strep Exposure: no     Therapies Tried and outcome: suzanne evans -- helps a little bit.      Reviewed and updated as needed this visit by Provider  Tobacco  Allergies  Meds  Problems  Med Hx  Surg Hx  Fam Hx         Review of Systems   ROS COMP: Constitutional, HEENT, cardiovascular, pulmonary, gi and gu systems are negative, except as otherwise noted.       Objective    /68   Pulse 77   Temp 97.9  F (36.6  C) (Oral)   Wt 72.1 kg (159 lb)   SpO2 96%   BMI 26.46 kg/m    Body mass index is 26.46 kg/m .  Physical Exam   GENERAL: healthy, alert and no distress  HENT: ear canals and TM's normal, nose and mouth without ulcers or lesions  NECK: no adenopathy and no asymmetry, masses, or scars  RESP: lungs clear to auscultation - no rales, rhonchi or wheezes  CV: regular rate and rhythm, normal S1 S2, no S3 or S4, no murmur, click or rub, no peripheral edema and peripheral pulses strong  ABDOMEN: soft, nontender, no hepatosplenomegaly, no masses and bowel sounds normal  MS: no gross musculoskeletal defects noted, no edema  SKIN: no suspicious lesions or rashes  PSYCH: mentation appears normal, affect normal/bright    Diagnostic Test Results:  none         Assessment & Plan     1. Nightmares: discussed that if nightmares aren't causing a big issue, may not need any intervention. If becoming more bothersome or disruptive to sleep, would advise sleep psychology referral.  - SLEEP PSYCHOLOGY REFERRAL    2. Viral upper respiratory tract infection: hemodynamically stable, afebrile and non-toxic appearing. The signs and symptoms are consistent with viral upper respiratory illness. The patient is well appearing and in no significant distress. The patient will be treated symptomatically on an outpatient basis. Encourage oral hydration, symptomatic relief with PRN Tylenol/ibuprofen. Continue other OTC supportive cares as helpful.  - guaiFENesin-codeine (ROBITUSSIN AC) 100-10 MG/5ML solution; Take 5-10 mLs by mouth every 6 hours as needed for cough . But try to primarily use at night if cough keeping you up.  Dispense: 236 mL; Refill: 0       Return in about 3 days (around 1/10/2020) for follow up if symptoms not improving.    Bradley Hopkins,   [facility]

## 2020-02-03 DIAGNOSIS — K59.00 CONSTIPATION, UNSPECIFIED CONSTIPATION TYPE: ICD-10-CM

## 2020-02-04 RX ORDER — POLYETHYLENE GLYCOL 3350 17 G/17G
POWDER, FOR SOLUTION ORAL
Qty: 527 G | Refills: 3 | Status: SHIPPED | OUTPATIENT
Start: 2020-02-04 | End: 2023-01-20

## 2020-02-04 NOTE — TELEPHONE ENCOUNTER
"Requested Prescriptions   Pending Prescriptions Disp Refills     polyethylene glycol (MIRALAX/GLYCOLAX) powder [Pharmacy Med Name: POLYETHYLENE GLYCOL 3350 POWD]  Last Written Prescription Date:  4/2/2019  Last Fill Quantity: 527 g,  # refills: 3   Last office visit: 1/7/2020 with prescribing provider:  Kellie     Future Office Visit:   Next 5 appointments (look out 90 days)    Apr 24, 2020  9:40 AM CDT  PHYSICAL with Bradley Hopkins, DO  [facility] ([facility]) [address] [address]  [phone]            527 g 3     Sig: MIX 17 GRAMS (1 CAPFUL) OF POWDER IN 8 OUNCES LIQUID AND DRINK ONCE DAILY       Laxatives Protocol Passed - 2/3/2020  3:27 PM        Passed - Patient is age 6 or older        Passed - Recent (12 mo) or future (30 days) visit within the authorizing provider's specialty     Patient has had an office visit with the authorizing provider or a provider within the authorizing providers department within the previous 12 mos or has a future within next 30 days. See \"Patient Info\" tab in inbasket, or \"Choose Columns\" in Meds & Orders section of the refill encounter.              Passed - Medication is active on med list        "

## 2020-02-27 ENCOUNTER — OFFICE VISIT (OUTPATIENT)
Dept: CARDIOLOGY | Facility: CLINIC | Age: 85
End: 2020-02-27
Payer: COMMERCIAL

## 2020-02-27 VITALS
BODY MASS INDEX: 26.16 KG/M2 | DIASTOLIC BLOOD PRESSURE: 68 MMHG | HEIGHT: 65 IN | HEART RATE: 71 BPM | WEIGHT: 157 LBS | SYSTOLIC BLOOD PRESSURE: 121 MMHG

## 2020-02-27 DIAGNOSIS — I48.19 PERSISTENT ATRIAL FIBRILLATION (H): ICD-10-CM

## 2020-02-27 DIAGNOSIS — I48.91 ATRIAL FIBRILLATION, UNSPECIFIED TYPE (H): Primary | ICD-10-CM

## 2020-02-27 PROCEDURE — 99214 OFFICE O/P EST MOD 30 MIN: CPT | Performed by: INTERNAL MEDICINE

## 2020-02-27 PROCEDURE — 93000 ELECTROCARDIOGRAM COMPLETE: CPT | Performed by: INTERNAL MEDICINE

## 2020-02-27 ASSESSMENT — MIFFLIN-ST. JEOR: SCORE: 1324.03

## 2020-02-27 NOTE — LETTER
"2/27/2020    Bradley RITAJames Hopkins, DO  [address]  [place] [address]    RE: Manfred Caraballo       Dear Colleague,    I had the pleasure of seeing Manfred Caraballo in the [facility].    Electrophysiology/ Clinic Note         H&P and Plan:     REASON FOR VISIT:  Evaluation of persistent atrial fibrillation.       HISTORY OF PRESENT ILLNESS:  Ms. Caraballo is a delightful 85-year-old gentleman with a history of hypertension, hyperlipidemia, diabetes, chronic kidney disease, COPD and persistent atrial fibrillation, who is here for routine evaluation.    He is on chronic therapy of diltiazem.  Today he informs he continues to do well and has no complaints during this visit.He denies any other symptoms such as chest pain, lightheadedness, near-syncope or syncopal episode.       Labs obtained in 2019 were within normal limits.  KG today shows atrial fibrillation with good heart rate control (heart rate of 71 bpm).    Previous studies:  - Holter (2017): A. fib with good heart rate control.  Average HR of 61 bpm (range of ).    - Echo (2017): Normal LV function and mil MR/TR.     ASSESSMENT AND PLAN:   1.  Persistent atrial fibrillation.  He continues to do well heart rate seems to be well controlled.  We will continue therapy with diltiazem 120 mg daily.     Will repeat a limited echo.  2.  Embolic prevention.  CHADS-VASc score of 4.    Continue anticoagulation with Xarelto indefinitely.  3.  Hypertension.  Blood pressure is well controlled.   4. Follow up care.  Follow up with PA in 1 year.    Tato Au MD    Physical Exam:  Vitals: /68   Pulse 71   Ht 1.651 m (5' 5\")   Wt 71.2 kg (157 lb)   BMI 26.13 kg/m       Constitutional:  AAO x3.  Pt is in NAD.  HEAD: normocephalic.  SKIN: Skin normal color, texture and turgor with no lesions or eruptions.  Eyes: PERRL, EOMI.  ENT:  Supple, normal JVP. No lymphadenopathy or thyroid enlargement.  Chest:  CTAB.  Cardiac:  IIR, variable " sound of S1 and Normal S2.  No murmurs rubs or gallop.   Abdomen:  Normal BS.  Soft, non-tender and non-distended.  No rebound or guarding.    Extremities:  Pedious pulses palpable B/L.  No LE edema noticed.   Neurological: Strength and sensation grossly symmetric and intact throughout.       CURRENT MEDICATIONS:  Current Outpatient Medications   Medication Sig Dispense Refill     alcohol swab prep pads Use to swab area of injection/daniel as directed 100 each 3     ASPIRIN NOT PRESCRIBED (INTENTIONAL) not prescribed - ulcer history 1 Tab 0     atorvastatin (LIPITOR) 10 MG tablet TAKE 1 TABLET BY MOUTH EVERY DAY 90 tablet 2     azithromycin (ZITHROMAX) 500 MG tablet Take one tablet daily for up to 3 days as needed for traveler's diarrhea. May repeat if needed. (Patient not taking: Reported on 1/7/2020) 6 tablet 0     blood glucose (NO BRAND SPECIFIED) test strip Use to test blood sugar 1 times daily or as directed. To accompany: Blood Glucose Monitor Brands: per insurance. 200 strip 6     blood glucose calibration (NO BRAND SPECIFIED) solution Use to calibrate blood glucose monitor as needed as directed. To accompany: Blood Glucose Monitor Brands: per insurance. 1 Bottle 3     blood glucose monitoring ([employer] CONTOUR MONITOR) meter device kit Use to test blood sugars  1 times daily or as directed. 1 kit 0     blood glucose monitoring (NO BRAND SPECIFIED) test strip Use to test blood sugars 1  times daily or as directed 100 each 3     [employer] PAIN RELIEF 500 MG tablet TAKE 1-2 TABLETS BY MOUTH EVERY 6 HOURS AS NEEDED FOR MILD PAIN 360 tablet 1     diltiazem ER COATED BEADS (CARDIZEM CD) 120 MG 24 hr capsule Take 1 capsule (120 mg) by mouth daily 90 capsule 0     fluticasone (FLONASE) 50 MCG/ACT nasal spray Spray 1-2 sprays into both nostrils daily 16 g 3     glimepiride (AMARYL) 1 MG tablet Take 1 tablet (1 mg) by mouth every morning (before breakfast) 90 tablet 1     guaiFENesin-codeine (ROBITUSSIN AC) 100-10 MG/5ML  solution Take 5-10 mLs by mouth every 6 hours as needed for cough . But try to primarily use at night if cough keeping you up. 236 mL 0     hydrocortisone (WESTCORT) 0.2 % cream APPLY SPARINGLY TO AFFECTED AREA THREE TIMES DAILY FOR 14 DAYS. 30 g 0     ipratropium - albuterol 0.5 mg/2.5 mg/3 mL (DUONEB) 0.5-2.5 (3) MG/3ML nebulization Take 1 vial (3 mLs) by nebulization every 6 hours as needed for shortness of breath / dyspnea or wheezing 30 vial 1     KLOR-CON 20 MEQ CR tablet TAKE 1 TABLET (20 MEQ) BY MOUTH DAILY 90 tablet 1     losartan-hydrochlorothiazide (HYZAAR) 50-12.5 MG tablet TAKE 2 TABLET BY MOUTH EVERY DAY 60 tablet 0     metFORMIN (GLUCOPHAGE) 1000 MG tablet Take 1 tablet (1,000 mg) by mouth 2 times daily (with meals) 180 tablet 1     order for DME Equipment being ordered: Digital home blood pressure monitor kit 1 each 0     pantoprazole (PROTONIX) 40 MG EC tablet TAKE 1 TABLET (40 MG) BY MOUTH DAILY 90 tablet 3     polyethylene glycol (MIRALAX/GLYCOLAX) powder MIX 17 GRAMS (1 CAPFUL) OF POWDER IN 8 OUNCES LIQUID AND DRINK ONCE DAILY 527 g 3     rivaroxaban ANTICOAGULANT (XARELTO) 20 MG TABS tablet Take 1 tablet (20 mg) by mouth daily (with dinner) 90 tablet 0     thin (NO BRAND SPECIFIED) lancets Use to test blood sugar 1 times daily or as directed. To accompany: Blood Glucose Monitor Brands: per insurance. 200 each 6     VENTOLIN  (90 Base) MCG/ACT inhaler INHALE 2 PUFFS INTO THE LUNGS EVERY 6 HOURS AS NEEDED FOR SHORTNESS OF BREATH / DYSPNEA 18 g 1       ALLERGIES     Allergies   Allergen Reactions     Aspirin      Salicylates      ASA/ Ulcers       PAST MEDICAL HISTORY:  Past Medical History:   Diagnosis Date     Acute duodenal ulcer with hemorrhage, without mention of obstruction 11/15/03    and pyloric also - required hosp and transfusion 2 units- H. pylori negative     Allergic rhinitis, cause unspecified      Calculus of gallbladder without mention of cholecystitis or obstruction 11/15/03     no residual pain- transient cholestasis for 2 days     Colon Polyps normal 5/2010 2008 = one hyperplastic & one tubular adenoma - no high grade dysplasis - repeat in 2013      Diverticulosis of colon (without mention of hemorrhage) 11/03     Dyspepsia and other specified disorders of function of stomach     dyspepsia,  h/o bleeding stomach ulcer in 1985     Esophageal reflux      Genital herpes, unspecified      Paroxysmal atrial fibrillation (H) 12/21/2016    [facility].     Pneumonia, organism unspecified(486) 11/15/03    right middle and lower lobe     Pure hypercholesterolemia      Pure hyperglyceridemia      Tear meniscus knee 2009    degenerative on right      Type II or unspecified type diabetes mellitus without mention of complication, not stated as uncontrolled at age 63     Unspecified essential hypertension        PAST SURGICAL HISTORY:  Past Surgical History:   Procedure Laterality Date     HC COLONOSCOPY THRU STOMA, DIAGNOSTIC  11/03    for GI bleed - diverticulosis      HC COLONOSCOPY THRU STOMA, DIAGNOSTIC  5/2010    normal - repeat in 5 years      HC UGI ENDOSCOPY DIAG W OR W/O BRUSH/WASH  11/03    for GI bleed - showed pyloric and duodenal  ulcer        FAMILY HISTORY:  Family History   Problem Relation Age of Onset     Diabetes No family hx of      C.A.D. No family hx of      Hypertension No family hx of      Cancer - colorectal No family hx of      Prostate Cancer No family hx of        SOCIAL HISTORY:  Social History     Socioeconomic History     Marital status:      Spouse name: Constanza     Number of children: 7     Years of education: Not on file     Highest education level: Not on file   Occupational History     Occupation: retired - was in food processing - packaging hamburger, etc.      Employer: NONE    Social Needs     Financial resource strain: Not on file     Food insecurity:     Worry: Not on file     Inability: Not on file     Transportation needs:     Medical:  Not on file     Non-medical: Not on file   Tobacco Use     Smoking status: Former Smoker     Packs/day: 0.50     Years: 20.00     Pack years: 10.00     Types: Cigarettes     Last attempt to quit: 1981     Years since quittin.8     Smokeless tobacco: Never Used     Tobacco comment: quit in     Substance and Sexual Activity     Alcohol use: No     Alcohol/week: 0.0 standard drinks     Comment: quit in      Drug use: No     Comment: no herbal meds either     Sexual activity: Yes     Partners: Female     Comment:    Lifestyle     Physical activity:     Days per week: Not on file     Minutes per session: Not on file     Stress: Not on file   Relationships     Social connections:     Talks on phone: Not on file     Gets together: Not on file     Attends Anglican service: Not on file     Active member of club or organization: Not on file     Attends meetings of clubs or organizations: Not on file     Relationship status: Not on file     Intimate partner violence:     Fear of current or ex partner: Not on file     Emotionally abused: Not on file     Physically abused: Not on file     Forced sexual activity: Not on file   Other Topics Concern      Service Yes     Comment: was in  in [facility] for 5 years and  for 15 years     Blood Transfusions Yes     Comment: in  after bleeding stomach ulcer     Caffeine Concern No     Comment: stopped all coffee and tea after ulcers      Occupational Exposure Not Asked     Hobby Hazards Not Asked     Sleep Concern Not Asked     Stress Concern Not Asked     Weight Concern Not Asked     Special Diet Not Asked     Back Care Not Asked     Exercise Yes     Comment: walks at least one mile every day     Bike Helmet Not Asked     Seat Belt Yes     Comment: always     Self-Exams Yes     Comment: KARINA encouraged monthly     Parent/sibling w/ CABG, MI or angioplasty before 65F 55M? No   Social History Narrative    no longer taking one 81mg asa  qday - secondary to bleeding  ulcers 11/03    PSA checking every 6 months - one year.                Review of Systems:  Skin:        Eyes:       ENT:       Respiratory:       Cardiovascular:       Gastroenterology:      Genitourinary:       Musculoskeletal:       Neurologic:       Psychiatric:       Heme/Lymph/Imm:       Endocrine:           Recent Lab Results:  LIPID RESULTS:  Lab Results   Component Value Date    CHOL 136 04/24/2019    HDL 42 04/24/2019    LDL 58 04/24/2019    TRIG 179 (H) 04/24/2019    CHOLHDLRATIO 4.2 05/07/2015       LIVER ENZYME RESULTS:  Lab Results   Component Value Date    AST 17 10/16/2018    ALT 22 10/16/2018       CBC RESULTS:  Lab Results   Component Value Date    WBC 7.4 10/18/2019    RBC 5.18 10/18/2019    HGB 12.0 (L) 10/18/2019    HCT 38.9 (L) 10/18/2019    MCV 75 (L) 10/18/2019    MCH 23.2 (L) 10/18/2019    MCHC 30.8 (L) 10/18/2019    RDW 13.4 10/18/2019     10/18/2019       BMP RESULTS:  Lab Results   Component Value Date     10/18/2019    POTASSIUM 4.0 10/18/2019    CHLORIDE 100 10/18/2019    CO2 27 10/18/2019    ANIONGAP 8 10/18/2019     (H) 10/18/2019    BUN 20 10/18/2019    CR 1.09 10/18/2019    GFRESTIMATED 62 10/18/2019    GFRESTBLACK 72 10/18/2019    BRITTNY 9.7 10/18/2019        A1C RESULTS:  Lab Results   Component Value Date    A1C 6.5 (H) 10/18/2019       INR RESULTS:  No results found for: INR      ECHOCARDIOGRAM  No results found for this or any previous visit (from the past 8760 hour(s)).      No orders of the defined types were placed in this encounter.    No orders of the defined types were placed in this encounter.    There are no discontinued medications.      No diagnosis found.      CHANDAN Hopkins DO  [address] [place] [address]            Thank you for allowing me to participate in the care of your patient.    Sincerely,     Tato Au MD     [facility]

## 2020-02-27 NOTE — PROGRESS NOTES
"Electrophysiology/ Clinic Note         H&P and Plan:     REASON FOR VISIT:  Evaluation of persistent atrial fibrillation.       HISTORY OF PRESENT ILLNESS:  Ms. Caraballo is a delightful 85-year-old gentleman with a history of hypertension, hyperlipidemia, diabetes, chronic kidney disease, COPD and persistent atrial fibrillation, who is here for routine evaluation.    He is on chronic therapy of diltiazem.  Today he informs he continues to do well and has no complaints during this visit.He denies any other symptoms such as chest pain, lightheadedness, near-syncope or syncopal episode.       Labs obtained in 2019 were within normal limits.  KG today shows atrial fibrillation with good heart rate control (heart rate of 71 bpm).    Previous studies:  - Holter (2017): A. fib with good heart rate control.  Average HR of 61 bpm (range of ).    - Echo (2017): Normal LV function and mil MR/TR.     ASSESSMENT AND PLAN:   1.  Persistent atrial fibrillation.  He continues to do well heart rate seems to be well controlled.  We will continue therapy with diltiazem 120 mg daily.    Will repeat a limited echo.  2.  Embolic prevention.  CHADS-VASc score of 4.   Continue anticoagulation with Xarelto indefinitely.  3.  Hypertension.  Blood pressure is well controlled.   4. Follow up care.  Follow up with PA in 1 year.    Tato Au MD    Physical Exam:  Vitals: /68   Pulse 71   Ht 1.651 m (5' 5\")   Wt 71.2 kg (157 lb)   BMI 26.13 kg/m      Constitutional:  AAO x3.  Pt is in NAD.  HEAD: normocephalic.  SKIN: Skin normal color, texture and turgor with no lesions or eruptions.  Eyes: PERRL, EOMI.  ENT:  Supple, normal JVP. No lymphadenopathy or thyroid enlargement.  Chest:  CTAB.  Cardiac:  IIR, variable sound of S1 and Normal S2.  No murmurs rubs or gallop.   Abdomen:  Normal BS.  Soft, non-tender and non-distended.  No rebound or guarding.    Extremities:  Pedious pulses palpable B/L.  No LE edema noticed. "   Neurological: Strength and sensation grossly symmetric and intact throughout.       CURRENT MEDICATIONS:  Current Outpatient Medications   Medication Sig Dispense Refill     alcohol swab prep pads Use to swab area of injection/daniel as directed 100 each 3     ASPIRIN NOT PRESCRIBED (INTENTIONAL) not prescribed - ulcer history 1 Tab 0     atorvastatin (LIPITOR) 10 MG tablet TAKE 1 TABLET BY MOUTH EVERY DAY 90 tablet 2     azithromycin (ZITHROMAX) 500 MG tablet Take one tablet daily for up to 3 days as needed for traveler's diarrhea. May repeat if needed. (Patient not taking: Reported on 1/7/2020) 6 tablet 0     blood glucose (NO BRAND SPECIFIED) test strip Use to test blood sugar 1 times daily or as directed. To accompany: Blood Glucose Monitor Brands: per insurance. 200 strip 6     blood glucose calibration (NO BRAND SPECIFIED) solution Use to calibrate blood glucose monitor as needed as directed. To accompany: Blood Glucose Monitor Brands: per insurance. 1 Bottle 3     blood glucose monitoring ([employer] CONTOUR MONITOR) meter device kit Use to test blood sugars  1 times daily or as directed. 1 kit 0     blood glucose monitoring (NO BRAND SPECIFIED) test strip Use to test blood sugars 1  times daily or as directed 100 each 3     [employer] PAIN RELIEF 500 MG tablet TAKE 1-2 TABLETS BY MOUTH EVERY 6 HOURS AS NEEDED FOR MILD PAIN 360 tablet 1     diltiazem ER COATED BEADS (CARDIZEM CD) 120 MG 24 hr capsule Take 1 capsule (120 mg) by mouth daily 90 capsule 0     fluticasone (FLONASE) 50 MCG/ACT nasal spray Spray 1-2 sprays into both nostrils daily 16 g 3     glimepiride (AMARYL) 1 MG tablet Take 1 tablet (1 mg) by mouth every morning (before breakfast) 90 tablet 1     guaiFENesin-codeine (ROBITUSSIN AC) 100-10 MG/5ML solution Take 5-10 mLs by mouth every 6 hours as needed for cough . But try to primarily use at night if cough keeping you up. 236 mL 0     hydrocortisone (WESTCORT) 0.2 % cream APPLY SPARINGLY TO AFFECTED AREA  THREE TIMES DAILY FOR 14 DAYS. 30 g 0     ipratropium - albuterol 0.5 mg/2.5 mg/3 mL (DUONEB) 0.5-2.5 (3) MG/3ML nebulization Take 1 vial (3 mLs) by nebulization every 6 hours as needed for shortness of breath / dyspnea or wheezing 30 vial 1     KLOR-CON 20 MEQ CR tablet TAKE 1 TABLET (20 MEQ) BY MOUTH DAILY 90 tablet 1     losartan-hydrochlorothiazide (HYZAAR) 50-12.5 MG tablet TAKE 2 TABLET BY MOUTH EVERY DAY 60 tablet 0     metFORMIN (GLUCOPHAGE) 1000 MG tablet Take 1 tablet (1,000 mg) by mouth 2 times daily (with meals) 180 tablet 1     order for DME Equipment being ordered: Digital home blood pressure monitor kit 1 each 0     pantoprazole (PROTONIX) 40 MG EC tablet TAKE 1 TABLET (40 MG) BY MOUTH DAILY 90 tablet 3     polyethylene glycol (MIRALAX/GLYCOLAX) powder MIX 17 GRAMS (1 CAPFUL) OF POWDER IN 8 OUNCES LIQUID AND DRINK ONCE DAILY 527 g 3     rivaroxaban ANTICOAGULANT (XARELTO) 20 MG TABS tablet Take 1 tablet (20 mg) by mouth daily (with dinner) 90 tablet 0     thin (NO BRAND SPECIFIED) lancets Use to test blood sugar 1 times daily or as directed. To accompany: Blood Glucose Monitor Brands: per insurance. 200 each 6     VENTOLIN  (90 Base) MCG/ACT inhaler INHALE 2 PUFFS INTO THE LUNGS EVERY 6 HOURS AS NEEDED FOR SHORTNESS OF BREATH / DYSPNEA 18 g 1       ALLERGIES     Allergies   Allergen Reactions     Aspirin      Salicylates      ASA/ Ulcers       PAST MEDICAL HISTORY:  Past Medical History:   Diagnosis Date     Acute duodenal ulcer with hemorrhage, without mention of obstruction 11/15/03    and pyloric also - required hosp and transfusion 2 units- H. pylori negative     Allergic rhinitis, cause unspecified      Calculus of gallbladder without mention of cholecystitis or obstruction 11/15/03    no residual pain- transient cholestasis for 2 days     Colon Polyps normal 5/2010 2008 = one hyperplastic & one tubular adenoma - no high grade dysplasis - repeat in 2013      Diverticulosis of colon  (without mention of hemorrhage) 11/03     Dyspepsia and other specified disorders of function of stomach     dyspepsia,  h/o bleeding stomach ulcer in 1985     Esophageal reflux      Genital herpes, unspecified      Paroxysmal atrial fibrillation (H) 12/21/2016    [facility].     Pneumonia, organism unspecified(486) 11/15/03    right middle and lower lobe     Pure hypercholesterolemia      Pure hyperglyceridemia      Tear meniscus knee 2009    degenerative on right      Type II or unspecified type diabetes mellitus without mention of complication, not stated as uncontrolled at age 63     Unspecified essential hypertension        PAST SURGICAL HISTORY:  Past Surgical History:   Procedure Laterality Date     HC COLONOSCOPY THRU STOMA, DIAGNOSTIC  11/03    for GI bleed - diverticulosis      HC COLONOSCOPY THRU STOMA, DIAGNOSTIC  5/2010    normal - repeat in 5 years      HC UGI ENDOSCOPY DIAG W OR W/O BRUSH/WASH  11/03    for GI bleed - showed pyloric and duodenal  ulcer        FAMILY HISTORY:  Family History   Problem Relation Age of Onset     Diabetes No family hx of      C.A.D. No family hx of      Hypertension No family hx of      Cancer - colorectal No family hx of      Prostate Cancer No family hx of        SOCIAL HISTORY:  Social History     Socioeconomic History     Marital status:      Spouse name: Constanza     Number of children: 7     Years of education: Not on file     Highest education level: Not on file   Occupational History     Occupation: retired - was in food processing - packaging hamburger, etc.      Employer: NONE    Social Needs     Financial resource strain: Not on file     Food insecurity:     Worry: Not on file     Inability: Not on file     Transportation needs:     Medical: Not on file     Non-medical: Not on file   Tobacco Use     Smoking status: Former Smoker     Packs/day: 0.50     Years: 20.00     Pack years: 10.00     Types: Cigarettes     Last attempt to quit: 5/13/1981      Years since quittin.8     Smokeless tobacco: Never Used     Tobacco comment: quit in     Substance and Sexual Activity     Alcohol use: No     Alcohol/week: 0.0 standard drinks     Comment: quit in      Drug use: No     Comment: no herbal meds either     Sexual activity: Yes     Partners: Female     Comment:    Lifestyle     Physical activity:     Days per week: Not on file     Minutes per session: Not on file     Stress: Not on file   Relationships     Social connections:     Talks on phone: Not on file     Gets together: Not on file     Attends Protestant service: Not on file     Active member of club or organization: Not on file     Attends meetings of clubs or organizations: Not on file     Relationship status: Not on file     Intimate partner violence:     Fear of current or ex partner: Not on file     Emotionally abused: Not on file     Physically abused: Not on file     Forced sexual activity: Not on file   Other Topics Concern      Service Yes     Comment: was in  in [facility] for 5 years and  for 15 years     Blood Transfusions Yes     Comment: in  after bleeding stomach ulcer     Caffeine Concern No     Comment: stopped all coffee and tea after ulcers      Occupational Exposure Not Asked     Hobby Hazards Not Asked     Sleep Concern Not Asked     Stress Concern Not Asked     Weight Concern Not Asked     Special Diet Not Asked     Back Care Not Asked     Exercise Yes     Comment: walks at least one mile every day     Bike Helmet Not Asked     Seat Belt Yes     Comment: always     Self-Exams Yes     Comment: KARINA encouraged monthly     Parent/sibling w/ CABG, MI or angioplasty before 65F 55M? No   Social History Narrative    no longer taking one 81mg asa qday - secondary to bleeding  ulcers     PSA checking every 6 months - one year.                Review of Systems:  Skin:        Eyes:       ENT:       Respiratory:       Cardiovascular:        Gastroenterology:      Genitourinary:       Musculoskeletal:       Neurologic:       Psychiatric:       Heme/Lymph/Imm:       Endocrine:           Recent Lab Results:  LIPID RESULTS:  Lab Results   Component Value Date    CHOL 136 04/24/2019    HDL 42 04/24/2019    LDL 58 04/24/2019    TRIG 179 (H) 04/24/2019    CHOLHDLRATIO 4.2 05/07/2015       LIVER ENZYME RESULTS:  Lab Results   Component Value Date    AST 17 10/16/2018    ALT 22 10/16/2018       CBC RESULTS:  Lab Results   Component Value Date    WBC 7.4 10/18/2019    RBC 5.18 10/18/2019    HGB 12.0 (L) 10/18/2019    HCT 38.9 (L) 10/18/2019    MCV 75 (L) 10/18/2019    MCH 23.2 (L) 10/18/2019    MCHC 30.8 (L) 10/18/2019    RDW 13.4 10/18/2019     10/18/2019       BMP RESULTS:  Lab Results   Component Value Date     10/18/2019    POTASSIUM 4.0 10/18/2019    CHLORIDE 100 10/18/2019    CO2 27 10/18/2019    ANIONGAP 8 10/18/2019     (H) 10/18/2019    BUN 20 10/18/2019    CR 1.09 10/18/2019    GFRESTIMATED 62 10/18/2019    GFRESTBLACK 72 10/18/2019    BRITTNY 9.7 10/18/2019        A1C RESULTS:  Lab Results   Component Value Date    A1C 6.5 (H) 10/18/2019       INR RESULTS:  No results found for: INR      ECHOCARDIOGRAM  No results found for this or any previous visit (from the past 8760 hour(s)).      No orders of the defined types were placed in this encounter.    No orders of the defined types were placed in this encounter.    There are no discontinued medications.      No diagnosis found.      CC  Bradley Hopkins DO  [address] [place] [address]

## 2020-03-04 DIAGNOSIS — E78.5 HYPERLIPIDEMIA WITH TARGET LDL LESS THAN 100: ICD-10-CM

## 2020-03-04 DIAGNOSIS — E11.21 TYPE 2 DIABETES MELLITUS WITH DIABETIC NEPHROPATHY, WITHOUT LONG-TERM CURRENT USE OF INSULIN (H): ICD-10-CM

## 2020-03-04 DIAGNOSIS — D64.9 ANEMIA, UNSPECIFIED TYPE: ICD-10-CM

## 2020-03-04 DIAGNOSIS — I48.19 PERSISTENT ATRIAL FIBRILLATION (H): ICD-10-CM

## 2020-03-04 DIAGNOSIS — Z00.00 MEDICARE ANNUAL WELLNESS VISIT, SUBSEQUENT: ICD-10-CM

## 2020-03-04 RX ORDER — DILTIAZEM HYDROCHLORIDE 120 MG/1
120 CAPSULE, COATED, EXTENDED RELEASE ORAL DAILY
Qty: 90 CAPSULE | Refills: 3 | Status: SHIPPED | OUTPATIENT
Start: 2020-03-04 | End: 2021-05-10

## 2020-03-04 RX ORDER — ATORVASTATIN CALCIUM 10 MG/1
TABLET, FILM COATED ORAL
Qty: 90 TABLET | Refills: 0 | Status: SHIPPED | OUTPATIENT
Start: 2020-03-04 | End: 2020-04-22

## 2020-03-04 NOTE — TELEPHONE ENCOUNTER
Prescription approved per [facility] Refill Protocol.  Halyie Posadas RN, BSN  [facility]

## 2020-03-04 NOTE — TELEPHONE ENCOUNTER
"Requested Prescriptions   Pending Prescriptions Disp Refills     atorvastatin (LIPITOR) 10 MG tablet [Pharmacy Med Name: ATORVASTATIN 10 MG TABLET]  Last Written Prescription Date:  6/17/2019  Last Fill Quantity: 90 tablet,  # refills: 2   Last office visit: 1/7/2020 with prescribing provider:  Kellie     Future Office Visit:   Next 5 appointments (look out 90 days)    Apr 24, 2020  9:40 AM CDT  PHYSICAL with Bradley Hopkins,   [facility] [address] [facility] [id number] [phone]            90 tablet 2     Sig: TAKE 1 TABLET BY MOUTH EVERY DAY       Statins Protocol Passed - 3/4/2020  1:47 AM        Passed - LDL on file in past 12 months     Recent Labs   Lab Test 04/24/19  0949   LDL 58             Passed - No abnormal creatine kinase in past 12 months     No lab results found.             Passed - Recent (12 mo) or future (30 days) visit within the authorizing provider's specialty     Patient has had an office visit with the authorizing provider or a provider within the authorizing providers department within the previous 12 mos or has a future within next 30 days. See \"Patient Info\" tab in inbasket, or \"Choose Columns\" in Meds & Orders section of the refill encounter.              Passed - Medication is active on med list        Passed - Patient is age 18 or older        "

## 2020-04-03 ENCOUNTER — TELEPHONE (OUTPATIENT)
Dept: FAMILY MEDICINE | Facility: CLINIC | Age: 85
End: 2020-04-03

## 2020-04-03 NOTE — TELEPHONE ENCOUNTER
Called son and left message letting him know appt was cancelled as [facility] is closing temporarily.  Asked him to call to discuss other options.  Bell Reid

## 2020-04-22 ENCOUNTER — VIRTUAL VISIT (OUTPATIENT)
Dept: FAMILY MEDICINE | Facility: CLINIC | Age: 85
End: 2020-04-22
Payer: COMMERCIAL

## 2020-04-22 DIAGNOSIS — D64.9 ANEMIA, UNSPECIFIED TYPE: ICD-10-CM

## 2020-04-22 DIAGNOSIS — J44.9 CHRONIC OBSTRUCTIVE PULMONARY DISEASE, UNSPECIFIED COPD TYPE (H): ICD-10-CM

## 2020-04-22 DIAGNOSIS — I10 HYPERTENSION GOAL BP (BLOOD PRESSURE) < 140/90: ICD-10-CM

## 2020-04-22 DIAGNOSIS — M25.562 CHRONIC PAIN OF LEFT KNEE: ICD-10-CM

## 2020-04-22 DIAGNOSIS — G89.29 CHRONIC PAIN OF LEFT KNEE: ICD-10-CM

## 2020-04-22 DIAGNOSIS — E11.21 TYPE 2 DIABETES MELLITUS WITH DIABETIC NEPHROPATHY, WITHOUT LONG-TERM CURRENT USE OF INSULIN (H): ICD-10-CM

## 2020-04-22 DIAGNOSIS — I48.91 ATRIAL FIBRILLATION, UNSPECIFIED TYPE (H): ICD-10-CM

## 2020-04-22 DIAGNOSIS — E78.5 HYPERLIPIDEMIA WITH TARGET LDL LESS THAN 100: ICD-10-CM

## 2020-04-22 PROCEDURE — 99214 OFFICE O/P EST MOD 30 MIN: CPT | Mod: 95 | Performed by: FAMILY MEDICINE

## 2020-04-22 RX ORDER — ACETAMINOPHEN 500 MG
TABLET ORAL
Qty: 360 TABLET | Refills: 1 | Status: SHIPPED | OUTPATIENT
Start: 2020-04-22 | End: 2020-08-04

## 2020-04-22 RX ORDER — GLIMEPIRIDE 1 MG/1
1 TABLET ORAL
Qty: 90 TABLET | Refills: 1 | Status: SHIPPED | OUTPATIENT
Start: 2020-04-22 | End: 2020-10-11

## 2020-04-22 RX ORDER — ALBUTEROL SULFATE 90 UG/1
AEROSOL, METERED RESPIRATORY (INHALATION)
Qty: 18 G | Refills: 1 | Status: SHIPPED | OUTPATIENT
Start: 2020-04-22 | End: 2021-11-30

## 2020-04-22 RX ORDER — LOSARTAN POTASSIUM AND HYDROCHLOROTHIAZIDE 12.5; 5 MG/1; MG/1
TABLET ORAL
Qty: 180 TABLET | Refills: 1 | Status: SHIPPED | OUTPATIENT
Start: 2020-04-22 | End: 2020-10-09

## 2020-04-22 RX ORDER — ATORVASTATIN CALCIUM 10 MG/1
10 TABLET, FILM COATED ORAL DAILY
Qty: 90 TABLET | Refills: 3 | Status: SHIPPED | OUTPATIENT
Start: 2020-04-22 | End: 2021-01-19

## 2020-04-22 NOTE — PROGRESS NOTES
"Manfred Caraballo is a 85 year old male who is being evaluated via a billable telephone visit.      The patient has been notified of following:     \"This telephone visit will be conducted via a call between you and your physician/provider. We have found that certain health care needs can be provided without the need for a physical exam.  This service lets us provide the care you need with a short phone conversation.  If a prescription is necessary we can send it directly to your pharmacy.  If lab work is needed we can place an order for that and you can then stop by our lab to have the test done at a later time.    Telephone visits are billed at different rates depending on your insurance coverage. During this emergency period, for some insurers they may be billed the same as an in-person visit.  Please reach out to your insurance provider with any questions.    If during the course of the call the physician/provider feels a telephone visit is not appropriate, you will not be charged for this service.\"    Patient has given verbal consent for Telephone visit?  Yes    How would you like to obtain your AVS? Mail a copy    Subjective     Manfred Caraballo is a 85 year old male who presents to clinic today for the following health issues:    HPI  Diabetes Follow-up    How often are you checking your blood sugar? One time daily  What time of day are you checking your blood sugars (select all that apply)?  Before meals  Have you had any blood sugars above 200?  No  Have you had any blood sugars below 70?  No    What symptoms do you notice when your blood sugar is low?  None    What concerns do you have today about your diabetes? None     Do you have any of these symptoms? (Select all that apply)  No numbness or tingling in feet.  No redness, sores or blisters on feet.  No complaints of excessive thirst.  No reports of blurry vision.  No significant changes to weight.    Have you had a diabetic eye exam in the last 12 months? " No          Hyperlipidemia Follow-Up      Are you regularly taking any medication or supplement to lower your cholesterol?   Yes-      Are you having muscle aches or other side effects that you think could be caused by your cholesterol lowering medication?  No    Hypertension Follow-up      Do you check your blood pressure regularly outside of the clinic? Yes     Are you following a low salt diet? Yes    Are your blood pressures ever more than 140 on the top number (systolic) OR more   than 90 on the bottom number (diastolic), for example 140/90? No     Denies any headaches, lightheadedness, dizziness, vision changes, chest pain, palpitations, shortness of breath, dyspnea, numbness/tingling.      BP Readings from Last 2 Encounters:   02/27/20 121/68   01/07/20 122/68     Hemoglobin A1C (%)   Date Value   10/18/2019 6.5 (H)   04/24/2019 7.0 (H)     LDL Cholesterol Calculated (mg/dL)   Date Value   04/24/2019 58   04/16/2018 66         How many servings of fruits and vegetables do you eat daily?  2-3    On average, how many sweetened beverages do you drink each day (Examples: soda, juice, sweet tea, etc.  Do NOT count diet or artificially sweetened beverages)?   0    How many days per week do you exercise enough to make your heart beat faster? 3 or less    How many minutes a day do you exercise enough to make your heart beat faster? 9 or less    How many days per week do you miss taking your medication? 0      Patient Active Problem List   Diagnosis     Hypertension goal BP (blood pressure) < 140/90     Allergic rhinitis     Dyspepsia and other specified disorders of function of stomach     Genital herpes     Calculus of gallbladder     ACUTE PROSTATITIS- mild- follow up- 10/02     Acute duodenal ulcer with hemorrhage     Diverticulosis of large intestine     Iron deficiency anemia     Impotence of organic origin     Esophageal reflux     History of colonic polyps     Colon Polyps     Tear meniscus knee      Hyperlipidemia with target LDL less than 100     Advanced directives, counseling/discussion     Microalbuminuria- very mild      Type 2 diabetes mellitus with diabetic nephropathy (H)     GERD (gastroesophageal reflux disease)     COPD (chronic obstructive pulmonary disease) (H)     History of pneumonia     Persistent atrial fibrillation     CKD (chronic kidney disease) stage 2, GFR 60-89 ml/min     Past Surgical History:   Procedure Laterality Date     HC COLONOSCOPY THRU STOMA, DIAGNOSTIC      for GI bleed - diverticulosis      HC COLONOSCOPY THRU STOMA, DIAGNOSTIC  2010    normal - repeat in 5 years      HC UGI ENDOSCOPY DIAG W OR W/O BRUSH/WASH      for GI bleed - showed pyloric and duodenal  ulcer        Social History     Tobacco Use     Smoking status: Former Smoker     Packs/day: 0.50     Years: 20.00     Pack years: 10.00     Types: Cigarettes     Last attempt to quit: 1981     Years since quittin.9     Smokeless tobacco: Never Used     Tobacco comment: quit in     Substance Use Topics     Alcohol use: No     Alcohol/week: 0.0 standard drinks     Comment: quit in      Family History   Problem Relation Age of Onset     Diabetes No family hx of      C.A.D. No family hx of      Hypertension No family hx of      Cancer - colorectal No family hx of      Prostate Cancer No family hx of            Reviewed and updated as needed this visit by Provider         Review of Systems   ROS COMP: Constitutional, HEENT, cardiovascular, pulmonary, gi and gu systems are negative, except as otherwise noted.       Objective   Reported vitals:  There were no vitals taken for this visit.   healthy, alert and no distress  PSYCH: Alert and oriented times 3; coherent speech, normal   rate and volume, able to articulate logical thoughts, able   to abstract reason, no tangential thoughts, no hallucinations   or delusions  His affect is normal  RESP: No cough, no audible wheezing, able to talk in full  sentences  Remainder of exam unable to be completed due to telephone visits    Diagnostic Test Results:  Labs reviewed in Epic        Assessment/Plan:  1. Chronic obstructive pulmonary disease, unspecified COPD type (H): doing well, continue same regimen  - albuterol (VENTOLIN HFA) 108 (90 Base) MCG/ACT inhaler; INHALE 2 PUFFS INTO THE LUNGS EVERY 6 HOURS AS NEEDED FOR SHORTNESS OF BREATH / DYSPNEA  Dispense: 18 g; Refill: 1    2. Chronic pain of left knee: stable with PRN acetaminophen  - acetaminophen ([facility] PAIN RELIEF) 500 MG tablet; TAKE 1-2 TABLETS BY MOUTH EVERY 6 HOURS AS NEEDED FOR MILD PAIN  Dispense: 360 tablet; Refill: 1    3. Hyperlipidemia with target LDL less than 100: stable, continue atorvastatin.  - atorvastatin (LIPITOR) 10 MG tablet; Take 1 tablet (10 mg) by mouth daily  Dispense: 90 tablet; Refill: 3  - Lipid panel reflex to direct LDL Fasting; Future    4. Type 2 diabetes mellitus with diabetic nephropathy, without long-term current use of insulin (H): well controlled. Last A1c of 6.5%.  Continue discontinuing glimepiride in the future, especially if A1c stable on recheck.  - atorvastatin (LIPITOR) 10 MG tablet; Take 1 tablet (10 mg) by mouth daily  Dispense: 90 tablet; Refill: 3  - glimepiride (AMARYL) 1 MG tablet; Take 1 tablet (1 mg) by mouth every morning (before breakfast)  Dispense: 90 tablet; Refill: 1  - metFORMIN (GLUCOPHAGE) 1000 MG tablet; Take 1 tablet (1,000 mg) by mouth 2 times daily (with meals)  Dispense: 180 tablet; Refill: 1  - **A1C FUTURE anytime; Future  - Albumin Random Urine Quantitative with Creat Ratio; Future  - **Basic metabolic panel FUTURE anytime; Future    5. Hypertension goal BP (blood pressure) < 140/90: BP at goal.  Continue losartan-hydrochlorothiazide daily.  - losartan-hydrochlorothiazide (HYZAAR) 50-12.5 MG tablet; TAKE 2 TABLET BY MOUTH EVERY DAY  Dispense: 180 tablet; Refill: 1  - **Basic metabolic panel FUTURE anytime; Future    6. Atrial fibrillation,  unspecified type (H: Stable, continue Xarelto.  - rivaroxaban ANTICOAGULANT (XARELTO) 20 MG TABS tablet; Take 1 tablet (20 mg) by mouth daily (with dinner)  Dispense: 90 tablet; Refill: 0    No follow-ups on file.      Phone call duration:  10 minutes    Bradley Hopkins DO

## 2020-04-28 DIAGNOSIS — E11.21 TYPE 2 DIABETES MELLITUS WITH DIABETIC NEPHROPATHY, WITHOUT LONG-TERM CURRENT USE OF INSULIN (H): ICD-10-CM

## 2020-04-28 RX ORDER — CARVEDILOL 25 MG/1
TABLET, FILM COATED ORAL
Qty: 100 STRIP | Refills: 1 | Status: SHIPPED | OUTPATIENT
Start: 2020-04-28 | End: 2020-10-20

## 2020-04-28 NOTE — TELEPHONE ENCOUNTER
Prescription approved per [facility] Refill Protocol.  CARMINA SzymanskiN, RN  [facility]

## 2020-05-12 NOTE — TELEPHONE ENCOUNTER
[facility] Discharge Summary   Patient ID:  Kar Hernandez  [id number]  44 year old  1976      Admit date: 4/19/2020    Discharge date and time:4/29/2020    Attending Physician: No att. providers found     Primary Care Physician: Buddy Subramanian MD     Admit Dx: Community acquired pneumonia [J18.9]      Primary Diagnosis at discharge from Hospital:   Please see problem list below    Risk of readmission: mediu    Discharged Condition: stable    Disposition: Home    Important follow up:    Severo Freire MD  [address] [address]  [phone]    Call in 2 weeks  Call pulmonologist to set up telephone visit in 1-2 weeks     [facility]     Schedule an appointment as soon as possible for a visit in 1 week        Discharge meds     Summary of your Discharge Medications      Take these Medications      Details   albuterol 108 (90 Base) MCG/ACT inhaler   Inhale 2 puffs into the lungs Every 4 hours as needed for Shortness of Breath or Wheezing.     metFORMIN 500 MG tablet  Commonly known as:  GLUCOPHAGE   Take 1 tablet by mouth 2 times daily (with meals).            Consults:   IP Consult Orders (From admission, onward)     Start     Ordered    04/21/20 0956  Inpatient consult to Pulmonology  ONE TIME     Provider:  Del Taylor MD    04/21/20 0956    04/20/20 0915  Inpatient consult to Infectious Diseases  ONE TIME     Provider:  Yunior Rachel MD    04/20/20 0914                 Radiology: Xr Chest Pa Or Ap 1 View    Result Date: 4/20/2020  Narrative: EXAM: XR CHEST PA OR AP 1 VIEW INDICATION: cough. COMPARISON: None TECHNIQUE: Portable upright AP radiograph of the chest FINDINGS: EKG leads project at the patient's chest.  The cardiomediastinal silhouette is at the upper limit of normal for size, at least in part due to AP technique.  There is no pneumothorax.  The costophrenic angles are sharp.  Extensive interstitial and alveolar opacities are seen predominantly  Reason for Call:  Form, our goal is to have forms completed with 72 hours, however, some forms may require a visit or additional information.    Type of letter, form or note:  Home Health Certification    Who is the form from?: Home care    Where did the form come from: form was faxed in    What clinic location was the form placed at?: [facility]    Where the form was placed: Given to Kaur MONIQUE RN    What number is listed as a contact on the form?:        Additional comments:     Call taken on 6/6/2019 at 10:20 AM by Bell Reid             at the mid to lower lung zones.  No acute-appearing fracture is seen.     Impression: Extensive interstitial and alveolar opacities are nonspecific, but can be consistent with the clinical suspicion of COVID 19 viral illness. Follow-up with full inspiratory PA and lateral radiographs to ensure resolution is recommended. Electronically Signed by: MILLER STEEN M.D. Signed on: 4/20/2020 12:36 AM          Please refer to prior H&P on 4/20     Hospital Course:    Kar Hernandez  is a 44 year old male adtmitted with cough, fevers, myalgias.      Acute Hypoxic Respiratory Failure due to Covid 19  Possible superimposed bacterial pneumonia with + procalcitonin, yellow sputum production   Elevated inflammatory markers - improving now   Albuterol inhaler PRN  Completed Azithromycin and ceftriaxone.    Completed plaquenil   QTc was monitored while on plaquenil/azithromycin, now complete   S/p actemra 4/21   Prone positioning when possible - doing well with this.   ID and pulm following  Cont to wean O2 as able. Improving.   On discharge required zero liters at rest and 2 liters with exertion, set up with home O2     Thrombocytopenia - resolved   Suspect related to Covid - now resolved      T2DM  Not on treatment for this prior to admission   Check A1C - elevated to 10.4%  SSI, accucheks, hypoglycemia protocol   Prescribed metformin on discharge  Will need to establish with PCP for further management of DM on discharge. Will likely need further medication adjustment. Provided follow up information to establish with [facility] clinic    Day of discharge Exam   Vitals:    04/29/20 2030   BP: 136/82   Pulse: 89   Resp: 16   Temp: 98.6 °F (37 °C)       Exam on day of discharge:  See daily progress note for physical exam on day of discharge    Total Time Coordinating Care: Greater than 30 minutes    Patient had opportunity to ask questions and state understand and agree with therapeutic plan as outlined

## 2020-06-01 ENCOUNTER — TRANSFERRED RECORDS (OUTPATIENT)
Dept: MULTI SPECIALTY CLINIC | Facility: CLINIC | Age: 85
End: 2020-06-01

## 2020-06-01 LAB — RETINOPATHY: NORMAL

## 2020-07-14 DIAGNOSIS — I48.91 ATRIAL FIBRILLATION, UNSPECIFIED TYPE (H): ICD-10-CM

## 2020-07-15 NOTE — TELEPHONE ENCOUNTER
Routing refill request to provider for review/approval because:  Does not meet protocol requirements:  Direct Oral Anticoagulant Agents Failed        Creatinine Clearance greater than 50 ml/min on file in past 3 mos     No lab results found.      Serum creatinine less than or equal to 1.4 on file in past 3 mos         Recent Labs   Lab Test 10/18/19  0942   CR 1.09        Darleen Garcia R.N.

## 2020-07-17 RX ORDER — RIVAROXABAN 20 MG/1
TABLET, FILM COATED ORAL
Qty: 90 TABLET | Refills: 0 | Status: SHIPPED | OUTPATIENT
Start: 2020-07-17 | End: 2020-10-11

## 2020-08-26 ENCOUNTER — HOSPITAL ENCOUNTER (OUTPATIENT)
Dept: CARDIOLOGY | Facility: CLINIC | Age: 85
Discharge: HOME | End: 2020-08-26
Attending: INTERNAL MEDICINE | Admitting: INTERNAL MEDICINE
Payer: COMMERCIAL

## 2020-08-26 DIAGNOSIS — I48.91 ATRIAL FIBRILLATION, UNSPECIFIED TYPE (H): ICD-10-CM

## 2020-08-26 PROCEDURE — 93308 TTE F-UP OR LMTD: CPT

## 2020-08-26 PROCEDURE — 93308 TTE F-UP OR LMTD: CPT | Mod: 26 | Performed by: INTERNAL MEDICINE

## 2020-08-26 PROCEDURE — 93325 DOPPLER ECHO COLOR FLOW MAPG: CPT | Mod: 26 | Performed by: INTERNAL MEDICINE

## 2020-08-26 PROCEDURE — 93321 DOPPLER ECHO F-UP/LMTD STD: CPT | Mod: 26 | Performed by: INTERNAL MEDICINE

## 2020-09-01 ENCOUNTER — TELEPHONE (OUTPATIENT)
Dept: CARDIOLOGY | Facility: CLINIC | Age: 85
End: 2020-09-01

## 2020-09-17 ENCOUNTER — TELEPHONE (OUTPATIENT)
Dept: FAMILY MEDICINE | Facility: CLINIC | Age: 85
End: 2020-09-17

## 2020-09-17 NOTE — TELEPHONE ENCOUNTER
Date Forms was received: September 17, 2020    Forms received by: Fax    Purpose of Form:  Activ[employer]    When the form is due:  ASAP    How the form needs to be returned for patient:  Fax    Form currently placed  JM inbox as SW is out

## 2020-10-01 ENCOUNTER — TELEPHONE (OUTPATIENT)
Dept: FAMILY MEDICINE | Facility: CLINIC | Age: 85
End: 2020-10-01

## 2020-10-01 NOTE — TELEPHONE ENCOUNTER
Forms/Letter Request    Name of form/letter: Special Diet Information Request     Have you been seen for this request: N/A    Do we have the form/letter: Yes: given to TC     When is form/letter needed by: ASAP     How would you like the form/letter returned: Mail    Patient Notified form requests are processed in 3-5 business days:Yes    Okay to leave a detailed message? Yes Home number on file [phone] (home)      Katie Servin/

## 2020-10-06 NOTE — TELEPHONE ENCOUNTER
Patient's form signed, dated, and placed in TC basket.    Bradley Hopkins DO  10/6/2020 9:03 AM

## 2020-10-09 DIAGNOSIS — E11.21 TYPE 2 DIABETES MELLITUS WITH DIABETIC NEPHROPATHY, WITHOUT LONG-TERM CURRENT USE OF INSULIN (H): ICD-10-CM

## 2020-10-09 DIAGNOSIS — I10 HYPERTENSION GOAL BP (BLOOD PRESSURE) < 140/90: ICD-10-CM

## 2020-10-09 DIAGNOSIS — I48.91 ATRIAL FIBRILLATION, UNSPECIFIED TYPE (H): ICD-10-CM

## 2020-10-09 RX ORDER — LOSARTAN POTASSIUM AND HYDROCHLOROTHIAZIDE 12.5; 5 MG/1; MG/1
TABLET ORAL
Qty: 180 TABLET | Refills: 0 | Status: SHIPPED | OUTPATIENT
Start: 2020-10-09 | End: 2021-01-11

## 2020-10-09 NOTE — TELEPHONE ENCOUNTER
Losartan-hydrochlorothiazide    Prescription approved per [facility] Refill Protocol.    Kori GREWALN, RN, PHN

## 2020-10-09 NOTE — TELEPHONE ENCOUNTER
Metformin,, Glimepiride,      Routing refill request to provider for review/approval because:  Labs not current:  A1C    Xarelto    Routing refill request to provider for review/approval because:  Labs out of range:  Cr Clearance    Kori GREWALN, RN, PHN

## 2020-10-11 RX ORDER — RIVAROXABAN 20 MG/1
TABLET, FILM COATED ORAL
Qty: 90 TABLET | Refills: 0 | Status: SHIPPED | OUTPATIENT
Start: 2020-10-11 | End: 2021-01-19

## 2020-10-11 RX ORDER — GLIMEPIRIDE 1 MG/1
1 TABLET ORAL
Qty: 90 TABLET | Refills: 1 | Status: SHIPPED | OUTPATIENT
Start: 2020-10-11 | End: 2021-01-19

## 2020-10-20 DIAGNOSIS — E11.21 TYPE 2 DIABETES MELLITUS WITH DIABETIC NEPHROPATHY, WITHOUT LONG-TERM CURRENT USE OF INSULIN (H): ICD-10-CM

## 2020-10-20 NOTE — TELEPHONE ENCOUNTER
Routing refill request to provider for review/approval because:  Patient has not completed labs for over one year and is due for 6 month follow up  KYREE Szymanski, RN  [facility]

## 2020-10-20 NOTE — LETTER
[facility]  [address] [facility] [id number] [phone]  October 26, 2020    Manfred Caraballo  [address] [place] [id number]    Dear Manfred,    We received a refill request from your pharmacy for your medication(s).  At this time the nurses were able to give you a jess refill, but you are due to be seen for an Annual Wellness office visit before the next refill.      Here is a list of Health Maintenance topics that are due now or due soon:  Health Maintenance Due   Topic Date Due     ANNUAL REVIEW OF HM ORDERS  1935     HEPATITIS B IMMUNIZATION (1 of 3 - Risk 3-dose series) 02/13/1954     ZOSTER IMMUNIZATION (1 of 2) 02/13/1985     EYE EXAM  07/12/2018     DIABETIC FOOT EXAM  10/16/2019     PHQ-2  01/01/2020     A1C  04/18/2020     LIPID  04/24/2020     MICROALBUMIN  04/24/2020     INFLUENZA VACCINE (1) 09/01/2020     BMP  10/18/2020     FALL RISK ASSESSMENT  10/18/2020     MEDICARE ANNUAL WELLNESS VISIT  10/18/2020       Please call us at [phone] (or use [place]) to address the above recommendations.     Thank you for trusting [facility] and we appreciate the opportunity to serve you.  We look forward to supporting your healthcare needs in the future.    Healthy Regards,    Dr. Bradley Hopkins, DO

## 2020-10-22 NOTE — TELEPHONE ENCOUNTER
Patient due for follow up. Please help schedule appointment. Also due for fasting blood work.    Bradley Hopkins DO  10/21/2020 11:08 PM

## 2020-10-23 DIAGNOSIS — K21.9 GASTROESOPHAGEAL REFLUX DISEASE WITHOUT ESOPHAGITIS: ICD-10-CM

## 2020-10-23 RX ORDER — PANTOPRAZOLE SODIUM 40 MG/1
TABLET, DELAYED RELEASE ORAL
Qty: 90 TABLET | Refills: 3 | Status: SHIPPED | OUTPATIENT
Start: 2020-10-23 | End: 2021-10-26

## 2020-10-24 DIAGNOSIS — I10 HYPERTENSION GOAL BP (BLOOD PRESSURE) < 140/90: ICD-10-CM

## 2020-10-26 NOTE — TELEPHONE ENCOUNTER
K[facility]    Routing refill request to provider for review/approval because:  Labs not current:  Wilbur GREWALN, RN, PHN

## 2020-10-28 ENCOUNTER — VIRTUAL VISIT (OUTPATIENT)
Dept: FAMILY MEDICINE | Facility: CLINIC | Age: 85
End: 2020-10-28
Payer: COMMERCIAL

## 2020-10-28 DIAGNOSIS — R50.9 SUBJECTIVE FEVER: ICD-10-CM

## 2020-10-28 DIAGNOSIS — R05.9 COUGH: Primary | ICD-10-CM

## 2020-10-28 PROCEDURE — 99213 OFFICE O/P EST LOW 20 MIN: CPT | Mod: TEL | Performed by: FAMILY MEDICINE

## 2020-10-28 RX ORDER — POTASSIUM CHLORIDE 1500 MG/1
TABLET, EXTENDED RELEASE ORAL
Qty: 90 TABLET | Refills: 0 | Status: SHIPPED | OUTPATIENT
Start: 2020-10-28 | End: 2021-01-19

## 2020-10-28 RX ORDER — IPRATROPIUM BROMIDE AND ALBUTEROL SULFATE 2.5; .5 MG/3ML; MG/3ML
1 SOLUTION RESPIRATORY (INHALATION) EVERY 6 HOURS PRN
Qty: 30 VIAL | Refills: 1 | Status: SHIPPED | OUTPATIENT
Start: 2020-10-28 | End: 2022-01-24

## 2020-10-28 NOTE — PROGRESS NOTES
"Manfred Caraballo is a 85 year old male who is being evaluated via a billable telephone visit.      The patient has been notified of following:     \"This telephone visit will be conducted via a call between you and your physician/provider. We have found that certain health care needs can be provided without the need for a physical exam.  This service lets us provide the care you need with a short phone conversation.  If a prescription is necessary we can send it directly to your pharmacy.  If lab work is needed we can place an order for that and you can then stop by our lab to have the test done at a later time.    Telephone visits are billed at different rates depending on your insurance coverage. During this emergency period, for some insurers they may be billed the same as an in-person visit.  Please reach out to your insurance provider with any questions.    If during the course of the call the physician/provider feels a telephone visit is not appropriate, you will not be charged for this service.\"    Patient has given verbal consent for Telephone visit?  Yes    What phone number would you like to be contacted at? [phone]- this is Phuc's number who is patient son and with him and speaks english.    How would you like to obtain your AVS? Mail a copy    Subjective     Manfred Caraballo is a 85 year old male who presents via phone visit today for the following health issues:    HPI     Acute Illness  Acute illness concerns: Fever  Onset/Duration: x 1 day ago   Symptoms:  Fever: YES, subjective but normal over night  Chills/Sweats:   Headache (location?): no  Sinus Pressure: no  Conjunctivitis:  no  Ear Pain: no  Rhinorrhea: no  Congestion: no  Sore Throat: YES  Cough: YES - sore throat -- occasional cough  Wheeze: YES- not new always has  Decreased Appetite: YES  Nausea: no  Vomiting: no  Diarrhea: no  Dysuria/Freq.: no  Dysuria or Hematuria: no  Fatigue/Achiness: no  Sick/Strep Exposure: no  Therapies tried and " outcome: Tylenol.      Yesterday morning, started with fever and chills. Son gave him some Tylenol and Theraflu. He ate some breakfast today -- appetite improved. He is feeling better today.            Review of Systems   Constitutional, HEENT, cardiovascular, pulmonary, gi and gu systems are negative, except as otherwise noted.       Objective          Vitals:  No vitals were obtained today due to virtual visit.    healthy, alert and no distress  PSYCH: Alert and oriented times 3; coherent speech, normal   rate and volume, able to articulate logical thoughts, able   to abstract reason, no tangential thoughts, no hallucinations   or delusions  His affect is normal  RESP: No cough, no audible wheezing, able to talk in full sentences  Remainder of exam unable to be completed due to telephone visits          Assessment/Plan:    Assessment & Plan     Manfred has 2 day history of mild cough and subjective fevers. He normally attends adult  but no report of illness spreading there. Given symptoms, we will check COVID-19 test. He does have history of COPD which is controlled without daily inhalers. Will refill nebulizer treatments to have on hand. He is going to stay home from adult day care for the next 10 days.    Cough  - Symptomatic COVID-19 Virus (Coronavirus) by PCR; Future  - ipratropium - albuterol 0.5 mg/2.5 mg/3 mL (DUONEB) 0.5-2.5 (3) MG/3ML neb solution; Take 1 vial (3 mLs) by nebulization every 6 hours as needed for shortness of breath / dyspnea or wheezing    Subjective fever  - Symptomatic COVID-19 Virus (Coronavirus) by PCR; Future            Return if symptoms worsen or fail to improve.    Bradley Hopkins, [facility]    Phone call duration:  9 minutes

## 2020-10-29 DIAGNOSIS — R50.9 SUBJECTIVE FEVER: ICD-10-CM

## 2020-10-29 DIAGNOSIS — R05.9 COUGH: ICD-10-CM

## 2020-10-29 PROCEDURE — U0003 INFECTIOUS AGENT DETECTION BY NUCLEIC ACID (DNA OR RNA); SEVERE ACUTE RESPIRATORY SYNDROME CORONAVIRUS 2 (SARS-COV-2) (CORONAVIRUS DISEASE [COVID-19]), AMPLIFIED PROBE TECHNIQUE, MAKING USE OF HIGH THROUGHPUT TECHNOLOGIES AS DESCRIBED BY CMS-2020-01-R: HCPCS | Performed by: FAMILY MEDICINE

## 2020-10-30 DIAGNOSIS — I10 HYPERTENSION GOAL BP (BLOOD PRESSURE) < 140/90: ICD-10-CM

## 2020-10-30 RX ORDER — POTASSIUM CHLORIDE 1500 MG/1
TABLET, EXTENDED RELEASE ORAL
Qty: 90 TABLET | Refills: 0 | OUTPATIENT
Start: 2020-10-30

## 2020-10-31 ENCOUNTER — TELEPHONE (OUTPATIENT)
Dept: URGENT CARE | Facility: RETAIL CLINIC | Age: 85
End: 2020-10-31

## 2020-10-31 LAB
SARS-COV-2 RNA SPEC QL NAA+PROBE: ABNORMAL
SPECIMEN SOURCE: ABNORMAL

## 2020-10-31 NOTE — TELEPHONE ENCOUNTER
Coronavirus (COVID-19) Notification    Reason for call  Notify of POSITIVE  COVID-19 lab result, assess symptoms,  review [facility] recommendations    Lab Result   Lab test for 2019-nCoV rRt-PCR or SARS-COV-2 PCR  Oropharyngeal AND/OR nasopharyngeal swabs were POSITIVE for 2019-nCoV RNA [OR] SARS-COV-2 RNA (COVID-19) RNA     We have been unable to reach Patient by phone at this time to notify of their Positive COVID-19 result.  Phone number in chart is not accurate.  POSITIVE COVID-19 Letter sent.    [Name]  Ivelisse Dao LPN

## 2020-12-02 ENCOUNTER — TELEPHONE (OUTPATIENT)
Dept: FAMILY MEDICINE | Facility: CLINIC | Age: 85
End: 2020-12-02

## 2020-12-02 NOTE — TELEPHONE ENCOUNTER
Reason for Call:  Form, our goal is to have forms completed with 72 hours, however, some forms may require a visit or additional information.    Type of letter, form or note:  Home Health Certification    Who is the form from?: Home care    Where did the form come from: form was faxed in    What clinic location was the form placed at?: [facility]    Where the form was placed: Given to MA/RN    What number is listed as a contact on the form?:        Additional comments:     Call taken on 12/2/2020 at 9:26 AM by Bell Reid

## 2020-12-02 NOTE — TELEPHONE ENCOUNTER
MED REC DONE     Discrepancies:      Acetaminophen            Epic: 500 mg TAKE 1-2 TABLETS BY MOUTH EVERY 6 HOURS   AS  NEEDED FOR MILD PAIN           Form:  325 mg tablet 2 tablets prn as needed q 6 hr       Atorvastatin    Epic: 10 mg Take 1 tablet (10 mg) by mouth daily    Form: 20 mg Oral 0.5 tablet (10 mg) by mouth daily       Glimepiride   Epic: 1 mg TAKE 1 TABLET (1 MG) BY MOUTH EVERY MORNING         (BEFORE BREAKFAST)    Form: 2 mg Tale 0.5 tab daily       Hydrocortisone Valerate 0.2%   Epic:  APPLY SPARINGLY TO AFFECTED AREA THREE TIMES  DAILY FOR 14 DAYS. Last ordered 2017      Form:  Hydrocortisone 2% External 30 gm daily       Losartan Potassium-hydrochlorothiazide   Epic: 50-12.5 MG TAKE 2 TABLETS BY MOUTH EVERY DAY   Form: 100-25 mg oral 1 tablet daily    Meds on Epic but NOT  on Form:         Polyethylene Glycol 3350 MIRALAX 17 GM/Dose MIX 17 GRAMS (1 CAPFUL) OF POWDER IN 8 OUNCES LIQUID AND DRINK ONCE DAILY       Potassium Chloride Senia ER KLOR-CON 20 MEQ TAKE 1 TABLET BY MOUTH DAILY         Meds on Form but NOT on Epic :     None      Routing to PCP for further review/recommendations/orders    Home agency needs to correct PCP on their form.   Paper copy given to Dr. Kellie Garcia R.N.

## 2020-12-03 ENCOUNTER — MEDICAL CORRESPONDENCE (OUTPATIENT)
Dept: HEALTH INFORMATION MANAGEMENT | Facility: CLINIC | Age: 85
End: 2020-12-03

## 2020-12-08 ENCOUNTER — TELEPHONE (OUTPATIENT)
Dept: FAMILY MEDICINE | Facility: CLINIC | Age: 85
End: 2020-12-08

## 2020-12-08 NOTE — TELEPHONE ENCOUNTER
Date Forms was received: December 8, 2020    Forms received by: Fax    Purpose of Form:  Home care orders    When the form is due:  ASAP    How the form needs to be returned for patient:  Fax    Form currently placed  SW inbox

## 2020-12-14 ENCOUNTER — TELEPHONE (OUTPATIENT)
Dept: FAMILY MEDICINE | Facility: CLINIC | Age: 85
End: 2020-12-14

## 2020-12-14 NOTE — TELEPHONE ENCOUNTER
Completed forms faxed back to [facility] at [phone].   Originals sent to be scanned.       Maribell Arenas

## 2020-12-14 NOTE — TELEPHONE ENCOUNTER
Reason for Call:  Form, our goal is to have forms completed with 72 hours, however, some forms may require a visit or additional information.    Type of letter, form or note:  Home Health Certification    Who is the form from?: Home care    Where did the form come from: form was faxed in    What clinic location was the form placed at?: [place]    Where the form was placed: Given to MA/RN/on [place] side    What number is listed as a contact on the form?: Fax to [phone]       Additional comments:     Call taken on 12/14/2020 at 2:56 PM by Rosalinda Wiseman LPN

## 2020-12-16 NOTE — TELEPHONE ENCOUNTER
MED REC DONE     Discrepancies:      acetaminophen            Epic: 500 mg tablet take 1-2 tabs by mouth every 6 hours for mild   pain            Form:  325 mg tab, oral 2 PRN/as needed Q6 hr pain       Meds on Epic but NOT  on Form:       NA       Meds on Form but NOT on Epic :     NA       Routing to PCP for further review/recommendations/orders    Form placed in MD [facility] form basket     Paradise Khan RN, BSN  [place] [facility]

## 2020-12-17 ENCOUNTER — MEDICAL CORRESPONDENCE (OUTPATIENT)
Dept: HEALTH INFORMATION MANAGEMENT | Facility: CLINIC | Age: 85
End: 2020-12-17

## 2020-12-17 NOTE — TELEPHONE ENCOUNTER
Patient's form signed, dated, and placed in TC basket.    Bradley Hopkins DO  12/17/2020 7:14 AM

## 2020-12-23 ENCOUNTER — TELEPHONE (OUTPATIENT)
Dept: FAMILY MEDICINE | Facility: CLINIC | Age: 85
End: 2020-12-23

## 2020-12-23 NOTE — TELEPHONE ENCOUNTER
Reason for call:  Form   Our goal is to have forms completed within 72 hours, however some forms may require a visit or additional information.     Who is the form from? Home care  Where did the form come from? form was faxed in  What clinic location was the form placed at? [place]  Where was the form placed? Given to AMARA Urbina

## 2020-12-23 NOTE — TELEPHONE ENCOUNTER
MED REC DONE     Discrepancies:     acetaminophen            Epic: 500 mg tablet take 1-2 tabs by mouth every 6 hours for   mild pain            Form:  325 mg tab, oral 2 PRN/as needed Q6 hr pain            Meds on Epic but NOT  on Form:         KLOR-CON 20 MEQ CR tablet 90 tablet 0 10/28/2020  No   Sig: TAKE 1 TABLET BY MOUTH DAILY   Sent to pharmacy as: Klor-Con M20 20 MEQ Oral Tablet Extended Release (potassium chloride ER)     polyethylene glycol (MIRALAX/GLYCOLAX) powder 527 g 3 2/4/2020  No   Sig: MIX 17 GRAMS (1 CAPFUL) OF POWDER IN 8 OUNCES LIQUID AND DRINK ONCE DAILY   Sent to pharmacy as: polyethylene glycol (MIRALAX/GLYCOLAX) powder   Class: E-Prescribe           Meds on Form but NOT on Epic :     NA       Routing to PCP for further review/recommendations/orders              Form placed in PCPs basket     Paradise Khan RN, BSN  [facility]

## 2020-12-28 ENCOUNTER — MEDICAL CORRESPONDENCE (OUTPATIENT)
Dept: HEALTH INFORMATION MANAGEMENT | Facility: CLINIC | Age: 85
End: 2020-12-28

## 2020-12-29 ENCOUNTER — MEDICAL CORRESPONDENCE (OUTPATIENT)
Dept: HEALTH INFORMATION MANAGEMENT | Facility: CLINIC | Age: 85
End: 2020-12-29

## 2020-12-29 NOTE — TELEPHONE ENCOUNTER
Patient's form signed, dated, and placed in TC basket.    Bradley Hopkins DO  12/29/2020 12:36 PM

## 2021-01-04 DIAGNOSIS — M25.562 CHRONIC PAIN OF LEFT KNEE: ICD-10-CM

## 2021-01-04 DIAGNOSIS — G89.29 CHRONIC PAIN OF LEFT KNEE: ICD-10-CM

## 2021-01-06 RX ORDER — ACETAMINOPHEN 500 MG
TABLET ORAL
Qty: 360 TABLET | Refills: 3 | Status: SHIPPED | OUTPATIENT
Start: 2021-01-06 | End: 2022-04-22

## 2021-01-06 NOTE — TELEPHONE ENCOUNTER
Routing refill request to provider for review/approval because:  Drug not on the [facility] refill protocol   RN unable to CARMINA BlakeN, RN  [facility]

## 2021-01-18 ENCOUNTER — TELEPHONE (OUTPATIENT)
Dept: FAMILY MEDICINE | Facility: CLINIC | Age: 86
End: 2021-01-18

## 2021-01-18 NOTE — TELEPHONE ENCOUNTER
Reason for Call:  Form, our goal is to have forms completed with 72 hours, however, some forms may require a visit or additional information.    Type of letter, form or note:  medical    Who is the form from?: Home care    Where did the form come from: form was faxed in    What clinic location was the form placed at?: [place]    Where the form was placed: Dr Hopkins Box/Folder    What number is listed as a contact on the form?: [phone]       Additional comments:     Call taken on 1/18/2021 at 8:30 AM by Colleen Lemos

## 2021-01-19 ENCOUNTER — TELEPHONE (OUTPATIENT)
Dept: FAMILY MEDICINE | Facility: CLINIC | Age: 86
End: 2021-01-19

## 2021-01-19 ENCOUNTER — OFFICE VISIT (OUTPATIENT)
Dept: FAMILY MEDICINE | Facility: CLINIC | Age: 86
End: 2021-01-19
Payer: COMMERCIAL

## 2021-01-19 ENCOUNTER — MEDICAL CORRESPONDENCE (OUTPATIENT)
Dept: HEALTH INFORMATION MANAGEMENT | Facility: CLINIC | Age: 86
End: 2021-01-19

## 2021-01-19 VITALS
OXYGEN SATURATION: 96 % | DIASTOLIC BLOOD PRESSURE: 68 MMHG | SYSTOLIC BLOOD PRESSURE: 124 MMHG | HEIGHT: 65 IN | WEIGHT: 158 LBS | TEMPERATURE: 97.3 F | BODY MASS INDEX: 26.33 KG/M2 | HEART RATE: 85 BPM

## 2021-01-19 DIAGNOSIS — G89.29 CHRONIC PAIN OF BOTH KNEES: ICD-10-CM

## 2021-01-19 DIAGNOSIS — M25.562 CHRONIC PAIN OF BOTH KNEES: ICD-10-CM

## 2021-01-19 DIAGNOSIS — D64.9 ANEMIA, UNSPECIFIED TYPE: ICD-10-CM

## 2021-01-19 DIAGNOSIS — Z91.81 AT RISK FOR FALLS: ICD-10-CM

## 2021-01-19 DIAGNOSIS — E78.5 HYPERLIPIDEMIA WITH TARGET LDL LESS THAN 100: ICD-10-CM

## 2021-01-19 DIAGNOSIS — M23.204 OLD TEAR OF MEDIAL MENISCUS OF LEFT KNEE, UNSPECIFIED TEAR TYPE: ICD-10-CM

## 2021-01-19 DIAGNOSIS — I10 HYPERTENSION GOAL BP (BLOOD PRESSURE) < 140/90: ICD-10-CM

## 2021-01-19 DIAGNOSIS — E11.21 TYPE 2 DIABETES MELLITUS WITH DIABETIC NEPHROPATHY, WITHOUT LONG-TERM CURRENT USE OF INSULIN (H): ICD-10-CM

## 2021-01-19 DIAGNOSIS — R26.89 BALANCE PROBLEM: ICD-10-CM

## 2021-01-19 DIAGNOSIS — Z00.00 ENCOUNTER FOR MEDICARE ANNUAL WELLNESS EXAM: Primary | ICD-10-CM

## 2021-01-19 DIAGNOSIS — I48.91 ATRIAL FIBRILLATION, UNSPECIFIED TYPE (H): ICD-10-CM

## 2021-01-19 DIAGNOSIS — J44.9 CHRONIC OBSTRUCTIVE PULMONARY DISEASE, UNSPECIFIED COPD TYPE (H): ICD-10-CM

## 2021-01-19 DIAGNOSIS — M25.561 CHRONIC PAIN OF BOTH KNEES: ICD-10-CM

## 2021-01-19 LAB — HBA1C MFR BLD: 7.4 % (ref 0–5.6)

## 2021-01-19 PROCEDURE — 99214 OFFICE O/P EST MOD 30 MIN: CPT | Mod: 25 | Performed by: FAMILY MEDICINE

## 2021-01-19 PROCEDURE — 82043 UR ALBUMIN QUANTITATIVE: CPT | Performed by: FAMILY MEDICINE

## 2021-01-19 PROCEDURE — 99397 PER PM REEVAL EST PAT 65+ YR: CPT | Performed by: FAMILY MEDICINE

## 2021-01-19 PROCEDURE — 99207 PR FOOT EXAM NO CHARGE: CPT | Mod: 25 | Performed by: FAMILY MEDICINE

## 2021-01-19 PROCEDURE — 36415 COLL VENOUS BLD VENIPUNCTURE: CPT | Performed by: FAMILY MEDICINE

## 2021-01-19 PROCEDURE — 80061 LIPID PANEL: CPT | Performed by: FAMILY MEDICINE

## 2021-01-19 PROCEDURE — 80053 COMPREHEN METABOLIC PANEL: CPT | Performed by: FAMILY MEDICINE

## 2021-01-19 PROCEDURE — 83036 HEMOGLOBIN GLYCOSYLATED A1C: CPT | Performed by: FAMILY MEDICINE

## 2021-01-19 RX ORDER — LOSARTAN POTASSIUM AND HYDROCHLOROTHIAZIDE 12.5; 5 MG/1; MG/1
TABLET ORAL
Qty: 180 TABLET | Refills: 3 | Status: SHIPPED | OUTPATIENT
Start: 2021-01-19 | End: 2022-02-07

## 2021-01-19 RX ORDER — GLIMEPIRIDE 1 MG/1
1 TABLET ORAL
Qty: 90 TABLET | Refills: 1 | Status: SHIPPED | OUTPATIENT
Start: 2021-01-19 | End: 2021-09-13

## 2021-01-19 RX ORDER — INFLUENZA A VIRUS A/MICHIGAN/45/2015 X-275 (H1N1) ANTIGEN (FORMALDEHYDE INACTIVATED), INFLUENZA A VIRUS A/SINGAPORE/INFIMH-16-0019/2016 IVR-186 (H3N2) ANTIGEN (FORMALDEHYDE INACTIVATED), INFLUENZA B VIRUS B/PHUKET/3073/2013 ANTIGEN (FORMALDEHYDE INACTIVATED), AND INFLUENZA B VIRUS B/MARYLAND/15/2016 BX-69A ANTIGEN (FORMALDEHYDE INACTIVATED) 60; 60; 60; 60 UG/.7ML; UG/.7ML; UG/.7ML; UG/.7ML
INJECTION, SUSPENSION INTRAMUSCULAR
COMMUNITY
Start: 2020-10-17 | End: 2021-01-19

## 2021-01-19 RX ORDER — POTASSIUM CHLORIDE 1500 MG/1
20 TABLET, EXTENDED RELEASE ORAL DAILY
Qty: 90 TABLET | Refills: 3 | Status: SHIPPED | OUTPATIENT
Start: 2021-01-19 | End: 2022-03-01

## 2021-01-19 RX ORDER — ATORVASTATIN CALCIUM 10 MG/1
10 TABLET, FILM COATED ORAL DAILY
Qty: 90 TABLET | Refills: 3 | Status: SHIPPED | OUTPATIENT
Start: 2021-01-19 | End: 2022-02-07

## 2021-01-19 ASSESSMENT — MIFFLIN-ST. JEOR: SCORE: 1328.56

## 2021-01-19 NOTE — TELEPHONE ENCOUNTER
Reason for Call:  Form, our goal is to have forms completed with 72 hours, however, some forms may require a visit or additional information.    Type of letter, form or note:  medical    Who is the form from?: [facility] (if other please explain)    Where did the form come from: form was faxed in    What clinic location was the form placed at?: [place]    Where the form was placed: Dr Menendze Box/Folder    What number is listed as a contact on the form?: [phone]       Additional comments:     Call taken on 1/19/2021 at 8:54 AM by Colleen Lemos

## 2021-01-19 NOTE — LETTER
[facility]  [address] [place] [address]  [phone]                    January 21, 2021    Manfred Caraballo  [id number] [id number]      Dear Manfred,    Here is a summary of your recent test results:    -Cholesterol levels are at your goal levels.  ADVISE: continuing your medication, a regular exercise program with at least 150 minutes of aerobic exercise per week, and eating a low saturated fat/low carbohydrate diet.  Also, you should recheck this fasting cholesterol panel in 12 months.   -Liver and gallbladder tests (ALT,AST, Alk phos,bilirubin) are normal.   -Kidney function (GFR) is decreased.  ADVISE: make sure you are drinking plenty of water and we will recheck your kidney function in 3 months   -Sodium is normal.   -Potassium is normal.   -Calcium is normal.   -Glucose is elevated due to your diabetes.   -A1C (test of diabetes control the last 2-3 months) is at your goal, however, has increased from 1 year ago. Please continue with your current plan but also make sure you continue to closely monitor carbohydrate intake. Also, you should make an appointment to see me and recheck your A1C test in 3 months.   -Microalbumin (urine protein) level is elevated. 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  ADVISE: avoiding anti-inflamatory agents such as ibuprofen (Advil, Motrin) or naproxen (Aleve) as much as possible, keeping your blood pressure in a normal range, and continuing your medication (losartan) that helps protect your kidneys.  Also, this should be rechecked in 1 year.     Your test results are enclosed.      Please contact me if you have any questions.    In addition, here is a list of due or overdue Health Maintenance reminders.    Health Maintenance Due   Topic Date Due     ANNUAL REVIEW OF HM ORDERS  1935     Zoster (Shingles) Vaccine (1 of 2) 02/13/1985     Eye Exam  07/12/2018       Please call  us at [phone] (or use [employer]) to address the above recommendations.            Thank you very much for trusting [facility] [place]     Healthy regards,    Dr. Bradley Hopkins, DO           Results for orders placed or performed in visit on 01/19/21   Albumin Random Urine Quantitative with Creat Ratio     Status: Abnormal   Result Value Ref Range    Creatinine Urine 33 mg/dL    Albumin Urine mg/L 35 mg/L    Albumin Urine mg/g Cr 103.90 (H) 0 - 17 mg/g Cr   Lipid panel reflex to direct LDL Fasting     Status: Abnormal   Result Value Ref Range    Cholesterol 155 <200 mg/dL    Triglycerides 267 (H) <150 mg/dL    HDL Cholesterol 38 (L) >39 mg/dL    LDL Cholesterol Calculated 64 <100 mg/dL    Non HDL Cholesterol 117 <130 mg/dL   **A1C FUTURE anytime     Status: Abnormal   Result Value Ref Range    Hemoglobin A1C 7.4 (H) 0 - 5.6 %   Comprehensive metabolic panel (BMP + Alb, Alk Phos, ALT, AST, Total. Bili, TP)     Status: Abnormal   Result Value Ref Range    Sodium 137 133 - 144 mmol/L    Potassium 4.2 3.4 - 5.3 mmol/L    Chloride 101 94 - 109 mmol/L    Carbon Dioxide 28 20 - 32 mmol/L    Anion Gap 8 3 - 14 mmol/L    Glucose 107 (H) 70 - 99 mg/dL    Urea Nitrogen 25 7 - 30 mg/dL    Creatinine 1.26 (H) 0.66 - 1.25 mg/dL    GFR Estimate 51 (L) >60 mL/min/[1.73_m2]    GFR Estimate If Black 59 (L) >60 mL/min/[1.73_m2]    Calcium 9.7 8.5 - 10.1 mg/dL    Bilirubin Total 0.5 0.2 - 1.3 mg/dL    Albumin 4.1 3.4 - 5.0 g/dL    Protein Total 8.1 6.8 - 8.8 g/dL    Alkaline Phosphatase 63 40 - 150 U/L    ALT 22 0 - 70 U/L    AST 12 0 - 45 U/L

## 2021-01-19 NOTE — PROGRESS NOTES
"        SUBJECTIVE:   Manfred Caraballo is a 85 year old male who presents for Preventive Visit.      Patient has been advised of split billing requirements and indicates understanding: Yes  Are you in the first 12 months of your Medicare Part B coverage?  No    Physical Health:    In general, how would you rate your overall physical health? good    Outside of work, how many days during the week do you exercise? 4-5 days/week    Outside of work, approximately how many minutes a day do you exercise?30-45 minutes    If you drink alcohol do you typically have >3 drinks per day or >7 drinks per week? No    Do you usually eat at least 4 servings of fruit and vegetables a day, include whole grains & fiber and avoid regularly eating high fat or \"junk\" foods? Yes    Do you have any problems taking medications regularly?  No    Do you have any side effects from medications? none    Needs assistance for the following daily activities: no assistance needed    Which of the following safety concerns are present in your home?  none identified     Hearing impairment: No    In the past 6 months, have you been bothered by leaking of urine? yes    Mental Health:    In general, how would you rate your overall mental or emotional health? good  PHQ-2 Score:      Do you feel safe in your environment? YES  NoHave you ever done Advance Care Planning? (For example, a Health Directive, POLST, or a discussion with a medical provider or your loved ones about your wishes): No, advance care planning information given to patient to review.  Patient plans to discuss their wishes with loved ones or provider.      Additional concerns to address?  No    Fall risk:  Fallen 2 or more times in the past year?: No  Any fall with injury in the past year?: No  click delete button to remove this line now  Cognitive Screenin) Repeat 3 items (Leader, Season, Table)      2) Clock draw:   ABNORMAL   3) 3 item recall:   Recalls 2 objects   Results: done with " sonu     [facility] Copyright DEBBIE Guzman. Licensed by the author for use in [facility]; reprinted with permission (fina@.[facility]). All rights reserved.      Do you have sleep apnea, excessive snoring or daytime drowsiness?: no      Diabetes Follow-up    How often are you checking your blood sugar? One time daily  What time of day are you checking your blood sugars (select all that apply)?  Before and after meals  Have you had any blood sugars above 200?  No  Have you had any blood sugars below 70?  No    What symptoms do you notice when your blood sugar is low?  None    What concerns do you have today about your diabetes? None     Do you have any of these symptoms? (Select all that apply)  No numbness or tingling in feet.  No redness, sores or blisters on feet.  No complaints of excessive thirst.  No reports of blurry vision.  No significant changes to weight.    Have you had a diabetic eye exam in the last 12 months? Yes- Date of last eye exam: June 2020,  [place] [facility] [place]     Usually fasting glucose is around 120.             Hyperlipidemia Follow-Up      Are you regularly taking any medication or supplement to lower your cholesterol?   Yes- Statin    Are you having muscle aches or other side effects that you think could be caused by your cholesterol lowering medication?  No    Hypertension Follow-up      Do you check your blood pressure regularly outside of the clinic? Yes     Are you following a low salt diet? Yes    Are your blood pressures ever more than 140 on the top number (systolic) OR more   than 90 on the bottom number (diastolic), for example 140/90? Yes     Denies any headaches, lightheadedness, dizziness, vision changes, chest pain, palpitations, shortness of breath, dyspnea, numbness/tingling.      BP Readings from Last 2 Encounters:   01/19/21 124/68   02/27/20 121/68     Hemoglobin A1C (%)   Date Value   10/18/2019 6.5 (H)   04/24/2019 7.0 (H)     LDL Cholesterol Calculated  (mg/dL)   Date Value   2019 58   2018 66     Requests disability parking permit: states his balance is poor and so he uses cane to assist with ambulation.    He does utilize a cane with ambulation to help with balance. Also has bilateral knee pain. History of medial meniscus tear on left knee        Reviewed and updated as needed this visit by clinical staff  Tobacco   Meds              Reviewed and updated as needed this visit by Provider                Social History     Tobacco Use     Smoking status: Former Smoker     Packs/day: 0.50     Years: 20.00     Pack years: 10.00     Types: Cigarettes     Quit date: 1981     Years since quittin.7     Smokeless tobacco: Never Used     Tobacco comment: quit in     Substance Use Topics     Alcohol use: No     Alcohol/week: 0.0 standard drinks     Comment: quit in                            Current providers sharing in care for this patient include:   Patient Care Team:  Bradley Hopkins DO as PCP - General (Family Practice)  Bradley Hopkins DO as Assigned PCP    The following health maintenance items are reviewed in Epic and correct as of today:  Health Maintenance   Topic Date Due     ANNUAL REVIEW OF HM ORDERS  1935     ZOSTER IMMUNIZATION (1 of 2) 1985     EYE EXAM  2018     DIABETIC FOOT EXAM  10/16/2019     A1C  2020     LIPID  2020     MICROALBUMIN  2020     BMP  10/18/2020     FALL RISK ASSESSMENT  10/18/2020     MEDICARE ANNUAL WELLNESS VISIT  2022     ADVANCE CARE PLANNING  2026     DTAP/TDAP/TD IMMUNIZATION (3 - Td) 2027     SPIROMETRY  Completed     COPD ACTION PLAN  Completed     PHQ-2  Completed     INFLUENZA VACCINE  Completed     Pneumococcal Vaccine: 65+ Years  Completed     Pneumococcal Vaccine: Pediatrics (0 to 5 Years) and At-Risk Patients (6 to 64 Years)  Aged Out     IPV IMMUNIZATION  Aged Out     MENINGITIS IMMUNIZATION  Aged Out     Lab work is in  "process      ROS:  Constitutional, HEENT, cardiovascular, pulmonary, gi and gu systems are negative, except as otherwise noted.    OBJECTIVE:   /68   Pulse 85   Temp 97.3  F (36.3  C) (Tympanic)   Ht 1.651 m (5' 5\")   Wt 71.7 kg (158 lb)   SpO2 96%   BMI 26.29 kg/m   Estimated body mass index is 26.29 kg/m  as calculated from the following:    Height as of this encounter: 1.651 m (5' 5\").    Weight as of this encounter: 71.7 kg (158 lb).  EXAM:   GENERAL: healthy, alert and no distress  EYES: Eyes grossly normal to inspection, PERRL and conjunctivae and sclerae normal  HENT: ear canals and TM's normal, nose and mouth without ulcers or lesions  NECK: no adenopathy, no asymmetry, masses, or scars and thyroid normal to palpation  RESP: lungs clear to auscultation - no rales, rhonchi or wheezes  CV: regular rate and rhythm, normal S1 S2, no S3 or S4, no murmur, click or rub, no peripheral edema and peripheral pulses strong  ABDOMEN: soft, nontender, no hepatosplenomegaly, no masses and bowel sounds normal  MS: no gross musculoskeletal defects noted, no edema  SKIN: no suspicious lesions or rashes  Diabetic foot exam: normal DP and PT pulses, no trophic changes or ulcerative lesions, normal sensory exam and normal monofilament exam    Diagnostic Test Results:  Labs reviewed in Epic    ASSESSMENT / PLAN:   1. Encounter for Medicare annual wellness exam: health maintenance reviewed and updated    2. Type 2 diabetes mellitus with diabetic nephropathy, without long-term current use of insulin (H): well controlled. Continue current regimen and recheck A1c.  - Albumin Random Urine Quantitative with Creat Ratio  - **A1C FUTURE anytime  - atorvastatin (LIPITOR) 10 MG tablet; Take 1 tablet (10 mg) by mouth daily  Dispense: 90 tablet; Refill: 3  - FOOT EXAM  - EYE EXAM (SIMPLE-NONBILLABLE)  - metFORMIN (GLUCOPHAGE) 1000 MG tablet; TAKE 1 TABLET BY MOUTH TWICE A DAY WITH MEALS  Dispense: 180 tablet; Refill: 1  - " glimepiride (AMARYL) 1 MG tablet; Take 1 tablet (1 mg) by mouth every morning (before breakfast)  Dispense: 90 tablet; Refill: 1  - Comprehensive metabolic panel (BMP + Alb, Alk Phos, ALT, AST, Total. Bili, TP)    3. Hypertension goal BP (blood pressure) < 140/90: At goal. Continue current regimen.  - losartan-hydrochlorothiazide (HYZAAR) 50-12.5 MG tablet; TAKE 2 TABLETS BY MOUTH EVERY DAY  Dispense: 180 tablet; Refill: 3  - potassium chloride ER (KLOR-CON) 20 MEQ CR tablet; Take 1 tablet (20 mEq) by mouth daily  Dispense: 90 tablet; Refill: 3  - Comprehensive metabolic panel (BMP + Alb, Alk Phos, ALT, AST, Total. Bili, TP)    4. Hyperlipidemia with target LDL less than 100: stable, continue atorvastatin.  - Lipid panel reflex to direct LDL Fasting  - atorvastatin (LIPITOR) 10 MG tablet; Take 1 tablet (10 mg) by mouth daily  Dispense: 90 tablet; Refill: 3  - Comprehensive metabolic panel (BMP + Alb, Alk Phos, ALT, AST, Total. Bili, TP)    5. Anemia, unspecified type  - atorvastatin (LIPITOR) 10 MG tablet; Take 1 tablet (10 mg) by mouth daily  Dispense: 90 tablet; Refill: 3    6. Atrial fibrillation, unspecified type (H)  - rivaroxaban ANTICOAGULANT (XARELTO ANTICOAGULANT) 20 MG TABS tablet; TAKE 1 TABLET BY MOUTH DAILY WITH DINNER  Dispense: 90 tablet; Refill: 3    7. Balance problem    8. At risk for falls: utilizes cane for ambulation due to balance problems and pain in knee. Form filled out for disability parking placard.    9. Chronic pain of both knees    10. Old tear of medial meniscus of left knee, unspecified tear type    11. Chronic obstructive pulmonary disease, unspecified COPD type (H): stable, continue PRN inhaler and nebulizer      Patient has been advised of split billing requirements and indicates understanding: Yes    COUNSELING:  Reviewed preventive health counseling, as reflected in patient instructions       Regular exercise       Healthy diet/nutrition    Estimated body mass index is 26.29  "kg/m  as calculated from the following:    Height as of this encounter: 1.651 m (5' 5\").    Weight as of this encounter: 71.7 kg (158 lb).        He reports that he quit smoking about 39 years ago. His smoking use included cigarettes. He has a 10.00 pack-year smoking history. He has never used smokeless tobacco.    Appropriate preventive services were discussed with this patient, including applicable screening as appropriate for cardiovascular disease, diabetes, osteopenia/osteoporosis, and glaucoma.  As appropriate for age/gender, discussed screening for colorectal cancer, prostate cancer, breast cancer, and cervical cancer. Checklist reviewing preventive services available has been given to the patient.    Reviewed patients plan of care and provided an AVS. The Basic Care Plan (routine screening as documented in Health Maintenance) for Manfred meets the Care Plan requirement. This Care Plan has been established and reviewed with the Patient.    Counseling Resources:  ATP IV Guidelines  Pooled Cohorts Equation Calculator  Breast Cancer Risk Calculator  BRCA-Related Cancer Risk Assessment: FHS-7 Tool  FRAX Risk Assessment  ICSI Preventive Guidelines  Dietary Guidelines for Americans, 2010  USDA's MyPlate  ASA Prophylaxis  Lung CA Screening    Bradley Hopkins DO  [facility]  "

## 2021-01-19 NOTE — PATIENT INSTRUCTIONS
Patient Education   Personalized Prevention Plan  You are due for the preventive services outlined below.  Your care team is available to assist you in scheduling these services.  If you have already completed any of these items, please share that information with your care team to update in your medical record.  Health Maintenance Due   Topic Date Due     ANNUAL REVIEW OF HM ORDERS  1935     Zoster (Shingles) Vaccine (1 of 2) 02/13/1985     Eye Exam  07/12/2018     Diabetic Foot Exam  10/16/2019     A1C Lab  04/18/2020     Cholesterol Lab  04/24/2020     Kidney Microalbumin Urine Test  04/24/2020     Basic Metabolic Panel  10/18/2020     FALL RISK ASSESSMENT  10/18/2020           Please schedule routine follow up with cardiology per Dr. Au's instructions from lat year.

## 2021-01-19 NOTE — TELEPHONE ENCOUNTER
Patient's form signed, dated, and placed in TC basket.    Bradley Hopkins DO  1/19/2021 12:35 PM

## 2021-01-20 LAB
ALBUMIN SERPL-MCNC: 4.1 G/DL (ref 3.4–5)
ALP SERPL-CCNC: 63 U/L (ref 40–150)
ALT SERPL W P-5'-P-CCNC: 22 U/L (ref 0–70)
ANION GAP SERPL CALCULATED.3IONS-SCNC: 8 MMOL/L (ref 3–14)
AST SERPL W P-5'-P-CCNC: 12 U/L (ref 0–45)
BILIRUB SERPL-MCNC: 0.5 MG/DL (ref 0.2–1.3)
BUN SERPL-MCNC: 25 MG/DL (ref 7–30)
CALCIUM SERPL-MCNC: 9.7 MG/DL (ref 8.5–10.1)
CHLORIDE SERPL-SCNC: 101 MMOL/L (ref 94–109)
CHOLEST SERPL-MCNC: 155 MG/DL
CO2 SERPL-SCNC: 28 MMOL/L (ref 20–32)
CREAT SERPL-MCNC: 1.26 MG/DL (ref 0.66–1.25)
CREAT UR-MCNC: 33 MG/DL
GFR SERPL CREATININE-BSD FRML MDRD: 51 ML/MIN/{1.73_M2}
GLUCOSE SERPL-MCNC: 107 MG/DL (ref 70–99)
HDLC SERPL-MCNC: 38 MG/DL
LDLC SERPL CALC-MCNC: 64 MG/DL
MICROALBUMIN UR-MCNC: 35 MG/L
MICROALBUMIN/CREAT UR: 103.9 MG/G CR (ref 0–17)
NONHDLC SERPL-MCNC: 117 MG/DL
POTASSIUM SERPL-SCNC: 4.2 MMOL/L (ref 3.4–5.3)
PROT SERPL-MCNC: 8.1 G/DL (ref 6.8–8.8)
SODIUM SERPL-SCNC: 137 MMOL/L (ref 133–144)
TRIGL SERPL-MCNC: 267 MG/DL

## 2021-01-20 NOTE — TELEPHONE ENCOUNTER
MED REC DONE     Discrepancies:      Acetaminophen            Epic: 500 mg 1-2 tabs by mouth every 6 hours as needed for mild pain            Form:  325 mg oral 2 PRN/as needed q6 hr pain           Meds on Epic but NOT  on Form:         polyethylene glycol (MIRALAX/GLYCOLAX) powder 527 g 3 2/4/2020  No   Sig: MIX 17 GRAMS (1 CAPFUL) OF POWDER IN 8 OUNCES LIQUID AND DRINK ONCE DAILY   Sent to pharmacy as: polyethylene glycol (MIRALAX/GLYCOLAX) powder     potassium chloride ER (KLOR-CON) 20 MEQ CR tablet 90 tablet 3 1/19/2021  No   Sig - Route: Take 1 tablet (20 mEq) by mouth daily - Oral   Sent to pharmacy as: Potassium Chloride Senia ER 20 MEQ Oral Tablet Extended Release (KLOR-CON)           Meds on Form but NOT on Epic :           Routing to PCP for further review/recommendations/orders          Form in PCP basket     Paradise Khan RN, BSN  [place] [facility]

## 2021-01-21 NOTE — TELEPHONE ENCOUNTER
Patient's form signed, dated, and placed in TC basket.    Bradley Hopkins DO  1/21/2021 8:40 AM

## 2021-02-02 ENCOUNTER — TELEPHONE (OUTPATIENT)
Dept: FAMILY MEDICINE | Facility: CLINIC | Age: 86
End: 2021-02-02

## 2021-02-02 NOTE — TELEPHONE ENCOUNTER
Reason for Call:  Form, our goal is to have forms completed with 72 hours, however, some forms may require a visit or additional information.    Type of letter, form or note:  medical    Who is the form from?: Home care    Where did the form come from: form was faxed in    What clinic location was the form placed at?: [place]    Where the form was placed: Dr Hopkins Box/Folder    What number is listed as a contact on the form?: [phone]             Call taken on 2/2/2021 at 10:41 AM by Yue Gunderson

## 2021-02-02 NOTE — TELEPHONE ENCOUNTER
Reason for Call:  Form, our goal is to have forms completed with 72 hours, however, some forms may require a visit or additional information.    Type of letter, form or note:  medical    Who is the form from?: Home care    Where did the form come from: form was faxed in    What clinic location was the form placed at?: [place]    Where the form was placed: Dr Hopkins Box/Folder    What number is listed as a contact on the form?: [phone]       Additional comments:     Call taken on 2/2/2021 at 10:27 AM by Colleen Lemos

## 2021-02-02 NOTE — TELEPHONE ENCOUNTER
Reason for Call:  Form, our goal is to have forms completed with 72 hours, however, some forms may require a visit or additional information.    Type of letter, form or note:  medical    Who is the form from?: Home care    Where did the form come from: form was faxed in    What clinic location was the form placed at?: [place]    Where the form was placed: Dr Hopkins Box/Folder    What number is listed as a contact on the form?: [phone]       Additional comments:     Call taken on 2/2/2021 at 10:29 AM by Colleen Lemos

## 2021-02-05 ENCOUNTER — MEDICAL CORRESPONDENCE (OUTPATIENT)
Dept: HEALTH INFORMATION MANAGEMENT | Facility: CLINIC | Age: 86
End: 2021-02-05

## 2021-02-08 NOTE — TELEPHONE ENCOUNTER
Completed forms [facility]ed back to [facility]  at [phone].   Originals sent to be scanned.       Maribell Arenas

## 2021-02-08 NOTE — TELEPHONE ENCOUNTER
Completed forms faxed back to [facility] at [phone].   Originals sent to be scanned.       Maribell Arenas

## 2021-02-08 NOTE — TELEPHONE ENCOUNTER
Completed forms faxed back to [facility] at [phone].   Originals sent to be scanned.       Maribell Arenas

## 2021-02-19 ENCOUNTER — MEDICAL CORRESPONDENCE (OUTPATIENT)
Dept: HEALTH INFORMATION MANAGEMENT | Facility: CLINIC | Age: 86
End: 2021-02-19

## 2021-02-22 ENCOUNTER — TELEPHONE (OUTPATIENT)
Dept: FAMILY MEDICINE | Facility: CLINIC | Age: 86
End: 2021-02-22

## 2021-02-22 NOTE — TELEPHONE ENCOUNTER
Plan of Care from 11/16/2020-1/14/2021and   9/17/2020-11/15/2020  7/10/2020-9/16/2020    All for previous dates of service, no need to med rec.     Given to provider to sign

## 2021-02-22 NOTE — TELEPHONE ENCOUNTER
Reason for Call:  Form, our goal is to have forms completed with 72 hours, however, some forms may require a visit or additional information.    Type of letter, form or note:  medical    Who is the form from?: Home care    Where did the form come from: form was faxed in    What clinic location was the form placed at?: [place]    Where the form was placed: Bradley Hopkins Box/Folder    What number is listed as a contact on the form?: [phone]       Call taken on 2/22/2021 at 7:01 AM by Hiral Olivares

## 2021-02-23 ENCOUNTER — MEDICAL CORRESPONDENCE (OUTPATIENT)
Dept: HEALTH INFORMATION MANAGEMENT | Facility: CLINIC | Age: 86
End: 2021-02-23

## 2021-02-23 NOTE — TELEPHONE ENCOUNTER
Patient's form signed, dated, and placed in TC basket.    Bradley Hopkins DO  2/23/2021 2:30 PM

## 2021-02-24 NOTE — TELEPHONE ENCOUNTER
Completed forms faxed back to [facility] at [phone] by Bell on 2/23.      Originals sent to be scanned.       Maribell Arenas

## 2021-03-02 ENCOUNTER — IMMUNIZATION (OUTPATIENT)
Dept: PEDIATRICS | Facility: CLINIC | Age: 86
End: 2021-03-02
Payer: COMMERCIAL

## 2021-03-02 PROCEDURE — 91300 PR COVID VAC PFIZER DIL RECON 30 MCG/0.3 ML IM: CPT

## 2021-03-02 PROCEDURE — 0001A PR COVID VAC PFIZER DIL RECON 30 MCG/0.3 ML IM: CPT

## 2021-03-12 ENCOUNTER — TELEPHONE (OUTPATIENT)
Dept: FAMILY MEDICINE | Facility: CLINIC | Age: 86
End: 2021-03-12

## 2021-03-12 ENCOUNTER — MEDICAL CORRESPONDENCE (OUTPATIENT)
Dept: HEALTH INFORMATION MANAGEMENT | Facility: CLINIC | Age: 86
End: 2021-03-12

## 2021-03-12 NOTE — TELEPHONE ENCOUNTER
Reason for Call:  Form, our goal is to have forms completed with 72 hours, however, some forms may require a visit or additional information.    Type of letter, form or note:  medical    Who is the form from?: Home care    Where did the form come from: form was faxed in    What clinic location was the form placed at?: [place]    Where the form was placed: Dr Menendez Box/Folder    What number is listed as a contact on the form?: [phone]         Call taken on 3/12/2021 at 11:26 AM by Yue Gunderson

## 2021-03-17 ENCOUNTER — TELEPHONE (OUTPATIENT)
Dept: FAMILY MEDICINE | Facility: CLINIC | Age: 86
End: 2021-03-17

## 2021-03-17 NOTE — TELEPHONE ENCOUNTER
MED RECONCILIATION DONE    Discrepancies:  Epic:  acetaminophen (ACETAMINOPHEN EXTRA STRENGTH) 500 MG tablet 360 tablet 3 1/6/2021  No   Sig: TAKE 1-2 TABLETS BY MOUTH EVERY 6 HOURS AS NEEDED FOR MILD PAIN     Form: Acetaminophen 325 mg; take 2 tablets PO Q6h PRN      Meds on Epic but NOT  on Form:       potassium chloride ER (KLOR-CON) 20 MEQ CR tablet 90 tablet 3 1/19/2021  No   Sig - Route: Take 1 tablet (20 mEq) by mouth daily - Oral   Sent to pharmacy as: Potassium Chloride [place] ER 20 MEQ Oral Tablet Extended Release (KLOR-CON)     polyethylene glycol (MIRALAX/GLYCOLAX) powder 527 g 3 2/4/2020  No   Sig: MIX 17 GRAMS (1 CAPFUL) OF POWDER IN 8 OUNCES LIQUID AND DRINK ONCE DAILY         Meds on Form but NOT on Epic :    None        Form currently on [facility] Desk in [place] RN Folder  Routing to PCP for further review/recommendations/orders.        Js Chandler RN  [facility]

## 2021-03-18 ENCOUNTER — OFFICE VISIT (OUTPATIENT)
Dept: CARDIOLOGY | Facility: CLINIC | Age: 86
End: 2021-03-18
Payer: COMMERCIAL

## 2021-03-18 VITALS
WEIGHT: 158.9 LBS | OXYGEN SATURATION: 98 % | BODY MASS INDEX: 27.13 KG/M2 | SYSTOLIC BLOOD PRESSURE: 101 MMHG | HEART RATE: 62 BPM | HEIGHT: 64 IN | DIASTOLIC BLOOD PRESSURE: 63 MMHG

## 2021-03-18 DIAGNOSIS — I48.19 PERSISTENT ATRIAL FIBRILLATION (H): Primary | ICD-10-CM

## 2021-03-18 PROCEDURE — 99213 OFFICE O/P EST LOW 20 MIN: CPT | Performed by: INTERNAL MEDICINE

## 2021-03-18 PROCEDURE — 93000 ELECTROCARDIOGRAM COMPLETE: CPT | Performed by: INTERNAL MEDICINE

## 2021-03-18 ASSESSMENT — MIFFLIN-ST. JEOR: SCORE: 1311.77

## 2021-03-18 NOTE — PROGRESS NOTES
"Electrophysiology/ Clinic Note         H&P and Plan:     REASON FOR VISIT: Electrophysiology evaluation.      HISTORY OF PRESENT ILLNESS:  Ms. Caraballo is a delightful 86-year-old gentleman with a history of hypertension, hyperlipidemia, diabetes, chronic kidney disease, COPD and permanent atrial fibrillation, who is here for routine evaluation.     He is on chronic therapy of diltiazem and Xarelto.    He is here for routine follow-up.  He has no complaints during his visit and denies any symptoms such as chest pain, lightheadedness, near-syncope or syncopal episode.       Labs obtained January of this year including CBC and BMP were within normal limits.  EKG done today shows atrial fibrillation with good heart rate control (heart rate of 63 bpm).     PREVIOUS STUDIES (personally reviewed):  - Holter (2017): A. fib with good heart rate control.  Average HR of 61 bpm (range of ).    - Echo (2020): Normal LV function.  There was mil AI.      ASSESSMENT AND PLAN:   1.  Persistent atrial fibrillation.    Heart rates well controlled and he is asymptomatic.  We will continue therapy with diltiazem.     2.  Embolic prevention.  CHADS-VASc score of 4.   Continue anticoagulation with Xarelto indefinitely.  3.  Hypertension.  Blood pressure is well controlled.   4. Follow up care.  Follow up with PA in 1 year.      Tato Au MD    Physical Exam:  Vitals: /63 (BP Location: Left arm, Patient Position: Sitting, Cuff Size: Adult Large)   Pulse 62   Ht 1.626 m (5' 4\")   Wt 72.1 kg (158 lb 14.4 oz)   SpO2 98%   BMI 27.28 kg/m      Constitutional:  AAO x3.  Pt is in NAD.  HEAD: normocephalic.  SKIN: Skin normal color, texture and turgor with no lesions or eruptions.  Eyes: PERRL, EOMI.  ENT:  Supple, normal JVP. No lymphadenopathy or thyroid enlargement.  Chest:  CTAB.  Cardiac:   RRR, normal  S1 and S2.  No murmurs rubs or gallop.    Abdomen:  Normal BS.  Soft, non-tender and non-distended.  No rebound or " guarding.    Extremities:  Pedious pulses palpable B/L.  No LE edema noticed.   Neurological: Strength and sensation grossly symmetric and intact throughout.       CURRENT MEDICATIONS:  Current Outpatient Medications   Medication Sig Dispense Refill     acetaminophen (ACETAMINOPHEN EXTRA STRENGTH) 500 MG tablet TAKE 1-2 TABLETS BY MOUTH EVERY 6 HOURS AS NEEDED FOR MILD PAIN 360 tablet 3     albuterol (VENTOLIN HFA) 108 (90 Base) MCG/ACT inhaler INHALE 2 PUFFS INTO THE LUNGS EVERY 6 HOURS AS NEEDED FOR SHORTNESS OF BREATH / DYSPNEA 18 g 1     Alcohol Swabs (B-D SINGLE USE SWABS REGULAR) PADS USE TO SWAB AREA OF INJECTION/ALAN AS DIRECTED 100 each 3     ASPIRIN NOT PRESCRIBED (INTENTIONAL) not prescribed - ulcer history 1 Tab 0     atorvastatin (LIPITOR) 10 MG tablet Take 1 tablet (10 mg) by mouth daily 90 tablet 3     blood glucose calibration (NO BRAND SPECIFIED) solution Use to calibrate blood glucose monitor as needed as directed. To accompany: Blood Glucose Monitor Brands: per insurance. 1 Bottle 3     blood glucose monitoring ([employer] CONTOUR MONITOR) meter device kit Use to test blood sugars  1 times daily or as directed. 1 kit 0     blood glucose monitoring (NO BRAND SPECIFIED) test strip Use to test blood sugars 1  times daily or as directed 100 each 3     CONTOUR NEXT TEST test strip USE TO TEST BLOOD SUGAR 1 TIMES DAILY OR AS DIRECTED. 100 strip 0     diltiazem ER COATED BEADS (CARDIZEM CD) 120 MG 24 hr capsule Take 1 capsule (120 mg) by mouth daily 90 capsule 3     fluticasone (FLONASE) 50 MCG/ACT nasal spray Spray 1-2 sprays into both nostrils daily 16 g 3     glimepiride (AMARYL) 1 MG tablet Take 1 tablet (1 mg) by mouth every morning (before breakfast) 90 tablet 1     hydrocortisone (WESTCORT) 0.2 % cream APPLY SPARINGLY TO AFFECTED AREA THREE TIMES DAILY FOR 14 DAYS. 30 g 0     ipratropium - albuterol 0.5 mg/2.5 mg/3 mL (DUONEB) 0.5-2.5 (3) MG/3ML neb solution Take 1 vial (3 mLs) by nebulization every 6  hours as needed for shortness of breath / dyspnea or wheezing 30 vial 1     losartan-hydrochlorothiazide (HYZAAR) 50-12.5 MG tablet TAKE 2 TABLETS BY MOUTH EVERY  tablet 3     metFORMIN (GLUCOPHAGE) 1000 MG tablet TAKE 1 TABLET BY MOUTH TWICE A DAY WITH MEALS 180 tablet 1     order for DME Equipment being ordered: Digital home blood pressure monitor kit 1 each 0     pantoprazole (PROTONIX) 40 MG EC tablet TAKE 1 TABLET BY MOUTH EVERY DAY 90 tablet 3     polyethylene glycol (MIRALAX/GLYCOLAX) powder MIX 17 GRAMS (1 CAPFUL) OF POWDER IN 8 OUNCES LIQUID AND DRINK ONCE DAILY 527 g 3     potassium chloride ER (KLOR-CON) 20 MEQ CR tablet Take 1 tablet (20 mEq) by mouth daily 90 tablet 3     rivaroxaban ANTICOAGULANT (XARELTO ANTICOAGULANT) 20 MG TABS tablet TAKE 1 TABLET BY MOUTH DAILY WITH DINNER 90 tablet 3     thin (NO BRAND SPECIFIED) lancets Use to test blood sugar 1 times daily or as directed. To accompany: Blood Glucose Monitor Brands: per insurance. 200 each 6       ALLERGIES     Allergies   Allergen Reactions     Aspirin      Salicylates      ASA/ Ulcers       PAST MEDICAL HISTORY:  Past Medical History:   Diagnosis Date     Acute duodenal ulcer with hemorrhage, without mention of obstruction 11/15/03    and pyloric also - required hosp and transfusion 2 units- H. pylori negative     Allergic rhinitis, cause unspecified      Calculus of gallbladder without mention of cholecystitis or obstruction 11/15/03    no residual pain- transient cholestasis for 2 days     Colon Polyps normal 5/2010 2008 = one hyperplastic & one tubular adenoma - no high grade dysplasis - repeat in 2013      Diverticulosis of colon (without mention of hemorrhage) 11/03     Dyspepsia and other specified disorders of function of stomach     dyspepsia,  h/o bleeding stomach ulcer in 1985     Esophageal reflux      Genital herpes, unspecified      Paroxysmal atrial fibrillation (H) 12/21/2016    [facility].     Pneumonia,  organism unspecified(486) 11/15/03    right middle and lower lobe     Pure hypercholesterolemia      Pure hyperglyceridemia      Tear meniscus knee 2009    degenerative on right      Type II or unspecified type diabetes mellitus without mention of complication, not stated as uncontrolled at age 63     Unspecified essential hypertension        PAST SURGICAL HISTORY:  Past Surgical History:   Procedure Laterality Date     HC COLONOSCOPY THRU STOMA, DIAGNOSTIC      for GI bleed - diverticulosis      HC COLONOSCOPY THRU STOMA, DIAGNOSTIC  2010    normal - repeat in 5 years      HC UGI ENDOSCOPY DIAG W OR W/O BRUSH/WASH      for GI bleed - showed pyloric and duodenal  ulcer        FAMILY HISTORY:  Family History   Problem Relation Age of Onset     Diabetes No family hx of      C.A.D. No family hx of      Hypertension No family hx of      Cancer - colorectal No family hx of      Prostate Cancer No family hx of        SOCIAL HISTORY:  Social History     Socioeconomic History     Marital status:      Spouse name: Constanza     Number of children: 7     Years of education: Not on file     Highest education level: Not on file   Occupational History     Occupation: retired - was in food processing - packaging hamburger, etc.      Employer: NONE    Social Needs     Financial resource strain: Not on file     Food insecurity     Worry: Not on file     Inability: Not on file     Transportation needs     Medical: Not on file     Non-medical: Not on file   Tobacco Use     Smoking status: Former Smoker     Packs/day: 0.50     Years: 20.00     Pack years: 10.00     Types: Cigarettes     Quit date: 1981     Years since quittin.8     Smokeless tobacco: Never Used     Tobacco comment: quit in     Substance and Sexual Activity     Alcohol use: No     Alcohol/week: 0.0 standard drinks     Comment: quit in      Drug use: No     Comment: no herbal meds either     Sexual activity: Yes     Partners: Female      Comment:    Lifestyle     Physical activity     Days per week: Not on file     Minutes per session: Not on file     Stress: Not on file   Relationships     Social connections     Talks on phone: Not on file     Gets together: Not on file     Attends Yazidi service: Not on file     Active member of club or organization: Not on file     Attends meetings of clubs or organizations: Not on file     Relationship status: Not on file     Intimate partner violence     Fear of current or ex partner: Not on file     Emotionally abused: Not on file     Physically abused: Not on file     Forced sexual activity: Not on file   Other Topics Concern      Service Yes     Comment: was in  in [facility] for 5 years and  for 15 years     Blood Transfusions Yes     Comment: in 1985 after bleeding stomach ulcer     Caffeine Concern No     Comment: stopped all coffee and tea after ulcers 11/03     Occupational Exposure Not Asked     Hobby Hazards Not Asked     Sleep Concern Not Asked     Stress Concern Not Asked     Weight Concern Not Asked     Special Diet Not Asked     Back Care Not Asked     Exercise Yes     Comment: walks at least one mile every day     Bike Helmet Not Asked     Seat Belt Yes     Comment: always     Self-Exams Yes     Comment: KARINA encouraged monthly     Parent/sibling w/ CABG, MI or angioplasty before 65F 55M? No   Social History Narrative    no longer taking one 81mg asa qday - secondary to bleeding  ulcers 11/03    PSA checking every 6 months - one year.                Review of Systems:  Skin:        Eyes:       ENT:       Respiratory:       Cardiovascular:       Gastroenterology:      Genitourinary:       Musculoskeletal:       Neurologic:       Psychiatric:       Heme/Lymph/Imm:       Endocrine:           Recent Lab Results:  LIPID RESULTS:  Lab Results   Component Value Date    CHOL 155 01/19/2021    HDL 38 (L) 01/19/2021    LDL 64 01/19/2021    TRIG 267 (H) 01/19/2021    CHOLHDLRATIO  4.2 05/07/2015       LIVER ENZYME RESULTS:  Lab Results   Component Value Date    AST 12 01/19/2021    ALT 22 01/19/2021       CBC RESULTS:  Lab Results   Component Value Date    WBC 7.4 10/18/2019    RBC 5.18 10/18/2019    HGB 12.0 (L) 10/18/2019    HCT 38.9 (L) 10/18/2019    MCV 75 (L) 10/18/2019    MCH 23.2 (L) 10/18/2019    MCHC 30.8 (L) 10/18/2019    RDW 13.4 10/18/2019     10/18/2019       BMP RESULTS:  Lab Results   Component Value Date     01/19/2021    POTASSIUM 4.2 01/19/2021    CHLORIDE 101 01/19/2021    CO2 28 01/19/2021    ANIONGAP 8 01/19/2021     (H) 01/19/2021    BUN 25 01/19/2021    CR 1.26 (H) 01/19/2021    GFRESTIMATED 51 (L) 01/19/2021    GFRESTBLACK 59 (L) 01/19/2021    BRITTNY 9.7 01/19/2021        A1C RESULTS:  Lab Results   Component Value Date    A1C 7.4 (H) 01/19/2021       INR RESULTS:  No results found for: INR      ECHOCARDIOGRAM  No results found for this or any previous visit (from the past 8760 hour(s)).      No orders of the defined types were placed in this encounter.    No orders of the defined types were placed in this encounter.    There are no discontinued medications.      No diagnosis found.      CC  No referring provider defined for this encounter.

## 2021-03-23 ENCOUNTER — MEDICAL CORRESPONDENCE (OUTPATIENT)
Dept: HEALTH INFORMATION MANAGEMENT | Facility: CLINIC | Age: 86
End: 2021-03-23

## 2021-03-23 ENCOUNTER — IMMUNIZATION (OUTPATIENT)
Dept: PEDIATRICS | Facility: CLINIC | Age: 86
End: 2021-03-23
Attending: INTERNAL MEDICINE
Payer: COMMERCIAL

## 2021-03-23 PROCEDURE — 91300 PR COVID VAC PFIZER DIL RECON 30 MCG/0.3 ML IM: CPT

## 2021-03-23 PROCEDURE — 0002A PR COVID VAC PFIZER DIL RECON 30 MCG/0.3 ML IM: CPT

## 2021-03-23 NOTE — TELEPHONE ENCOUNTER
Patient's form signed, dated, and placed in TC basket.    Bradley Hopkins DO  3/23/2021 7:19 AM

## 2021-04-05 ENCOUNTER — TELEPHONE (OUTPATIENT)
Dept: FAMILY MEDICINE | Facility: CLINIC | Age: 86
End: 2021-04-05

## 2021-04-06 ENCOUNTER — MEDICAL CORRESPONDENCE (OUTPATIENT)
Dept: HEALTH INFORMATION MANAGEMENT | Facility: CLINIC | Age: 86
End: 2021-04-06

## 2021-04-06 NOTE — TELEPHONE ENCOUNTER
Completed form faxed to [facility] [phone], copy to abstract and filed.      Los SIMPSON

## 2021-04-06 NOTE — TELEPHONE ENCOUNTER
Patient's form signed, dated, and placed in TC basket.    Bradley Hopkins DO  4/6/2021 7:52 AM

## 2021-04-19 ENCOUNTER — TELEPHONE (OUTPATIENT)
Dept: FAMILY MEDICINE | Facility: CLINIC | Age: 86
End: 2021-04-19

## 2021-04-20 ENCOUNTER — MEDICAL CORRESPONDENCE (OUTPATIENT)
Dept: HEALTH INFORMATION MANAGEMENT | Facility: CLINIC | Age: 86
End: 2021-04-20

## 2021-04-20 NOTE — TELEPHONE ENCOUNTER
Forms faxed and sent to scan  
Patient's form signed, dated, and placed in TC basket.    Bradley Hopkins DO  4/20/2021 7:13 AM      
Plan of Care dates:  11/16/2020-1/14/2021  Given to Olaf Hopkins to sign, old dates       Maribell Arenas       
Reason for Call:  Form, our goal is to have forms completed with 72 hours, however, some forms may require a visit or additional information.    Type of letter, form or note:  medical    Who is the form from?: Home care    Where did the form come from: form was faxed in    What clinic location was the form placed at?: [place]    Where the form was placed: Dr Hopkins Box/Folder    What number is listed as a contact on the form?: [phone]       Additional comments:     Call taken on 4/19/2021 at 7:15 AM by Colleen Lemos      
none

## 2021-05-05 ENCOUNTER — TELEPHONE (OUTPATIENT)
Dept: FAMILY MEDICINE | Facility: CLINIC | Age: 86
End: 2021-05-05

## 2021-05-05 ENCOUNTER — MEDICAL CORRESPONDENCE (OUTPATIENT)
Dept: HEALTH INFORMATION MANAGEMENT | Facility: CLINIC | Age: 86
End: 2021-05-05

## 2021-05-05 NOTE — TELEPHONE ENCOUNTER
Completed forms faxed back to [facility]  at [phone].   Originals sent to be scanned.       Maribell Arenas

## 2021-05-05 NOTE — TELEPHONE ENCOUNTER
Patient's form signed, dated, and placed in TC basket.    Bradley Hopkins DO  5/5/2021 11:03 AM

## 2021-05-05 NOTE — TELEPHONE ENCOUNTER
Completed forms faxed back to [facility]  at [phone].   Originals sent to be scanned.       Maribell Arenas

## 2021-05-05 NOTE — TELEPHONE ENCOUNTER
Patient's form signed, dated, and placed in TC basket.    Bradley Hopkins DO  5/5/2021 11:04 AM

## 2021-05-05 NOTE — TELEPHONE ENCOUNTER
Completed forms faxed back to [facility]  at [phone].   Originals sent to be scanned.       Maribell Arenas

## 2021-05-05 NOTE — TELEPHONE ENCOUNTER
Reason for Call:  Form, our goal is to have forms completed with 72 hours, however, some forms may require a visit or additional information.    Type of letter, form or note:  medical    Who is the form from?: Home care    Where did the form come from: form was faxed in    What clinic location was the form placed at?: [place]    Where the form was placed: Dr Hopkins Box/Folder    What number is listed as a contact on the form?: [phone]       Additional comments:     Call taken on 5/5/2021 at 8:36 AM by Colleen Lemos

## 2021-05-05 NOTE — TELEPHONE ENCOUNTER
Reason for Call:  Form, our goal is to have forms completed with 72 hours, however, some forms may require a visit or additional information.    Type of letter, form or note:  medical    Who is the form from?: Home care    Where did the form come from: form was faxed in    What clinic location was the form placed at?: [place]    Where the form was placed: Dr Hopkins Box/Folder    What number is listed as a contact on the form?: [phone]       Additional comments:     Call taken on 5/5/2021 at 8:46 AM by Colleen Lemos

## 2021-05-05 NOTE — TELEPHONE ENCOUNTER
Completed forms faxed back to [facility]  at [phone].   Originals sent to be scanned.       Maribell Arenas

## 2021-05-05 NOTE — TELEPHONE ENCOUNTER
Reason for Call:  Form, our goal is to have forms completed with 72 hours, however, some forms may require a visit or additional information.    Type of letter, form or note:  medical    Who is the form from?: Home care    Where did the form come from: form was faxed in    What clinic location was the form placed at?: [place]    Where the form was placed: Dr Hopkins Box/Folder    What number is listed as a contact on the form?: [phone]       Additional comments:     Call taken on 5/5/2021 at 8:56 AM by Colleen Lemos

## 2021-05-05 NOTE — TELEPHONE ENCOUNTER
Completed forms faxed back to [facility]  at [phone].   Originals sent to be scanned.       Maribell Arenas

## 2021-05-05 NOTE — TELEPHONE ENCOUNTER
Completed forms faxed back to [facility]  at [phone].   Originals sent to be scanned.       Maribell Arenas

## 2021-05-05 NOTE — TELEPHONE ENCOUNTER
Reason for Call:  Form, our goal is to have forms completed with 72 hours, however, some forms may require a visit or additional information.    Type of letter, form or note:  medical    Who is the form from?: Home care    Where did the form come from: form was faxed in    What clinic location was the form placed at?: [place]    Where the form was placed: Dr Hopkins Box/Folder    What number is listed as a contact on the form?: [phone]       Additional comments:     Call taken on 5/5/2021 at 8:49 AM by Colleen Lemos

## 2021-05-05 NOTE — TELEPHONE ENCOUNTER
Patient's form signed, dated, and placed in TC basket.    Bradley Hopkins DO  5/5/2021 11:02 AM

## 2021-05-05 NOTE — TELEPHONE ENCOUNTER
Reason for Call:  Form, our goal is to have forms completed with 72 hours, however, some forms may require a visit or additional information.    Type of letter, form or note:  medical    Who is the form from?: Home care    Where did the form come from: form was faxed in    What clinic location was the form placed at?: [place]    Where the form was placed: Dr Purvis Box/Folder    What number is listed as a contact on the form?: [phone]       Additional comments:     Call taken on 5/5/2021 at 8:47 AM by Colleen Lemos

## 2021-05-05 NOTE — TELEPHONE ENCOUNTER
Reason for Call:  Form, our goal is to have forms completed with 72 hours, however, some forms may require a visit or additional information.    Type of letter, form or note:  medical    Who is the form from?: Home care, [facility]    Where did the form come from: form was faxed in    What clinic location was the form placed at?: [place]    Where the form was placed: Bradley Hopkins Box/Folder    What number is listed as a contact on the form?: Fax to [phone]       Call taken on 5/5/2021 at 8:39 AM by Hiral Olivares

## 2021-05-07 ENCOUNTER — TELEPHONE (OUTPATIENT)
Dept: FAMILY MEDICINE | Facility: CLINIC | Age: 86
End: 2021-05-07

## 2021-05-07 NOTE — TELEPHONE ENCOUNTER
Reason for Call:  Form, our goal is to have forms completed with 72 hours, however, some forms may require a visit or additional information.    Type of letter, form or note:  medical - Plan of Care    Who is the form from?: [facility] (if other please explain)    Where did the form come from: form was faxed in    What clinic location was the form placed at?: [place]    Where the form was placed: Dr. Hopkins Box/Folder    What number is listed as a contact on the form?: [phone]    Call taken on 5/7/2021 at 9:20 AM by Jes MCGRATH

## 2021-05-07 NOTE — TELEPHONE ENCOUNTER
Reason for Call:  Form, our goal is to have forms completed with 72 hours, however, some forms may require a visit or additional information.    Type of letter, form or note:  medical    Who is the form from?: Home care    Where did the form come from: form was faxed in    What clinic location was the form placed at?: [place]    Where the form was placed: Bradley Hopkins Box/Folder    What number is listed as a contact on the form?: Fax to [phone]       Call taken on 5/7/2021 at 9:21 AM by Hiral Olivares

## 2021-05-07 NOTE — TELEPHONE ENCOUNTER
Reason for Call:  Form, our goal is to have forms completed with 72 hours, however, some forms may require a visit or additional information.    Type of letter, form or note:  medical    Who is the form from?: Home care    Where did the form come from: form was faxed in    What clinic location was the form placed at?: [place]    Where the form was placed: Dr Hopkins    What number is listed as a contact on the form?:      [phone]      Call taken on 5/7/2021 at 9:09 AM by Yue Gunderson

## 2021-05-07 NOTE — TELEPHONE ENCOUNTER
Reason for Call:  Form, our goal is to have forms completed with 72 hours, however, some forms may require a visit or additional information.    Type of letter, form or note:  medical - Plan of Care    Who is the form from?: [facility] (if other please explain)    Where did the form come from: form was faxed in    What clinic location was the form placed at?: [place]    Where the form was placed: Dr. Hopkins Box/Folder    What number is listed as a contact on the form?: [phone]    Call taken on 5/7/2021 at 9:20 AM by Jes MCGRATH

## 2021-05-07 NOTE — TELEPHONE ENCOUNTER
Reason for Call:  Form, our goal is to have forms completed with 72 hours, however, some forms may require a visit or additional information.    Type of letter, form or note:  medical    Who is the form from?: Home care    Where did the form come from: form was faxed in    What clinic location was the form placed at?: [place]    Where the form was placed: Bradley Hopkins Box/Folder    What number is listed as a contact on the form?: Fax to [phone]       Call taken on 5/7/2021 at 9:06 AM by Hiral Olivares

## 2021-05-07 NOTE — TELEPHONE ENCOUNTER
Reason for Call:  Form, our goal is to have forms completed with 72 hours, however, some forms may require a visit or additional information.    Type of letter, form or note:  medical - Plan of Care    Who is the form from?: Counts include 234 beds at the [facility] (if other please explain)    Where did the form come from: form was faxed in    What clinic location was the form placed at?: [place]    Where the form was placed: Dr. Hopkins Box/Folder    What number is listed as a contact on the form?: [phone]    Call taken on 5/7/2021 at 9:20 AM by Jes MCGRATH

## 2021-05-07 NOTE — TELEPHONE ENCOUNTER
Reason for Call:  Form, our goal is to have forms completed with 72 hours, however, some forms may require a visit or additional information.    Type of letter, form or note:  medical    Who is the form from?: Home care    Where did the form come from: form was faxed in    What clinic location was the form placed at?: [place]    Where the form was placed: Dr Hopkins Box/Folder    What number is listed as a contact on the form?: [phone]           Call taken on 5/7/2021 at 9:36 AM by Yue Gunderson

## 2021-05-07 NOTE — TELEPHONE ENCOUNTER
Reason for Call:  Form, our goal is to have forms completed with 72 hours, however, some forms may require a visit or additional information.    Type of letter, form or note:  medical    Who is the form from?: Home care    Where did the form come from: form was faxed in    What clinic location was the form placed at?: [place]    Where the form was placed: Bradley Hopkins Box/Folder    What number is listed as a contact on the form?: Fax to [phone]       Call taken on 5/7/2021 at 9:21 AM by Hiral Olivares

## 2021-05-10 DIAGNOSIS — E78.5 HYPERLIPIDEMIA WITH TARGET LDL LESS THAN 100: ICD-10-CM

## 2021-05-10 DIAGNOSIS — D64.9 ANEMIA, UNSPECIFIED TYPE: ICD-10-CM

## 2021-05-10 DIAGNOSIS — E11.21 TYPE 2 DIABETES MELLITUS WITH DIABETIC NEPHROPATHY, WITHOUT LONG-TERM CURRENT USE OF INSULIN (H): ICD-10-CM

## 2021-05-10 DIAGNOSIS — I48.19 PERSISTENT ATRIAL FIBRILLATION (H): ICD-10-CM

## 2021-05-10 DIAGNOSIS — Z00.00 MEDICARE ANNUAL WELLNESS VISIT, SUBSEQUENT: ICD-10-CM

## 2021-05-10 RX ORDER — DILTIAZEM HYDROCHLORIDE 120 MG/1
120 CAPSULE, COATED, EXTENDED RELEASE ORAL DAILY
Qty: 90 CAPSULE | Refills: 3 | Status: SHIPPED | OUTPATIENT
Start: 2021-05-10 | End: 2022-05-27

## 2021-05-13 ENCOUNTER — TELEPHONE (OUTPATIENT)
Dept: FAMILY MEDICINE | Facility: CLINIC | Age: 86
End: 2021-05-13

## 2021-05-13 NOTE — TELEPHONE ENCOUNTER
Reason for Call:  Form, our goal is to have forms completed with 72 hours, however, some forms may require a visit or additional information.    Type of letter, form or note:  medical    Who is the form from?: Home care    Where did the form come from: form was faxed in    What clinic location was the form placed at?: [place]    Where the form was placed: Dr Hopkins Box/Folder    What number is listed as a contact on the form?: [phone]           Call taken on 5/13/2021 at 10:03 AM by Yue Gunderson

## 2021-05-14 ENCOUNTER — MEDICAL CORRESPONDENCE (OUTPATIENT)
Dept: HEALTH INFORMATION MANAGEMENT | Facility: CLINIC | Age: 86
End: 2021-05-14

## 2021-05-14 NOTE — TELEPHONE ENCOUNTER
[place] - Forms not in blue RN folder. Cannot find.       Amira Castaneda RN  [facility]

## 2021-05-17 NOTE — TELEPHONE ENCOUNTER
Completed forms faxed back to [facility]  at [phone].   Originals sent to be scanned.       Maribell Arenas        First Trimester Pregnancy   WHAT YOU NEED TO KNOW:   The first trimester of pregnancy lasts from your last period through the 13th week of pregnancy  Follow up with your healthcare provider for prenatal care  Regular prenatal care can help to keep you and your baby healthy  DISCHARGE INSTRUCTIONS:   Return to the emergency department if:   · You have pain or cramping in your abdomen or low back  · You have severe vaginal bleeding or clotting  Contact your healthcare provider or obstetrician if:   · You have light bleeding  · You have chills or a fever  · You have vaginal itching, burning, or pain  · You have yellow, green, white, or foul-smelling vaginal discharge  · You have pain or burning when you urinate, less urine than usual, or pink or bloody urine  · You have questions or concerns about your condition or care  Follow up with your healthcare provider or obstetrician as directed:  Go to all of your prenatal visits during your pregnancy  Write down your questions so you remember to ask them during your visits  Stay healthy during pregnancy:   · Take prenatal vitamins as directed  Prenatal vitamins can help you get the amount of vitamins and minerals you need during pregnancy  Prenatal vitamins may also decrease the risk of certain birth defects  · Eat a variety of healthy foods  Healthy foods include fruits, vegetables, whole-grain breads, low-fat dairy products, beans, turkey and chicken, and lean red meat  Ask your healthcare provider for more information about foods that are healthy and safe to eat during pregnancy  · Drink liquids as directed  Ask how much liquid to drink each day and which liquids are best for you  Some healthy liquids include milk, water, and juice  It is not clear how caffeine affects pregnancy  Limit your intake of caffeine to less than 200 mg each day to avoid possible health problems   Caffeine may be found in coffee, tea, cola, sports drinks, and chocolate  Do not drink alcohol  · Talk to your healthcare provider before you take medicines  Many medicines can harm your baby, especially during early pregnancy  Ask your healthcare provider before you take any medicines, including over-the-counter medicines, vitamins, herbs, or food supplements  Never use illegal or street drugs while you are pregnant  Talk to your healthcare provider if you are having trouble quitting street drugs  · Exercise  Ask your healthcare provider about the best exercise plan for you  Exercise may help you feel better and make your labor and delivery easier  · Do not smoke  Smoking can cause problems during pregnancy  It can also cause your baby to weigh less at birth  If you smoke, it is never too late to quit  Ask for information if you need help quitting  Safety tips:   · Do not use a hot tub or sauna  while you are pregnant, especially during your first trimester  Hot tubs and saunas may raise your baby's temperature and increase the risk of birth defects  It also increases your risk of miscarriage  · Protect yourself from illness  Toxoplasmosis is a disease that can cause birth defects  To protect yourself from this disease, do not clean your cat's litter box yourself  Ask someone else to clean your cat's litter box while you are pregnant  Also, do not  eat raw meat or unwashed fruits and vegetables  Wash your hands after you touch raw meat, and eat only well-cooked meat  Wash fruits and vegetables well before you eat them  © 2017 [employer] Information is for End User's use only and may not be sold, redistributed or otherwise used for commercial purposes  All illustrations and images included in CareNotes® are the copyrighted property of A D A M , Inc  or [employer]  The above information is an  only  It is not intended as medical advice for individual conditions or treatments   Talk to your doctor, nurse or pharmacist before following any medical regimen to see if it is safe and effective for you

## 2021-06-01 NOTE — TELEPHONE ENCOUNTER
"Requested Prescriptions   Pending Prescriptions Disp Refills     glimepiride (AMARYL) 1 MG tablet  Last Written Prescription Date:  12/11/2017  Last Fill Quantity: 30 tablet,  # refills: 1   Last Office Visit with [facility], MARIA E or [facility] prescribing provider:  12/11/2017   Future Office Visit:    Next 5 appointments (look out 90 days)     Apr 16, 2018 10:00 AM CDT   Office Visit with Bradley Hopkins DO   [facility] ([facility]    [address] [facility] [address] [phone]                  30 tablet 1     Sig: Take 1 tablet (1 mg) by mouth every morning (before breakfast)    Sulfonylurea Agents Failed    1/30/2018  9:28 AM       Failed - Patient's BP is less than 140/90    BP Readings from Last 3 Encounters:   12/11/17 152/70   07/31/17 124/70   06/21/17 126/64          Passed - Patient has documented LDL within the past 12 mos.    Recent Labs   Lab Test  06/21/17   0913   LDL  35          Passed - Patient has had a Microalbumin in the past 12 mos.    Recent Labs   Lab Test  06/21/17   0914   MICROL  73   UMALCR  186.00*          Passed - Patient has documented A1c within the specified period of time.    Recent Labs   Lab Test  12/11/17   1107   A1C  7.8*          Passed - Patient is age 18 or older       Passed - Patient has a recent creatinine (normal) within the past 12 mos.    Recent Labs   Lab Test  06/21/17   0913   CR  1.01          Passed - Patient has had an appointment with authorizing provider within the past 6 mos. or  within next 30 days    Patient had office visit in the last 6 months or has a visit in the next 30 days with authorizing provider.  See \"Patient Info\" tab in inbasket, or \"Choose Columns\" in Meds & Orders section of the refill encounter.              " CT Scan received from [facility] 6/1/21.  Results will be kept for Dr. Rajput to review with patient at 6/7/21 apt.

## 2021-07-14 ENCOUNTER — MEDICAL CORRESPONDENCE (OUTPATIENT)
Dept: HEALTH INFORMATION MANAGEMENT | Facility: CLINIC | Age: 86
End: 2021-07-14

## 2021-08-06 ENCOUNTER — TELEPHONE (OUTPATIENT)
Dept: FAMILY MEDICINE | Facility: CLINIC | Age: 86
End: 2021-08-06

## 2021-08-06 NOTE — TELEPHONE ENCOUNTER
Reason for Call:  Form, our goal is to have forms completed with 72 hours, however, some forms may require a visit or additional information.    Type of letter, form or note:  Medical supplies    Who is the form from?: [employer] (if other please explain)    Where did the form come from: form was faxed in    What clinic location was the form placed at?: [facility]    Where the form was placed: Dr Hopkins Box/Folder    What number is listed as a contact on the form?: [phone]           Call taken on 8/6/2021 at 1:42 PM by Yue Gunderson

## 2021-08-09 ENCOUNTER — TELEPHONE (OUTPATIENT)
Dept: FAMILY MEDICINE | Facility: CLINIC | Age: 86
End: 2021-08-09

## 2021-08-09 NOTE — TELEPHONE ENCOUNTER
Reason for Call:  Form, our goal is to have forms completed with 72 hours, however, some forms may require a visit or additional information.    Type of letter, form or note:  medical    Who is the form from?: Home care    Where did the form come from: form was faxed in    What clinic location was the form placed at?: [facility]    Where the form was placed: Dr Hopkins Box/Folder    What number is listed as a contact on the form?: [phone]       Additional comments:     Call taken on 8/9/2021 at 11:13 AM by Colleen Lemos

## 2021-08-09 NOTE — TELEPHONE ENCOUNTER
Completed forms faxed back to [employer] at [phone].   Originals sent to be scanned.       Maribell Arenas

## 2021-08-09 NOTE — TELEPHONE ENCOUNTER
Patient's form signed, dated, and placed in TC basket.    Bradley Hopkins DO  8/9/2021 9:09 AM

## 2021-08-10 NOTE — TELEPHONE ENCOUNTER
MED RECONCILIATION DONE    Discrepancies:             Epic:   acetaminophen (ACETAMINOPHEN EXTRA STRENGTH) 500 MG tablet 360 tablet 3 1/6/2021  No   Sig: TAKE 1-2 TABLETS BY MOUTH EVERY 6 HOURS AS NEEDED FOR MILD PAIN   Sent to pharmacy as: Acetaminophen 500 MG Oral Tablet (Acetaminophen Extra Strength)                Form:  Acetaminophen 325 mg; Take 2 tablets PO Q6h PRN       Meds on Epic but NOT  on Form:      polyethylene glycol (MIRALAX/GLYCOLAX) powder 527 g 3 2/4/2020  No   Sig: MIX 17 GRAMS (1 CAPFUL) OF POWDER IN 8 OUNCES LIQUID AND DRINK ONCE DAILY     potassium chloride ER (KLOR-CON) 20 MEQ CR tablet 90 tablet 3 1/19/2021  No   Sig - Route: Take 1 tablet (20 mEq) by mouth daily - Oral   Sent to pharmacy as: Potassium Chloride Senia ER 20 MEQ Oral Tablet Extended Release (KLOR-CON)         Meds on Form but NOT on Epic :        Form currently on [facility] Desk in Blue RN Folder  Routing to PCP for further review/recommendations/orders.          Called # [phone]     [facility] using  services to call back to verify medications above, if taking or dosage.    Js Chandler, AMARA   [facility] [place] [facility] Triage

## 2021-08-12 NOTE — TELEPHONE ENCOUNTER
Attempt # 2  Called # [phone]     Left a non detailed VM to call back at [phone] and ask for any available Triage Nurse.    Js Chandler RN   [facility] [place] [facility]

## 2021-08-12 NOTE — TELEPHONE ENCOUNTER
Form signed by provider, given to fax, sent to scan    Js FORD RN   [facility]

## 2021-09-10 DIAGNOSIS — E11.21 TYPE 2 DIABETES MELLITUS WITH DIABETIC NEPHROPATHY, WITHOUT LONG-TERM CURRENT USE OF INSULIN (H): ICD-10-CM

## 2021-09-13 RX ORDER — GLIMEPIRIDE 1 MG/1
1 TABLET ORAL
Qty: 90 TABLET | Refills: 1 | Status: SHIPPED | OUTPATIENT
Start: 2021-09-13 | End: 2022-02-07

## 2021-09-13 NOTE — TELEPHONE ENCOUNTER
Failed protocol.  please route to  team if patient needs an appointment     Maribell GALLARDORN BSN  [facility]  [phone]

## 2021-09-20 ENCOUNTER — TRANSFERRED RECORDS (OUTPATIENT)
Dept: HEALTH INFORMATION MANAGEMENT | Facility: CLINIC | Age: 86
End: 2021-09-20

## 2021-09-20 LAB — RETINOPATHY: NEGATIVE

## 2021-10-04 ENCOUNTER — TELEPHONE (OUTPATIENT)
Dept: FAMILY MEDICINE | Facility: CLINIC | Age: 86
End: 2021-10-04

## 2021-10-04 NOTE — TELEPHONE ENCOUNTER
Reason for Call:  Form, our goal is to have forms completed with 72 hours, however, some forms may require a visit or additional information.    Type of letter, form or note:  medical    Who is the form from?: Patient    Where did the form come from: Patient or family brought in       What clinic location was the form placed at?: [facility]    Where the form was placed: [facility] Box/Folder    What number is listed as a contact on the form?: [phone]       Additional comments:     Call taken on 10/4/2021 at 4:45 PM by Kecia Atkinson

## 2021-10-05 NOTE — TELEPHONE ENCOUNTER
Patient's form signed, dated, and placed in TC basket.    Bradley Hopkins DO  10/5/2021 1:09 PM

## 2021-11-26 DIAGNOSIS — J44.9 CHRONIC OBSTRUCTIVE PULMONARY DISEASE, UNSPECIFIED COPD TYPE (H): ICD-10-CM

## 2021-11-30 RX ORDER — FLUTICASONE PROPIONATE 50 MCG
1-2 SPRAY, SUSPENSION (ML) NASAL DAILY
Qty: 48 ML | Refills: 3 | Status: SHIPPED | OUTPATIENT
Start: 2021-11-30 | End: 2023-01-17

## 2021-11-30 RX ORDER — ALBUTEROL SULFATE 90 UG/1
AEROSOL, METERED RESPIRATORY (INHALATION)
Qty: 18 G | Refills: 3 | Status: SHIPPED | OUTPATIENT
Start: 2021-11-30 | End: 2023-02-27

## 2022-01-18 DIAGNOSIS — I48.91 ATRIAL FIBRILLATION, UNSPECIFIED TYPE (H): ICD-10-CM

## 2022-01-20 NOTE — TELEPHONE ENCOUNTER
Failed protocol.  please route to  team if patient needs an appointment     Maribell GALLARDORN BSN  [facility]  [phone]

## 2022-01-21 RX ORDER — RIVAROXABAN 20 MG/1
TABLET, FILM COATED ORAL
Qty: 90 TABLET | Refills: 3 | Status: SHIPPED | OUTPATIENT
Start: 2022-01-21 | End: 2023-01-16

## 2022-01-24 DIAGNOSIS — R05.9 COUGH: ICD-10-CM

## 2022-01-24 NOTE — TELEPHONE ENCOUNTER
Patient's son (on consent to communicate) calling.   Patient's son requesting a refill of patient's Duo neb.     Duo neb last ordered on 10/28/2020 by Dr. Hopkins.     Last office visit on 1/19/2021 with Dr. Hopkins.     Son denies any acute issues right now, they just want to have the Duo neb on hand just in case.     Pharmacy verified.     Advised son that patient is overdue for an appointment with Dr. Hopkins. Son verbalized understanding. Transferred son to central scheduling to schedule appointment.     Routing to [place] to refill Duo neb if appropriate, per protocol.     Helena GALLARDO RN   [facility]

## 2022-01-25 RX ORDER — IPRATROPIUM BROMIDE AND ALBUTEROL SULFATE 2.5; .5 MG/3ML; MG/3ML
1 SOLUTION RESPIRATORY (INHALATION) EVERY 6 HOURS PRN
Qty: 30 ML | Refills: 0 | Status: SHIPPED | OUTPATIENT
Start: 2022-01-25 | End: 2022-03-01

## 2022-01-25 NOTE — TELEPHONE ENCOUNTER
Medication is being filled for 1 time refill only due to:  Patient needs to be seen because it has been more than one year since last visit.    Pt has appt scheduled  Phyllis GALLARDO RN, BSN

## 2022-01-31 ENCOUNTER — TELEPHONE (OUTPATIENT)
Dept: FAMILY MEDICINE | Facility: CLINIC | Age: 87
End: 2022-01-31
Payer: COMMERCIAL

## 2022-01-31 NOTE — TELEPHONE ENCOUNTER
[facility] requesting RX change for glimepiride as this medicaiton is on the Beers list and or HEDIS list of high risk medications in elderly. Possible alternative glipizide. If implement a medication change follow up with patient and send new script if needed. Please advise.     Jake Al

## 2022-02-07 ENCOUNTER — OFFICE VISIT (OUTPATIENT)
Dept: FAMILY MEDICINE | Facility: CLINIC | Age: 87
End: 2022-02-07
Payer: COMMERCIAL

## 2022-02-07 VITALS
OXYGEN SATURATION: 97 % | HEIGHT: 64 IN | DIASTOLIC BLOOD PRESSURE: 78 MMHG | TEMPERATURE: 97 F | BODY MASS INDEX: 25.61 KG/M2 | HEART RATE: 70 BPM | WEIGHT: 150 LBS | SYSTOLIC BLOOD PRESSURE: 130 MMHG

## 2022-02-07 DIAGNOSIS — Z00.00 ROUTINE GENERAL MEDICAL EXAMINATION AT A HEALTH CARE FACILITY: Primary | ICD-10-CM

## 2022-02-07 DIAGNOSIS — D64.9 ANEMIA, UNSPECIFIED TYPE: ICD-10-CM

## 2022-02-07 DIAGNOSIS — I48.19 PERSISTENT ATRIAL FIBRILLATION (H): ICD-10-CM

## 2022-02-07 DIAGNOSIS — E11.21 TYPE 2 DIABETES MELLITUS WITH DIABETIC NEPHROPATHY, WITHOUT LONG-TERM CURRENT USE OF INSULIN (H): ICD-10-CM

## 2022-02-07 DIAGNOSIS — E78.5 HYPERLIPIDEMIA WITH TARGET LDL LESS THAN 100: ICD-10-CM

## 2022-02-07 DIAGNOSIS — J44.9 CHRONIC OBSTRUCTIVE PULMONARY DISEASE, UNSPECIFIED COPD TYPE (H): ICD-10-CM

## 2022-02-07 DIAGNOSIS — I10 HYPERTENSION GOAL BP (BLOOD PRESSURE) < 140/90: ICD-10-CM

## 2022-02-07 LAB
HBA1C MFR BLD: 5.7 % (ref 0–5.6)
HGB BLD-MCNC: 11.6 G/DL (ref 13.3–17.7)

## 2022-02-07 PROCEDURE — 36415 COLL VENOUS BLD VENIPUNCTURE: CPT | Performed by: FAMILY MEDICINE

## 2022-02-07 PROCEDURE — 80061 LIPID PANEL: CPT | Performed by: FAMILY MEDICINE

## 2022-02-07 PROCEDURE — 85018 HEMOGLOBIN: CPT | Performed by: FAMILY MEDICINE

## 2022-02-07 PROCEDURE — 80053 COMPREHEN METABOLIC PANEL: CPT | Performed by: FAMILY MEDICINE

## 2022-02-07 PROCEDURE — 83036 HEMOGLOBIN GLYCOSYLATED A1C: CPT | Performed by: FAMILY MEDICINE

## 2022-02-07 PROCEDURE — 99397 PER PM REEVAL EST PAT 65+ YR: CPT | Performed by: FAMILY MEDICINE

## 2022-02-07 PROCEDURE — 99207 PR FOOT EXAM NO CHARGE: CPT | Mod: 25 | Performed by: FAMILY MEDICINE

## 2022-02-07 PROCEDURE — 82043 UR ALBUMIN QUANTITATIVE: CPT | Performed by: FAMILY MEDICINE

## 2022-02-07 RX ORDER — GLIPIZIDE 2.5 MG/1
2.5 TABLET, EXTENDED RELEASE ORAL DAILY
Qty: 90 TABLET | Refills: 1 | Status: SHIPPED | OUTPATIENT
Start: 2022-02-07 | End: 2022-08-03

## 2022-02-07 RX ORDER — ATORVASTATIN CALCIUM 10 MG/1
10 TABLET, FILM COATED ORAL DAILY
Qty: 90 TABLET | Refills: 3 | Status: SHIPPED | OUTPATIENT
Start: 2022-02-07 | End: 2023-02-27

## 2022-02-07 RX ORDER — LOSARTAN POTASSIUM AND HYDROCHLOROTHIAZIDE 12.5; 5 MG/1; MG/1
TABLET ORAL
Qty: 180 TABLET | Refills: 3 | Status: SHIPPED | OUTPATIENT
Start: 2022-02-07 | End: 2023-06-05

## 2022-02-07 ASSESSMENT — ENCOUNTER SYMPTOMS
DYSURIA: 0
PARESTHESIAS: 0
FREQUENCY: 0
SHORTNESS OF BREATH: 0
DIZZINESS: 0
CHILLS: 0
JOINT SWELLING: 0
EYE PAIN: 0
HEADACHES: 0
FEVER: 0
SORE THROAT: 0
HEMATOCHEZIA: 0
MYALGIAS: 0
HEMATURIA: 0
ABDOMINAL PAIN: 0
PALPITATIONS: 0
WEAKNESS: 0
DIARRHEA: 0
ARTHRALGIAS: 1
COUGH: 0
NERVOUS/ANXIOUS: 0
CONSTIPATION: 0
NAUSEA: 0
HEARTBURN: 0

## 2022-02-07 ASSESSMENT — ACTIVITIES OF DAILY LIVING (ADL)
CURRENT_FUNCTION: PREPARING MEALS REQUIRES ASSISTANCE
CURRENT_FUNCTION: TRANSPORTATION REQUIRES ASSISTANCE
CURRENT_FUNCTION: HOUSEWORK REQUIRES ASSISTANCE
CURRENT_FUNCTION: LAUNDRY REQUIRES ASSISTANCE
CURRENT_FUNCTION: SHOPPING REQUIRES ASSISTANCE
CURRENT_FUNCTION: BATHING REQUIRES ASSISTANCE

## 2022-02-07 ASSESSMENT — MIFFLIN-ST. JEOR: SCORE: 1263.46

## 2022-02-07 NOTE — LETTER
[facility]  [address] [place] [address]  [phone]                    February 11, 2022    Manfred Caraballo  [address] [place] [id number]      Dear Manfred,    Here is a summary of your recent test results:    -Hemoglobin is decreased indicating anemia.  This is stable from the past several years. We will continue monitoring this.   -Cholesterol levels are at your goal levels.  ADVISE: continuing your medication, a regular exercise program with at least 150 minutes of aerobic exercise per week, and eating a low saturated fat/low carbohydrate diet.  Also, you should recheck this fasting cholesterol panel in 12 months.   -Liver and gallbladder tests (ALT,AST, Alk phos,bilirubin) are normal.   -Kidney function (GFR) is normal.   -Sodium is normal.   -Potassium is normal.   -Calcium is normal.   -Glucose is elevated due to your diabetes.   -A1C (test of diabetes control the last 2-3 months) is at your goal. Please continue with your current plan. Also, you should make an appointment to see me and recheck your A1C test in 6 months.   -Microalbumin (urine protein) level is elevated. 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  ADVISE: avoiding anti-inflamatory agents such as ibuprofen (Advil, Motrin) or naproxen (Aleve) as much as possible, keeping your blood pressure in a normal range, and continuing your medication (losartan) that helps protect your kidneys.  Also, this should be rechecked in 1 year.     Your test results are enclosed.      Please contact me if you have any questions.    In addition, here is a list of due or overdue Health Maintenance reminders.    Health Maintenance Due   Topic Date Due     Zoster (Shingles) Vaccine (1 of 2) Never done       Please call us at [phone] (or use [employer]) to address the above recommendations.            Thank you very much for trusting [facility].      Healthy regards,    Dr. Bradley Hopkins, DO           Results for orders placed or performed in visit on 02/07/22   Hemoglobin     Status: Abnormal   Result Value Ref Range    Hemoglobin 11.6 (L) 13.3 - 17.7 g/dL   HEMOGLOBIN A1C     Status: Abnormal   Result Value Ref Range    Hemoglobin A1C 5.7 (H) 0.0 - 5.6 %   Lipid panel reflex to direct LDL Fasting     Status: Abnormal   Result Value Ref Range    Cholesterol 134 <200 mg/dL    Triglycerides 274 (H) <150 mg/dL    Direct Measure HDL 44 >=40 mg/dL    LDL Cholesterol Calculated 35 <=100 mg/dL    Non HDL Cholesterol 90 <130 mg/dL    Patient Fasting > 8hrs? No     Narrative    Cholesterol  Desirable:  <200 mg/dL    Triglycerides  Normal:  Less than 150 mg/dL  Borderline High:  150-199 mg/dL  High:  200-499 mg/dL  Very High:  Greater than or equal to 500 mg/dL    Direct Measure HDL  Female:  Greater than or equal to 50 mg/dL   Male:  Greater than or equal to 40 mg/dL    LDL Cholesterol  Desirable:  <100mg/dL  Above Desirable:  100-129 mg/dL   Borderline High:  130-159 mg/dL   High:  160-189 mg/dL   Very High:  >= 190 mg/dL    Non HDL Cholesterol  Desirable:  130 mg/dL  Above Desirable:  130-159 mg/dL  Borderline High:  160-189 mg/dL  High:  190-219 mg/dL  Very High:  Greater than or equal to 220 mg/dL   Comprehensive metabolic panel (BMP + Alb, Alk Phos, ALT, AST, Total. Bili, TP)     Status: Abnormal   Result Value Ref Range    Sodium 134 133 - 144 mmol/L    Potassium 4.4 3.4 - 5.3 mmol/L    Chloride 101 94 - 109 mmol/L    Carbon Dioxide (CO2) 28 20 - 32 mmol/L    Anion Gap 5 3 - 14 mmol/L    Urea Nitrogen 15 7 - 30 mg/dL    Creatinine 1.08 0.66 - 1.25 mg/dL    Calcium 9.6 8.5 - 10.1 mg/dL    Glucose 134 (H) 70 - 99 mg/dL    Alkaline Phosphatase 62 40 - 150 U/L    AST 13 0 - 45 U/L    ALT 21 0 - 70 U/L    Protein Total 7.8 6.8 - 8.8 g/dL    Albumin 4.0 3.4 - 5.0 g/dL    Bilirubin Total 0.6 0.2 - 1.3 mg/dL    GFR Estimate 67 >60 mL/min/1.73m2   Albumin Random Urine  Quantitative with Creat Ratio     Status: Abnormal   Result Value Ref Range    Creatinine Urine mg/dL 86 mg/dL    Albumin Urine mg/L 56 mg/L    Albumin Urine mg/g Cr 65.12 (H) 0.00 - 17.00 mg/g Cr

## 2022-02-07 NOTE — PROGRESS NOTES
"SUBJECTIVE:   Manfred Caraballo is a 86 year old male who presents for Preventive Visit.        Patient has been advised of split billing requirements and indicates understanding: Yes  Are you in the first 12 months of your Medicare coverage?  No    Healthy Habits:     In general, how would you rate your overall health?  Good    Frequency of exercise:  2-3 days/week    Duration of exercise:  Less than 15 minutes    Do you usually eat at least 4 servings of fruit and vegetables a day, include whole grains    & fiber and avoid regularly eating high fat or \"junk\" foods?  No    Taking medications regularly:  Yes    Medication side effects:  None    Ability to successfully perform activities of daily living:  Transportation requires assistance, shopping requires assistance, preparing meals requires assistance, housework requires assistance, bathing requires assistance and laundry requires assistance    Home Safety:  No safety concerns identified    Hearing Impairment:  Need to ask people to speak up or repeat themselves    In the past 6 months, have you been bothered by leaking of urine?  No    In general, how would you rate your overall mental or emotional health?  Fair      PHQ-2 Total Score: 1    Additional concerns today:  No    Do you feel safe in your environment? Yes    Have you ever done Advance Care Planning? (For example, a Health Directive, POLST, or a discussion with a medical provider or your loved ones about your wishes): No, advance care planning information given to patient to review.  Patient declined advance care planning discussion at this time.       Fall risk  Fallen 2 or more times in the past year?: No  Any fall with injury in the past year?: No    Cognitive Screening   1) Repeat 3 items (Leader, Season, Table)    2) Clock draw: NORMAL  3) 3 item recall: Recalls 3 objects  Results: 3 items recalled: COGNITIVE IMPAIRMENT LESS LIKELY    Mini-CogTM Copyright S Thomas. Licensed by the author for use in " [facility]; reprinted with permission (fina@[facility]). All rights reserved.      Do you have sleep apnea, excessive snoring or daytime drowsiness?: no    Reviewed and updated as needed this visit by clinical staff   Tobacco  Allergies  Meds  Problems  Med Hx  Surg Hx  Fam Hx          Reviewed and updated as needed this visit by Provider                 Social History     Tobacco Use     Smoking status: Former Smoker     Packs/day: 0.50     Years: 20.00     Pack years: 10.00     Types: Cigarettes     Quit date: 1981     Years since quittin.8     Smokeless tobacco: Never Used     Tobacco comment: quit in     Substance Use Topics     Alcohol use: No     Alcohol/week: 0.0 standard drinks     Comment: quit in          Alcohol Use 2022   Prescreen: >3 drinks/day or >7 drinks/week? No   Prescreen: >3 drinks/day or >7 drinks/week? -         Diabetes Follow-up    How often are you checking your blood sugar? One time daily  What time of day are you checking your blood sugars (select all that apply)?  Before meals  Have you had any blood sugars above 200?  No  Have you had any blood sugars below 70?  No    What symptoms do you notice when your blood sugar is low?  None and Not applicable    What concerns do you have today about your diabetes? None     Do you have any of these symptoms? (Select all that apply)  No numbness or tingling in feet.  No redness, sores or blisters on feet.  No complaints of excessive thirst.  No reports of blurry vision.  No significant changes to weight.    Checks in AM - 120, 125    Hyperlipidemia Follow-Up      Are you regularly taking any medication or supplement to lower your cholesterol?   Yes- lipitor    Are you having muscle aches or other side effects that you think could be caused by your cholesterol lowering medication?  No    Hypertension Follow-up      Do you check your blood pressure regularly outside of the clinic? Yes     Are you following a low  salt diet? Yes    Are your blood pressures ever more than 140 on the top number (systolic) OR more   than 90 on the bottom number (diastolic), for example 140/90? No     Denies any headaches, lightheadedness, dizziness, vision changes, chest pain, palpitations, shortness of breath, dyspnea, numbness/tingling.      BP Readings from Last 2 Encounters:   02/07/22 130/78   03/18/21 101/63     Hemoglobin A1C POCT (%)   Date Value   01/19/2021 7.4 (H)   10/18/2019 6.5 (H)     Hemoglobin A1C (%)   Date Value   02/07/2022 5.7 (H)     LDL Cholesterol Calculated (mg/dL)   Date Value   02/07/2022 35   01/19/2021 64   04/24/2019 58         Current providers sharing in care for this patient include:   Patient Care Team:  Bradley Hopkins DO as PCP - General (Family Practice)  Tato Au MD as Assigned Heart and Vascular Provider  Bradley Hopkins DO as Assigned PCP    The following health maintenance items are reviewed in Epic and correct as of today:  Health Maintenance Due   Topic Date Due     ZOSTER IMMUNIZATION (1 of 2) Never done         Review of Systems   Constitutional: Negative for chills and fever.   HENT: Positive for hearing loss. Negative for congestion, ear pain and sore throat.    Eyes: Negative for pain and visual disturbance.   Respiratory: Negative for cough and shortness of breath.    Cardiovascular: Negative for chest pain, palpitations and peripheral edema.   Gastrointestinal: Negative for abdominal pain, constipation, diarrhea, heartburn, hematochezia and nausea.   Genitourinary: Negative for dysuria, frequency, genital sores, hematuria, impotence, penile discharge and urgency.   Musculoskeletal: Positive for arthralgias. Negative for joint swelling and myalgias.   Skin: Negative for rash.   Neurological: Negative for dizziness, weakness, headaches and paresthesias.   Psychiatric/Behavioral: Negative for mood changes. The patient is not nervous/anxious.        OBJECTIVE:   /78   Pulse 70  "  Temp 97  F (36.1  C)   Ht 1.613 m (5' 3.5\")   Wt 68 kg (150 lb)   SpO2 97%   BMI 26.15 kg/m   Estimated body mass index is 26.15 kg/m  as calculated from the following:    Height as of this encounter: 1.613 m (5' 3.5\").    Weight as of this encounter: 68 kg (150 lb).  Physical Exam  GENERAL: healthy, alert and no distress  HENT: ear canals and TM's normal, nose and mouth without ulcers or lesions  NECK: no adenopathy and no asymmetry, masses, or scars  RESP: lungs clear to auscultation - no rales, rhonchi or wheezes  CV: regular rate and rhythm, normal S1 S2, no S3 or S4, no murmur, click or rub  MS: no gross musculoskeletal defects noted, no edema  SKIN: no suspicious lesions or rashes  PSYCH: mentation appears normal, affect normal/bright  FOOT EXAM: normal monofilament exam. Peripheral pulses equal bilaterally.       ASSESSMENT / PLAN:   1. Routine general medical examination at a health care facility: health maintenance reviewed and updated.  - REVIEW OF HEALTH MAINTENANCE PROTOCOL ORDERS    2. Type 2 diabetes mellitus with diabetic nephropathy, without long-term current use of insulin (H): well controlled with current regimen. Continue metformin, glipizide. I think that we could consider discontinuing glipizide if sugars remain well controlled to decrease risk of hypoglycemia.   - HEMOGLOBIN A1C; Future  - Albumin Random Urine Quantitative with Creat Ratio; Future  - glipiZIDE (GLUCOTROL XL) 2.5 MG 24 hr tablet; Take 1 tablet (2.5 mg) by mouth daily  Dispense: 90 tablet; Refill: 1  - Comprehensive metabolic panel (BMP + Alb, Alk Phos, ALT, AST, Total. Bili, TP); Future  - FOOT EXAM  - atorvastatin (LIPITOR) 10 MG tablet; Take 1 tablet (10 mg) by mouth daily  Dispense: 90 tablet; Refill: 3  - metFORMIN (GLUCOPHAGE) 1000 MG tablet; Take 1 tablet (1,000 mg) by mouth 2 times daily (with meals)  Dispense: 180 tablet; Refill: 1  - HEMOGLOBIN A1C  - Lipid panel reflex to direct LDL Fasting  - Comprehensive " "metabolic panel (BMP + Alb, Alk Phos, ALT, AST, Total. Bili, TP)  - Albumin Random Urine Quantitative with Creat Ratio    3. Chronic obstructive pulmonary disease, unspecified COPD type (H): stable, continue PRN nebs. Order for new nebulizer placed.  - Nebulizer and Supplies Order for DME - ONLY FOR DME    4. Persistent atrial fibrillation (H): stable, HR controlled, asymptomatic. Follows with cardiology. Continue diltiazem and anticoagulation with Xarelto.    5. Hyperlipidemia with target LDL less than 100: recheck lipids; continue Lipitor  - Lipid panel reflex to direct LDL Fasting; Future  - atorvastatin (LIPITOR) 10 MG tablet; Take 1 tablet (10 mg) by mouth daily  Dispense: 90 tablet; Refill: 3  - Lipid panel reflex to direct LDL Fasting    6. Anemia, unspecified type: recheck Hgb  - Hemoglobin; Future  - Hemoglobin    7. Hypertension goal BP (blood pressure) < 140/90: well controlled. Continue current plan.  - losartan-hydrochlorothiazide (HYZAAR) 50-12.5 MG tablet; TAKE 2 TABLETS BY MOUTH EVERY DAY  Dispense: 180 tablet; Refill: 3      COUNSELING:  Reviewed preventive health counseling, as reflected in patient instructions       Regular exercise       Healthy diet/nutrition    Estimated body mass index is 26.15 kg/m  as calculated from the following:    Height as of this encounter: 1.613 m (5' 3.5\").    Weight as of this encounter: 68 kg (150 lb).        He reports that he quit smoking about 40 years ago. His smoking use included cigarettes. He has a 10.00 pack-year smoking history. He has never used smokeless tobacco.      Appropriate preventive services were discussed with this patient, including applicable screening as appropriate for cardiovascular disease, diabetes, osteopenia/osteoporosis, and glaucoma.  As appropriate for age/gender, discussed screening for colorectal cancer, prostate cancer, breast cancer, and cervical cancer. Checklist reviewing preventive services available has been given to the " patient.    Reviewed patients plan of care and provided an AVS. The Basic Care Plan (routine screening as documented in Health Maintenance) for Manfred meets the Care Plan requirement. This Care Plan has been established and reviewed with the Patient.    Counseling Resources:  ATP IV Guidelines  Pooled Cohorts Equation Calculator  Breast Cancer Risk Calculator  Breast Cancer: Medication to Reduce Risk  FRAX Risk Assessment  ICSI Preventive Guidelines  Dietary Guidelines for Americans, 2010  USDA's MyPlate  ASA Prophylaxis  Lung CA Screening    Bradley Hopkins DO  [facility]    Identified Health Risks:

## 2022-02-08 LAB
ALBUMIN SERPL-MCNC: 4 G/DL (ref 3.4–5)
ALP SERPL-CCNC: 62 U/L (ref 40–150)
ALT SERPL W P-5'-P-CCNC: 21 U/L (ref 0–70)
ANION GAP SERPL CALCULATED.3IONS-SCNC: 5 MMOL/L (ref 3–14)
AST SERPL W P-5'-P-CCNC: 13 U/L (ref 0–45)
BILIRUB SERPL-MCNC: 0.6 MG/DL (ref 0.2–1.3)
BUN SERPL-MCNC: 15 MG/DL (ref 7–30)
CALCIUM SERPL-MCNC: 9.6 MG/DL (ref 8.5–10.1)
CHLORIDE BLD-SCNC: 101 MMOL/L (ref 94–109)
CHOLEST SERPL-MCNC: 134 MG/DL
CO2 SERPL-SCNC: 28 MMOL/L (ref 20–32)
CREAT SERPL-MCNC: 1.08 MG/DL (ref 0.66–1.25)
FASTING STATUS PATIENT QL REPORTED: NO
GFR SERPL CREATININE-BSD FRML MDRD: 67 ML/MIN/1.73M2
GLUCOSE BLD-MCNC: 134 MG/DL (ref 70–99)
HDLC SERPL-MCNC: 44 MG/DL
LDLC SERPL CALC-MCNC: 35 MG/DL
NONHDLC SERPL-MCNC: 90 MG/DL
POTASSIUM BLD-SCNC: 4.4 MMOL/L (ref 3.4–5.3)
PROT SERPL-MCNC: 7.8 G/DL (ref 6.8–8.8)
SODIUM SERPL-SCNC: 134 MMOL/L (ref 133–144)
TRIGL SERPL-MCNC: 274 MG/DL

## 2022-02-09 LAB
CREAT UR-MCNC: 86 MG/DL
MICROALBUMIN UR-MCNC: 56 MG/L
MICROALBUMIN/CREAT UR: 65.12 MG/G CR (ref 0–17)

## 2022-02-17 ENCOUNTER — TELEPHONE (OUTPATIENT)
Dept: FAMILY MEDICINE | Facility: CLINIC | Age: 87
End: 2022-02-17
Payer: COMMERCIAL

## 2022-02-17 NOTE — TELEPHONE ENCOUNTER
Reason for Call:  Form, our goal is to have forms completed with 72 hours, however, some forms may require a visit or additional information.    Type of letter, form or note:  medical    Who is the form from?: [employer] (if other please explain)    Where did the form come from: form was faxed in    What clinic location was the form placed at?: [facility]    Where the form was placed: Bradley Hopkins, DO Box/Folder    What number is listed as a contact on the form?: Fax to [phone]    Call taken on 2/17/2022 at 2:04 PM by Hiral Olivares

## 2022-02-21 DIAGNOSIS — K21.9 GASTROESOPHAGEAL REFLUX DISEASE WITHOUT ESOPHAGITIS: ICD-10-CM

## 2022-02-21 NOTE — TELEPHONE ENCOUNTER
Completed forms and med list  faxed back to [facility]  at [phone].   Originals sent to be scanned.       Maribell Arenas

## 2022-02-21 NOTE — TELEPHONE ENCOUNTER
Patient's form signed, dated, and placed in TC basket. Please attach medication list to form before faxing. Thanks.    Bradley Hopkins DO  2/20/2022 10:41 PM

## 2022-02-22 ENCOUNTER — TELEPHONE (OUTPATIENT)
Dept: FAMILY MEDICINE | Facility: CLINIC | Age: 87
End: 2022-02-22
Payer: COMMERCIAL

## 2022-02-22 RX ORDER — PANTOPRAZOLE SODIUM 40 MG/1
40 TABLET, DELAYED RELEASE ORAL DAILY
Qty: 90 TABLET | Refills: 0 | Status: SHIPPED | OUTPATIENT
Start: 2022-02-22 | End: 2022-03-01

## 2022-02-22 NOTE — TELEPHONE ENCOUNTER
Pts son calling stating that they have not gotten the Cpap machine yet and it has been 2 weeks.     He was told that the order was placed at  on 2/7/2022     There is no order that has been placed     [facility] # [phone] ok Lm     Please advise     Thank you     Paradise Khan RN, BSN  [facility]

## 2022-02-22 NOTE — TELEPHONE ENCOUNTER
[facility]  Calling stating that they never received the most recent OV note from 2/7/22. Looks like it still needs to be signed. Once completed I will fax to # [phone]. Please advise.     Jake Al

## 2022-02-22 NOTE — TELEPHONE ENCOUNTER
Prescription approved per [facility]ill Protocol.      Js Chandler RN   [facility] [place]

## 2022-02-22 NOTE — TELEPHONE ENCOUNTER
Reason for Call:  Form, our goal is to have forms completed with 72 hours, however, some forms may require a visit or additional information.    Type of letter, form or note:  medical    Who is the form from?: [facility] (if other please explain)    Where did the form come from: form was faxed in    What clinic location was the form placed at?: [facility]    Where the form was placed: Dr Hopkins Box/Folder    What number is listed as a contact on the form?: [phone] is the fax number       Additional comments: Need notes for 2/7/2022    Call taken on 2/22/2022 at 3:13 PM by Yue Gunderson

## 2022-02-22 NOTE — TELEPHONE ENCOUNTER
Manfred doesn't have KIRBY. The order was for a nebulizer machine. Can we resend order to [facility]?    Bradley Hopkins DO  2/22/2022 12:44 PM

## 2022-02-22 NOTE — TELEPHONE ENCOUNTER
Order printed, faxed to [facility] medical.    Js FORD RN   [facility]

## 2022-02-23 NOTE — PATIENT INSTRUCTIONS
Preventive Health Recommendations:     See your health care provider every year to    Review health changes.     Discuss preventive care.      Review your medicines if your doctor has prescribed any.      Talk with your health care provider about whether you should have a test to screen for prostate cancer (PSA).    Every 3 years, have a diabetes test (fasting glucose). If you are at risk for diabetes, you should have this test more often.    Every 5 years, have a cholesterol test. Have this test more often if you are at risk for high cholesterol or heart disease.     Every 10 years, have a colonoscopy. Or, have a yearly FIT test (stool test). These exams will check for colon cancer.    Talk to with your health care provider about screening for Abdominal Aortic Aneurysm if you have a family history of AAA or have a history of smoking.    Shots:     Get a flu shot each year.     Get a tetanus shot every 10 years.     Talk to your doctor about your pneumonia vaccines. There are now two you should receive - Pneumovax (PPSV 23) and Prevnar (PCV 13).     Talk to your pharmacist about a shingles vaccine.     Talk to your doctor about the hepatitis B vaccine.  Nutrition:     Eat at least 5 servings of fruits and vegetables each day.     Eat whole-grain bread, whole-wheat pasta and brown rice instead of white grains and rice.     Get adequate Calcium and Vitamin D.   Lifestyle    Exercise for at least 150 minutes a week (30 minutes a day, 5 days a week). This will help you control your weight and prevent disease.     Limit alcohol to one drink per day.     No smoking.     Wear sunscreen to prevent skin cancer.    See your dentist every six months for an exam and cleaning.    See your eye doctor every 1 to 2 years to screen for conditions such as glaucoma, macular degeneration, cataracts, etc.    Personalized Prevention Plan  You are due for the preventive services outlined below.  Your care team is available to assist you  in scheduling these services.  If you have already completed any of these items, please share that information with your care team to update in your medical record.  Health Maintenance Due   Topic Date Due     Zoster (Shingles) Vaccine (1 of 2) Never done

## 2022-02-23 NOTE — TELEPHONE ENCOUNTER
Requesting signed encounter notes from 2/7/22. Faxing to [facility] [employer]  @ #[phone].     Jake Al

## 2022-03-18 ENCOUNTER — TELEPHONE (OUTPATIENT)
Dept: FAMILY MEDICINE | Facility: CLINIC | Age: 87
End: 2022-03-18
Payer: COMMERCIAL

## 2022-03-18 NOTE — TELEPHONE ENCOUNTER
Saba calling from [employer] requesting to see if pcp would follow patient for home care services via phone and fax.     Saba can be reached at # [phone], Ok to leave detailed message.     Jake Al

## 2022-03-18 NOTE — TELEPHONE ENCOUNTER
Reason for Call:  Form, our goal is to have forms completed with 72 hours, however, some forms may require a visit or additional information.    Type of letter, form or note:  medical    Who is the form from?: Home care    Where did the form come from: form was faxed in    What clinic location was the form placed at?: [facility]    Where the form was placed: Dr Hopkins Box/Folder    What number is listed as a contact on the form?: [phone]       Additional comments: 9 separate orders    Call taken on 3/18/2022 at 11:57 AM by Yue Gunderson

## 2022-03-18 NOTE — TELEPHONE ENCOUNTER
Reason for Call:  Form, our goal is to have forms completed with 72 hours, however, some forms may require a visit or additional information.    Type of letter, form or note:  medical    Who is the form from?: [facility] (if other please explain)    Where did the form come from: form was faxed in    What clinic location was the form placed at?: [facility]    Where the form was placed: Given to physician    What number is listed as a contact on the form?: Fax #[phone]       Additional comments:     Call taken on 3/18/2022 at 1:39 PM by Jake Al

## 2022-03-21 ENCOUNTER — TELEPHONE (OUTPATIENT)
Dept: FAMILY MEDICINE | Facility: CLINIC | Age: 87
End: 2022-03-21
Payer: COMMERCIAL

## 2022-03-21 ENCOUNTER — MEDICAL CORRESPONDENCE (OUTPATIENT)
Dept: HEALTH INFORMATION MANAGEMENT | Facility: CLINIC | Age: 87
End: 2022-03-21
Payer: COMMERCIAL

## 2022-03-22 NOTE — TELEPHONE ENCOUNTER
MED RECONCILIATION DONE    Discrepancies:     Atorvastatin           Epic: 10 mg - 1 tab daily             Hydrocortisone   Epic: 0.2% - apply sparingly to affected area three times daily for 14 days, using PRN      Losartan-hydrochlorothiazide    Epic: 50-12.5 - 2 tablets daily       Acetaminophen   Epic: 500 mg - 1-2 tablets every 6 hours PRN           Meds on Epic but NOT  on Form:      glipiZIDE (GLUCOTROL XL) 2.5 MG 24 hr tablet 90 tablet 1 2/7/2022  --   Sig - Route: Take 1 tablet (2.5 mg) by mouth daily - Oral   Sent to pharmacy as: glipiZIDE ER 2.5 MG Oral Tablet Extended Release 24 Hour (GLUCOTROL XL)    Taking   Class: E-Prescribe        KLOR-CON 20 MEQ CR tablet 90 tablet 3 3/1/2022  No   Sig: TAKE 1 TABLET BY MOUTH DAILY   Sent to pharmacy as: Klor-Con M20 20 MEQ Oral Tablet Extended Release (potassium chloride ER)    Taking   Class: E-Prescribe       polyethylene glycol (MIRALAX/GLYCOLAX) powder 527 g 3 2/4/2020  No   Sig: MIX 17 GRAMS (1 CAPFUL) OF POWDER IN 8 OUNCES LIQUID AND DRINK ONCE DAILY   Sent to pharmacy as: polyethylene glycol (MIRALAX/GLYCOLAX) powder    Taking   Class: E-Prescribe         Meds on Form but NOT on Epic :    Form currently on [facility] Desk in [place] RN Folder      Called and spoke with son, Brigida LINGC on file. He will call back when he gets home to go over his medications.       Lianne Alvarado RN  [facility]

## 2022-03-22 NOTE — TELEPHONE ENCOUNTER
Pt son called back, discussed notes and meds below. Updated med list.    Med list printed, given form to PCP to review and advise.      Js FORD RN   [facility]

## 2022-03-22 NOTE — TELEPHONE ENCOUNTER
Patient's form signed, dated, and placed in TC basket.    Bradley Hopkins DO  3/21/2022 10:58 PM

## 2022-03-23 NOTE — TELEPHONE ENCOUNTER
Patient's form signed, dated, and placed in TC basket.    Bradley Hpokins DO  3/23/2022 2:32 PM

## 2022-03-24 NOTE — TELEPHONE ENCOUNTER
Completed forms faxed back to [facility]  at [phone].   Originals sent to be scanned.       Maribell Arenas

## 2022-03-28 ENCOUNTER — TELEPHONE (OUTPATIENT)
Dept: FAMILY MEDICINE | Facility: CLINIC | Age: 87
End: 2022-03-28
Payer: COMMERCIAL

## 2022-03-28 NOTE — TELEPHONE ENCOUNTER
Reason for Call:  Form, our goal is to have forms completed with 72 hours, however, some forms may require a visit or additional information.    Type of letter, form or note:  medical    Who is the form from?: Home care    Where did the form come from: form was faxed in    What clinic location was the form placed at?: [facility]    Where the form was placed: Dr Hopkins Box/Folder    What number is listed as a contact on the form?: [phone]       Additional comments:     Call taken on 3/28/2022 at 8:27 AM by Colleen Lemos

## 2022-03-28 NOTE — TELEPHONE ENCOUNTER
Reason for Call:  Form, our goal is to have forms completed with 72 hours, however, some forms may require a visit or additional information.    Type of letter, form or note:  medical    Who is the form from?: Home care    Where did the form come from: form was faxed in    What clinic location was the form placed at?: [facility]    Where the form was placed: Dr Hopkins Box/Folder    What number is listed as a contact on the form?: [phone]           Call taken on 3/28/2022 at 5:42 PM by Yue Gunderson

## 2022-03-28 NOTE — TELEPHONE ENCOUNTER
Reason for Call:  Form, our goal is to have forms completed with 72 hours, however, some forms may require a visit or additional information.    Type of letter, form or note:  medical    Who is the form from?: Home care    Where did the form come from: form was faxed in    What clinic location was the form placed at?: [facility]    Where the form was placed: Dr Hopkins Box/Folder    What number is listed as a contact on the form?: [phone]           Call taken on 3/28/2022 at 5:45 PM by Yue Gunderson

## 2022-03-29 ENCOUNTER — TELEPHONE (OUTPATIENT)
Dept: FAMILY MEDICINE | Facility: CLINIC | Age: 87
End: 2022-03-29
Payer: COMMERCIAL

## 2022-03-29 NOTE — TELEPHONE ENCOUNTER
Reason for Call:  Form, our goal is to have forms completed with 72 hours, however, some forms may require a visit or additional information.    Type of letter, form or note:  medical    Who is the form from?: Home care    Where did the form come from: form was faxed in    What clinic location was the form placed at?: [facility]    Where the form was placed: Dr Hopkins Box/Folder    What number is listed as a contact on the form?: [phone]       Additional comments:     Call taken on 3/29/2022 at 12:39 PM by Colleen Lemos    er

## 2022-03-29 NOTE — TELEPHONE ENCOUNTER
Reason for Call:  Form, our goal is to have forms completed with 72 hours, however, some forms may require a visit or additional information.    Type of letter, form or note:  medical    Who is the form from?: Home care    Where did the form come from: form was faxed in    What clinic location was the form placed at?: [facility]    Where the form was placed: Dr Hopkins Box/Folder    What number is listed as a contact on the form?: [phone]       Additional comments:     Call taken on 3/29/2022 at 12:38 PM by Colleen Lemos

## 2022-03-29 NOTE — TELEPHONE ENCOUNTER
Reason for Call:  Form, our goal is to have forms completed with 72 hours, however, some forms may require a visit or additional information.    Type of letter, form or note:  medical    Who is the form from?: Home care    Where did the form come from: form was faxed in    What clinic location was the form placed at?: [facility]    Where the form was placed: Dr Hopkins Box/Folder    What number is listed as a contact on the form?: [phone]       Additional comments:     Call taken on 3/29/2022 at 12:37 PM by Colleen Lemos

## 2022-03-30 NOTE — TELEPHONE ENCOUNTER
MED RECONCILIATION DONE    Discrepancies:             Epic:   acetaminophen (ACETAMINOPHEN EXTRA STRENGTH) 500 MG tablet 360 tablet 3 1/6/2021  No   Sig: TAKE 1-2 TABLETS BY MOUTH EVERY 6 HOURS AS NEEDED FOR MILD PAIN            Form:  Acetaminophen 325 mg PO; take 2 tabs (650 mg) PRN Q6h         Meds on Epic but NOT  on Form:    glipiZIDE (GLUCOTROL XL) 2.5 MG 24 hr tablet 90 tablet 1 2/7/2022  --   Sig - Route: Take 1 tablet (2.5 mg) by mouth daily - Oral     KLOR-CON 20 MEQ CR tablet 90 tablet 3 3/1/2022  No   Sig: TAKE 1 TABLET BY MOUTH DAILY     polyethylene glycol (MIRALAX/GLYCOLAX) powder 527 g 3 2/4/2020  No   Sig: MIX 17 GRAMS (1 CAPFUL) OF POWDER IN 8 OUNCES LIQUID AND DRINK ONCE DAILY         Meds on Form but NOT on Epic :    -Glimepiride 2 mg; take 0.5 tablets PO daily        Form currently in PCP forms bin at Provider desk  Routing to PCP for further review/recommendations/orders.        Attempt # 1  Called # [phone] using  services ID#[id number]    Left a non detailed VM to call back at [phone] and ask for any available Triage Nurse.    Js Chandler RN   [facility] [place] Triage

## 2022-03-30 NOTE — TELEPHONE ENCOUNTER
MED RECONCILIATION DONE    Discrepancies:             Epic:   acetaminophen (ACETAMINOPHEN EXTRA STRENGTH) 500 MG tablet 360 tablet 3 1/6/2021  No   Sig: TAKE 1-2 TABLETS BY MOUTH EVERY 6 HOURS AS NEEDED FOR MILD PAIN            Form:  Acetaminophen 325 mg PO; take 2 tabs (650 mg) PRN Q6h         Meds on Epic but NOT  on Form:    glipiZIDE (GLUCOTROL XL) 2.5 MG 24 hr tablet 90 tablet 1 2/7/2022  --   Sig - Route: Take 1 tablet (2.5 mg) by mouth daily - Oral     KLOR-CON 20 MEQ CR tablet 90 tablet 3 3/1/2022  No   Sig: TAKE 1 TABLET BY MOUTH DAILY     polyethylene glycol (MIRALAX/GLYCOLAX) powder 527 g 3 2/4/2020  No   Sig: MIX 17 GRAMS (1 CAPFUL) OF POWDER IN 8 OUNCES LIQUID AND DRINK ONCE DAILY         Meds on Form but NOT on Epic :    -Glimepiride 2 mg; take 0.5 tablets PO daily        Form currently on [facility] Desk in [place] RN Folder  Routing to PCP for further review/recommendations/orders.        Attempt # 1  Called # [phone] using  services ID#[id number]    Left a non detailed VM to call back at [phone] and ask for any available Triage Nurse.    Js Chandler RN   [facility] [place] Triage

## 2022-03-31 ENCOUNTER — MEDICAL CORRESPONDENCE (OUTPATIENT)
Dept: HEALTH INFORMATION MANAGEMENT | Facility: CLINIC | Age: 87
End: 2022-03-31
Payer: COMMERCIAL

## 2022-04-01 NOTE — TELEPHONE ENCOUNTER
Patient's form signed, dated, and placed in TC basket.    Bradley Hopkins DO  3/31/2022 11:31 PM

## 2022-04-05 ENCOUNTER — TELEPHONE (OUTPATIENT)
Dept: FAMILY MEDICINE | Facility: CLINIC | Age: 87
End: 2022-04-05
Payer: COMMERCIAL

## 2022-04-05 ENCOUNTER — MEDICAL CORRESPONDENCE (OUTPATIENT)
Dept: HEALTH INFORMATION MANAGEMENT | Facility: CLINIC | Age: 87
End: 2022-04-05
Payer: COMMERCIAL

## 2022-04-05 NOTE — TELEPHONE ENCOUNTER
Reason for Call:  Form, our goal is to have forms completed with 72 hours, however, some forms may require a visit or additional information.    Type of letter, form or note:  medical    Who is the form from?: Home care    Where did the form come from: form was faxed in    What clinic location was the form placed at?: [facility]    Where the form was placed: Dr Hopkins Box/Folder    What number is listed as a contact on the form?: [phone]           Call taken on 4/5/2022 at 10:17 AM by Yue Gunderson

## 2022-04-05 NOTE — TELEPHONE ENCOUNTER
MED RECONCILIATION DONE    Discrepancies:             Epic:   acetaminophen (ACETAMINOPHEN EXTRA STRENGTH) 500 MG tablet 360 tablet 3 1/6/2021  No   Sig: TAKE 1-2 TABLETS BY MOUTH EVERY 6 HOURS AS NEEDED FOR MILD PAIN            Form:  Acetaminophen 325 mg PO; take 2 tabs (650 mg) PRN Q6h         Meds on Epic but NOT  on Form:    glipiZIDE (GLUCOTROL XL) 2.5 MG 24 hr tablet 90 tablet 1 2/7/2022  --   Sig - Route: Take 1 tablet (2.5 mg) by mouth daily - Oral     KLOR-CON 20 MEQ CR tablet 90 tablet 3 3/1/2022  No   Sig: TAKE 1 TABLET BY MOUTH DAILY     polyethylene glycol (MIRALAX/GLYCOLAX) powder 527 g 3 2/4/2020  No   Sig: MIX 17 GRAMS (1 CAPFUL) OF POWDER IN 8 OUNCES LIQUID AND DRINK ONCE DAILY         Meds on Form but NOT on Epic :    -Glimepiride 2 mg; take 0.5 tablets PO daily        Form currently in PCP forms bin at Provider desk  Routing to PCP for further review/recommendations/orders.      Js FORD RN   [facility] [place] Triage

## 2022-04-05 NOTE — TELEPHONE ENCOUNTER
Reason for Call:  Form, our goal is to have forms completed with 72 hours, however, some forms may require a visit or additional information.    Type of letter, form or note:  medical    Who is the form from?: Home care    Where did the form come from: form was faxed in    What clinic location was the form placed at?: [facility]    Where the form was placed: Dr Hopkins Box/Folder    What number is listed as a contact on the form?: [phone]           Call taken on 4/5/2022 at 10:14 AM by Yue Gunderson

## 2022-04-05 NOTE — TELEPHONE ENCOUNTER
Reason for Call:  Form, our goal is to have forms completed with 72 hours, however, some forms may require a visit or additional information.    Type of letter, form or note:  medical    Who is the form from?: Home care    Where did the form come from: form was faxed in    What clinic location was the form placed at?: [facility]    Where the form was placed: Dr Hopkins Box/Folder    What number is listed as a contact on the form?: [phone]           Call taken on 4/5/2022 at 10:11 AM by Yue Gunderson

## 2022-04-06 NOTE — TELEPHONE ENCOUNTER
Patient's form signed, dated, and placed in TC basket.    Bradley Hopkins DO  4/5/2022 11:21 PM

## 2022-04-06 NOTE — TELEPHONE ENCOUNTER
Form faxed to [employer] @ # [phone].     Original sent to be scanned.     Jake Al

## 2022-04-06 NOTE — TELEPHONE ENCOUNTER
Patient's form signed, dated, and placed in TC basket.    Bradley Hopkins DO  4/5/2022 11:22 PM

## 2022-04-11 ENCOUNTER — TELEPHONE (OUTPATIENT)
Dept: FAMILY MEDICINE | Facility: CLINIC | Age: 87
End: 2022-04-11
Payer: COMMERCIAL

## 2022-04-11 ENCOUNTER — MEDICAL CORRESPONDENCE (OUTPATIENT)
Dept: HEALTH INFORMATION MANAGEMENT | Facility: CLINIC | Age: 87
End: 2022-04-11
Payer: COMMERCIAL

## 2022-04-11 NOTE — TELEPHONE ENCOUNTER
Reason for Call:  Form, our goal is to have forms completed with 72 hours, however, some forms may require a visit or additional information.    Type of letter, form or note:  medical    Who is the form from?: Home care    Where did the form come from: form was faxed in    What clinic location was the form placed at?: [facility]    Where the form was placed: Dr Hopkins Box/Folder    What number is listed as a contact on the form?: [phone]       Additional comments:     Call taken on 4/11/2022 at 12:37 PM by Colleen Lemos

## 2022-04-11 NOTE — TELEPHONE ENCOUNTER
Reason for Call:  Form, our goal is to have forms completed with 72 hours, however, some forms may require a visit or additional information.    Type of letter, form or note:  medical    Who is the form from?: Home care    Where did the form come from: form was faxed in    What clinic location was the form placed at?: [facility]    Where the form was placed: Dr Hopkins Box/Folder    What number is listed as a contact on the form?: [phone]       Additional comments:     Call taken on 4/11/2022 at 12:35 PM by Colleen Lemos

## 2022-04-11 NOTE — TELEPHONE ENCOUNTER
Form faxed to [employer] @ # [phone].     Original sent to be scanned.     Jake Al

## 2022-04-11 NOTE — TELEPHONE ENCOUNTER
Reason for Call:  Form, our goal is to have forms completed with 72 hours, however, some forms may require a visit or additional information.    Type of letter, form or note:  medical    Who is the form from?: Home care    Where did the form come from: form was faxed in    What clinic location was the form placed at?: [facility]    Where the form was placed: Dr Hopkins Box/Folder    What number is listed as a contact on the form?: [phone]           Call taken on 4/11/2022 at 5:54 PM by Yue Gunderson

## 2022-04-11 NOTE — TELEPHONE ENCOUNTER
Reason for Call:  Form, our goal is to have forms completed with 72 hours, however, some forms may require a visit or additional information.    Type of letter, form or note:  medical    Who is the form from?: Home care    Where did the form come from: form was faxed in    What clinic location was the form placed at?: [facility]    Where the form was placed: Dr Hopkins Box/Folder    What number is listed as a contact on the form?: [phone]       Additional comments:     Call taken on 4/11/2022 at 12:36 PM by Colleen Lemos

## 2022-04-19 ENCOUNTER — TELEPHONE (OUTPATIENT)
Dept: FAMILY MEDICINE | Facility: CLINIC | Age: 87
End: 2022-04-19
Payer: COMMERCIAL

## 2022-04-20 ENCOUNTER — MEDICAL CORRESPONDENCE (OUTPATIENT)
Dept: HEALTH INFORMATION MANAGEMENT | Facility: CLINIC | Age: 87
End: 2022-04-20
Payer: COMMERCIAL

## 2022-04-20 ENCOUNTER — TELEPHONE (OUTPATIENT)
Dept: FAMILY MEDICINE | Facility: CLINIC | Age: 87
End: 2022-04-20
Payer: COMMERCIAL

## 2022-04-20 NOTE — TELEPHONE ENCOUNTER
Plan of Care dates: 9/12-11/10/21  Previous dates of care just given to Dr Hopkins to sign       Maribell Arenas

## 2022-04-20 NOTE — TELEPHONE ENCOUNTER
Reason for Call:  Form, our goal is to have forms completed with 72 hours, however, some forms may require a visit or additional information.    Type of letter, form or note:  medical    Who is the form from?: Home care    Where did the form come from: form was faxed in    What clinic location was the form placed at?: [facility]    Where the form was placed: Dr Hopkins Box/Folder    What number is listed as a contact on the form?: [phone]           Call taken on 4/19/2022 at 7:52 PM by Yue Gunderson

## 2022-04-20 NOTE — TELEPHONE ENCOUNTER
Reason for Call:  Form, our goal is to have forms completed with 72 hours, however, some forms may require a visit or additional information.    Type of letter, form or note:  medical    Who is the form from?: Home care    Where did the form come from: form was faxed in    What clinic location was the form placed at?: [facility]    Where the form was placed: Dr Hopkins Box/Folder    What number is listed as a contact on the form?: [phone]           Call taken on 4/19/2022 at 7:49 PM by Yue Gunderson

## 2022-04-20 NOTE — TELEPHONE ENCOUNTER
Reason for Call:  Form, our goal is to have forms completed with 72 hours, however, some forms may require a visit or additional information.    Type of letter, form or note:  medical    Who is the form from?: Home care    Where did the form come from: form was faxed in    What clinic location was the form placed at?: [facility]    Where the form was placed: Dr Hopkins Box/Folder    What number is listed as a contact on the form?: [phone]       Call taken on 4/20/2022 at 11:01 AM by Yue Gunderson

## 2022-04-20 NOTE — TELEPHONE ENCOUNTER
Reason for Call:  Form, our goal is to have forms completed with 72 hours, however, some forms may require a visit or additional information.    Type of letter, form or note:  medical    Who is the form from?: Home care    Where did the form come from: form was faxed in    What clinic location was the form placed at?: [facility]    Where the form was placed: Dr Hopkins Box/Folder    What number is listed as a contact on the form?: [phone]       Additional comments:     Call taken on 4/20/2022 at 12:40 PM by Colleen Lemos

## 2022-04-20 NOTE — TELEPHONE ENCOUNTER
Reason for Call:  Form, our goal is to have forms completed with 72 hours, however, some forms may require a visit or additional information.    Type of letter, form or note:  medical    Who is the form from?: Home care    Where did the form come from: form was faxed in    What clinic location was the form placed at?: [facility]    Where the form was placed: Dr Hopkins Box/Folder    What number is listed as a contact on the form?: [phone]         Call taken on 4/20/2022 at 11:18 AM by Yue Gunderson

## 2022-04-20 NOTE — TELEPHONE ENCOUNTER
Reason for Call:  Form, our goal is to have forms completed with 72 hours, however, some forms may require a visit or additional information.    Type of letter, form or note:  medical    Who is the form from?: Home care    Where did the form come from: form was faxed in    What clinic location was the form placed at?: [facility]    Where the form was placed: Bradley Hopkins, DO Box/Folder    What number is listed as a contact on the form?: Fax to [phone]       Call taken on 4/20/2022 at 9:26 AM by Hiral Olivares

## 2022-04-20 NOTE — TELEPHONE ENCOUNTER
Reason for Call:  Form, our goal is to have forms completed with 72 hours, however, some forms may require a visit or additional information.    Type of letter, form or note:  medical    Who is the form from?: Home care    Where did the form come from: form was faxed in    What clinic location was the form placed at?: [facility]    Where the form was placed: Dr Hopkins Box/Folder    What number is listed as a contact on the form?: [phone]           Call taken on 4/19/2022 at 7:56 PM by Yue Gunderson

## 2022-04-21 DIAGNOSIS — G89.29 CHRONIC PAIN OF LEFT KNEE: ICD-10-CM

## 2022-04-21 DIAGNOSIS — M25.562 CHRONIC PAIN OF LEFT KNEE: ICD-10-CM

## 2022-04-21 NOTE — TELEPHONE ENCOUNTER
Completed forms faxed back to [facility]  at [phone].   Originals sent to be scanned.       Maribell Arenas

## 2022-04-21 NOTE — TELEPHONE ENCOUNTER
Completed forms faxed back to [facility]  at [phone].   Originals sent to be scanned.       Maribell Arenas

## 2022-04-21 NOTE — TELEPHONE ENCOUNTER
Completed forms faxed back to [facility]  at [phone].   Originals sent to be scanned.       Maribell Arenas

## 2022-04-21 NOTE — TELEPHONE ENCOUNTER
Completed forms faxed back to [facility]  at [phone].   Originals sent to be scanned.       Maribell Arenas

## 2022-04-21 NOTE — TELEPHONE ENCOUNTER
Patient's form signed, dated, and placed in TC basket.    Bradley Hopkins DO  4/20/2022 11:10 PM

## 2022-04-21 NOTE — TELEPHONE ENCOUNTER
Completed forms faxed back to [facility]  at [phone].   Originals sent to be scanned.       Maribell Arenas

## 2022-04-21 NOTE — TELEPHONE ENCOUNTER
Completed forms faxed back to [facility]  at [phone].   Originals sent to be scanned.       Maribell Arenas

## 2022-04-21 NOTE — TELEPHONE ENCOUNTER
Completed forms faxed back to [facility]  at [phone].   Originals sent to be scanned.       Maribell Arenas

## 2022-04-21 NOTE — TELEPHONE ENCOUNTER
Patient's form signed, dated, and placed in TC basket.    Bradley Hopkins DO  4/20/2022 11:09 PM

## 2022-04-21 NOTE — TELEPHONE ENCOUNTER
Patient's form signed, dated, and placed in TC basket.    Bradley Hopkins DO  4/20/2022 11:11 PM

## 2022-04-22 RX ORDER — ACETAMINOPHEN 500 MG
TABLET ORAL
Qty: 360 TABLET | Refills: 3 | Status: SHIPPED | OUTPATIENT
Start: 2022-04-22 | End: 2023-01-20

## 2022-04-26 ENCOUNTER — TELEPHONE (OUTPATIENT)
Dept: FAMILY MEDICINE | Facility: CLINIC | Age: 87
End: 2022-04-26
Payer: COMMERCIAL

## 2022-04-26 NOTE — TELEPHONE ENCOUNTER
Reason for Call:  Form, our goal is to have forms completed with 72 hours, however, some forms may require a visit or additional information.    Type of letter, form or note:  medical    Who is the form from?: Home care    Where did the form come from: form was faxed in    What clinic location was the form placed at?: [facility]    Where the form was placed: Dr Hopkins Box/Folder    What number is listed as a contact on the form?: [phone]           Call taken on 4/26/2022 at 2:13 PM by Yue Gunderson

## 2022-04-26 NOTE — TELEPHONE ENCOUNTER
Reason for Call:  Form, our goal is to have forms completed with 72 hours, however, some forms may require a visit or additional information.    Type of letter, form or note:  medical    Who is the form from?: Home care    Where did the form come from: form was faxed in    What clinic location was the form placed at?: [facility]    Where the form was placed: Dr Hopkins Box/Folder    What number is listed as a contact on the form?: [phone]                 Call taken on 4/26/2022 at 2:32 PM by Yue Gunderson

## 2022-04-26 NOTE — TELEPHONE ENCOUNTER
Reason for Call:  Form, our goal is to have forms completed with 72 hours, however, some forms may require a visit or additional information.    Type of letter, form or note:  medical    Who is the form from?: Home care    Where did the form come from: form was faxed in    What clinic location was the form placed at?: [facility]    Where the form was placed: [facility] Box/Folder    What number is listed as a contact on the form?: [phone]           Call taken on 4/26/2022 at 6:37 PM by Yue Gunderson

## 2022-04-26 NOTE — TELEPHONE ENCOUNTER
Reason for Call:  Form, our goal is to have forms completed with 72 hours, however, some forms may require a visit or additional information.    Type of letter, form or note:  medical    Who is the form from?: Home care    Where did the form come from: form was faxed in    What clinic location was the form placed at?: [facility]    Where the form was placed: Dr Hopkins Box/Folder    What number is listed as a contact on the form?: [phone]           Call taken on 4/26/2022 at 2:04 PM by Yue Gunderson

## 2022-04-26 NOTE — TELEPHONE ENCOUNTER
MED RECONCILIATION DONE    Discrepancies:               Epic:   losartan-hydrochlorothiazide (HYZAAR) 50-12.5 MG tablet 180 tablet 3 2/7/2022  No   Sig: TAKE 2 TABLETS BY MOUTH EVERY DAY             Form:  Losartan 100-12.5 take one tablet daily       Epic:   acetaminophen (ACETAMINOPHEN EXTRA STRENGTH) 500 MG tablet 360 tablet 3 4/22/2022  No   Sig: TAKE 1-2 TABLETS BY MOUTH EVERY 6 HOURS AS NEEDED FOR MILD PAIN        Form: Tylenol 325 mg; take 2 tabs Q6h PRN      Meds on Epic but NOT  on Form:      glipiZIDE (GLUCOTROL XL) 2.5 MG 24 hr tablet 90 tablet 1 2/7/2022  --   Sig - Route: Take 1 tablet (2.5 mg) by mouth daily - Oral     KLOR-CON 20 MEQ CR tablet 90 tablet 3 3/1/2022  No   Sig: TAKE 1 TABLET BY MOUTH DAILY     polyethylene glycol (MIRALAX/GLYCOLAX) powder 527 g 3 2/4/2020  No   Sig: MIX 17 GRAMS (1 CAPFUL) OF POWDER IN 8 OUNCES LIQUID AND DRINK ONCE DAILY       Meds on Form but NOT on Epic :    -Glimepiride 2 mg; take 1/2 tab daily        Form given to PCP to review and advise, will attach current med list to fax back.      Js Chandler RN  [facility]

## 2022-04-26 NOTE — TELEPHONE ENCOUNTER
Reason for Call:  Form, our goal is to have forms completed with 72 hours, however, some forms may require a visit or additional information.    Type of letter, form or note:  medical    Who is the form from?: Home care    Where did the form come from: form was faxed in    What clinic location was the form placed at?: [facility]    Where the form was placed: Dr Hopkins Box/Folder    What number is listed as a contact on the form?: [phone]           Call taken on 4/26/2022 at 6:25 PM by Yue Gunderson

## 2022-04-26 NOTE — TELEPHONE ENCOUNTER
Reason for Call:  Form, our goal is to have forms completed with 72 hours, however, some forms may require a visit or additional information.    Type of letter, form or note:  medical    Who is the form from?: Home care    Where did the form come from: form was faxed in    What clinic location was the form placed at?: [facility]    Where the form was placed: Dr oHpkins Box/Folder    What number is listed as a contact on the form?: [phone]           Call taken on 4/26/2022 at 6:36 PM by Yue Gunderson

## 2022-04-27 ENCOUNTER — TELEPHONE (OUTPATIENT)
Dept: FAMILY MEDICINE | Facility: CLINIC | Age: 87
End: 2022-04-27
Payer: COMMERCIAL

## 2022-04-27 ENCOUNTER — MEDICAL CORRESPONDENCE (OUTPATIENT)
Dept: HEALTH INFORMATION MANAGEMENT | Facility: CLINIC | Age: 87
End: 2022-04-27
Payer: COMMERCIAL

## 2022-04-27 NOTE — TELEPHONE ENCOUNTER
Patient's form signed, dated, and placed in TC basket.    Bradley Hopkins DO  4/27/2022 3:16 PM

## 2022-04-27 NOTE — TELEPHONE ENCOUNTER
Reason for Call:  Form, our goal is to have forms completed with 72 hours, however, some forms may require a visit or additional information.    Type of letter, form or note:  medical    Who is the form from?: Home care    Where did the form come from: form was faxed in    What clinic location was the form placed at?: [facility]    Where the form was placed: Bradley Hopkins, DO Box/Folder    What number is listed as a contact on the form?: Fax to [phone]       Call taken on 4/27/2022 at 12:00 PM by Hiral Olivares

## 2022-04-27 NOTE — TELEPHONE ENCOUNTER
Patient's form signed, dated, and placed in TC basket.    Bradley Hopkins DO  4/27/2022 3:02 PM

## 2022-04-27 NOTE — TELEPHONE ENCOUNTER
Patient's form signed, dated, and placed in TC basket.    Bradley Hopkins DO  4/27/2022 3:03 PM

## 2022-04-28 NOTE — TELEPHONE ENCOUNTER
Completed forms faxed back to [facility] at [phone].   Originals sent to be scanned.       Maribell Arenas

## 2022-04-28 NOTE — TELEPHONE ENCOUNTER
Completed forms faxed back to [facility]  at [phone].   Originals sent to be scanned.       Maribell Arenas

## 2022-04-28 NOTE — TELEPHONE ENCOUNTER
Completed forms faxed back to [facility]  at [phone].   Originals sent to be scanned.       Maribell Arenas

## 2022-05-03 ENCOUNTER — TELEPHONE (OUTPATIENT)
Dept: FAMILY MEDICINE | Facility: CLINIC | Age: 87
End: 2022-05-03
Payer: COMMERCIAL

## 2022-05-03 NOTE — TELEPHONE ENCOUNTER
Reason for Call:  Form, our goal is to have forms completed with 72 hours, however, some forms may require a visit or additional information.    Type of letter, form or note:  medical    Who is the form from?: Home care    Where did the form come from: form was faxed in    What clinic location was the form placed at?: [facility]    Where the form was placed: Dr Hopkins Box/Folder    What number is listed as a contact on the form?: [phone]           Call taken on 5/3/2022 at 5:48 PM by Yue Gunderson

## 2022-05-03 NOTE — TELEPHONE ENCOUNTER
Reason for Call:  Form, our goal is to have forms completed with 72 hours, however, some forms may require a visit or additional information.    Type of letter, form or note:  medical    Who is the form from?: Home care    Where did the form come from: form was faxed in    What clinic location was the form placed at?: [facility]    Where the form was placed: Dr Hopkins Box/Folder    What number is listed as a contact on the form?: [phone]           Call taken on 5/3/2022 at 5:30 PM by Yue Gunderson

## 2022-05-03 NOTE — TELEPHONE ENCOUNTER
Reason for Call:  Form, our goal is to have forms completed with 72 hours, however, some forms may require a visit or additional information.    Type of letter, form or note:  medical    Who is the form from?: Home care    Where did the form come from: form was faxed in    What clinic location was the form placed at?: [facility]    Where the form was placed: Dr Hopkins Box/Folder    What number is listed as a contact on the form?: [phone]           Call taken on 5/3/2022 at 5:45 PM by Yue Gunderson

## 2022-05-03 NOTE — TELEPHONE ENCOUNTER
Reason for Call:  Form, our goal is to have forms completed with 72 hours, however, some forms may require a visit or additional information.    Type of letter, form or note:  medical    Who is the form from?: Home care    Where did the form come from: form was faxed in    What clinic location was the form placed at?: [facility]    Where the form was placed: Dr Hopkins Box/Folder    What number is listed as a contact on the form?: [phone]        Call taken on 5/3/2022 at 5:32 PM by Yue Gunderson

## 2022-05-03 NOTE — TELEPHONE ENCOUNTER
Reason for Call:  Form, our goal is to have forms completed with 72 hours, however, some forms may require a visit or additional information.    Type of letter, form or note:  medical    Who is the form from?: Home care    Where did the form come from: form was faxed in    What clinic location was the form placed at?: [facility]    Where the form was placed: Dr Hopkins Box/Folder    What number is listed as a contact on the form?: [phone]           Call taken on 5/3/2022 at 5:50 PM by Yue Gunderson

## 2022-05-03 NOTE — TELEPHONE ENCOUNTER
Reason for Call:  Form, our goal is to have forms completed with 72 hours, however, some forms may require a visit or additional information.    Type of letter, form or note:  medical    Who is the form from?: Home care    Where did the form come from: form was faxed in    What clinic location was the form placed at?: [facility]    Where the form was placed: Dr Hopkins Box/Folder    What number is listed as a contact on the form?: [phone]           Call taken on 5/3/2022 at 5:43 PM by Yue Gunderson

## 2022-05-04 ENCOUNTER — TELEPHONE (OUTPATIENT)
Dept: FAMILY MEDICINE | Facility: CLINIC | Age: 87
End: 2022-05-04
Payer: COMMERCIAL

## 2022-05-04 NOTE — TELEPHONE ENCOUNTER
Reason for Call:  Form, our goal is to have forms completed with 72 hours, however, some forms may require a visit or additional information.    Type of letter, form or note:  medical    Who is the form from?: [employer] (if other please explain)    Where did the form come from: form was faxed in    What clinic location was the form placed at?: [facility]    Where the form was placed: [facility]    What number is listed as a contact on the form?: [phone]       Additional comments: RX Change?    Call taken on 5/4/2022 at 10:39 AM by Yue Gunderson

## 2022-05-05 ENCOUNTER — TELEPHONE (OUTPATIENT)
Dept: FAMILY MEDICINE | Facility: CLINIC | Age: 87
End: 2022-05-05
Payer: COMMERCIAL

## 2022-05-05 NOTE — TELEPHONE ENCOUNTER
Reason for Call:  Form, our goal is to have forms completed with 72 hours, however, some forms may require a visit or additional information.    Type of letter, form or note:  medical    Who is the form from?: Home care    Where did the form come from: form was faxed in    What clinic location was the form placed at?: [facility]    Where the form was placed: [facility]    What number is listed as a contact on the form?: [phone]           Call taken on 5/5/2022 at 3:00 PM by Yue Gunderson

## 2022-05-05 NOTE — TELEPHONE ENCOUNTER
Reason for Call:  Form, our goal is to have forms completed with 72 hours, however, some forms may require a visit or additional information.    Type of letter, form or note:  medical    Who is the form from?: Home care    Where did the form come from: form was faxed in    What clinic location was the form placed at?: [facility]    Where the form was placed: [facility]    What number is listed as a contact on the form?: [phone]           Call taken on 5/5/2022 at 3:02 PM by Yue Gunderson

## 2022-05-06 NOTE — TELEPHONE ENCOUNTER
Form faxed to [facility] @ # [phone].     Original sent to be scanned.     Jake Al

## 2022-05-06 NOTE — TELEPHONE ENCOUNTER
Patient's form signed, dated, and placed in TC basket.    Bradley Hopkins DO  5/6/2022 10:29 AM

## 2022-05-27 DIAGNOSIS — E78.5 HYPERLIPIDEMIA WITH TARGET LDL LESS THAN 100: ICD-10-CM

## 2022-05-27 DIAGNOSIS — D64.9 ANEMIA, UNSPECIFIED TYPE: ICD-10-CM

## 2022-05-27 DIAGNOSIS — Z00.00 MEDICARE ANNUAL WELLNESS VISIT, SUBSEQUENT: ICD-10-CM

## 2022-05-27 DIAGNOSIS — I48.19 PERSISTENT ATRIAL FIBRILLATION (H): ICD-10-CM

## 2022-05-27 DIAGNOSIS — E11.21 TYPE 2 DIABETES MELLITUS WITH DIABETIC NEPHROPATHY, WITHOUT LONG-TERM CURRENT USE OF INSULIN (H): ICD-10-CM

## 2022-05-27 RX ORDER — DILTIAZEM HYDROCHLORIDE 120 MG/1
120 CAPSULE, COATED, EXTENDED RELEASE ORAL DAILY
Qty: 90 CAPSULE | Refills: 0 | Status: SHIPPED | OUTPATIENT
Start: 2022-05-27 | End: 2022-08-29

## 2022-06-07 ENCOUNTER — TELEPHONE (OUTPATIENT)
Dept: FAMILY MEDICINE | Facility: CLINIC | Age: 87
End: 2022-06-07
Payer: COMMERCIAL

## 2022-06-07 NOTE — TELEPHONE ENCOUNTER
Reason for Call:  Form, our goal is to have forms completed with 72 hours, however, some forms may require a visit or additional information.    Type of letter, form or note:  medical    Who is the form from?: Home care    Where did the form come from: form was faxed in    What clinic location was the form placed at?: [facility]    Where the form was placed: [facility]    What number is listed as a contact on the form?: [phone]       Additional comments:     Call taken on 6/7/2022 at 10:55 AM by Colleen Lemos

## 2022-06-14 NOTE — TELEPHONE ENCOUNTER
MED RECONCILIATION DONE    Discrepancies:    None    Meds on Epic but NOT  on Form:      KLOR-CON 20 MEQ CR tablet 90 tablet 3 3/1/2022  No   Sig: TAKE 1 TABLET BY MOUTH DAILY    Taking     polyethylene glycol (MIRALAX/GLYCOLAX) powder 527 g 3 2/4/2020  No   Sig: MIX 17 GRAMS (1 CAPFUL) OF POWDER IN 8 OUNCES LIQUID AND DRINK ONCE DAILY    Taking     glipiZIDE (GLUCOTROL XL) 2.5 MG 24 hr tablet 90 tablet 1 2/7/2022  --   Sig - Route: Take 1 tablet (2.5 mg) by mouth daily - Oral    Taking           Meds on Form but NOT on Epic :    Glimepiride 2 mg, take one tablet daily- NOT TAKING    Form given to provider to review and advise or sign if agree, send to fax/scan once complete.        Js Chandler RN  [facility]       no

## 2022-06-16 NOTE — TELEPHONE ENCOUNTER
Patient's form signed, dated, and placed in TC basket.    Bradley Hopkins DO  6/16/2022 12:04 AM

## 2022-06-16 NOTE — TELEPHONE ENCOUNTER
Form faxed to [employer] @ # [phone].     Original sent to be scanned.     Jake Al

## 2022-06-24 ENCOUNTER — OFFICE VISIT (OUTPATIENT)
Dept: CARDIOLOGY | Facility: CLINIC | Age: 87
End: 2022-06-24
Payer: COMMERCIAL

## 2022-06-24 VITALS
HEART RATE: 85 BPM | WEIGHT: 150.4 LBS | HEIGHT: 64 IN | SYSTOLIC BLOOD PRESSURE: 115 MMHG | BODY MASS INDEX: 25.68 KG/M2 | DIASTOLIC BLOOD PRESSURE: 72 MMHG

## 2022-06-24 DIAGNOSIS — I48.19 PERSISTENT ATRIAL FIBRILLATION (H): Primary | ICD-10-CM

## 2022-06-24 PROCEDURE — 99213 OFFICE O/P EST LOW 20 MIN: CPT | Performed by: INTERNAL MEDICINE

## 2022-06-24 PROCEDURE — 93000 ELECTROCARDIOGRAM COMPLETE: CPT | Performed by: INTERNAL MEDICINE

## 2022-06-24 NOTE — LETTER
"6/24/2022    Bradley ROGERS Kellie, DO  [address] [address] [id number]    RE: Manfred Caraballo       Dear Colleague,     I had the pleasure of seeing Manfred Caraballo in the [facility].  Electrophysiology/ Clinic Note         H&P and Plan:     REASON FOR VISIT: Electrophysiology evaluation.      HISTORY OF PRESENT ILLNESS: Ms. Caraballo is a delightful 87-year-old gentleman with a history of hypertension, hyperlipidemia, diabetes, chronic kidney disease, COPD and permanent atrial fibrillation, who is here for routine evaluation.     Today, he informs he is doing well.  He has no complaints during this visit.  He denies any symptoms such as chest pain, lightheadedness, near-syncope or syncopal episode.       EKG done today shows A. fib with good heart rate control. Labs done this year showed normal BMP and hemoglobin.       PREVIOUS STUDIES (personally reviewed):  - Holter (2017): A. fib with good heart rate control.  Average HR of 61 bpm (range of ).    - Echo (2020): Normal LV function.  There was mil AI.      ASSESSMENT AND PLAN:   1.  Persistent atrial fibrillation.    Heart rates well controlled and he is asymptomatic.  We will continue therapy with diltiazem.     2.  Embolic prevention.  CHADS-VASc score of 4.   Continue anticoagulation with Xarelto indefinitely.  3.  Hypertension.  Blood pressure is well controlled.   4. Follow up care.  Follow up with PA in 1 year.      Tato Au MD    Physical Exam:  Vitals: /72   Pulse 85   Ht 1.626 m (5' 4\")   Wt 68.2 kg (150 lb 6.4 oz)   BMI 25.82 kg/m      Constitutional:  AAO x3.  Pt is in NAD.  HEAD: normocephalic.  SKIN: Skin normal color, texture and turgor with no lesions or eruptions.  Eyes: PERRL, EOMI.  ENT:  Supple, normal JVP. No lymphadenopathy or thyroid enlargement.  Chest:  CTAB.  Cardiac:  IIR, variable sound of S1 and Normal S2.  No murmurs rubs or gallop.   Abdomen:  Normal BS.  Soft, non-tender and " non-distended.  No rebound or guarding.    Extremities:  Pedious pulses palpable B/L.  No LE edema noticed.   Neurological: Strength and sensation grossly symmetric and intact throughout.       CURRENT MEDICATIONS:  Current Outpatient Medications   Medication Sig Dispense Refill     acetaminophen (ACETAMINOPHEN EXTRA STRENGTH) 500 MG tablet TAKE 1-2 TABLETS BY MOUTH EVERY 6 HOURS AS NEEDED FOR MILD PAIN 360 tablet 3     albuterol (PROAIR HFA/PROVENTIL HFA/VENTOLIN HFA) 108 (90 Base) MCG/ACT inhaler INHALE 2 PUFFS INTO THE LUNGS EVERY 6 HOURS AS NEEDED FOR SHORTNESS OF BREATH / DYSPNEA 18 g 3     Alcohol Swabs (B-D SINGLE USE SWABS REGULAR) PADS USE TO SWAB AREA OF INJECTION/ALAN AS DIRECTED 100 each 3     atorvastatin (LIPITOR) 10 MG tablet Take 1 tablet (10 mg) by mouth daily 90 tablet 3     blood glucose calibration (NO BRAND SPECIFIED) solution Use to calibrate blood glucose monitor as needed as directed. To accompany: Blood Glucose Monitor Brands: per insurance. 1 Bottle 3     blood glucose monitoring ([employer] CONTOUR MONITOR) meter device kit Use to test blood sugars  1 times daily or as directed. 1 kit 0     blood glucose monitoring (NO BRAND SPECIFIED) test strip Use to test blood sugars 1  times daily or as directed 100 each 3     CONTOUR NEXT TEST test strip USE TO TEST BLOOD SUGAR 1 TIMES DAILY OR AS DIRECTED. 100 strip 0     diltiazem ER COATED BEADS (CARDIZEM CD) 120 MG 24 hr capsule Take 1 capsule (120 mg) by mouth daily Appointment required for further refills 90 capsule 0     fluticasone (FLONASE) 50 MCG/ACT nasal spray SPRAY 1-2 SPRAYS INTO BOTH NOSTRILS DAILY 48 mL 3     glipiZIDE (GLUCOTROL XL) 2.5 MG 24 hr tablet Take 1 tablet (2.5 mg) by mouth daily 90 tablet 1     hydrocortisone (WESTCORT) 0.2 % cream APPLY SPARINGLY TO AFFECTED AREA THREE TIMES DAILY FOR 14 DAYS. 30 g 0     ipratropium - albuterol 0.5 mg/2.5 mg/3 mL (DUONEB) 0.5-2.5 (3) MG/3ML neb solution TAKE 1 VIAL (3 MLS) BY NEBULIZATION  EVERY 6 HOURS AS NEEDED FOR SHORTNESS OF BREATH / DYSPNEA OR WHEEZING 90 mL 0     KLOR-CON 20 MEQ CR tablet TAKE 1 TABLET BY MOUTH DAILY 90 tablet 3     losartan-hydrochlorothiazide (HYZAAR) 50-12.5 MG tablet TAKE 2 TABLETS BY MOUTH EVERY  tablet 3     metFORMIN (GLUCOPHAGE) 1000 MG tablet Take 1 tablet (1,000 mg) by mouth 2 times daily (with meals) 180 tablet 1     order for DME Equipment being ordered: Digital home blood pressure monitor kit 1 each 0     pantoprazole (PROTONIX) 40 MG EC tablet TAKE 1 TABLET BY MOUTH EVERY DAY 90 tablet 0     polyethylene glycol (MIRALAX/GLYCOLAX) powder MIX 17 GRAMS (1 CAPFUL) OF POWDER IN 8 OUNCES LIQUID AND DRINK ONCE DAILY 527 g 3     thin (NO BRAND SPECIFIED) lancets Use to test blood sugar 1 times daily or as directed. To accompany: Blood Glucose Monitor Brands: per insurance. 200 each 6     XARELTO ANTICOAGULANT 20 MG TABS tablet TAKE 1 TABLET BY MOUTH EVERY DAY WITH DINNER 90 tablet 3     ASPIRIN NOT PRESCRIBED (INTENTIONAL) not prescribed - ulcer history (Patient not taking: Reported on 6/24/2022) 1 Tab 0       ALLERGIES     Allergies   Allergen Reactions     Aspirin      Salicylates      ASA/ Ulcers       PAST MEDICAL HISTORY:  Past Medical History:   Diagnosis Date     Acute duodenal ulcer with hemorrhage, without mention of obstruction 11/15/03    and pyloric also - required hosp and transfusion 2 units- H. pylori negative     Allergic rhinitis, cause unspecified      Calculus of gallbladder without mention of cholecystitis or obstruction 11/15/03    no residual pain- transient cholestasis for 2 days     Colon Polyps normal 5/2010 2008 = one hyperplastic & one tubular adenoma - no high grade dysplasis - repeat in 2013      Diverticulosis of colon (without mention of hemorrhage) 11/03     Dyspepsia and other specified disorders of function of stomach     dyspepsia,  h/o bleeding stomach ulcer in 1985     Esophageal reflux      Genital herpes, unspecified       Paroxysmal atrial fibrillation (H) 2016    [facility] ER.     Pneumonia, organism unspecified(486) 11/15/03    right middle and lower lobe     Pure hypercholesterolemia      Pure hyperglyceridemia      Tear meniscus knee 2009    degenerative on right      Type II or unspecified type diabetes mellitus without mention of complication, not stated as uncontrolled at age 63     Unspecified essential hypertension        PAST SURGICAL HISTORY:  Past Surgical History:   Procedure Laterality Date     HC UGI ENDOSCOPY DIAG W OR W/O BRUSH/WASH      for GI bleed - showed pyloric and duodenal  ulcer      [facility]HC COLONOSCOPY THRU STOMA, DIAGNOSTIC      for GI bleed - diverticulosis      ZZHC COLONOSCOPY THRU STOMA, DIAGNOSTIC  2010    normal - repeat in 5 years        FAMILY HISTORY:  Family History   Problem Relation Age of Onset     Diabetes No family hx of      C.A.D. No family hx of      Hypertension No family hx of      Cancer - colorectal No family hx of      Prostate Cancer No family hx of        SOCIAL HISTORY:  Social History     Socioeconomic History     Marital status:      Spouse name: Constanza     Number of children: 7     Years of education: None     Highest education level: None   Occupational History     Occupation: retired - was in food processing - packaging hamburger, etc.      Employer: NONE    Tobacco Use     Smoking status: Former Smoker     Packs/day: 0.50     Years: 20.00     Pack years: 10.00     Types: Cigarettes     Quit date: 1981     Years since quittin.1     Smokeless tobacco: Never Used     Tobacco comment: quit in     Substance and Sexual Activity     Alcohol use: No     Alcohol/week: 0.0 standard drinks     Comment: quit in      Drug use: No     Comment: no herbal meds either     Sexual activity: Yes     Partners: Female     Comment:    Other Topics Concern      Service Yes     Comment: was in  in [facility] [employer] [facility] for 5 years and   for 15 years     Blood Transfusions Yes     Comment: in 1985 after bleeding stomach ulcer     Caffeine Concern No     Comment: stopped all coffee and tea after ulcers 11/03     Exercise Yes     Comment: walks at least one mile every day     Seat Belt Yes     Comment: always     Self-Exams Yes     Comment: KARINA encouraged monthly     Parent/sibling w/ CABG, MI or angioplasty before 65F 55M? No   Social History Narrative    no longer taking one 81mg asa qday - secondary to bleeding  ulcers 11/03    PSA checking every 6 months - one year.                Review of Systems:  Skin:        Eyes:       ENT:       Respiratory:       Cardiovascular:       Gastroenterology:      Genitourinary:       Musculoskeletal:       Neurologic:       Psychiatric:       Heme/Lymph/Imm:       Endocrine:           Recent Lab Results:  LIPID RESULTS:  Lab Results   Component Value Date    CHOL 134 02/07/2022    CHOL 155 01/19/2021    HDL 44 02/07/2022    HDL 38 (L) 01/19/2021    LDL 35 02/07/2022    LDL 64 01/19/2021    TRIG 274 (H) 02/07/2022    TRIG 267 (H) 01/19/2021    CHOLHDLRATIO 4.2 05/07/2015       LIVER ENZYME RESULTS:  Lab Results   Component Value Date    AST 13 02/07/2022    AST 12 01/19/2021    ALT 21 02/07/2022    ALT 22 01/19/2021       CBC RESULTS:  Lab Results   Component Value Date    WBC 7.4 10/18/2019    RBC 5.18 10/18/2019    HGB 11.6 (L) 02/07/2022    HGB 12.0 (L) 10/18/2019    HCT 38.9 (L) 10/18/2019    MCV 75 (L) 10/18/2019    MCH 23.2 (L) 10/18/2019    MCHC 30.8 (L) 10/18/2019    RDW 13.4 10/18/2019     10/18/2019       BMP RESULTS:  Lab Results   Component Value Date     02/07/2022     01/19/2021    POTASSIUM 4.4 02/07/2022    POTASSIUM 4.2 01/19/2021    CHLORIDE 101 02/07/2022    CHLORIDE 101 01/19/2021    CO2 28 02/07/2022    CO2 28 01/19/2021    ANIONGAP 5 02/07/2022    ANIONGAP 8 01/19/2021     (H) 02/07/2022     (H) 01/19/2021    BUN 15 02/07/2022    BUN 25 01/19/2021    CR 1.08  02/07/2022    CR 1.26 (H) 01/19/2021    GFRESTIMATED 67 02/07/2022    GFRESTIMATED 51 (L) 01/19/2021    GFRESTBLACK 59 (L) 01/19/2021    BRITTNY 9.6 02/07/2022    BRITTNY 9.7 01/19/2021        A1C RESULTS:  Lab Results   Component Value Date    A1C 5.7 (H) 02/07/2022    A1C 7.4 (H) 01/19/2021       INR RESULTS:  No results found for: INR      ECHOCARDIOGRAM  No results found for this or any previous visit (from the past 8760 hour(s)).      Orders Placed This Encounter   Procedures     EKG 12-lead complete w/read - Clinics (performed today)     No orders of the defined types were placed in this encounter.    There are no discontinued medications.      Encounter Diagnosis   Name Primary?     Persistent atrial fibrillation (H) Yes         CC    Thank you for allowing me to participate in the care of your patient.      Sincerely,     Tato Au MD     [facility]

## 2022-06-24 NOTE — PROGRESS NOTES
"Electrophysiology/ Clinic Note         H&P and Plan:     REASON FOR VISIT: Electrophysiology evaluation.      HISTORY OF PRESENT ILLNESS: Ms. Caraballo is a delightful 87-year-old gentleman with a history of hypertension, hyperlipidemia, diabetes, chronic kidney disease, COPD and permanent atrial fibrillation, who is here for routine evaluation.     Today, he informs he is doing well.  He has no complaints during this visit.  He denies any symptoms such as chest pain, lightheadedness, near-syncope or syncopal episode.       EKG done today shows A. fib with good heart rate control. Labs done this year showed normal BMP and hemoglobin.       PREVIOUS STUDIES (personally reviewed):  - Holter (2017): A. fib with good heart rate control.  Average HR of 61 bpm (range of ).    - Echo (2020): Normal LV function.  There was mil AI.      ASSESSMENT AND PLAN:   1.  Persistent atrial fibrillation.    Heart rates well controlled and he is asymptomatic.  We will continue therapy with diltiazem.     2.  Embolic prevention.  CHADS-VASc score of 4.   Continue anticoagulation with Xarelto indefinitely.  3.  Hypertension.  Blood pressure is well controlled.   4. Follow up care.  Follow up with PA in 1 year.      Tato Au MD    Physical Exam:  Vitals: /72   Pulse 85   Ht 1.626 m (5' 4\")   Wt 68.2 kg (150 lb 6.4 oz)   BMI 25.82 kg/m      Constitutional:  AAO x3.  Pt is in NAD.  HEAD: normocephalic.  SKIN: Skin normal color, texture and turgor with no lesions or eruptions.  Eyes: PERRL, EOMI.  ENT:  Supple, normal JVP. No lymphadenopathy or thyroid enlargement.  Chest:  CTAB.  Cardiac:  IIR, variable sound of S1 and Normal S2.  No murmurs rubs or gallop.   Abdomen:  Normal BS.  Soft, non-tender and non-distended.  No rebound or guarding.    Extremities:  Pedious pulses palpable B/L.  No LE edema noticed.   Neurological: Strength and sensation grossly symmetric and intact throughout.       CURRENT " MEDICATIONS:  Current Outpatient Medications   Medication Sig Dispense Refill     acetaminophen (ACETAMINOPHEN EXTRA STRENGTH) 500 MG tablet TAKE 1-2 TABLETS BY MOUTH EVERY 6 HOURS AS NEEDED FOR MILD PAIN 360 tablet 3     albuterol (PROAIR HFA/PROVENTIL HFA/VENTOLIN HFA) 108 (90 Base) MCG/ACT inhaler INHALE 2 PUFFS INTO THE LUNGS EVERY 6 HOURS AS NEEDED FOR SHORTNESS OF BREATH / DYSPNEA 18 g 3     Alcohol Swabs (B-D SINGLE USE SWABS REGULAR) PADS USE TO SWAB AREA OF INJECTION/ALAN AS DIRECTED 100 each 3     atorvastatin (LIPITOR) 10 MG tablet Take 1 tablet (10 mg) by mouth daily 90 tablet 3     blood glucose calibration (NO BRAND SPECIFIED) solution Use to calibrate blood glucose monitor as needed as directed. To accompany: Blood Glucose Monitor Brands: per insurance. 1 Bottle 3     blood glucose monitoring (GuideIT CONTOUR MONITOR) meter device kit Use to test blood sugars  1 times daily or as directed. 1 kit 0     blood glucose monitoring (NO BRAND SPECIFIED) test strip Use to test blood sugars 1  times daily or as directed 100 each 3     CONTOUR NEXT TEST test strip USE TO TEST BLOOD SUGAR 1 TIMES DAILY OR AS DIRECTED. 100 strip 0     diltiazem ER COATED BEADS (CARDIZEM CD) 120 MG 24 hr capsule Take 1 capsule (120 mg) by mouth daily Appointment required for further refills 90 capsule 0     fluticasone (FLONASE) 50 MCG/ACT nasal spray SPRAY 1-2 SPRAYS INTO BOTH NOSTRILS DAILY 48 mL 3     glipiZIDE (GLUCOTROL XL) 2.5 MG 24 hr tablet Take 1 tablet (2.5 mg) by mouth daily 90 tablet 1     hydrocortisone (WESTCORT) 0.2 % cream APPLY SPARINGLY TO AFFECTED AREA THREE TIMES DAILY FOR 14 DAYS. 30 g 0     ipratropium - albuterol 0.5 mg/2.5 mg/3 mL (DUONEB) 0.5-2.5 (3) MG/3ML neb solution TAKE 1 VIAL (3 MLS) BY NEBULIZATION EVERY 6 HOURS AS NEEDED FOR SHORTNESS OF BREATH / DYSPNEA OR WHEEZING 90 mL 0     KLOR-CON 20 MEQ CR tablet TAKE 1 TABLET BY MOUTH DAILY 90 tablet 3     losartan-hydrochlorothiazide (HYZAAR) 50-12.5 MG  tablet TAKE 2 TABLETS BY MOUTH EVERY  tablet 3     metFORMIN (GLUCOPHAGE) 1000 MG tablet Take 1 tablet (1,000 mg) by mouth 2 times daily (with meals) 180 tablet 1     order for DME Equipment being ordered: Digital home blood pressure monitor kit 1 each 0     pantoprazole (PROTONIX) 40 MG EC tablet TAKE 1 TABLET BY MOUTH EVERY DAY 90 tablet 0     polyethylene glycol (MIRALAX/GLYCOLAX) powder MIX 17 GRAMS (1 CAPFUL) OF POWDER IN 8 OUNCES LIQUID AND DRINK ONCE DAILY 527 g 3     thin (NO BRAND SPECIFIED) lancets Use to test blood sugar 1 times daily or as directed. To accompany: Blood Glucose Monitor Brands: per insurance. 200 each 6     XARELTO ANTICOAGULANT 20 MG TABS tablet TAKE 1 TABLET BY MOUTH EVERY DAY WITH DINNER 90 tablet 3     ASPIRIN NOT PRESCRIBED (INTENTIONAL) not prescribed - ulcer history (Patient not taking: Reported on 6/24/2022) 1 Tab 0       ALLERGIES     Allergies   Allergen Reactions     Aspirin      Salicylates      ASA/ Ulcers       PAST MEDICAL HISTORY:  Past Medical History:   Diagnosis Date     Acute duodenal ulcer with hemorrhage, without mention of obstruction 11/15/03    and pyloric also - required hosp and transfusion 2 units- H. pylori negative     Allergic rhinitis, cause unspecified      Calculus of gallbladder without mention of cholecystitis or obstruction 11/15/03    no residual pain- transient cholestasis for 2 days     Colon Polyps normal 5/2010 2008 = one hyperplastic & one tubular adenoma - no high grade dysplasis - repeat in 2013      Diverticulosis of colon (without mention of hemorrhage) 11/03     Dyspepsia and other specified disorders of function of stomach     dyspepsia,  h/o bleeding stomach ulcer in 1985     Esophageal reflux      Genital herpes, unspecified      Paroxysmal atrial fibrillation (H) 12/21/2016    [facility] ER.     Pneumonia, organism unspecified(486) 11/15/03    right middle and lower lobe     Pure hypercholesterolemia      Pure  hyperglyceridemia      Tear meniscus knee 2009    degenerative on right      Type II or unspecified type diabetes mellitus without mention of complication, not stated as uncontrolled at age 63     Unspecified essential hypertension        PAST SURGICAL HISTORY:  Past Surgical History:   Procedure Laterality Date     [facility] UGI ENDOSCOPY DIAG W OR W/O BRUSH/WASH      for GI bleed - showed pyloric and duodenal  ulcer      [facility] COLONOSCOPY THRU STOMA, DIAGNOSTIC      for GI bleed - diverticulosis      [facility] COLONOSCOPY THRU STOMA, DIAGNOSTIC  2010    normal - repeat in 5 years        FAMILY HISTORY:  Family History   Problem Relation Age of Onset     Diabetes No family hx of      C.A.D. No family hx of      Hypertension No family hx of      Cancer - colorectal No family hx of      Prostate Cancer No family hx of        SOCIAL HISTORY:  Social History     Socioeconomic History     Marital status:      Spouse name: Constanza     Number of children: 7     Years of education: None     Highest education level: None   Occupational History     Occupation: retired - was in food processing - packaging hamburger, etc.      Employer: NONE    Tobacco Use     Smoking status: Former Smoker     Packs/day: 0.50     Years: 20.00     Pack years: 10.00     Types: Cigarettes     Quit date: 1981     Years since quittin.1     Smokeless tobacco: Never Used     Tobacco comment: quit in     Substance and Sexual Activity     Alcohol use: No     Alcohol/week: 0.0 standard drinks     Comment: quit in      Drug use: No     Comment: no herbal meds either     Sexual activity: Yes     Partners: Female     Comment:    Other Topics Concern      Service Yes     Comment: was in  in [facility] for 5 years and  for 15 years     Blood Transfusions Yes     Comment: in  after bleeding stomach ulcer     Caffeine Concern No     Comment: stopped all coffee and tea after ulcers      Exercise Yes      Comment: walks at least one mile every day     Seat Belt Yes     Comment: always     Self-Exams Yes     Comment: KARINA encouraged monthly     Parent/sibling w/ CABG, MI or angioplasty before 65F 55M? No   Social History Narrative    no longer taking one 81mg asa qday - secondary to bleeding  ulcers 11/03    PSA checking every 6 months - one year.                Review of Systems:  Skin:        Eyes:       ENT:       Respiratory:       Cardiovascular:       Gastroenterology:      Genitourinary:       Musculoskeletal:       Neurologic:       Psychiatric:       Heme/Lymph/Imm:       Endocrine:           Recent Lab Results:  LIPID RESULTS:  Lab Results   Component Value Date    CHOL 134 02/07/2022    CHOL 155 01/19/2021    HDL 44 02/07/2022    HDL 38 (L) 01/19/2021    LDL 35 02/07/2022    LDL 64 01/19/2021    TRIG 274 (H) 02/07/2022    TRIG 267 (H) 01/19/2021    CHOLHDLRATIO 4.2 05/07/2015       LIVER ENZYME RESULTS:  Lab Results   Component Value Date    AST 13 02/07/2022    AST 12 01/19/2021    ALT 21 02/07/2022    ALT 22 01/19/2021       CBC RESULTS:  Lab Results   Component Value Date    WBC 7.4 10/18/2019    RBC 5.18 10/18/2019    HGB 11.6 (L) 02/07/2022    HGB 12.0 (L) 10/18/2019    HCT 38.9 (L) 10/18/2019    MCV 75 (L) 10/18/2019    MCH 23.2 (L) 10/18/2019    MCHC 30.8 (L) 10/18/2019    RDW 13.4 10/18/2019     10/18/2019       BMP RESULTS:  Lab Results   Component Value Date     02/07/2022     01/19/2021    POTASSIUM 4.4 02/07/2022    POTASSIUM 4.2 01/19/2021    CHLORIDE 101 02/07/2022    CHLORIDE 101 01/19/2021    CO2 28 02/07/2022    CO2 28 01/19/2021    ANIONGAP 5 02/07/2022    ANIONGAP 8 01/19/2021     (H) 02/07/2022     (H) 01/19/2021    BUN 15 02/07/2022    BUN 25 01/19/2021    CR 1.08 02/07/2022    CR 1.26 (H) 01/19/2021    GFRESTIMATED 67 02/07/2022    GFRESTIMATED 51 (L) 01/19/2021    GFRESTBLACK 59 (L) 01/19/2021    BRITTNY 9.6 02/07/2022    BRITTNY 9.7 01/19/2021        A1C  RESULTS:  Lab Results   Component Value Date    A1C 5.7 (H) 02/07/2022    A1C 7.4 (H) 01/19/2021       INR RESULTS:  No results found for: INR      ECHOCARDIOGRAM  No results found for this or any previous visit (from the past 8760 hour(s)).      Orders Placed This Encounter   Procedures     EKG 12-lead complete w/read - Clinics (performed today)     No orders of the defined types were placed in this encounter.    There are no discontinued medications.      Encounter Diagnosis   Name Primary?     Persistent atrial fibrillation (H) Yes         CC  Error in [id number] index!

## 2022-06-27 ENCOUNTER — TELEPHONE (OUTPATIENT)
Dept: FAMILY MEDICINE | Facility: CLINIC | Age: 87
End: 2022-06-27

## 2022-06-27 NOTE — TELEPHONE ENCOUNTER
Reason for Call:  Form, our goal is to have forms completed with 72 hours, however, some forms may require a visit or additional information.    Type of letter, form or note:  medical    Who is the form from?: [employer] (if other please explain)    Where did the form come from: form was faxed in    What clinic location was the form placed at?: [facility]    Where the form was placed: Dr Hopkins Box/Folder    What number is listed as a contact on the form?: [phone]       Additional comments:     Call taken on 6/27/2022 at 9:32 AM by Colleen Lemos

## 2022-06-28 ENCOUNTER — MEDICAL CORRESPONDENCE (OUTPATIENT)
Dept: HEALTH INFORMATION MANAGEMENT | Facility: CLINIC | Age: 87
End: 2022-06-28

## 2022-06-28 NOTE — TELEPHONE ENCOUNTER
Patient's form signed, dated, and placed in TC basket.    Bradley Hopkins DO  6/28/2022 9:48 AM

## 2022-07-13 ENCOUNTER — PATIENT OUTREACH (OUTPATIENT)
Dept: GERIATRIC MEDICINE | Facility: CLINIC | Age: 87
End: 2022-07-13

## 2022-07-13 NOTE — LETTER
July 13, 2022    Important [employer] Information    MANFRED SIVONGXAY  [id number] [address]    Your New Care Coordinator  Dear Manfred,  My name is BRITTNEY Avilez and I am your new Care Coordinator. You may reach me by calling [phone]. I will be in touch with you shortly to address any questions you may have.   I have also been in contact with Margarita Richter, your previous care coordinator, to ensure a smooth transition.  Questions?  Call me at [phone] Monday-Friday between 8am and 5pm. TTY: [phone]. I look forward to working with you as a [employer]  member.  Sincerely,    BRITTNEY Avilez    E-mail: [email or web address]  Phone: [phone]      Balakam      cc: member records                                                                                                                        CB5 (Os) (5-2020)    Civil Rights Notice  Discrimination is against the law. [employer] does not discriminate on the basis of any of the following:    Race    Color    National Origin    Creed    Evangelical    Age    Public Assistance Status    Receipt of Health Care Services    Disability (including physical or mental impairment)    Sex (including sex stereotypes and gender identity)    Marital Status    Political Beliefs    Medical Condition    Genetic Information    Sexual Orientation    Claims Experience    Medical History    Health Status    Auxiliary Aids and Services:  [employer] provides auxiliary aids and services, like qualified interpreters or information in accessible formats, free of charge and in a timely manner, to ensure an equal opportunity to participate in our health care programs. Contact [employer] at [employer]/contact medicaid or call [phone] (toll free); TTY:711 or at [employer]/contactmedicaid.    Language Assistance Services:  [employer] provides translated documents and spoken language interpreting, free of charge and in a timely manner, when  language assistance services are necessary to ensure limited English speakers have meaningful access to our information and services. Contact [employer] at [phone] (toll free); TTY: 711 or [email or web address].     Civil Rights Complaints  You have the right to file a discrimination complaint if you believe you were treated in a discriminatory way by [employer]. You may contact any of the following four agencies directly to file a discrimination complaint.        U.S. Department of Health and Human Services  Office for Civil Rights (OCR)  You have the right to file a complaint with the OCR, a federal agency, if you believe you have been discriminated against because of any of the following:    Race    Disability    Color    Sex    National Origin    Age    Scientology (in some cases)    Contact the OCR directly to file a complaint:         Director         U.S. Department of Health and Human Services  Office for Civil Rights         [address] [place] [address]         Customer Response Center: Toll-free: [phone]          TDD: [phone]         Email: [email or web address]    [place] Department of Human Rights (MDHR)  In [place], you have the right to file a complaint with the MDHR if you believe you have been discriminated against because of any of the following:      Race    Color    National Origin    Scientology    Creed    Sex    Sexual Orientation    Marital Status    Public Assistance Status    Disability    Contact the MDHR directly to file a complaint:         [place] Department of Human Rights         [address] [place] [address]         [phone] (voice)          [phone] (toll free)         711 or [phone] ([place] Relay)         [phone] (Fax)         [email or web address] (Email)     [place] Department of Human Services (DHS)  You have the right to file a complaint with DHS if  you believe you have been discriminated against in our health care programs because of any of the following:    Race    Color    National Origin    Creed    Pentecostal    Age    Public Assistance Status    Receipt of Health Care Services    Disability (including physical or mental impairment)    Sex (including sex stereotypes and gender identity)    Marital Status    Political Beliefs    Medical Condition    Genetic Information    Sexual Orientation    Claims Experience    Medical History    Health Status    Complaints must be in writing and filed within 180 days of the date you discovered the alleged discrimination. The complaint must contain your name and address and describe the discrimination you are complaining about. After we get your complaint, we will review it and notify you in writing about whether we have authority to investigate. If we do, we will investigate the complaint.      [facility] will notify you in writing of the investigation s outcome. You have a right to appeal the outcome if you disagree with the decision. To appeal, you must send a written request to have [facility] review the investigation outcome. Be brief and state why you disagree with the decision. Include additional information you think is important.      If you file a complaint in this way, the people who work for the agency named in the complaint cannot retaliate against you. This means they cannot punish you in any way for filing a complaint. Filing a complaint in this way does not stop you from seeking out other legal or administration actions.     Contact [facility] directly to file a discrimination complaint:        Civil Rights Coordinator        [place] Department of Human Services        Equal Opportunity and Access Division        [address] [address]        [place] [address] [phone] (voice) or use your preferred relay service     [employer] Complaint Notice   You have the right to file a complaint with [employer] if you believe you have  been discriminated against because of any of the following:       Medical condition    Health status    Receipt of health care services    Claims experience    Medical history    Genetic information    Disability (including mental or physical impairment)    Marital status    Age    Sex (including sex stereotypes and gender identity)    Sexual orientation    National origin    Race    Color    Jewish    Creed    Public assistance status    Political beliefs    You can file a complaint and ask for help in filing a complaint in person or by mail, phone, fax, or email at:     [employer] Civil Rights Coordinator  [facility]  [address], [place] [address] [phone] (voice and fax) or [email or web address]  Email: [email or web address]    American Indians can begin or continue to use [facility] and [facility] ([facility]) clinics. We will not require prior approval or impose any conditions for you to get services at these clinics. For elders age 65 years and older this includes Elderly Waiver (EW) services accessed through the [place]. If a doctor or other provider in a [facility] or [facility] clinic refers you to a provider in our network, we will not require you to see your primary care provider prior to the referral.

## 2022-07-13 NOTE — PROGRESS NOTES
[facility] Care Coordination Contact    Member became effective with  [facility] on 7/1/2022 with [facility].  Previous Health Plan: [employer] [facility]  Previous Care System: [employer]  Previous care coordinators name and number: Margarita Richter, [phone]  Waiver Type: EW  Last MMIS Entry: Date 8/5/21 and Type Reassmt  MMIS visit date (and type) if different from above: NA  Services Listed in MMIS:   55 I ARRG TRANS 18 F PERSONAL 20 F DAY SVC  25 F SUPPLIES 35 F CASE MGMT 62 I LAUNDRY  66 F PCA SUPER 07 I MONEY MGT 01 I GROC SHOP  47 F WVRAC CHEUNG  UTF received: Yes: Received and saved to member file     Beverly Duran  Care Management Specialist  [facility]  [phone]

## 2022-07-19 ENCOUNTER — TELEPHONE (OUTPATIENT)
Dept: FAMILY MEDICINE | Facility: CLINIC | Age: 87
End: 2022-07-19

## 2022-07-19 NOTE — TELEPHONE ENCOUNTER
Reason for Call:  Form, our goal is to have forms completed with 72 hours, however, some forms may require a visit or additional information.    Type of letter, form or note:  medical    Who is the form from?: Home care    Where did the form come from: form was faxed in    What clinic location was the form placed at?: [facility]    Where the form was placed: Dr Hopkins Box/Folder    What number is listed as a contact on the form?: [phone]       Additional comments:     Call taken on 7/19/2022 at 9:01 AM by Colleen Lemos

## 2022-07-20 ENCOUNTER — TELEPHONE (OUTPATIENT)
Dept: FAMILY MEDICINE | Facility: CLINIC | Age: 87
End: 2022-07-20

## 2022-07-20 NOTE — TELEPHONE ENCOUNTER
Patient's form signed, dated, and placed in TC basket.    Bradley Hopkins DO  7/19/2022 11:37 PM

## 2022-07-20 NOTE — TELEPHONE ENCOUNTER
Completed forms faxed back to [facility]  at [phone].   Originals sent to be scanned.       Maribell Arenas

## 2022-07-20 NOTE — TELEPHONE ENCOUNTER
Reason for Call:  Form, our goal is to have forms completed with 72 hours, however, some forms may require a visit or additional information.    Type of letter, form or note:  medical    Who is the form from?: Home care    Where did the form come from: form was faxed in    What clinic location was the form placed at?: [facility]    Where the form was placed: Dr Hopkins Box/Folder    What number is listed as a contact on the form?: [phone]           Call taken on 7/20/2022 at 12:18 PM by Colleen Lemos

## 2022-07-26 ENCOUNTER — MEDICAL CORRESPONDENCE (OUTPATIENT)
Dept: HEALTH INFORMATION MANAGEMENT | Facility: CLINIC | Age: 87
End: 2022-07-26

## 2022-07-26 ENCOUNTER — PATIENT OUTREACH (OUTPATIENT)
Dept: GERIATRIC MEDICINE | Facility: CLINIC | Age: 87
End: 2022-07-26

## 2022-07-26 NOTE — TELEPHONE ENCOUNTER
Patient's form signed, dated, and placed in TC basket.    Bradley Hopkins DO  7/26/2022 7:13 AM

## 2022-07-28 ASSESSMENT — ACTIVITIES OF DAILY LIVING (ADL)
DEPENDENT_IADLS:: CLEANING;COOKING;LAUNDRY;SHOPPING;MEAL PREPARATION;INCONTINENCE;TRANSPORTATION;MONEY MANAGEMENT;MEDICATION MANAGEMENT

## 2022-07-28 NOTE — PROGRESS NOTES
[facility] Care Coordination Contact    [facility] Home Visit Assessment     Telephone visit for Health Risk Assessment with Manfred Caraballo completed on July 26, 2022.  Visit completed via telephone with Manfred and his son Phuc.  Completed via telephone due to COVID-19.    Type of residence:: Private home - stairs  Current living arrangement:: I live in a private home with family     Assessment completed with:: Patient, Children    Current Care Plan  Member currently receiving the following home care services: None     Member currently receiving the following community resources: Day Care, PCA    Medication Review  Medication reconciliation completed in Epic: Yes  Medication set-up & administration: Family/informal caregiver sets up weekly.  Family caregiver administers medications.  Medication Risk Assessment Medication (1 or more, place referral to MTM): N/A: No risk factors identified  MTM Referral Placed: No: No risk factors idenified    Mental/Behavioral Health   Depression Screening:   PHQ-2 Total Score (Adult) - Positive if 3 or more points; Administer PHQ-9 if positive: 1       Mental health DX:: No        Falls Assessment:   Fallen 2 or more times in the past year?: No   Any fall with injury in the past year?: No    ADL/IADL Dependencies:   Dependent ADLs:: Ambulation-cane, Bathing, Dressing, Grooming, Incontinence, Transfers, Toileting  Dependent IADLs:: Cleaning, Cooking, Laundry, Shopping, Meal Preparation, Incontinence, Transportation, Money Management, Medication Management    [facility] sponsored benefits: Shared information re: Silver Sneakers/gym memberships, ASA, Calcium +D.    PCA Assessment completed at visit: Yes Annual PCA assessment indicated 16 units per day of PCA. This is the same as the previous assessment.     Elderly Waiver Eligibility: Yes-will continue on EW    Care Plan & Recommendations: CC covering visit for Chelly Wood on this date.  Completed visit via  telephone with son Phuc on phone answering questions and providing interpretation as he is primary caregiver and can answer with Manfred.  Manfred has had stable year, with no falls, hospitalizations or ER visits.  He continues to be supported by his family he lives with, formally via PCA and informally as family.  He continues to attend adult day care five days a week which he and his son report he enjoys, and the socialization helps him with his sadness he expressed he still feels regarding the loss of his wife two years ago.  Has no other needs at this time, will reach out to Chelly if needs arise.    See [facility] for detailed assessment information.    Follow-Up Plan: Member informed of future contact, plan to f/u with member with a 6 month telephone assessment.  Contact information shared with member and family, encouraged member to call with any questions or concerns at any time.    [place] care continuum providers: Please see Snapshot and Care Management Flowsheets for Specific details of care plan.    This CC note routed to PCP.    Amanda Isaac, BRITTNEY, [facility]  [phone]

## 2022-08-03 ENCOUNTER — PATIENT OUTREACH (OUTPATIENT)
Dept: GERIATRIC MEDICINE | Facility: CLINIC | Age: 87
End: 2022-08-03

## 2022-08-03 NOTE — LETTER
August 3, 2022    Important [employer] Information    ALVA ROTHXAY  [address] [facility] [address]  Your Care Plan  Dear Alva,  When we spoke recently, I promised to send you a Care Plan. The plan enclosed is a summary of our discussion. It includes the steps we agreed would help you meet your health goals. In addition, I can help you with:  Egzuvnp-F-JxpiYW  This program is available to members who need a ride to medical and dental visits. To schedule a ride, call [phone] or [phone] (toll free). TTY: 711. You can call 8 a.m. to 8 p.m. Seven days a week. Access to a representative may be limited at times.   One Pass  One Pass is your no-cost, complete, fitness solution for your mind and body. To learn more visit [employer]/fitness or call One Pass, toll-free [phone] (TTY: 719) 8 a.m. to 9 p.m. Monday-Friday.  Health Care Directive   This form helps you outline your health care wishes. You can request a form from me and I will answer any questions you have before you discuss it with your doctor.   Annual Physical  Take a key step on your path to good health and set up an annual physical at your clinic.  Questions?  Call me at [phone] Monday-Friday between 8am and 5pm.  TTY: 711. As we discussed, I plan to be in touch with you again in 6 months to follow up via phone.  Sincerely,    BRITTNEY Avilez    E-mail: [email or web address]  Phone: [phone]      [employer]      cc: member records                                                                                             CB5 (Os) (5-2020)    Civil Rights Notice  Discrimination is against the law. [facility] does not discriminate on the basis of any of the following:    Race    Color    National Origin    Creed    Caodaism    Age    Public Assistance Status    Receipt of Health Care Services    Disability (including physical or mental impairment)    Sex (including sex stereotypes and gender  identity)    Marital Status    Political Beliefs    Medical Condition    Genetic Information    Sexual Orientation    Claims Experience    Medical History    Health Status    Auxiliary Aids and Services:  [facility] provides auxiliary aids and services, like qualified interpreters or information in accessible formats, free of charge and in a timely manner, to ensure an equal opportunity to participate in our health care programs. Contact [employer] at [employer]/contact medicaid or call [phone] (toll free); TTY:71 or at [employer]/contactmedicaid.    Language Assistance Services:  [employer] provides translated documents and spoken language interpreting, free of charge and in a timely manner, when language assistance services are necessary to ensure limited English speakers have meaningful access to our information and services. Contact [employer] at [phone] (toll free); TTY: 425 or [employer]/contactmedicaid.     Civil Rights Complaints  You have the right to file a discrimination complaint if you believe you were treated in a discriminatory way by Medic. You may contact any of the following four agencies directly to file a discrimination complaint.    U.S. Department of Health and Human Services  Office for Civil Rights (OCR)  You have the right to file a complaint with the OCR, a federal agency, if you believe you have been discriminated against because of any of the following:    Race    Disability    Color    Sex    National Origin    Age    Evangelical (in some cases)    Contact the OCR directly to file a complaint:         Director         U.S. Department of Health and Human Services  Office for Civil Rights         [address] [place] [address], [place] [address]         Customer Response Center: Toll-free: [phone]          TDD: [phone]         Email: [email or web address]    [place] Department of Human Rights (MD)  In [place], you have the right to file a  complaint with the MDHR if you believe you have been discriminated against because of any of the following:      Race    Color    National Origin    Mu-ism    Creed    Sex    Sexual Orientation    Marital Status    Public Assistance Status    Disability    Contact the MDHR directly to file a complaint:         [facility] of Human Rights         [address] [address]         [phone] (voice)          [phone] (toll free)         711 or [phone] ([place] Relay)         [phone] (Fax)         [email or web address] (Email)     [place] Department of Human Services (DHS)  You have the right to file a complaint with [facility] if you believe you have been discriminated against in our health care programs because of any of the following:    Race    Color    National Origin    Creed    Mu-ism    Age    Public Assistance Status    Receipt of Health Care Services    Disability (including physical or mental impairment)    Sex (including sex stereotypes and gender identity)    Marital Status    Political Beliefs    Medical Condition    Genetic Information    Sexual Orientation    Claims Experience    Medical History    Health Status    Complaints must be in writing and filed within 180 days of the date you discovered the alleged discrimination. The complaint must contain your name and address and describe the discrimination you are complaining about. After we get your complaint, we will review it and notify you in writing about whether we have authority to investigate. If we do, we will investigate the complaint.      [facility] will notify you in writing of the investigation s outcome. You have a right to appeal the outcome if you disagree with the decision. To appeal, you must send a written request to have [facility] review the investigation outcome. Be brief and state why you disagree with the decision. Include additional information you think is important.      If you  file a complaint in this way, the people who work for the agency named in the complaint cannot retaliate against you. This means they cannot punish you in any way for filing a complaint. Filing a complaint in this way does not stop you from seeking out other legal or administration actions.     Contact DHS directly to file a discrimination complaint:        Civil Rights Coordinator        [place] Department of Human Services        Equal Opportunity and Access Division        [address] [address]        [place] [address] [phone] (voice) or use your preferred relay service     [facility] Complaint Notice   You have the right to file a complaint with [facility] if you believe you have been discriminated against because of any of the following:       Medical condition    Health status    Receipt of health care services    Claims experience    Medical history    Genetic information    Disability (including mental or physical impairment)    Marital status    Age    Sex (including sex stereotypes and gender identity)    Sexual orientation    National origin    Race    Color    Anabaptist    Creed    Public assistance status    Political beliefs    You can file a complaint and ask for help in filing a complaint in person or by mail, phone, fax, or email at:     [facility] Civil Rights Coordinator  [employer] [facility]ss[facility]  [address], [place] [address] [phone] (voice and fax) or TTV:020  Email: [email or web address]    American Indians can begin or continue to use [facility] and  Health Services (IHS) clinics. We will not require prior approval or impose any conditions for you to get services at these clinics. For elders age 65 years and older this includes Elderly Waiver (EW) services accessed through the [facility]. If a doctor or other provider in a [facility] or IHS clinic refers you to a provider in our network, we will not require you to see your primary care provider prior to the  referral.

## 2022-08-03 NOTE — PROGRESS NOTES
[facility] Care Coordination Contact    Received after visit chart from care coordinator.  Completed following tasks: Mailed copy of care plan to client, Updated services in Database, Submitted referrals/auths for PCA and ADC, Mailed copy of POC signature sheet for member to sign and return in SASE  and sent [facility] medication disposal form  , Provider Signature - No POC Shared:  Member indicates that they do not want their POC shared with any EW providers.     and [facility]:  Faxed completed PCA assessment to PCA Agency and mailed copies to member.  Faxed MD Communication to PCP.  Emailed referral form for auth to [employer]. Mailed PCA sig page to member.    Beverly Duran  Care Management Specialist  [facility]  [phone]

## 2022-08-25 ENCOUNTER — PATIENT OUTREACH (OUTPATIENT)
Dept: GERIATRIC MEDICINE | Facility: CLINIC | Age: 87
End: 2022-08-25

## 2022-08-25 NOTE — PROGRESS NOTES
No call to member. [facility] regulatory update.     BRITTNEY Avilez  [facility]   [phone] (Cell)  [phone] (Office)  [phone] (fax)  [email or web address]

## 2022-08-29 DIAGNOSIS — I48.19 PERSISTENT ATRIAL FIBRILLATION (H): ICD-10-CM

## 2022-08-29 DIAGNOSIS — E11.21 TYPE 2 DIABETES MELLITUS WITH DIABETIC NEPHROPATHY, WITHOUT LONG-TERM CURRENT USE OF INSULIN (H): ICD-10-CM

## 2022-08-29 DIAGNOSIS — Z00.00 MEDICARE ANNUAL WELLNESS VISIT, SUBSEQUENT: ICD-10-CM

## 2022-08-29 DIAGNOSIS — D64.9 ANEMIA, UNSPECIFIED TYPE: ICD-10-CM

## 2022-08-29 DIAGNOSIS — E78.5 HYPERLIPIDEMIA WITH TARGET LDL LESS THAN 100: ICD-10-CM

## 2022-08-29 RX ORDER — DILTIAZEM HYDROCHLORIDE 120 MG/1
120 CAPSULE, COATED, EXTENDED RELEASE ORAL DAILY
Qty: 90 CAPSULE | Refills: 2 | Status: SHIPPED | OUTPATIENT
Start: 2022-08-29 | End: 2023-02-27

## 2022-08-29 NOTE — PROGRESS NOTES
Refill request from: [facility]  Medication requested:   Diltiazem  Last office visit:  Dr Au 2022  Labs/EK2022  Future Appointments: return w/PA in 1 year  [facility] Refill Guideline reviewed. Medication meets criteria for refill. Refill sent.  Itzel Flynn RN 22 4:58 PM

## 2022-09-12 ENCOUNTER — TELEPHONE (OUTPATIENT)
Dept: FAMILY MEDICINE | Facility: CLINIC | Age: 87
End: 2022-09-12

## 2022-09-12 NOTE — TELEPHONE ENCOUNTER
Reason for Call:  Form, our goal is to have forms completed with 72 hours, however, some forms may require a visit or additional information.    Type of letter, form or note:  medical    Who is the form from?: Home care    Where did the form come from: form was faxed in    What clinic location was the form placed at?: [facility]    Where the form was placed: Dr. Hopkins Box/Folder    What number is listed as a contact on the form?: [phone]    Call taken on 9/12/2022 at 7:12 AM by Briseida Brambila

## 2022-09-13 ENCOUNTER — MEDICAL CORRESPONDENCE (OUTPATIENT)
Dept: HEALTH INFORMATION MANAGEMENT | Facility: CLINIC | Age: 87
End: 2022-09-13

## 2022-09-13 NOTE — TELEPHONE ENCOUNTER
Form faxed to [employer] @ # [phone].     Original sent to be scanned.     Jake Al

## 2022-09-13 NOTE — TELEPHONE ENCOUNTER
Patient's form signed, dated, and placed in TC basket.    Bradley Hopkins DO  9/13/2022 9:23 AM

## 2022-09-19 NOTE — TELEPHONE ENCOUNTER
"Requested Prescriptions   Pending Prescriptions Disp Refills     potassium chloride SA (KLOR-CON) 20 MEQ CR tablet  Last Written Prescription Date:  6/26/2017  Last Fill Quantity: 90 tablet,  # refills: 3   Last office visit: 6/25/2018 with prescribing provider:  Kellie     Future Office Visit:   Next 5 appointments (look out 90 days)     Oct 16, 2018 10:00 AM CDT   [facility] Bradley [facility]    [address] [facility] [address] [phone]                    90 tablet 3     Sig: Take 1 tablet (20 mEq) by mouth daily    Potassium Supplements Protocol Passed    9/7/2018 10:11 AM       Passed - Recent (12 mo) or future (30 days) visit within the authorizing provider's specialty    Patient had office visit in the last 12 months or has a visit in the next 30 days with authorizing provider or within the authorizing provider's specialty.  See \"Patient Info\" tab in inbasket, or \"Choose Columns\" in Meds & Orders section of the refill encounter.           Passed - Patient is age 18 or older       Passed - Normal serum potassium in past 12 months    Recent Labs   Lab Test  04/16/18   1011   POTASSIUM  4.1                      " Bilateral Helical Rim Advancement Flap Text: The defect edges were debeveled with a #15 blade scalpel.  Given the location of the defect and the proximity to free margins (helical rim) a bilateral helical rim advancement flap was deemed most appropriate.  Using a sterile surgical marker, the appropriate advancement flaps were drawn incorporating the defect and placing the expected incisions between the helical rim and antihelix where possible.  The area thus outlined was incised through and through with a #15 scalpel blade.  With a skin hook and iris scissors, the flaps were gently and sharply undermined and freed up.

## 2022-09-27 ENCOUNTER — TELEPHONE (OUTPATIENT)
Dept: FAMILY MEDICINE | Facility: CLINIC | Age: 87
End: 2022-09-27

## 2022-09-29 ENCOUNTER — TELEPHONE (OUTPATIENT)
Dept: FAMILY MEDICINE | Facility: CLINIC | Age: 87
End: 2022-09-29

## 2022-09-29 NOTE — TELEPHONE ENCOUNTER
Reason for Call:  Form, our goal is to have forms completed with 72 hours, however, some forms may require a visit or additional information.    Type of letter, form or note:  medical    Who is the form from?: Home care    Where did the form come from: form was faxed in    What clinic location was the form placed at?: [facility]    Where the form was placed: Dr Hopkins Box/Folder    What number is listed as a contact on the form?: [phone]           Call taken on 9/29/2022 at 5:54 PM by Yue Gunderson

## 2022-09-30 NOTE — TELEPHONE ENCOUNTER
Patient's form signed, dated, and placed in TC basket.    Bradley Hopkins DO  9/30/2022 12:19 AM

## 2022-10-04 NOTE — TELEPHONE ENCOUNTER
MED RECONCILIATION DONE    Discrepancies:    acetaminophen  Epic: 500 mg tablet - take 1-2 tablets every 6 hours PRN for mild pain  Form:  325 mg tablet - take 2 tablets every 6 hours PRN for pain    Losartan - hydrochlorothiazide    Epic: 50-12.5 mg tablet - take 2 tablets daily. - changed to this dosing in 2019.     Form: 100-12.5 mg - take once a day      Meds on Epic but NOT  on Form:      diltiazem ER COATED BEADS (CARDIZEM CD) 120 MG 24 hr capsule    glipiZIDE (GLUCOTROL XL) 2.5 MG 24 hr tablet    polyethylene glycol (MIRALAX/GLYCOLAX) powder    Meds on Form but NOT on Epic :     Glimepiride  2 mg - take 1/2 tablet daily.  - Discontinued 2/7/22.       Routing to PCP for further review/recommendations/orders.      Lianne Alvarado RN  [facility]

## 2022-10-05 ENCOUNTER — PATIENT OUTREACH (OUTPATIENT)
Dept: GERIATRIC MEDICINE | Facility: CLINIC | Age: 87
End: 2022-10-05

## 2022-10-05 NOTE — TELEPHONE ENCOUNTER
Completed forms faxed back to [facility]  at [phone].   Originals sent to be scanned.       Maribell Arenas

## 2022-10-05 NOTE — TELEPHONE ENCOUNTER
Patient's form signed, dated, and placed in TC basket.    Bradley Hopkins DO  10/4/2022 11:13 PM

## 2022-10-10 NOTE — PROGRESS NOTES
10/5/22  Spoke with Andreea at [employer].  Issue with the PCA auth. Just rec'd an auth from 7/1/22-8/31/22. Had previous auth for 8/1/22-7/31/23. Auth previous to that was 9/1/21-8/31/22. [phone]    Asked CMS to check with [employer] on the dates.    BRITTNEY Avilez  [employer]   [phone] (Cell)  [phone] (Office)  [phone] (fax)  [email or web address]

## 2022-10-10 NOTE — PROGRESS NOTES
10/6/22  Message from CMS with [employer]'s response.  New auth from 7/1/22-8/31/22 was generated due to a roll over in the system. They will update the auth from 9/1/22-7/31/22 to remove the overlap. Current auth from 8/1/22-7/31/23 remains valid.    BRITTNEY Avilez  [facility]   [phone] (Cell)  [phone] (Office)  [phone] (fax)  [email or web address]

## 2022-10-10 NOTE — PROGRESS NOTES
10/10/22  Email sent to Andreea at [facility], [email or web address]. Gave her update on the auth. [facility] will be resending provider letters to reflect the adjustment.    BRITTNEY Avilez  [facility]   [phone] (Cell)  [phone] (Office)  [phone] (fax)  [email or web address]

## 2022-10-17 ENCOUNTER — TRANSFERRED RECORDS (OUTPATIENT)
Dept: HEALTH INFORMATION MANAGEMENT | Facility: CLINIC | Age: 87
End: 2022-10-17

## 2022-10-17 LAB — RETINOPATHY: NEGATIVE

## 2022-10-27 ENCOUNTER — MEDICAL CORRESPONDENCE (OUTPATIENT)
Dept: HEALTH INFORMATION MANAGEMENT | Facility: CLINIC | Age: 87
End: 2022-10-27

## 2022-10-27 ENCOUNTER — TELEPHONE (OUTPATIENT)
Dept: FAMILY MEDICINE | Facility: CLINIC | Age: 87
End: 2022-10-27

## 2022-10-27 NOTE — TELEPHONE ENCOUNTER
Patient's form signed, dated, and placed in TC basket.    Bradley Hopkins DO  10/27/2022 12:41 PM

## 2022-10-27 NOTE — TELEPHONE ENCOUNTER
Reason for Call:  Form, our goal is to have forms completed with 72 hours, however, some forms may require a visit or additional information.    Type of letter, form or note:  medical    Who is the form from?: Home care    Where did the form come from: form was faxed in    What clinic location was the form placed at?: [facility]    Where the form was placed: Dr. Hopkins Box/Folder    What number is listed as a contact on the form?: [phone]    Call taken on 10/27/2022 at 11:30 AM by Briseida Brambila

## 2022-10-27 NOTE — TELEPHONE ENCOUNTER
Completed forms faxed back to [facility]  at [phone].   Originals sent to be scanned.       Maribell Arenas

## 2022-11-01 DIAGNOSIS — E11.21 TYPE 2 DIABETES MELLITUS WITH DIABETIC NEPHROPATHY, WITHOUT LONG-TERM CURRENT USE OF INSULIN (H): ICD-10-CM

## 2022-11-02 NOTE — TELEPHONE ENCOUNTER
Routing refill request to provider for review/approval because:  Patient has documented A1c within the specified period of time.    Recent (6 mo) or future (30 days) visit within the authorizing provider's specialty   Phyllis GALLARDO RN, BSN

## 2022-11-04 RX ORDER — GLIPIZIDE 2.5 MG/1
TABLET, EXTENDED RELEASE ORAL
Qty: 90 TABLET | Refills: 0 | Status: SHIPPED | OUTPATIENT
Start: 2022-11-04 | End: 2023-02-22

## 2022-11-08 ENCOUNTER — TELEPHONE (OUTPATIENT)
Dept: FAMILY MEDICINE | Facility: CLINIC | Age: 87
End: 2022-11-08

## 2022-11-08 NOTE — TELEPHONE ENCOUNTER
Reason for Call:  Form, our goal is to have forms completed with 72 hours, however, some forms may require a visit or additional information.    Type of letter, form or note:  medical    Who is the form from?: Home care    Where did the form come from: form was faxed in    What clinic location was the form placed at?: [facility]    Where the form was placed: Dr. Hopkins's Box/Folder    What number is listed as a contact on the form?: [phone]        Call taken on 11/8/2022 at 8:24 AM by Neelima Nunez

## 2022-11-10 NOTE — TELEPHONE ENCOUNTER
Patient's form signed, dated, and placed in TC basket.    Bradley Hopkins DO  11/10/2022 7:43 AM

## 2022-11-15 ENCOUNTER — TELEPHONE (OUTPATIENT)
Dept: FAMILY MEDICINE | Facility: CLINIC | Age: 87
End: 2022-11-15

## 2022-11-15 NOTE — TELEPHONE ENCOUNTER
Reason for Call:  Form, our goal is to have forms completed with 72 hours, however, some forms may require a visit or additional information.    Type of letter, form or note:  Request for Prescription Change or Information    Who is the form from?: Insurance comp    Where did the form come from: form was faxed in    What clinic location was the form placed at?: [facility]    Where the form was placed: Dr. Hopkins's Box/Folder    What number is listed as a contact on the form?: [phone]        Call taken on 11/15/2022 at 9:17 AM by Neelima Nunez

## 2022-11-17 NOTE — TELEPHONE ENCOUNTER
Form reviewed. Would not recommend corticosteroid inhaler at this time.    Bradley Hopkins DO  11/16/2022 6:06 PM

## 2022-12-01 ENCOUNTER — TELEPHONE (OUTPATIENT)
Dept: FAMILY MEDICINE | Facility: CLINIC | Age: 87
End: 2022-12-01

## 2022-12-01 NOTE — TELEPHONE ENCOUNTER
Reason for Call:  Form, our goal is to have forms completed with 72 hours, however, some forms may require a visit or additional information.    Type of letter, form or note:  medical    Who is the form from?: Home care    Where did the form come from: form was faxed in    What clinic location was the form placed at?: [facility]    Where the form was placed: Dr Hopkins Box/Folder    What number is listed as a contact on the form?: [phone]           Call taken on 12/1/2022 at 5:46 PM by Yue Gunderson

## 2022-12-05 ENCOUNTER — TELEPHONE (OUTPATIENT)
Dept: FAMILY MEDICINE | Facility: CLINIC | Age: 87
End: 2022-12-05

## 2022-12-05 NOTE — TELEPHONE ENCOUNTER
Reason for Call:  Form, our goal is to have forms completed with 72 hours, however, some forms may require a visit or additional information.    Type of letter, form or note:  medical    Who is the form from?: Home care    Where did the form come from: form was faxed in    What clinic location was the form placed at?: [facility]    Where the form was placed: Dr Hopkins Box/Folder    What number is listed as a contact on the form?: [phone]           Call taken on 12/5/2022 at 5:47 PM by Yue Gunderson

## 2022-12-12 ENCOUNTER — TELEPHONE (OUTPATIENT)
Dept: FAMILY MEDICINE | Facility: CLINIC | Age: 87
End: 2022-12-12

## 2022-12-12 NOTE — TELEPHONE ENCOUNTER
Reason for Call:  Form, our goal is to have forms completed with 72 hours, however, some forms may require a visit or additional information.    Type of letter, form or note:  medical    Who is the form from?: Home care    Where did the form come from: form was faxed in    What clinic location was the form placed at?: [facility]    Where the form was placed: Dr Hopkins Box/Folder    What number is listed as a contact on the form?: [phone]         Call taken on 12/12/2022 at 5:16 PM by Yue Gunderson

## 2022-12-13 ENCOUNTER — MEDICAL CORRESPONDENCE (OUTPATIENT)
Dept: HEALTH INFORMATION MANAGEMENT | Facility: CLINIC | Age: 87
End: 2022-12-13

## 2022-12-13 NOTE — TELEPHONE ENCOUNTER
Patient's form signed, dated, and placed in TC basket.    Bradley Hopkins DO  12/13/2022 7:09 AM

## 2022-12-14 ENCOUNTER — MEDICAL CORRESPONDENCE (OUTPATIENT)
Dept: HEALTH INFORMATION MANAGEMENT | Facility: CLINIC | Age: 87
End: 2022-12-14

## 2022-12-15 NOTE — TELEPHONE ENCOUNTER
Completed forms faxed back to [facility]  at [phone].   Originals sent to be scanned.       Maribell Arenas

## 2022-12-15 NOTE — TELEPHONE ENCOUNTER
Completed forms faxed back to [facility]  at [phone].   Originals sent to be scanned.       Maribell Arenas

## 2022-12-15 NOTE — TELEPHONE ENCOUNTER
Patient's form signed, dated, and placed in TC basket.    Bradley Hopkins DO  12/15/2022 12:16 AM

## 2023-01-10 ENCOUNTER — TELEPHONE (OUTPATIENT)
Dept: FAMILY MEDICINE | Facility: CLINIC | Age: 88
End: 2023-01-10
Payer: COMMERCIAL

## 2023-01-10 NOTE — TELEPHONE ENCOUNTER
Forms/Letter Request    Type of form/letter: Home Health Cert and Plan of Care    Have you been seen for this request: No    Do we have the form/letter: Yes: faxed to [facility]    When is form/letter needed by: when able    How would you like the form/letter returned: Fax  [phone]     Patient Notified form requests are processed in 3-5 business days:No    Okay to leave a detailed message?: No at Other phone number:  [facility] phone [phone]

## 2023-01-11 NOTE — TELEPHONE ENCOUNTER
Home Health Cert and Plan of care placed in the triage folder in [place].      Sayra Rees   [employer]

## 2023-01-12 DIAGNOSIS — I48.91 ATRIAL FIBRILLATION, UNSPECIFIED TYPE (H): ICD-10-CM

## 2023-01-12 NOTE — LETTER
January 23, 2023      Manfred Caraballo  [address] [place] [address]      We have been calling you regarding a recent refill request we received for Xarelto, Fluticason, and Acetaminophen.  Unfortunately, we were unable to reach you.  We are notifying you that you are due for and office visit and labs prior to your next refill.  You can schedule this appointment via [email or web address] or by calling the clinic at [phone].          Sincerely,     Bradley Hopkins DO / FLOR Hopkins DO

## 2023-01-16 RX ORDER — RIVAROXABAN 20 MG/1
TABLET, FILM COATED ORAL
Qty: 90 TABLET | Refills: 0 | Status: SHIPPED | OUTPATIENT
Start: 2023-01-16 | End: 2023-02-27

## 2023-01-16 NOTE — TELEPHONE ENCOUNTER
Routing refill request to provider for review/approval because:  Labs not current:  Platelets, creatinine, creatinine clearance.   Office visit greater than 6 months ago    LOV: 2/7/2022     OCTAVIANO CAMPOS RN on 1/16/2023 at 7:31 AM   [facility]

## 2023-01-16 NOTE — TELEPHONE ENCOUNTER
90 day refill signed. Lanoi will be due for follow up. Please help schedule.    Bradley Hopkins DO  1/16/2023 3:03 PM

## 2023-01-17 NOTE — TELEPHONE ENCOUNTER
MED RECONCILIATION DONE    Discrepancies:    acetaminophen  Epic: 500 mg - 1-2 tablets every 6 hours  Form:  325 mg - 2 tablets every 6 hours     Losartan-hydrochlorothiazide   Epic: 50-12.5 - 2 tablets daily    Form: 100-12.5 - daily    Hydrocortisone cream   Epic: 0.2% - apply sparingly to affected are TID   Form: 2% - external once a day    Meds on Epic but NOT  on Form:      glipiZIDE (GLUCOTROL XL) 2.5 MG 24 hr tablet 90 tablet 0 11/4/2022  No   Sig: TAKE 1 TABLET BY MOUTH EVERY DAY     KLOR-CON 20 MEQ CR tablet 90 tablet 3 3/1/2022  No   Sig: TAKE 1 TABLET BY MOUTH DAILY     polyethylene glycol (MIRALAX/GLYCOLAX) powder 527 g 3 2/4/2020  No   Sig: MIX 17 GRAMS (1 CAPFUL) OF POWDER IN 8 OUNCES LIQUID AND DRINK ONCE DAILY       Meds on Form but NOT on Epic :     glimeperide 2 mg - 1/2 tablet daily       Routing to PCP for further review/recommendations/orders.      Lianne Alvarado RN  [facility]

## 2023-01-20 ENCOUNTER — TELEPHONE (OUTPATIENT)
Dept: FAMILY MEDICINE | Facility: CLINIC | Age: 88
End: 2023-01-20
Payer: COMMERCIAL

## 2023-01-20 DIAGNOSIS — Z53.9 DIAGNOSIS NOT YET DEFINED: Primary | ICD-10-CM

## 2023-01-20 PROCEDURE — G0179 MD RECERTIFICATION HHA PT: HCPCS | Performed by: FAMILY MEDICINE

## 2023-01-20 RX ORDER — ACETAMINOPHEN 325 MG/1
650 TABLET ORAL EVERY 6 HOURS PRN
COMMUNITY
Start: 2023-01-20 | End: 2023-02-27

## 2023-01-20 NOTE — TELEPHONE ENCOUNTER
Patient's form signed, dated, and placed in TC basket.    Bradley Hopkins DO  1/20/2023 10:28 AM

## 2023-01-20 NOTE — TELEPHONE ENCOUNTER
Reason for Call:  Form, our goal is to have forms completed with 72 hours, however, some forms may require a visit or additional information.    Type of letter, form or note:  medical    Who is the form from?: Insurance comp    Where did the form come from: form was faxed in    What clinic location was the form placed at?: [facility]    Where the form was placed: Dr. Hopkins's Box/Folder    What number is listed as a contact on the form?: [phone]         Call taken on 1/20/2023 at 8:32 AM by Neelima Nunez

## 2023-01-20 NOTE — TELEPHONE ENCOUNTER
Completed Home Health Cert for 1/05 to 3/05, and faxed back to [phone].  Also faxed to [facility].    Sayra Rees   [employer]

## 2023-01-20 NOTE — TELEPHONE ENCOUNTER
Reason for Call:  Form, our goal is to have forms completed with 72 hours, however, some forms may require a visit or additional information.    Type of letter, form or note:  medical    Who is the form from?: Home care    Where did the form come from: form was faxed in    What clinic location was the form placed at?: [facility]    Where the form was placed: Dr. Hopkins's Box/Folder    What number is listed as a contact on the form?: F [phone]         Call taken on 1/20/2023 at 9:17 AM by Neelima Nunez

## 2023-01-23 NOTE — TELEPHONE ENCOUNTER
Voice mail left for pt using  service, and with son listed as alternative contact, and letter sent.     Juan Alberto Patel

## 2023-01-23 NOTE — TELEPHONE ENCOUNTER
Patient's son, Phuc calls back - on consent to communicate. Informed him patient due for follow-up appointment, was given 90 day supply of Xarelto to allow time to schedule an appointment. Call transferred to scheduling to make appointment.     Merlyn Echeverria RN  [facility]

## 2023-02-09 ENCOUNTER — TELEPHONE (OUTPATIENT)
Dept: FAMILY MEDICINE | Facility: CLINIC | Age: 88
End: 2023-02-09
Payer: COMMERCIAL

## 2023-02-09 NOTE — TELEPHONE ENCOUNTER
Reason for Call:  Form, our goal is to have forms completed with 72 hours, however, some forms may require a visit or additional information.    Type of letter, form or note:  medical    Who is the form from?: Home care    Where did the form come from: form was faxed in    What clinic location was the form placed at?: [facility]    Where the form was placed: Dr. Hopkins's Box/Folder    What number is listed as a contact on the form?: F [phone]         Call taken on 2/9/2023 at 10:01 AM by Neelima Nunez

## 2023-02-12 ENCOUNTER — MEDICAL CORRESPONDENCE (OUTPATIENT)
Dept: HEALTH INFORMATION MANAGEMENT | Facility: CLINIC | Age: 88
End: 2023-02-12
Payer: COMMERCIAL

## 2023-02-13 NOTE — TELEPHONE ENCOUNTER
Patient's form signed, dated, and placed in TC basket.    Bradley Hopkins DO  2/13/2023 1:21 AM

## 2023-02-14 ENCOUNTER — TELEPHONE (OUTPATIENT)
Dept: FAMILY MEDICINE | Facility: CLINIC | Age: 88
End: 2023-02-14
Payer: COMMERCIAL

## 2023-02-14 NOTE — TELEPHONE ENCOUNTER
Reason for Call:  Form, our goal is to have forms completed with 72 hours, however, some forms may require a visit or additional information.    Type of letter, form or note:  medical    Who is the form from?: Home care    Where did the form come from: form was faxed in    What clinic location was the form placed at?: [facility]    Where the form was placed: Dr Hopkins Box/Folder    What number is listed as a contact on the form?: [phone]           Call taken on 2/14/2023 at 3:54 PM by Yue Gunderson

## 2023-02-15 NOTE — TELEPHONE ENCOUNTER
Day Care center calling, they need the Latest physical including vitals sent with the form       Maribell Arenas

## 2023-02-16 ENCOUNTER — PATIENT OUTREACH (OUTPATIENT)
Dept: GERIATRIC MEDICINE | Facility: CLINIC | Age: 88
End: 2023-02-16
Payer: COMMERCIAL

## 2023-02-16 ENCOUNTER — MEDICAL CORRESPONDENCE (OUTPATIENT)
Dept: HEALTH INFORMATION MANAGEMENT | Facility: CLINIC | Age: 88
End: 2023-02-16
Payer: COMMERCIAL

## 2023-02-16 NOTE — PROGRESS NOTES
[facility] Six-Month Telephone Assessment    6 month telephone assessment completed on 2/16/23. Spoke wit son, Phuc. He reports his dad is stable and doing well. Had some questions about dentures vs partials. Explained the difference and that both are covered. He will take his dad to review options with his dentist.    ER visits: No  Hospitalizations: No  TCU stays: No  Significant health status changes: no  Falls/Injuries: No  ADL/IADL changes: No  Changes in services: No    Caregiver Assessment follow up:  n/a    Goals: See POC in chart for goal progress documentation.    1. Continue no falls goal  2. Continue pain goal.  3. Continue denture goal.  4. Continue to attend [facility] for socialization.    Will see member in 6 months for an annual health risk assessment.   Encouraged member to call CC with any questions or concerns in the meantime.     BRITTNEY Avilez  [facility]   [phone] (Office)  [email or web address]

## 2023-02-16 NOTE — TELEPHONE ENCOUNTER
Patient's form signed, dated, and placed in TC basket.    Last wellness visit was about a year ago. Has visit scheduled for 2/27/23.      Bradley Hopkins DO  2/16/2023 12:39 PM

## 2023-02-22 DIAGNOSIS — I10 HYPERTENSION GOAL BP (BLOOD PRESSURE) < 140/90: ICD-10-CM

## 2023-02-22 DIAGNOSIS — E11.21 TYPE 2 DIABETES MELLITUS WITH DIABETIC NEPHROPATHY, WITHOUT LONG-TERM CURRENT USE OF INSULIN (H): ICD-10-CM

## 2023-02-22 RX ORDER — GLIPIZIDE 2.5 MG/1
TABLET, EXTENDED RELEASE ORAL
Qty: 90 TABLET | Refills: 0 | Status: SHIPPED | OUTPATIENT
Start: 2023-02-22 | End: 2023-02-27

## 2023-02-22 RX ORDER — POTASSIUM CHLORIDE 1500 MG/1
TABLET, EXTENDED RELEASE ORAL
Qty: 90 TABLET | Refills: 3 | Status: SHIPPED | OUTPATIENT
Start: 2023-02-22 | End: 2023-02-27

## 2023-02-27 ENCOUNTER — OFFICE VISIT (OUTPATIENT)
Dept: FAMILY MEDICINE | Facility: CLINIC | Age: 88
End: 2023-02-27
Payer: COMMERCIAL

## 2023-02-27 ENCOUNTER — TELEPHONE (OUTPATIENT)
Dept: FAMILY MEDICINE | Facility: CLINIC | Age: 88
End: 2023-02-27

## 2023-02-27 VITALS
SYSTOLIC BLOOD PRESSURE: 130 MMHG | OXYGEN SATURATION: 97 % | DIASTOLIC BLOOD PRESSURE: 74 MMHG | RESPIRATION RATE: 18 BRPM | BODY MASS INDEX: 25.27 KG/M2 | TEMPERATURE: 98.2 F | HEIGHT: 64 IN | HEART RATE: 68 BPM | WEIGHT: 148 LBS

## 2023-02-27 DIAGNOSIS — I10 HYPERTENSION GOAL BP (BLOOD PRESSURE) < 140/90: ICD-10-CM

## 2023-02-27 DIAGNOSIS — R05.9 COUGH, UNSPECIFIED TYPE: ICD-10-CM

## 2023-02-27 DIAGNOSIS — I48.91 ATRIAL FIBRILLATION, UNSPECIFIED TYPE (H): ICD-10-CM

## 2023-02-27 DIAGNOSIS — K21.9 GASTROESOPHAGEAL REFLUX DISEASE WITHOUT ESOPHAGITIS: ICD-10-CM

## 2023-02-27 DIAGNOSIS — H91.93 BILATERAL HEARING LOSS, UNSPECIFIED HEARING LOSS TYPE: ICD-10-CM

## 2023-02-27 DIAGNOSIS — Z00.00 MEDICARE ANNUAL WELLNESS VISIT, SUBSEQUENT: Primary | ICD-10-CM

## 2023-02-27 DIAGNOSIS — E11.21 TYPE 2 DIABETES MELLITUS WITH DIABETIC NEPHROPATHY, WITHOUT LONG-TERM CURRENT USE OF INSULIN (H): ICD-10-CM

## 2023-02-27 DIAGNOSIS — I48.19 PERSISTENT ATRIAL FIBRILLATION (H): ICD-10-CM

## 2023-02-27 DIAGNOSIS — J44.9 CHRONIC OBSTRUCTIVE PULMONARY DISEASE, UNSPECIFIED COPD TYPE (H): ICD-10-CM

## 2023-02-27 DIAGNOSIS — E78.5 HYPERLIPIDEMIA WITH TARGET LDL LESS THAN 100: ICD-10-CM

## 2023-02-27 LAB
ERYTHROCYTE [DISTWIDTH] IN BLOOD BY AUTOMATED COUNT: 13 % (ref 10–15)
HBA1C MFR BLD: 5.7 % (ref 0–5.6)
HCT VFR BLD AUTO: 37.9 % (ref 40–53)
HGB BLD-MCNC: 11.4 G/DL (ref 13.3–17.7)
MCH RBC QN AUTO: 23.2 PG (ref 26.5–33)
MCHC RBC AUTO-ENTMCNC: 30.1 G/DL (ref 31.5–36.5)
MCV RBC AUTO: 77 FL (ref 78–100)
PLATELET # BLD AUTO: 228 10E3/UL (ref 150–450)
RBC # BLD AUTO: 4.92 10E6/UL (ref 4.4–5.9)
WBC # BLD AUTO: 6.2 10E3/UL (ref 4–11)

## 2023-02-27 PROCEDURE — 82043 UR ALBUMIN QUANTITATIVE: CPT | Performed by: FAMILY MEDICINE

## 2023-02-27 PROCEDURE — 80061 LIPID PANEL: CPT | Performed by: FAMILY MEDICINE

## 2023-02-27 PROCEDURE — 85027 COMPLETE CBC AUTOMATED: CPT | Performed by: FAMILY MEDICINE

## 2023-02-27 PROCEDURE — 99214 OFFICE O/P EST MOD 30 MIN: CPT | Mod: 25 | Performed by: FAMILY MEDICINE

## 2023-02-27 PROCEDURE — 82570 ASSAY OF URINE CREATININE: CPT | Performed by: FAMILY MEDICINE

## 2023-02-27 PROCEDURE — 36415 COLL VENOUS BLD VENIPUNCTURE: CPT | Performed by: FAMILY MEDICINE

## 2023-02-27 PROCEDURE — G0439 PPPS, SUBSEQ VISIT: HCPCS | Performed by: FAMILY MEDICINE

## 2023-02-27 PROCEDURE — 80053 COMPREHEN METABOLIC PANEL: CPT | Performed by: FAMILY MEDICINE

## 2023-02-27 PROCEDURE — 83036 HEMOGLOBIN GLYCOSYLATED A1C: CPT | Performed by: FAMILY MEDICINE

## 2023-02-27 RX ORDER — DILTIAZEM HYDROCHLORIDE 120 MG/1
120 CAPSULE, COATED, EXTENDED RELEASE ORAL DAILY
Qty: 90 CAPSULE | Refills: 3 | Status: SHIPPED | OUTPATIENT
Start: 2023-02-27 | End: 2024-03-08

## 2023-02-27 RX ORDER — IPRATROPIUM BROMIDE AND ALBUTEROL SULFATE 2.5; .5 MG/3ML; MG/3ML
1 SOLUTION RESPIRATORY (INHALATION) EVERY 6 HOURS PRN
Qty: 90 ML | Refills: 0 | Status: SHIPPED | OUTPATIENT
Start: 2023-02-27

## 2023-02-27 RX ORDER — FLUTICASONE PROPIONATE 50 MCG
1-2 SPRAY, SUSPENSION (ML) NASAL DAILY
Qty: 48 ML | Refills: 1 | Status: SHIPPED | OUTPATIENT
Start: 2023-02-27

## 2023-02-27 RX ORDER — ACETAMINOPHEN 325 MG/1
650 TABLET ORAL EVERY 6 HOURS PRN
Qty: 360 TABLET | Refills: 1 | Status: SHIPPED | OUTPATIENT
Start: 2023-02-27 | End: 2023-10-06

## 2023-02-27 RX ORDER — PANTOPRAZOLE SODIUM 40 MG/1
40 TABLET, DELAYED RELEASE ORAL DAILY
Qty: 90 TABLET | Refills: 3 | Status: SHIPPED | OUTPATIENT
Start: 2023-02-27 | End: 2024-05-22

## 2023-02-27 RX ORDER — LANCETS
EACH MISCELLANEOUS
Qty: 200 EACH | Refills: 6 | Status: SHIPPED | OUTPATIENT
Start: 2023-02-27

## 2023-02-27 RX ORDER — ISOPROPYL ALCOHOL 0.75 G/1
SWAB TOPICAL
Qty: 100 EACH | Refills: 3 | Status: SHIPPED | OUTPATIENT
Start: 2023-02-27

## 2023-02-27 RX ORDER — ATORVASTATIN CALCIUM 10 MG/1
10 TABLET, FILM COATED ORAL DAILY
Qty: 90 TABLET | Refills: 3 | Status: SHIPPED | OUTPATIENT
Start: 2023-02-27 | End: 2024-04-10

## 2023-02-27 RX ORDER — ALBUTEROL SULFATE 90 UG/1
2 AEROSOL, METERED RESPIRATORY (INHALATION) EVERY 6 HOURS PRN
Qty: 18 G | Refills: 3 | Status: SHIPPED | OUTPATIENT
Start: 2023-02-27 | End: 2024-05-22

## 2023-02-27 RX ORDER — POTASSIUM CHLORIDE 1500 MG/1
20 TABLET, EXTENDED RELEASE ORAL DAILY
Qty: 90 TABLET | Refills: 3 | Status: SHIPPED | OUTPATIENT
Start: 2023-02-27 | End: 2024-05-22

## 2023-02-27 RX ORDER — GLIPIZIDE 2.5 MG/1
2.5 TABLET, EXTENDED RELEASE ORAL DAILY
Qty: 90 TABLET | Refills: 3 | Status: SHIPPED | OUTPATIENT
Start: 2023-02-27 | End: 2023-09-18

## 2023-02-27 ASSESSMENT — ACTIVITIES OF DAILY LIVING (ADL)
CURRENT_FUNCTION: PREPARING MEALS REQUIRES ASSISTANCE
CURRENT_FUNCTION: MEDICATION ADMINISTRATION REQUIRES ASSISTANCE
CURRENT_FUNCTION: LAUNDRY REQUIRES ASSISTANCE
CURRENT_FUNCTION: SHOPPING REQUIRES ASSISTANCE
CURRENT_FUNCTION: HOUSEWORK REQUIRES ASSISTANCE
CURRENT_FUNCTION: BATHING REQUIRES ASSISTANCE

## 2023-02-27 ASSESSMENT — ENCOUNTER SYMPTOMS
SORE THROAT: 0
WEAKNESS: 0
ABDOMINAL PAIN: 0
HEMATURIA: 0
SHORTNESS OF BREATH: 0
CHILLS: 0
NAUSEA: 0
PARESTHESIAS: 0
HEADACHES: 0
MYALGIAS: 0
ARTHRALGIAS: 0
DIZZINESS: 0
DIARRHEA: 0
FEVER: 0
PALPITATIONS: 0
FREQUENCY: 0
HEMATOCHEZIA: 0
NERVOUS/ANXIOUS: 0
JOINT SWELLING: 0
CONSTIPATION: 0
HEARTBURN: 0
DYSURIA: 0
COUGH: 0
EYE PAIN: 0

## 2023-02-27 ASSESSMENT — PAIN SCALES - GENERAL: PAINLEVEL: NO PAIN (0)

## 2023-02-27 NOTE — LETTER
[facility]  [address] [place] [id number]  [phone]                    March 8, 2023    Manfred Caraballo  [address] [place] [address]      Dear Manfred,    Here is a summary of your recent test results:    Hemoglobin is decreased indicating anemia. This is stable over the past several years. We will continue to monitor this.   -White blood cell and platelet counts are normal.   -Cholesterol levels are at your goal levels.  ADVISE: continuing your medication, a regular exercise program with at least 150 minutes of aerobic exercise per week, and eating a low saturated fat/low carbohydrate diet.  Also, you should recheck this fasting cholesterol panel in 12 months.   -Liver and gallbladder tests are normal (ALT,AST, Alk phos, bilirubin), kidney function is normal (Cr, GFR), sodium is normal, potassium is normal, calcium is normal, glucose is normal.   -A1C (test of diabetes control the last 2-3 months) is at your goal. Please continue with your current plan. Also, you should make an appointment to see me and recheck your A1C test in 6 months.   -Microalbumin (urine protein) level is elevated. 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  ADVISE: avoiding anti-inflamatory agents such as ibuprofen (Advil, Motrin) or naproxen (Aleve) as much as possible, keeping your blood pressure in a normal range, and continuing your medication (losartan) that helps protect your kidneys.  Also, this should be rechecked in 1 year.     Healthy Bradley rice DO           Results for orders placed or performed in visit on 02/27/23   Comprehensive metabolic panel (BMP + Alb, Alk Phos, ALT, AST, Total. Bili, TP)     Status: Abnormal   Result Value Ref Range    Sodium 139 136 - 145 mmol/L    Potassium 4.4 3.4 - 5.3 mmol/L    Chloride 101 98 - 107 mmol/L    Carbon Dioxide (CO2) 26 22 - 29 mmol/L    Anion Gap 12 7 - 15 mmol/L    Urea  Nitrogen 16.5 8.0 - 23.0 mg/dL    Creatinine 1.07 0.67 - 1.17 mg/dL    Calcium 10.2 8.8 - 10.2 mg/dL    Glucose 69 (L) 70 - 99 mg/dL    Alkaline Phosphatase 64 40 - 129 U/L    AST 22 10 - 50 U/L    ALT 9 (L) 10 - 50 U/L    Protein Total 7.7 6.4 - 8.3 g/dL    Albumin 4.6 3.5 - 5.2 g/dL    Bilirubin Total 0.5 <=1.2 mg/dL    GFR Estimate 67 >60 mL/min/1.73m2   Hemoglobin A1c     Status: Abnormal   Result Value Ref Range    Hemoglobin A1C 5.7 (H) 0.0 - 5.6 %   Albumin Random Urine Quantitative with Creat Ratio     Status: Abnormal   Result Value Ref Range    Creatinine Urine mg/dL 87.4 mg/dL    Albumin Urine mg/L 144.0 mg/L    Albumin Urine mg/g Cr 164.76 (H) 0.00 - 17.00 mg/g Cr   CBC with platelets     Status: Abnormal   Result Value Ref Range    WBC Count 6.2 4.0 - 11.0 10e3/uL    RBC Count 4.92 4.40 - 5.90 10e6/uL    Hemoglobin 11.4 (L) 13.3 - 17.7 g/dL    Hematocrit 37.9 (L) 40.0 - 53.0 %    MCV 77 (L) 78 - 100 fL    MCH 23.2 (L) 26.5 - 33.0 pg    MCHC 30.1 (L) 31.5 - 36.5 g/dL    RDW 13.0 10.0 - 15.0 %    Platelet Count 228 150 - 450 10e3/uL   Lipid panel reflex to direct LDL Non-fasting     Status: Normal   Result Value Ref Range    Cholesterol 134 <200 mg/dL    Triglycerides 126 <150 mg/dL    Direct Measure HDL 40 >=40 mg/dL    LDL Cholesterol Calculated 69 <=100 mg/dL    Non HDL Cholesterol 94 <130 mg/dL    Narrative    Cholesterol  Desirable:  <200 mg/dL    Triglycerides  Normal:  Less than 150 mg/dL  Borderline High:  150-199 mg/dL  High:  200-499 mg/dL  Very High:  Greater than or equal to 500 mg/dL    Direct Measure HDL  Female:  Greater than or equal to 50 mg/dL   Male:  Greater than or equal to 40 mg/dL    LDL Cholesterol  Desirable:  <100mg/dL  Above Desirable:  100-129 mg/dL   Borderline High:  130-159 mg/dL   High:  160-189 mg/dL   Very High:  >= 190 mg/dL    Non HDL Cholesterol  Desirable:  130 mg/dL  Above Desirable:  130-159 mg/dL  Borderline High:  160-189 mg/dL  High:  190-219 mg/dL  Very High:   Greater than or equal to 220 mg/dL

## 2023-02-27 NOTE — PROGRESS NOTES
"SUBJECTIVE:   Manfred is a 88 year old who presents for Preventive Visit.  Patient has been advised of split billing requirements and indicates understanding: Yes  Are you in the first 12 months of your Medicare coverage?  No    Healthy Habits:     In general, how would you rate your overall health?  Fair    Frequency of exercise:  None    Do you usually eat at least 4 servings of fruit and vegetables a day, include whole grains    & fiber and avoid regularly eating high fat or \"junk\" foods?  Yes    Taking medications regularly:  Yes    Medication side effects:  None    Ability to successfully perform activities of daily living:  Shopping requires assistance, preparing meals requires assistance, housework requires assistance, bathing requires assistance, laundry requires assistance and medication administration requires assistance    Home Safety:  No safety concerns identified    Hearing Impairment:  Difficulty following a conversation in a noisy restaurant or crowded room, difficult to understand a speaker at a public meeting or [employer] service, need to ask people to speak up or repeat themselves, difficulty understanding soft or whispered speech and difficulty understanding speech on the telephone    In the past 6 months, have you been bothered by leaking of urine? Yes    In general, how would you rate your overall mental or emotional health?  Fair      PHQ-2 Total Score: 0    Additional concerns today:  No      Have you ever done Advance Care Planning? (For example, a Health Directive, POLST, or a discussion with a medical provider or your loved ones about your wishes): Yes, patient states has an Advance Care Planning document and will bring a copy to the clinic.       Fall risk  Fallen 2 or more times in the past year?: No  Any fall with injury in the past year?: No    Cognitive Screening   1) Repeat 3 items (Leader, Season, Table)      2) Clock draw:   NORMAL  3) 3 item recall:   Recalls 3 objects  Results: 3 " items recalled: COGNITIVE IMPAIRMENT LESS LIKELY    Mini-CogTM Copyright DEBBIE Guzman. Licensed by the author for use in [facility]; reprinted with permission (fina@[email or web address]). All rights reserved.          Reviewed and updated as needed this visit by clinical staff                  Reviewed and updated as needed this visit by Provider                 Social History     Tobacco Use     Smoking status: Former     Packs/day: 0.50     Years: 20.00     Pack years: 10.00     Types: Cigarettes     Quit date: 1981     Years since quittin.8     Smokeless tobacco: Never     Tobacco comments:     quit in     Substance Use Topics     Alcohol use: No     Alcohol/week: 0.0 standard drinks     Comment: quit in      If you drink alcohol do you typically have >3 drinks per day or >7 drinks per week? No    Alcohol Use 2023   Prescreen: >3 drinks/day or >7 drinks/week? No   Prescreen: >3 drinks/day or >7 drinks/week? -           Diabetes Follow-up    How often are you checking your blood sugar? Two times daily  What time of day are you checking your blood sugars (select all that apply)?  Before meals  Have you had any blood sugars above 200?  No  Have you had any blood sugars below 70?  No    What symptoms do you notice when your blood sugar is low?  None    What concerns do you have today about your diabetes? None     Do you have any of these symptoms? (Select all that apply)  No numbness or tingling in feet.  No redness, sores or blisters on feet.  No complaints of excessive thirst.  No reports of blurry vision.  No significant changes to weight.    Hyperlipidemia Follow-Up      Are you regularly taking any medication or supplement to lower your cholesterol?   Yes- atorvastatin (LIPITOR) 10 MG tablet    Are you having muscle aches or other side effects that you think could be caused by your cholesterol lowering medication?  No    Hypertension Follow-up      Do you check your blood pressure regularly  outside of the clinic? No     Are you following a low salt diet? Yes    Are your blood pressures ever more than 140 on the top number (systolic) OR more   than 90 on the bottom number (diastolic), for example 140/90? No    Denies any headaches, lightheadedness, dizziness, vision changes, chest pain, palpitations, shortness of breath, dyspnea, numbness/tingling.    BP Readings from Last 2 Encounters:   02/27/23 130/74   06/24/22 115/72     Hemoglobin A1C (%)   Date Value   02/07/2022 5.7 (H)   01/19/2021 7.4 (H)   10/18/2019 6.5 (H)     LDL Cholesterol Calculated (mg/dL)   Date Value   02/07/2022 35   01/19/2021 64   04/24/2019 58         Current providers sharing in care for this patient include:   Patient Care Team:  Bradley Hopkins DO as PCP - General (Family Practice)  Tato Au MD as Assigned Heart and Vascular Provider  Bradley Hopkins DO as Assigned PCP  Chelly Wood LSW as Lead Care Coordinator (Primary Care - CC)    The following health maintenance items are reviewed in Epic and correct as of today:  Health Maintenance   Topic Date Due     ZOSTER IMMUNIZATION (1 of 2) Never done     A1C  08/07/2022     BMP  02/07/2023     LIPID  02/07/2023     MICROALBUMIN  02/07/2023     DIABETIC FOOT EXAM  02/07/2023     ANNUAL REVIEW OF HM ORDERS  02/07/2023     MEDICARE ANNUAL WELLNESS VISIT  02/07/2023     HEMOGLOBIN  02/07/2023     EYE EXAM  10/17/2023     FALL RISK ASSESSMENT  02/27/2024     ADVANCE CARE PLANNING  02/22/2027     DTAP/TDAP/TD IMMUNIZATION (3 - Td or Tdap) 07/31/2027     SPIROMETRY  Completed     COPD ACTION PLAN  Completed     PHQ-2 (once per calendar year)  Completed     INFLUENZA VACCINE  Completed     Pneumococcal Vaccine: 65+ Years  Completed     URINALYSIS  Completed     COVID-19 Vaccine  Completed     IPV IMMUNIZATION  Aged Out     MENINGITIS IMMUNIZATION  Aged Out     Lab work is in process          Review of Systems   Constitutional: Negative for chills and fever.   HENT:  "Negative for congestion, ear pain, hearing loss and sore throat.    Eyes: Negative for pain and visual disturbance.   Respiratory: Negative for cough and shortness of breath.    Cardiovascular: Negative for chest pain, palpitations and peripheral edema.   Gastrointestinal: Negative for abdominal pain, constipation, diarrhea, heartburn, hematochezia and nausea.   Genitourinary: Negative for dysuria, frequency, genital sores, hematuria and urgency.   Musculoskeletal: Negative for arthralgias, joint swelling and myalgias.   Skin: Negative for rash.   Neurological: Negative for dizziness, weakness, headaches and paresthesias.   Psychiatric/Behavioral: Negative for mood changes. The patient is not nervous/anxious.        OBJECTIVE:   /74   Pulse 68   Temp 98.2  F (36.8  C) (Tympanic)   Resp 18   Ht 1.626 m (5' 4\")   Wt 67.1 kg (148 lb)   SpO2 97%   BMI 25.40 kg/m   Estimated body mass index is 25.4 kg/m  as calculated from the following:    Height as of this encounter: 1.626 m (5' 4\").    Weight as of this encounter: 67.1 kg (148 lb).  Physical Exam  GENERAL: healthy, alert and no distress  EYES: Eyes grossly normal to inspection  HENT: ear canals and TM's normal, nose and mouth without ulcers or lesions  NECK: no adenopathy and no asymmetry, masses, or scars  RESP: lungs clear to auscultation - no rales, rhonchi or wheezes  CV: regular rates and rhythm, normal S1 S2, no S3 or S4 and no murmur, click or rub  MS: no gross musculoskeletal defects noted, no edema  SKIN: no suspicious lesions or rashes  PSYCH: mentation appears normal, affect normal/bright    Diagnostic Test Results:  Labs reviewed in Epic    ASSESSMENT / PLAN:   1. Medicare annual wellness visit, subsequent  Health maintenance reviewed and updated. Emphasized importance of balanced diet and regular exercise.  - diltiazem ER COATED BEADS (CARDIZEM CD) 120 MG 24 hr capsule; Take 1 capsule (120 mg) by mouth daily Appointment required for further " refills  Dispense: 90 capsule; Refill: 3  - acetaminophen (TYLENOL) 325 MG tablet; Take 2 tablets (650 mg) by mouth every 6 hours as needed for mild pain  Dispense: 360 tablet; Refill: 1    2. Atrial fibrillation, unspecified type (H)  Stable, continue Xarelto, diltiazem  - rivaroxaban ANTICOAGULANT (XARELTO ANTICOAGULANT) 20 MG TABS tablet; Take 1 tablet (20 mg) by mouth daily (with dinner)  Dispense: 90 tablet; Refill: 3  - CBC with platelets; Future  - CBC with platelets    3. Type 2 diabetes mellitus with diabetic nephropathy, without long-term current use of insulin (H)  Well controlled. Recheck labs. Continue glipizide, metformin.  - thin (NO BRAND SPECIFIED) lancets; Use to test blood sugar 1 times daily or as directed. To accompany: Blood Glucose Monitor Brands: per insurance.  Dispense: 200 each; Refill: 6  - metFORMIN (GLUCOPHAGE) 1000 MG tablet; Take 1 tablet (1,000 mg) by mouth 2 times daily (with meals)  Dispense: 180 tablet; Refill: 1  - glipiZIDE (GLIPIZIDE XL) 2.5 MG 24 hr tablet; Take 1 tablet (2.5 mg) by mouth daily  Dispense: 90 tablet; Refill: 3  - diltiazem ER COATED BEADS (CARDIZEM CD) 120 MG 24 hr capsule; Take 1 capsule (120 mg) by mouth daily Appointment required for further refills  Dispense: 90 capsule; Refill: 3  - blood glucose (CONTOUR NEXT TEST) test strip; USE TO TEST BLOOD SUGAR 1 TIMES DAILY OR AS DIRECTED.  Dispense: 200 strip; Refill: 11  - blood glucose (NO BRAND SPECIFIED) test strip; Use to test blood sugars 1  times daily or as directed  Dispense: 100 strip; Refill: 3  - blood glucose calibration (NO BRAND SPECIFIED) solution; Use to calibrate blood glucose monitor as needed as directed. To accompany: Blood Glucose Monitor Brands: per insurance.  Dispense: 1 each; Refill: 3  - atorvastatin (LIPITOR) 10 MG tablet; Take 1 tablet (10 mg) by mouth daily  Dispense: 90 tablet; Refill: 3  - Alcohol Swabs (B-D SINGLE USE SWABS REGULAR) PADS; USE TO SWAB AREA OF INJECTION/ALAN AS  DIRECTED  Dispense: 100 each; Refill: 3  - Comprehensive metabolic panel (BMP + Alb, Alk Phos, ALT, AST, Total. Bili, TP); Future  - Hemoglobin A1c; Future  - Albumin Random Urine Quantitative with Creat Ratio; Future  - Comprehensive metabolic panel (BMP + Alb, Alk Phos, ALT, AST, Total. Bili, TP)  - Hemoglobin A1c  - Albumin Random Urine Quantitative with Creat Ratio    4. Gastroesophageal reflux disease without esophagitis  stable  - pantoprazole (PROTONIX) 40 MG EC tablet; Take 1 tablet (40 mg) by mouth daily  Dispense: 90 tablet; Refill: 3    5. Hypertension goal BP (blood pressure) < 140/90  Well controlled. Continue losartan-hydrochlorothiazide. Continue KCl supplement and recheck electrolytes  - potassium chloride ER (KLOR-CON) 20 MEQ CR tablet; Take 1 tablet (20 mEq) by mouth daily  Dispense: 90 tablet; Refill: 3    6. Cough, unspecified type  Stable, continue PRN Duonebs.  - ipratropium - albuterol 0.5 mg/2.5 mg/3 mL (DUONEB) 0.5-2.5 (3) MG/3ML neb solution; Take 1 vial (3 mLs) by nebulization every 6 hours as needed for shortness of breath or wheezing  Dispense: 90 mL; Refill: 0    7. Chronic obstructive pulmonary disease, unspecified COPD type (H)  - fluticasone (FLONASE) 50 MCG/ACT nasal spray; Spray 1-2 sprays into both nostrils daily  Dispense: 48 mL; Refill: 1  - albuterol (PROAIR HFA/PROVENTIL HFA/VENTOLIN HFA) 108 (90 Base) MCG/ACT inhaler; Inhale 2 puffs into the lungs every 6 hours as needed for shortness of breath  Dispense: 18 g; Refill: 3    8. Persistent atrial fibrillation (H)  Stable, continue diltiazem and anticoagulation.  - diltiazem ER COATED BEADS (CARDIZEM CD) 120 MG 24 hr capsule; Take 1 capsule (120 mg) by mouth daily Appointment required for further refills  Dispense: 90 capsule; Refill: 3    9. Hyperlipidemia with target LDL less than 100  Recheck labs. Continue Lipitor.  - atorvastatin (LIPITOR) 10 MG tablet; Take 1 tablet (10 mg) by mouth daily  Dispense: 90 tablet; Refill: 3  -  "Lipid panel reflex to direct LDL Non-fasting; Future  - Lipid panel reflex to direct LDL Non-fasting    10. Bilateral hearing loss, unspecified hearing loss type  Refer to audiology.  - Adult Audiology  Referral; Future        Patient has been advised of split billing requirements and indicates understanding: Yes      COUNSELING:  Reviewed preventive health counseling, as reflected in patient instructions      BMI:   Estimated body mass index is 25.4 kg/m  as calculated from the following:    Height as of this encounter: 1.626 m (5' 4\").    Weight as of this encounter: 67.1 kg (148 lb).         He reports that he quit smoking about 41 years ago. His smoking use included cigarettes. He has a 10.00 pack-year smoking history. He has never used smokeless tobacco.      Appropriate preventive services were discussed with this patient, including applicable screening as appropriate for cardiovascular disease, diabetes, osteopenia/osteoporosis, and glaucoma.  As appropriate for age/gender, discussed screening for colorectal cancer, prostate cancer, breast cancer, and cervical cancer. Checklist reviewing preventive services available has been given to the patient.    Reviewed patients plan of care and provided an AVS. The Basic Care Plan (routine screening as documented in Health Maintenance) for [facility] meets the Care Plan requirement. This Care Plan has been established and reviewed with the Patient.          Bradley Hopkins DO  [facility] [place]    Identified Health Risks:    The patient was provided with suggestions to help him develop a healthy emotional lifestyle.  "

## 2023-02-28 LAB
ALBUMIN SERPL BCG-MCNC: 4.6 G/DL (ref 3.5–5.2)
ALP SERPL-CCNC: 64 U/L (ref 40–129)
ALT SERPL W P-5'-P-CCNC: 9 U/L (ref 10–50)
ANION GAP SERPL CALCULATED.3IONS-SCNC: 12 MMOL/L (ref 7–15)
AST SERPL W P-5'-P-CCNC: 22 U/L (ref 10–50)
BILIRUB SERPL-MCNC: 0.5 MG/DL
BUN SERPL-MCNC: 16.5 MG/DL (ref 8–23)
CALCIUM SERPL-MCNC: 10.2 MG/DL (ref 8.8–10.2)
CHLORIDE SERPL-SCNC: 101 MMOL/L (ref 98–107)
CHOLEST SERPL-MCNC: 134 MG/DL
CREAT SERPL-MCNC: 1.07 MG/DL (ref 0.67–1.17)
CREAT UR-MCNC: 87.4 MG/DL
DEPRECATED HCO3 PLAS-SCNC: 26 MMOL/L (ref 22–29)
GFR SERPL CREATININE-BSD FRML MDRD: 67 ML/MIN/1.73M2
GLUCOSE SERPL-MCNC: 69 MG/DL (ref 70–99)
HDLC SERPL-MCNC: 40 MG/DL
LDLC SERPL CALC-MCNC: 69 MG/DL
MICROALBUMIN UR-MCNC: 144 MG/L
MICROALBUMIN/CREAT UR: 164.76 MG/G CR (ref 0–17)
NONHDLC SERPL-MCNC: 94 MG/DL
POTASSIUM SERPL-SCNC: 4.4 MMOL/L (ref 3.4–5.3)
PROT SERPL-MCNC: 7.7 G/DL (ref 6.4–8.3)
SODIUM SERPL-SCNC: 139 MMOL/L (ref 136–145)
TRIGL SERPL-MCNC: 126 MG/DL

## 2023-02-28 NOTE — TELEPHONE ENCOUNTER
Orders to be signed by Dr Hopkins for Plan of Care   11/06/22 to 1/04/2023.  Fax to [phone]  Sayra Rees   Flex

## 2023-02-28 NOTE — TELEPHONE ENCOUNTER
Reason for Call:  Form, our goal is to have forms completed with 72 hours, however, some forms may require a visit or additional information.    Type of letter, form or note:  medical    Who is the form from?: Home care Revision of Care    Where did the form come from: form was faxed in    What clinic location was the form placed at?: [facility]    Where the form was placed: Dr Hopkins Box/Folder    What number is listed as a contact on the form?: [phone] fax           Call taken on 2/27/2023 at 7:15 PM by Yue Gunderson

## 2023-03-01 DIAGNOSIS — R05.9 COUGH, UNSPECIFIED TYPE: ICD-10-CM

## 2023-03-02 ENCOUNTER — MEDICAL CORRESPONDENCE (OUTPATIENT)
Dept: HEALTH INFORMATION MANAGEMENT | Facility: CLINIC | Age: 88
End: 2023-03-02
Payer: COMMERCIAL

## 2023-03-02 RX ORDER — IPRATROPIUM BROMIDE AND ALBUTEROL SULFATE 2.5; .5 MG/3ML; MG/3ML
SOLUTION RESPIRATORY (INHALATION)
Qty: 90 ML | Refills: 0 | OUTPATIENT
Start: 2023-03-02

## 2023-03-03 NOTE — TELEPHONE ENCOUNTER
Patient's form signed, dated, and placed in TC basket.    Bradley Hopkins DO  3/3/2023 12:18 AM

## 2023-03-03 NOTE — TELEPHONE ENCOUNTER
Completed forms and faxed to [phone].  Sent to [employer] and filed in drawer.  Sayra Rees   [employer]

## 2023-03-06 NOTE — PATIENT INSTRUCTIONS
Patient Education   Personalized Prevention Plan  You are due for the preventive services outlined below.  Your care team is available to assist you in scheduling these services.  If you have already completed any of these items, please share that information with your care team to update in your medical record.  Health Maintenance Due   Topic Date Due     Zoster (Shingles) Vaccine (1 of 2) Never done     Diabetic Foot Exam  02/07/2023     ANNUAL REVIEW OF HM ORDERS  02/07/2023     Your Health Risk Assessment indicates you feel you are not in good emotional health.    Recreation   Recreation is not limited to sports and team events. It includes any activity that provides relaxation, interest, enjoyment, and exercise. Recreation provides an outlet for physical, mental, and social energy. It can give a sense of worth and achievement. It can help you stay healthy.    Mental Exercise and Social Involvement  Mental and emotional health is as important as physical health. Keep in touch with friends and family. Stay as active as possible. Continue to learn and challenge yourself.   Things you can do to stay mentally active are:    Learn something new, like a foreign language or musical instrument.     Play SCRABBLE or do crossword puzzles. If you cannot find people to play these games with you at home, you can play them with others on your computer through the Internet.     Join a games club--anything from card games to chess or checkers or lawn bowling.     Start a new hobby.     Go back to school.     Volunteer.     Read.   Keep up with world events.

## 2023-03-08 ENCOUNTER — TELEPHONE (OUTPATIENT)
Dept: FAMILY MEDICINE | Facility: CLINIC | Age: 88
End: 2023-03-08
Payer: COMMERCIAL

## 2023-03-09 NOTE — TELEPHONE ENCOUNTER
Reason for Call:  Form, our goal is to have forms completed with 72 hours, however, some forms may require a visit or additional information.    Type of letter, form or note:  form    Who is the form from?: [facility]    Where did the form come from: form was faxed in    What clinic location was the form placed at?: [facility]    Where the form was placed: Dr Hopkins Box/Folder    What number is listed as a contact on the form?: [phone] phone           Call taken on 3/8/2023 at 6:24 PM by Yue Gunderson

## 2023-03-09 NOTE — TELEPHONE ENCOUNTER
-- DO NOT REPLY / DO NOT REPLY ALL --  -- Message is from Engagement Center Operations (ECO) --    Offered Waitlist if Available for the Visit Type? No    Caller is requesting an appointment - at a sooner time than what was available.      Caller wants sooner appointment - offered other approved options    Reason for Visit:   Patient is scheduled to have foot surgery on his toe knuckle on 4/21. Patient is needing a pre-op appointment 2 weeks before.     Is the patient currently scheduled? No    Preferred time to be seen: within 2 wks of the surgery date     Caller Information       Type Contact Phone/Fax    03/09/2023 08:30 AM CST Phone (Incoming) Vinnie Lassiter (Self) [phone] (M)          Alternative phone number: Len Lassiter (Patient's Brother) [phone]    Can a detailed message be left? Yes    Message Turnaround: [place]:    Refer to site's KB page for routing instructions    Please give this turnaround time to the caller:   \"You can expect to receive a response 1-3 business days after your provider's clinical team reviews the message\"   Patient's form signed, dated, and placed in TC basket.    Bradley Hopkins DO  12/14/2020 12:39 PM

## 2023-03-10 NOTE — TELEPHONE ENCOUNTER
Patient's form signed, dated, and placed in TC basket.    Bradley Hopkins DO  3/10/2023 9:12 AM       No

## 2023-03-10 NOTE — TELEPHONE ENCOUNTER
Home health cert and plan of care form placed in Dr Kellie saunders for completion fax to [phone] when completed. Robyn Arevalo CMA

## 2023-03-13 ENCOUNTER — TELEPHONE (OUTPATIENT)
Dept: FAMILY MEDICINE | Facility: CLINIC | Age: 88
End: 2023-03-13
Payer: COMMERCIAL

## 2023-03-13 NOTE — TELEPHONE ENCOUNTER
Reason for Call:  Form, our goal is to have forms completed with 72 hours, however, some forms may require a visit or additional information.    Type of letter, form or note:  medical    Who is the form from?: [facility] (if other please explain)    Where did the form come from: form was faxed in    What clinic location was the form placed at?: [facility]    Where the form was placed: to scan    What number is listed as a contact on the form?: [phone]       Additional comments: request for last office visit- printed and faxed to number above.    Call taken on 3/13/2023 at 3:14 PM by Robyn Arevalo CMA

## 2023-03-20 ENCOUNTER — TELEPHONE (OUTPATIENT)
Dept: FAMILY MEDICINE | Facility: CLINIC | Age: 88
End: 2023-03-20
Payer: COMMERCIAL

## 2023-03-20 NOTE — TELEPHONE ENCOUNTER
Revision to plan of care for prn PCA and RN visit orders recieved via fax. Form in your mailbox for signature.

## 2023-03-20 NOTE — TELEPHONE ENCOUNTER
Reason for Call:  Form, our goal is to have forms completed with 72 hours, however, some forms may require a visit or additional information.    Type of letter, form or note:  Home Health Certification    Who is the form from?: home health cert and plan of care (if other please explain)    Where did the form come from: form was faxed in    What clinic location was the form placed at?: [facility]    Where the form was placed in blue folder for RN med rec     What number is listed as a contact on the form?: [phone]       Additional comments:     Call taken on 3/20/2023 at 4:37 PM by Robyn Arevalo, [facility]

## 2023-03-22 NOTE — TELEPHONE ENCOUNTER
Patient's form signed, dated, and placed in TC basket.    Bradley Hopkins DO  3/22/2023 7:16 AM

## 2023-03-24 NOTE — TELEPHONE ENCOUNTER
Patient's form signed, dated, and placed in TC basket.    Bradley Hopkins DO  3/23/2023 11:53 PM       S/P CABG (coronary artery bypass graft)

## 2023-03-28 ENCOUNTER — TELEPHONE (OUTPATIENT)
Dept: FAMILY MEDICINE | Facility: CLINIC | Age: 88
End: 2023-03-28
Payer: COMMERCIAL

## 2023-03-28 DIAGNOSIS — K59.00 CONSTIPATION, UNSPECIFIED CONSTIPATION TYPE: Primary | ICD-10-CM

## 2023-03-28 NOTE — TELEPHONE ENCOUNTER
Medication Question or Refill        What medication are you calling about (include dose and sig)?: Polyethylene glycol 3350 - pt had this prescription three years ago and needs it again     Preferred Pharmacy:       [id number] - [facility] [address] [facility] [address]  [facility] [place] [address]  Phone: [phone] Fax: [phone]      Controlled Substance Agreement on file:   CSA -- Patient Level:    CSA: None found at the patient level.       Who prescribed the medication?: PCP    Do you need a refill? {YES    When did you use the medication last? past    Patient offered an appointment? Only needs refill    Do you have any questions or concerns? no    Okay to leave a detailed message?: Phuc, son, ok to leave message [phone]

## 2023-03-29 RX ORDER — POLYETHYLENE GLYCOL 3350 17 G/17G
1 POWDER, FOR SOLUTION ORAL DAILY PRN
Qty: 765 G | Refills: 1 | Status: SHIPPED | OUTPATIENT
Start: 2023-03-29 | End: 2024-05-22

## 2023-05-02 ENCOUNTER — TELEPHONE (OUTPATIENT)
Dept: FAMILY MEDICINE | Facility: CLINIC | Age: 88
End: 2023-05-02

## 2023-05-02 NOTE — TELEPHONE ENCOUNTER
Forms/Letter Request    Type of form/letter: [facility] [place] request for prescription change or information     Have you been seen for this request: N/A    Do we have the form/letter: Yes: Placed in Dr. Hopkins's bin    Who is the form from? [facility] [place] request for prescription change or information (if other please explain)    Where did/will the form come from? form was faxed in

## 2023-05-05 ENCOUNTER — MEDICAL CORRESPONDENCE (OUTPATIENT)
Dept: HEALTH INFORMATION MANAGEMENT | Facility: CLINIC | Age: 88
End: 2023-05-05

## 2023-05-10 ENCOUNTER — TELEPHONE (OUTPATIENT)
Dept: FAMILY MEDICINE | Facility: CLINIC | Age: 88
End: 2023-05-10

## 2023-05-10 NOTE — TELEPHONE ENCOUNTER
Forms/Letter Request    Type of form/letter: Home Health Care Plan of Care    Have you been seen for this request: N/A    Do we have the form/letter: Yes: In Dr Hopkins's bin    Who is the form from? Home care    Where did/will the form come from? form was faxed in

## 2023-05-10 NOTE — LETTER
Attached is the current medication list.    Please review the patient s medications in person and if you find any discrepancies, please contact the clinic.

## 2023-05-10 NOTE — CONFIDENTIAL NOTE
Home Health Care Plan of Care for 05/05/23 to 07/03/23 routed and placed in Dr Hopkins's for review and signature    Roula S   [place]

## 2023-05-11 NOTE — CONFIDENTIAL NOTE
Signed Home Health Care Plan of Care for 05/05/23 to 07/03/23, med list, and letter faxed to     Faxed to [facility]    Filed in [facility]    Roula S   [place]

## 2023-05-11 NOTE — TELEPHONE ENCOUNTER
Patient's form signed, dated, and placed in TC basket.    Bradley Hopkins DO  5/11/2023 8:19 AM

## 2023-05-12 ENCOUNTER — TELEPHONE (OUTPATIENT)
Dept: FAMILY MEDICINE | Facility: CLINIC | Age: 88
End: 2023-05-12
Payer: COMMERCIAL

## 2023-05-12 NOTE — TELEPHONE ENCOUNTER
Forms/Letter Request    Type of form/letter: [employer] prescription/certificate of medical necessity    Have you been seen for this request: N/A    Do we have the form/letter: Yes: Placed in Dr. Hopkins's bin    Who is the form from? [employer] prescription/certificate of medical necessity (if other please explain)    Where did/will the form come from? form was faxed in

## 2023-05-15 ENCOUNTER — MEDICAL CORRESPONDENCE (OUTPATIENT)
Dept: HEALTH INFORMATION MANAGEMENT | Facility: CLINIC | Age: 88
End: 2023-05-15
Payer: COMMERCIAL

## 2023-05-17 NOTE — TELEPHONE ENCOUNTER
Form was completed by Dr Hopkins and faxed to [phone]/    [facility]  And filed in the drawer.    Sayra Rees   [employer]

## 2023-05-17 NOTE — TELEPHONE ENCOUNTER
Patient's form signed, dated, and placed in TC basket.    Bradley Hopkins DO  5/16/2023 11:05 PM

## 2023-06-05 ENCOUNTER — TELEPHONE (OUTPATIENT)
Dept: FAMILY MEDICINE | Facility: CLINIC | Age: 88
End: 2023-06-05
Payer: COMMERCIAL

## 2023-06-05 DIAGNOSIS — E11.21 TYPE 2 DIABETES MELLITUS WITH DIABETIC NEPHROPATHY, WITHOUT LONG-TERM CURRENT USE OF INSULIN (H): ICD-10-CM

## 2023-06-05 NOTE — TELEPHONE ENCOUNTER
Forms/Letter Request    Type of form/letter: Medication Therapy Management Provider Communication: Request for Prescription Change or Information    Have you been seen for this request: N/A    Do we have the form/letter: Yes:     Who is the form from? [facility] (if other please explain)  Fax: [phone]    Where did/will the form come from? form was faxed in

## 2023-06-05 NOTE — CONFIDENTIAL NOTE
Medication Therapy Management Provider Communication: Request for Prescription Change or Information routed and placed in Dr Hopkins's basket for review and signature.    Roula [place]

## 2023-06-15 ENCOUNTER — PATIENT OUTREACH (OUTPATIENT)
Dept: GERIATRIC MEDICINE | Facility: CLINIC | Age: 88
End: 2023-06-15
Payer: COMMERCIAL

## 2023-06-15 NOTE — TELEPHONE ENCOUNTER
Manfred's A1c is well within goal range for his diabetes control. Given age, we can try decreasing dose to 500 mg twice daily to help lower risk for adverse effects. New order sent to pharmacy. We should then follow up in a couple months to recheck his A1c.    Bradley Hopkins,   6/15/2023 12:03 AM

## 2023-06-15 NOTE — TELEPHONE ENCOUNTER
Attempt # 1, Wilian  used, ID# [id number]    Called # [phone]     Left a non detailed VM to call back at [phone] and ask for any available Triage Nurse.    OCTAVIANO CAMPOS RN on 6/15/2023 at 2:38 PM   [facility]

## 2023-06-15 NOTE — PROGRESS NOTES
Called sonPhuc, to schedule annual HRA home visit. HRA has been scheduled for 6/19/23.  Requested  from [facility].    BRITTNEY Avilez  [facility]   [phone] (Office)  [email or web address]

## 2023-06-19 ENCOUNTER — TELEPHONE (OUTPATIENT)
Dept: FAMILY MEDICINE | Facility: CLINIC | Age: 88
End: 2023-06-19
Payer: COMMERCIAL

## 2023-06-19 ENCOUNTER — PATIENT OUTREACH (OUTPATIENT)
Dept: GERIATRIC MEDICINE | Facility: CLINIC | Age: 88
End: 2023-06-19
Payer: COMMERCIAL

## 2023-06-19 NOTE — TELEPHONE ENCOUNTER
Called and spoke with sonPhuc. C2C on file.     Advised of medication change. He states understanding.   3 month follow up scheduled.     OCTAVIANO CAMPOS RN on 6/19/2023 at 9:17 AM   [facility]

## 2023-06-19 NOTE — PROGRESS NOTES
[facility] Care Coordination Contact    [facility] Home Visit Assessment     Home visit for Health Risk Assessment with Manfred Caraballo completed on June 19, 2023    Type of residence:: Private home - stairs  Current living arrangement:: I live in a private home with family     Assessment completed with:: Patient, Son-Mehdi, phone  and this CC    Current Care Plan  Member currently receiving the following home care services:     Member currently receiving the following community resources: Day Care, PCA    Medication Review  Medication reconciliation completed in Epic: Yes  Medication set-up & administration: Family/informal caregiver sets up weekly.  Family caregiver administers medications.  Medication Risk Assessment Medication (1 or more, place referral to MTM): N/A: No risk factors identified  MTM Referral Placed: No: No risk factors idenified    Mental/Behavioral Health   Depression Screening:   PHQ-2 Total Score (Adult) - Positive if 3 or more points; Administer PHQ-9 if positive: 0      Falls Assessment:   Fallen 2 or more times in the past year?: No   Any fall with injury in the past year?: No    ADL/IADL Dependencies:   Dependent ADLs:: Ambulation-cane, Bathing, Dressing, Grooming, Incontinence, Transfers, Toileting  Dependent IADLs:: Cleaning, Cooking, Laundry, Shopping, Meal Preparation, Incontinence, Transportation, Money Management, Medication Management    [facility] Plan sponsored benefits: Shared information re: Silver Sneakers/gym memberships, ASA, Calcium +D.    PCA Assessment completed at visit: Yes Annual PCA assessment indicated 16 units per day of PCA. This is the same as the previous assessment.     Elderly Waiver Eligibility: Yes-will continue on EW    Care Plan & Recommendations: Lives in a house with his son, DIL, and two grandchildren. Will continue to be as independent as possible with formal and informal support from family. Will continue with EW, PCA, adult  day care and supplies. Would like to switch glucerna order to another provider since the current provider has been out of stock. Would like a transport wheelchair for community outings-having a hard time with long distance walker. Family informed that a face to face visit is needed for the transport wheelchair. Needs a new shower chair, without a back. CC will send list of in network ENT providers to have hearing and hearing aids evaluated.     See [facility] for detailed assessment information.    Follow-Up Plan: Member informed of future contact, plan to f/u with member with a 6 month telephone assessment.  Contact information shared with member and family, encouraged member to call with any questions or concerns at any time.    [place] care continuum providers: Please see Snapshot and Care Management Flowsheets for Specific details of care plan.    This CC note routed to PCP.    Chelly Wood [facility]   [phone] (Office)  [email or web address]

## 2023-06-19 NOTE — PROGRESS NOTES
[facility] unable to fill  request.  [facility] unable to fill  request.  No other [employer] contracted  agency is in the area or has [place] interpreters.    PC to Phuc, son. He is ok to interpret and will also use phone  for home visit today.    BRITTNEY Avilez  [facility]   [phone] (Office)  [email or web address]

## 2023-06-20 NOTE — TELEPHONE ENCOUNTER
Revision to plan of care, POC 71654    MRN- PCA hourly, 0-6 hrs 5/14- 5/14/2023    Signature requested, placed in PCP desk

## 2023-06-20 NOTE — TELEPHONE ENCOUNTER
Forms/Letter Request    Type of form/letter: Home Health Care Plan of Care    Have you been seen for this request: N/A    Do we have the form/letter: Yes: At the     Who is the form from? Home care    Where did/will the form come from? form was faxed in    }

## 2023-06-21 NOTE — TELEPHONE ENCOUNTER
Signed Revision to plan of care, POC [id number] faxed to     [facility]    Filed in [facility]   [place]

## 2023-06-21 NOTE — TELEPHONE ENCOUNTER
Patient's form signed, dated, and placed in TC basket.    Bradley Hopkins DO  6/21/2023 7:05 AM

## 2023-06-29 ENCOUNTER — TELEPHONE (OUTPATIENT)
Dept: GERIATRIC MEDICINE | Facility: CLINIC | Age: 88
End: 2023-06-29
Payer: COMMERCIAL

## 2023-06-29 DIAGNOSIS — R26.89 BALANCE PROBLEMS: Primary | ICD-10-CM

## 2023-06-29 NOTE — TELEPHONE ENCOUNTER
DME supply, shower chair, has been requested by the patient. DME orders have been queued up and pended for the provider to review. Patient reports the need for DME supplies due to pain, balance, and bathroom safety. Previous shower chair broke and replacement is needed. It is covered by insurance and a face to face is not required. Once completed, please route the encounter to care coordinator, Chelly Wood, to fax completed documentation and order requisition to [facility].     BRITTNEY Avilez  [facility]   [phone] (Office)  [email or web address]

## 2023-06-30 ENCOUNTER — PATIENT OUTREACH (OUTPATIENT)
Dept: GERIATRIC MEDICINE | Facility: CLINIC | Age: 88
End: 2023-06-30

## 2023-06-30 NOTE — LETTER
June 30, 2023    Important [employer] Information    MANFRED SIVMARYXAY  [address] [facility] [address]  Your Care Plan  Dear Manfred,  When we spoke recently, I promised to send you a Care Plan. The plan enclosed is a summary of our discussion. It includes the steps we agreed would help you meet your health goals. In addition, I can help you with:  Ndughtw-K-TkyuJN  This program is available to members who need a ride to medical and dental visits. To schedule a ride, call [phone] or [phone] (toll free). TTY: 711. You can call Monday - Thursday 8 a.m. to 5 p.m. and Fridays 9 a.m. to 5 p.m.  One Pass  One Pass is your no-cost, complete, fitness solution for your mind and body. To learn more visit [employer]/fitness or call One Pass, toll-free 1 (918) 772-4874 (TTY: 711) 8 a.m. to 9 p.m. Monday-Friday.  Health Care Directive   This form helps you outline your health care wishes. You can request a form from me and I will answer any questions you have before you discuss it with your doctor.   Annual Physical  Take a key step on your path to good health and set up an annual physical at your clinic.  Questions?  Call me at [phone] Monday-Friday between 8am and 5pm.  TTY: 711. As we discussed, I plan to be in touch with you again in 6 months to follow up via phone.  Sincerely,    BRITTNEY Avilez    E-mail: [email or web address]  Phone: [phone]      [facility]    cc: member records

## 2023-06-30 NOTE — PROGRESS NOTES
[facility] Care Coordination Contact    Received after visit chart from care coordinator.  Completed following tasks: Mailed copy of care plan to client, Updated services in Database and Mailed Safe Medication Disposal   , Provider Signature - No POC Shared:  Member indicates that they do not want their POC shared with any EW providers.     and [employer]:  Faxed completed PCA assessment to PCA Agency and mailed copies to member.  Faxed MD Communication to PCP.  Emailed referral form for auth to [employer].    Joan Ferrer  Care Management Specialist  [facility]  [phone]

## 2023-07-03 ENCOUNTER — PATIENT OUTREACH (OUTPATIENT)
Dept: GERIATRIC MEDICINE | Facility: CLINIC | Age: 88
End: 2023-07-03
Payer: COMMERCIAL

## 2023-07-03 NOTE — TELEPHONE ENCOUNTER
Thank you!  Shower chair has been ordered.    BRITTNEY Avilez  [facility]   [phone] (Office)  [email or web address]

## 2023-07-05 NOTE — PROGRESS NOTES
[facility] Care Coordination Contact    Order placed with [facility] (p: [phone]; f: [phone]) for shower chair.  Order placed on 7/3/23. Database updated.  As required, authorization submitted to health plan.    BRITTNEY Avilez  [facility]   [phone] (Office)  [email or web address]

## 2023-07-10 ENCOUNTER — TELEPHONE (OUTPATIENT)
Dept: FAMILY MEDICINE | Facility: CLINIC | Age: 88
End: 2023-07-10
Payer: COMMERCIAL

## 2023-07-10 NOTE — TELEPHONE ENCOUNTER
Forms/Letter Request    Type of form/letter: Home Health Care Plan of Care    Have you been seen for this request: N/A    Do we have the form/letter: Yes: At Dr Hopkins's desk    Who is the form from? Home care    Where did/will the form come from? form was faxed in

## 2023-07-11 NOTE — TELEPHONE ENCOUNTER
Faxed back to Home Health Care at [phone]  Sent to [facility]  And filed in [facility].

## 2023-07-16 ENCOUNTER — MEDICAL CORRESPONDENCE (OUTPATIENT)
Dept: HEALTH INFORMATION MANAGEMENT | Facility: CLINIC | Age: 88
End: 2023-07-16

## 2023-07-23 ENCOUNTER — MEDICAL CORRESPONDENCE (OUTPATIENT)
Dept: HEALTH INFORMATION MANAGEMENT | Facility: CLINIC | Age: 88
End: 2023-07-23
Payer: COMMERCIAL

## 2023-07-24 ENCOUNTER — TELEPHONE (OUTPATIENT)
Dept: FAMILY MEDICINE | Facility: CLINIC | Age: 88
End: 2023-07-24
Payer: COMMERCIAL

## 2023-07-24 NOTE — TELEPHONE ENCOUNTER
Forms/Letter Request    Type of form/letter:  Home Health Care Revision to Plan of Care for Service Type of PRN - PCA - Hourly (Over hours) for Effective Date of 07- for 0.00 -6.00 Hour    Have you been seen for this request: N/A    Do we have the form/letter: Yes: Provider's Bin    Who is the form from? Home care  (Home Health Care)   Fax: [phone]    Where did/will the form come from? form was faxed in

## 2023-07-24 NOTE — PROGRESS NOTES
[facility] Care Coordination Contact    Per [facility], the shower chair was delivered on 7/13/23. CC notified.       Manfred Caraballo 1935 shower chair (no back, no handles) 7/3/2023 Chelly Seals DELIVERED 7/13/2023     Beverly Duran  Care Management Specialist  [facility]  [phone]

## 2023-07-31 ENCOUNTER — TELEPHONE (OUTPATIENT)
Dept: FAMILY MEDICINE | Facility: CLINIC | Age: 88
End: 2023-07-31
Payer: COMMERCIAL

## 2023-07-31 NOTE — TELEPHONE ENCOUNTER
Forms/Letter Request    Type of form/letter:  Home Health Care Revision to Plan of Care: PRN - PCA - Hourly (over hours), 0.00 - 5.00 hours for effective date of 07-    Have you been seen for this request: N/A    Do we have the form/letter: Yes: Provider's Bin    Who is the form from? Home care  (Home Health Care)  Fax: [phone]    Where did/will the form come from? form was faxed in

## 2023-08-04 ENCOUNTER — TELEPHONE (OUTPATIENT)
Dept: FAMILY MEDICINE | Facility: CLINIC | Age: 88
End: 2023-08-04
Payer: COMMERCIAL

## 2023-08-04 NOTE — TELEPHONE ENCOUNTER
Forms/Letter Request    Type of form/letter:  Home Health Care Home Health Certification and Plan of Care (Certification Period: 07- to 09-)    Have you been seen for this request: N/A    Do we have the form/letter: Yes: Provider's Bin    Who is the form from? Home care  (Home Health Care)  Fax: [phone]    Where did/will the form come from? form was faxed in

## 2023-08-07 NOTE — TELEPHONE ENCOUNTER
[facility] [phone].    Sent copy to [facility], file in drawer

## 2023-08-28 ENCOUNTER — TELEPHONE (OUTPATIENT)
Dept: FAMILY MEDICINE | Facility: CLINIC | Age: 88
End: 2023-08-28
Payer: COMMERCIAL

## 2023-08-28 NOTE — TELEPHONE ENCOUNTER
Forms/Letter Request    Type of form/letter:  Home Health Care Revision to Plan of Care (PRN - PCA - Hourly (flexible use) for 0.00 - 5.00 Hour on 08-)    Have you been seen for this request: N/A    Do we have the form/letter: Yes: Provider's Bin    Who is the form from? Home care  (Home Health Care)  Fax: [phone]    Where did/will the form come from? form was faxed in

## 2023-08-29 ENCOUNTER — MEDICAL CORRESPONDENCE (OUTPATIENT)
Dept: HEALTH INFORMATION MANAGEMENT | Facility: CLINIC | Age: 88
End: 2023-08-29
Payer: COMMERCIAL

## 2023-08-29 NOTE — TELEPHONE ENCOUNTER
Patient's form signed, dated, and placed in TC basket.    Bradley Hopkins DO  8/29/2023 12:16 PM

## 2023-08-30 NOTE — TELEPHONE ENCOUNTER
Faxed  to [facility]  Care #[phone]    Cert Period:  07/04/23 - 09/01/23    Faxed to [facility]    Filed in North  //  [place] // South

## 2023-09-05 ENCOUNTER — TELEPHONE (OUTPATIENT)
Dept: FAMILY MEDICINE | Facility: CLINIC | Age: 88
End: 2023-09-05
Payer: COMMERCIAL

## 2023-09-05 NOTE — TELEPHONE ENCOUNTER
Forms/Letter Request    Type of form/letter: Home Health Care home health certification and plan of care 9/2/23-10/31/23    Have you been seen for this request: N/A    Do we have the form/letter: Yes: FD TC bin, green folder    Who is the form from? Home care    Where did/will the form come from? form was faxed in

## 2023-09-07 DIAGNOSIS — Z53.9 DIAGNOSIS NOT YET DEFINED: Primary | ICD-10-CM

## 2023-09-07 PROCEDURE — G0179 MD RECERTIFICATION HHA PT: HCPCS | Performed by: FAMILY MEDICINE

## 2023-09-18 ENCOUNTER — OFFICE VISIT (OUTPATIENT)
Dept: FAMILY MEDICINE | Facility: CLINIC | Age: 88
End: 2023-09-18
Payer: COMMERCIAL

## 2023-09-18 VITALS
DIASTOLIC BLOOD PRESSURE: 64 MMHG | HEIGHT: 64 IN | TEMPERATURE: 97.8 F | HEART RATE: 59 BPM | BODY MASS INDEX: 26.12 KG/M2 | OXYGEN SATURATION: 96 % | WEIGHT: 153 LBS | SYSTOLIC BLOOD PRESSURE: 136 MMHG | RESPIRATION RATE: 12 BRPM

## 2023-09-18 DIAGNOSIS — Z23 NEED FOR PROPHYLACTIC VACCINATION AND INOCULATION AGAINST INFLUENZA: ICD-10-CM

## 2023-09-18 DIAGNOSIS — M25.562 CHRONIC PAIN OF BOTH KNEES: ICD-10-CM

## 2023-09-18 DIAGNOSIS — M25.561 CHRONIC PAIN OF BOTH KNEES: ICD-10-CM

## 2023-09-18 DIAGNOSIS — J44.9 CHRONIC OBSTRUCTIVE PULMONARY DISEASE, UNSPECIFIED COPD TYPE (H): ICD-10-CM

## 2023-09-18 DIAGNOSIS — E11.21 TYPE 2 DIABETES MELLITUS WITH DIABETIC NEPHROPATHY, WITHOUT LONG-TERM CURRENT USE OF INSULIN (H): Primary | ICD-10-CM

## 2023-09-18 DIAGNOSIS — G89.29 CHRONIC PAIN OF BOTH KNEES: ICD-10-CM

## 2023-09-18 LAB — HBA1C MFR BLD: 6.4 % (ref 0–5.6)

## 2023-09-18 PROCEDURE — 83036 HEMOGLOBIN GLYCOSYLATED A1C: CPT | Performed by: FAMILY MEDICINE

## 2023-09-18 PROCEDURE — 90662 IIV NO PRSV INCREASED AG IM: CPT | Performed by: FAMILY MEDICINE

## 2023-09-18 PROCEDURE — 36415 COLL VENOUS BLD VENIPUNCTURE: CPT | Performed by: FAMILY MEDICINE

## 2023-09-18 PROCEDURE — 99207 PR FOOT EXAM NO CHARGE: CPT | Performed by: FAMILY MEDICINE

## 2023-09-18 PROCEDURE — G0008 ADMIN INFLUENZA VIRUS VAC: HCPCS | Performed by: FAMILY MEDICINE

## 2023-09-18 PROCEDURE — 99214 OFFICE O/P EST MOD 30 MIN: CPT | Mod: 25 | Performed by: FAMILY MEDICINE

## 2023-09-18 ASSESSMENT — PAIN SCALES - GENERAL: PAINLEVEL: NO PAIN (0)

## 2023-09-18 NOTE — LETTER
October 4, 2023      Manfred Caraballo  [address] [place] [id number]    Dear ,    Here is a summary of your recent test results:     -A1C (test of diabetes control the last 2-3 months) is at your goal. As discussed, let's discontinue the glipizide. I think we can also stop metformin. Continue watching carbohydrates in your diet and we should recheck your A1c in 3 months to ensure it is staying below 8.0%. This could be done with a lab-only appointment. Future lab ordered.       For additional lab test information, [email or web address] is an excellent reference.     Please contact me if you have any questions.    Resulted Orders   Hemoglobin A1c   Result Value Ref Range    Hemoglobin A1C 6.4 (H) 0.0 - 5.6 %      Comment:      Normal <5.7%   Prediabetes 5.7-6.4%    Diabetes 6.5% or higher     Note: Adopted from ADA consensus guidelines.       Thank you very much for choosing [facility].     Best regards,  Bradley Hopkins DO  [phone]

## 2023-09-18 NOTE — PROGRESS NOTES
"  Assessment & Plan     1. Type 2 diabetes mellitus with diabetic nephropathy, without long-term current use of insulin (H)  Well controlled. If still at goal, will plan on discontinuing glipizide to avoid hypoglycemia. Could also consider discontinuing metformin as well to limit medication for him.  - Hemoglobin A1c; Future  - FOOT EXAM  - Hemoglobin A1c    2. Chronic pain of both knees  Will place order for wheelchair to help with long-distance transportation. Able to use cane in his home but more difficult for him to support himself when out in the community.  - Wheelchair Order for DME - ONLY FOR DME    3. Chronic obstructive pulmonary disease, unspecified COPD type (H)  Stable, continue QVAR  - Wheelchair Order for DME - ONLY FOR DME  - beclomethasone HFA (QVAR REDIHALER) 40 MCG/ACT inhaler; Inhale 1 puff into the lungs 2 times daily as needed (cough, shortness of breath)  Dispense: 10.6 g; Refill: 3    4. Need for prophylactic vaccination and inoculation against influenza  - INFLUENZA VACCINE 65+ (FLUZONE HD)         BMI:   Estimated body mass index is 26.26 kg/m  as calculated from the following:    Height as of this encounter: 1.626 m (5' 4\").    Weight as of this encounter: 69.4 kg (153 lb).           Bradley Hopkins DO  [facility] is a 88 year old, presenting for the following health issues:  Diabetes and Imm/Inj (Flu Shot)    History of Present Illness       Diabetes:   He presents for follow up of diabetes.  He is checking home blood glucose one time daily.   He checks blood glucose before meals.  Blood glucose is never over 200 and never under 70. He is aware of hypoglycemia symptoms including none.    He has no concerns regarding his diabetes at this time.   He is not experiencing numbness or burning in feet, excessive thirst, blurry vision, weight changes or redness, sores or blisters on feet. The patient has had a diabetic eye exam in the last 12 " "months. Eye exam performed on 07/2022. Location of last eye exam [place] [facility].        Hyperlipidemia:  He presents for follow up of hyperlipidemia.   He is taking medication to lower cholesterol. He is not having myalgia or other side effects to statin medications.    Hypertension: He presents for follow up of hypertension.  He does check blood pressure  regularly outside of the clinic. Outpatient blood pressures have not been over 140/90. He follows a low salt diet.     He eats 2-3 servings of fruits and vegetables daily.He consumes 1 sweetened beverage(s) daily.He exercises with enough effort to increase his heart rate 10 to 19 minutes per day.  He exercises with enough effort to increase his heart rate 3 or less days per week.   He is taking medications regularly.   Denies any headaches, lightheadedness, dizziness, vision changes, chest pain, palpitations, shortness of breath, dyspnea, numbness/tingling.        Wheelchair: If needing to walk a long distance, unable to support himself with cane or walker. Wheelchair would be helpful for transportation or long distance movement. Starts having right knee pain and low back pain. Not needing when at home - able to use cane.             Review of Systems   Constitutional, HEENT, cardiovascular, pulmonary, gi and gu systems are negative, except as otherwise noted.      Objective    /64   Pulse 59   Temp 97.8  F (36.6  C) (Tympanic)   Resp 12   Ht 1.626 m (5' 4\")   Wt 69.4 kg (153 lb)   SpO2 96%   BMI 26.26 kg/m    Body mass index is 26.26 kg/m .  Physical Exam   GENERAL: healthy, alert and no distress  RESP: lungs clear to auscultation - no rales, rhonchi or wheezes  CV: regular rate and rhythm, normal S1 S2, no S3 or S4, no murmur, click or rub, no peripheral edema and peripheral pulses strong  MS: no gross musculoskeletal defects noted, no edema  Diabetic foot exam: normal DP and PT pulses, no trophic changes or ulcerative lesions, normal sensory " exam, and normal monofilament exam

## 2023-09-19 DIAGNOSIS — E11.21 TYPE 2 DIABETES MELLITUS WITH DIABETIC NEPHROPATHY, WITHOUT LONG-TERM CURRENT USE OF INSULIN (H): Primary | ICD-10-CM

## 2023-09-27 ENCOUNTER — TELEPHONE (OUTPATIENT)
Dept: FAMILY MEDICINE | Facility: CLINIC | Age: 88
End: 2023-09-27
Payer: COMMERCIAL

## 2023-09-27 NOTE — TELEPHONE ENCOUNTER
Forms/Letter Request    Type of form/letter: [facility]  Division    Put in providers in box to view/sign    Have you been seen for this request: No    Do we have the form/letter: Yes: - special diet  Information Request    Who is the form from? [facility]     Where did/will the form come from? form was faxed in    When is form/letter needed by: asap    How would you like the form/letter returned: FAX; [phone]    Patient Notified form requests are processed in 3-5 business days:No    Okay to leave a detailed message?:  na    Instructed to mail form back to patient when signed

## 2023-10-06 DIAGNOSIS — Z00.00 MEDICARE ANNUAL WELLNESS VISIT, SUBSEQUENT: ICD-10-CM

## 2023-10-06 RX ORDER — ACETAMINOPHEN 325 MG/1
TABLET ORAL
Qty: 360 TABLET | Refills: 1 | Status: SHIPPED | OUTPATIENT
Start: 2023-10-06 | End: 2024-04-10

## 2023-11-19 ENCOUNTER — TRANSFERRED RECORDS (OUTPATIENT)
Dept: MULTI SPECIALTY CLINIC | Facility: CLINIC | Age: 88
End: 2023-11-19

## 2023-11-19 LAB — RETINOPATHY: NORMAL

## 2023-12-05 DIAGNOSIS — I10 HYPERTENSION GOAL BP (BLOOD PRESSURE) < 140/90: ICD-10-CM

## 2023-12-05 RX ORDER — LOSARTAN POTASSIUM AND HYDROCHLOROTHIAZIDE 12.5; 5 MG/1; MG/1
TABLET ORAL
Qty: 180 TABLET | Refills: 0 | Status: SHIPPED | OUTPATIENT
Start: 2023-12-05 | End: 2024-05-22

## 2023-12-18 DIAGNOSIS — I10 HYPERTENSION GOAL BP (BLOOD PRESSURE) < 140/90: ICD-10-CM

## 2023-12-18 RX ORDER — LOSARTAN POTASSIUM AND HYDROCHLOROTHIAZIDE 12.5; 5 MG/1; MG/1
TABLET ORAL
Qty: 180 TABLET | Refills: 0 | OUTPATIENT
Start: 2023-12-18

## 2023-12-19 DIAGNOSIS — K21.9 GASTROESOPHAGEAL REFLUX DISEASE WITHOUT ESOPHAGITIS: ICD-10-CM

## 2023-12-19 DIAGNOSIS — E11.21 TYPE 2 DIABETES MELLITUS WITH DIABETIC NEPHROPATHY, WITHOUT LONG-TERM CURRENT USE OF INSULIN (H): ICD-10-CM

## 2023-12-19 RX ORDER — PANTOPRAZOLE SODIUM 40 MG/1
40 TABLET, DELAYED RELEASE ORAL DAILY
Qty: 90 TABLET | Refills: 3 | OUTPATIENT
Start: 2023-12-19

## 2023-12-23 ENCOUNTER — MEDICAL CORRESPONDENCE (OUTPATIENT)
Dept: HEALTH INFORMATION MANAGEMENT | Facility: CLINIC | Age: 88
End: 2023-12-23

## 2023-12-23 ENCOUNTER — MEDICAL CORRESPONDENCE (OUTPATIENT)
Dept: HEALTH INFORMATION MANAGEMENT | Facility: CLINIC | Age: 88
End: 2023-12-23
Payer: COMMERCIAL

## 2024-01-03 ENCOUNTER — TELEPHONE (OUTPATIENT)
Dept: FAMILY MEDICINE | Facility: CLINIC | Age: 89
End: 2024-01-03
Payer: COMMERCIAL

## 2024-01-03 NOTE — TELEPHONE ENCOUNTER
Forms/Letter Request    Type of form/letter:  Home Health Private Duty Plan of Care    Have you been seen for this request:  N/A    Do we have the form/letter: Yes: given to Dr Hopkins    Who is the form from?  Home Health Home Care    Where did/will the form come from? form was faxed in    When is form/letter needed by: when available    How would you like the form/letter returned: Fax : [phone]

## 2024-01-04 NOTE — TELEPHONE ENCOUNTER
Form was reviewed and signed by Dr Hopkins  And faxed back with med list and letter  [phone]  Sent to [facility]  And filed in [facility].

## 2024-01-09 ENCOUNTER — PATIENT OUTREACH (OUTPATIENT)
Dept: GERIATRIC MEDICINE | Facility: CLINIC | Age: 89
End: 2024-01-09
Payer: COMMERCIAL

## 2024-01-09 NOTE — PROGRESS NOTES
Submitted order request for transport wheelchair to [facility].    BRITTNEY Avilez  [employer]   [phone] (Office)  [email or web address]

## 2024-01-09 NOTE — PROGRESS NOTES
[facility] Care Coordination Contact      [facility] Six-Month Telephone Assessment    6 month telephone assessment completed on 1/9/24. Spoke to sonMehdi. He reports his dad is stable. Refusing to get hearing checked. Still attending [facility] 5 days/week. Saw PCP in September and got wc orders- has not ordered it yet. CC will send order request to [facility].    ER visits: No  Hospitalizations: No  TCU stays: No  Significant health status changes: no  Falls/Injuries: No  ADL/IADL changes: No  Changes in services: No    Caregiver Assessment follow up:  n/a    Goals: See POC in chart for goal progress documentation.    Continue pain goal  Continue no falls goal  Continue hearing eval goal- currently refusing  Continue mood goal    Will see member in 6 months for an annual health risk assessment.   Encouraged member to call CC with any questions or concerns in the meantime.     BRITTNEY Avilez  [facility]   [phone] (Office)  [email or web address]

## 2024-01-30 ENCOUNTER — TELEPHONE (OUTPATIENT)
Dept: FAMILY MEDICINE | Facility: CLINIC | Age: 89
End: 2024-01-30
Payer: COMMERCIAL

## 2024-01-30 NOTE — TELEPHONE ENCOUNTER
Home Health Care      Reason for call:   Health Care - Cert Period: 11/01/23 - 12/30/23 - DME supplies; wheel chair, shower bench, bath safety bars, Cane, Walker    See above  Pt Provider: Kellie    Phone Number Homecare Nurse can be reached at: Fax       Okay to leave a detailed message?: na    Put in providers in box    Gabi K

## 2024-01-30 NOTE — TELEPHONE ENCOUNTER
Faxed signed forms to Home Health Certification and Plan of Care #[phone]    Copied into HIMS // Filed In [facility] // Gabi LAZO

## 2024-01-31 ENCOUNTER — TELEPHONE (OUTPATIENT)
Dept: ANTICOAGULATION | Facility: CLINIC | Age: 89
End: 2024-01-31

## 2024-01-31 DIAGNOSIS — I48.91 ATRIAL FIBRILLATION, UNSPECIFIED TYPE (H): ICD-10-CM

## 2024-01-31 NOTE — TELEPHONE ENCOUNTER
ANTICOAGULATION DIRECT ORAL ANTICOAGULANT MONITORING    SUBJECTIVE     The [facility] is evaluating Manfred Caraballo's Rivaroxaban (Xarelto) as part of its Anticoagulation Monitoring Program.    Indication:Atrial Fibrillation  Current dose per medication list: Rivaroxaban 20 mg Daily  Recent hospitalizations/ED/Office Visits for bleeding/clotting concerns: No  Other bleeding or side effect concerns: Yes:   11/15/03 - Acute duodenal ulcer with hemorrhage, without mention of obstruction   1985 -  h/o bleeding stomach ulcer   Additional findings: No    OBJECTIVE     Age: 88 year old    Wt Readings from Last 2 Encounters:   09/18/23 69.4 kg (153 lb)   02/27/23 67.1 kg (148 lb)      Lab Results   Component Value Date    CR 1.07 02/27/2023    CR 1.08 02/07/2022    CR 1.26 (H) 01/19/2021     Creatinine Clearance (using actual bodyweight, mL/min): 47    Lab Results   Component Value Date    HGB 11.4 02/27/2023    HGB 12.0 10/18/2019     02/27/2023     10/18/2019     ASSESSMENT/PLAN     A chart review for Direct Oral Anticoagulant (DOAC) Stewardship has been completed for:     Dosing: recommend adjustment to Rivaroxaban 15 mg Daily for CrCl <= 50 mL/min (using actual bodyweight) (consistent with package insert dosing)    Plan made per [facility] anticoagulation protocol    Darleen Jimenez RN  Anticoagulation Clinic

## 2024-02-01 ENCOUNTER — TELEPHONE (OUTPATIENT)
Dept: FAMILY MEDICINE | Facility: CLINIC | Age: 89
End: 2024-02-01
Payer: COMMERCIAL

## 2024-02-01 NOTE — TELEPHONE ENCOUNTER
Home Health Care      Reason for call:  Home Health Certification and Plan of Care - Pts Claim #[id number] - Cert Period: 11/01/23 - 12/30/23      Pt Provider: Kellie    Phone Number Homecare Nurse can be reached at: [phone]      Okay to leave a detailed message?:  na    Put In providers In box    Gabi K

## 2024-02-07 DIAGNOSIS — Z53.9 DIAGNOSIS NOT YET DEFINED: Primary | ICD-10-CM

## 2024-02-07 PROCEDURE — G0179 MD RECERTIFICATION HHA PT: HCPCS | Performed by: FAMILY MEDICINE

## 2024-02-07 NOTE — TELEPHONE ENCOUNTER
Form was reviewed and signed by Dr Hopkins  Faxed [phone]  Sent to HIMS  Filed in the [facility]

## 2024-02-21 ENCOUNTER — MEDICAL CORRESPONDENCE (OUTPATIENT)
Dept: HEALTH INFORMATION MANAGEMENT | Facility: CLINIC | Age: 89
End: 2024-02-21
Payer: COMMERCIAL

## 2024-02-27 ENCOUNTER — TELEPHONE (OUTPATIENT)
Dept: FAMILY MEDICINE | Facility: CLINIC | Age: 89
End: 2024-02-27
Payer: COMMERCIAL

## 2024-02-27 NOTE — TELEPHONE ENCOUNTER
Home Health Care      Reason for call:  Home Health Care - Order #[id number] - Cert Period: 02/21/24  - 04/20/24 -     Orders are needed for this patient.  SN - Effective 04/14/24 - 1wk1 - HHA 4wk1, 7wk8    Pt Provider: Dr. Hopkins    Phone Number Homecare Nurse can be reached at: Fax       Okay to leave a detailed message?:  na    Put In providers in box    Included letter and med rec    Gabi K

## 2024-03-05 ENCOUNTER — TRANSFERRED RECORDS (OUTPATIENT)
Dept: HEALTH INFORMATION MANAGEMENT | Facility: CLINIC | Age: 89
End: 2024-03-05
Payer: COMMERCIAL

## 2024-03-06 NOTE — TELEPHONE ENCOUNTER
Signed form faxed to [facility] include 234 beds at the [facility] Care #[phone]    Private Duty Plan Of Care    Copied into HIMS / [place] in [place]/ Gabi LAZO

## 2024-03-07 ENCOUNTER — TELEPHONE (OUTPATIENT)
Dept: FAMILY MEDICINE | Facility: CLINIC | Age: 89
End: 2024-03-07
Payer: COMMERCIAL

## 2024-03-07 NOTE — TELEPHONE ENCOUNTER
Home Health Care    Reason for call:     Home Health Care  - Order #[id number] - SN and HHA orders with Discipline and treatments    Orders are needed for this patient.  Skilled Nursing: Effective 04/14/24  -  1wk1  HHA - 4wk1, 7wk8    Pt Provider: Dr. Huerta    Phone Number Homecare Nurse can be reached at: fax  [phone]       Okay to leave a detailed message?:  na    Put In providers box w/ med rec and letter    Gabi K

## 2024-03-08 DIAGNOSIS — I48.19 PERSISTENT ATRIAL FIBRILLATION (H): ICD-10-CM

## 2024-03-08 DIAGNOSIS — I48.91 ATRIAL FIBRILLATION, UNSPECIFIED TYPE (H): ICD-10-CM

## 2024-03-08 RX ORDER — DILTIAZEM HYDROCHLORIDE 120 MG/1
120 CAPSULE, COATED, EXTENDED RELEASE ORAL DAILY
Qty: 90 CAPSULE | Refills: 3 | Status: SHIPPED | OUTPATIENT
Start: 2024-03-08 | End: 2024-05-22

## 2024-03-08 NOTE — TELEPHONE ENCOUNTER
Signed forms faxed to [facility] #[phone]    Orders:     and KEN    Copied into HIMS / Filed in [place] / Gabi LAZO

## 2024-03-13 NOTE — TELEPHONE ENCOUNTER
Dose adjusted under [facility] approval    Chad Jacob RN, BSN, PHN  Anticoagulation Clinic   [phone]

## 2024-03-14 NOTE — TELEPHONE ENCOUNTER
First attempt to reach pt/pt son regarding dose change. Requested call back to discuss.     Taran Campbell RN

## 2024-03-18 NOTE — TELEPHONE ENCOUNTER
Second attempt to reach patient. Left message requesting call back for message from provider regarding a medication dose change.   assisted with call.

## 2024-03-20 ENCOUNTER — TELEPHONE (OUTPATIENT)
Dept: FAMILY MEDICINE | Facility: CLINIC | Age: 89
End: 2024-03-20
Payer: COMMERCIAL

## 2024-03-20 NOTE — TELEPHONE ENCOUNTER
Home Health Care      Reason for call:  Home Health Care - Order #[id number] - Cert Period 02/21/24 - 04/20/24 - Type 2 diabetes Mellitus w/o complications - SN and HHA requested    Orders are needed for this patient.  Skilled Nursing: see form  HHA    Pt Provider: Kellie    Phone Number Homecare Nurse can be reached at: Fax       Okay to leave a detailed message?:  na    Put in providers in box    Gabi K

## 2024-03-21 ENCOUNTER — TELEPHONE (OUTPATIENT)
Dept: FAMILY MEDICINE | Facility: CLINIC | Age: 89
End: 2024-03-21

## 2024-03-21 ENCOUNTER — TELEPHONE (OUTPATIENT)
Dept: FAMILY MEDICINE | Facility: CLINIC | Age: 89
End: 2024-03-21
Payer: COMMERCIAL

## 2024-03-21 ENCOUNTER — MEDICAL CORRESPONDENCE (OUTPATIENT)
Dept: HEALTH INFORMATION MANAGEMENT | Facility: CLINIC | Age: 89
End: 2024-03-21
Payer: COMMERCIAL

## 2024-03-21 NOTE — TELEPHONE ENCOUNTER
Form was faxed to [phone]  Sent to [facility]  And filed [place].

## 2024-03-21 NOTE — TELEPHONE ENCOUNTER
Patient's form signed, dated, and placed in TC basket.    Bradley Hopkins DO  3/21/2024 11:09 AM

## 2024-03-21 NOTE — TELEPHONE ENCOUNTER
Home Health Care      Reason for call:  Home Health Care (Non Skilled/peresonal) ICD-10  diagnosis  - SN and HHA ordered    Orders are needed for this patient.  Skilled Nursing: Effec 04/14/24  1wk1  HHA 4wk1, 7wk8    Pt Provider: Dr. Hopkins      Phone Number Homecare Nurse can be reached at: fax       Okay to leave a detailed message?:  na    Put in providers in box    Gabi K    Included a med rec and letter

## 2024-03-21 NOTE — TELEPHONE ENCOUNTER
Faxed signed form to Home Health Care (non skilled/personal care) #[phone]    ICD -10 Diagnosis    Copied into HIMS / Filed In [place] / Gabi LAZO

## 2024-03-26 NOTE — TELEPHONE ENCOUNTER
Third attempt to reach patient.  assisted with call. Requested call back for an important message regarding medication.

## 2024-03-29 ENCOUNTER — TELEPHONE (OUTPATIENT)
Dept: FAMILY MEDICINE | Facility: CLINIC | Age: 89
End: 2024-03-29
Payer: COMMERCIAL

## 2024-03-29 NOTE — TELEPHONE ENCOUNTER
Forms/Letter Request    Type of form/letter: [employer] - Incontinence Pullups    Do we have the form/letter: Yes:     Who is the form from? Home care    Where did/will the form come from? form was faxed in    When is form/letter needed by: asap    How would you like the form/letter returned: Fax : [phone]    Patient Notified form requests are processed in 5-7 business days:No    Okay to leave a detailed message?:  na    Put In providers in box    Gabi K

## 2024-04-01 ENCOUNTER — MEDICAL CORRESPONDENCE (OUTPATIENT)
Dept: HEALTH INFORMATION MANAGEMENT | Facility: CLINIC | Age: 89
End: 2024-04-01
Payer: COMMERCIAL

## 2024-04-02 ENCOUNTER — TELEPHONE (OUTPATIENT)
Dept: FAMILY MEDICINE | Facility: CLINIC | Age: 89
End: 2024-04-02
Payer: COMMERCIAL

## 2024-04-02 NOTE — TELEPHONE ENCOUNTER
Home Health Care    Reason for call:  Home Health Care #[id number] - Cert Period 02/21/24 - 04/20/24      Orders are needed for this patient.  SN Effective 04/14/24 - 1wk1,  HHA 4wk1, 7wk3    Pt Provider: Dr. Hopkins    Phone Number Homecare Nurse can be reached at: [phone]      Okay to leave a detailed message?:  na    Put in providers in box w/letter and med rec    Gabi K

## 2024-04-03 ENCOUNTER — MEDICAL CORRESPONDENCE (OUTPATIENT)
Dept: HEALTH INFORMATION MANAGEMENT | Facility: CLINIC | Age: 89
End: 2024-04-03
Payer: COMMERCIAL

## 2024-04-03 NOTE — TELEPHONE ENCOUNTER
Called home care; patient is at adult day care until 3 pm each day.     PCA helps with light house keeping, showering, errands. Can only help with medication reminders.     PCA is Phuc.     Tameka Olivia, ElinaD, BCPS

## 2024-04-03 NOTE — TELEPHONE ENCOUNTER
Form reviewed and signed by Dr Hopkins  Order number #[id number]  Faxed back to [phone] with med list and letter  Sent to hims  And filed in [facility].

## 2024-04-04 ENCOUNTER — TELEPHONE (OUTPATIENT)
Dept: PHARMACY | Facility: CLINIC | Age: 89
End: 2024-04-04
Payer: COMMERCIAL

## 2024-04-04 NOTE — TELEPHONE ENCOUNTER
MTM referral from: Patient's insurance     MTM referral outreach attempt #1 on 04/04/24       Outcome: LVM with son, Phuc Small[place] Jay, PharmD, BCACP  Medication Therapy Management Provider  Phone: [phone]  [email or web address]

## 2024-04-09 DIAGNOSIS — I48.91 ATRIAL FIBRILLATION, UNSPECIFIED TYPE (H): ICD-10-CM

## 2024-04-09 DIAGNOSIS — E11.21 TYPE 2 DIABETES MELLITUS WITH DIABETIC NEPHROPATHY, WITHOUT LONG-TERM CURRENT USE OF INSULIN (H): ICD-10-CM

## 2024-04-09 DIAGNOSIS — Z00.00 MEDICARE ANNUAL WELLNESS VISIT, SUBSEQUENT: ICD-10-CM

## 2024-04-09 DIAGNOSIS — E78.5 HYPERLIPIDEMIA WITH TARGET LDL LESS THAN 100: ICD-10-CM

## 2024-04-10 RX ORDER — ATORVASTATIN CALCIUM 10 MG/1
10 TABLET, FILM COATED ORAL DAILY
Qty: 90 TABLET | Refills: 3 | Status: SHIPPED | OUTPATIENT
Start: 2024-04-10

## 2024-04-10 RX ORDER — RIVAROXABAN 20 MG/1
20 TABLET, FILM COATED ORAL
Qty: 90 TABLET | Refills: 3 | Status: SHIPPED | OUTPATIENT
Start: 2024-04-10

## 2024-04-10 RX ORDER — ACETAMINOPHEN 325 MG/1
TABLET ORAL
Qty: 360 TABLET | Refills: 1 | Status: SHIPPED | OUTPATIENT
Start: 2024-04-10

## 2024-04-21 ENCOUNTER — MEDICAL CORRESPONDENCE (OUTPATIENT)
Dept: HEALTH INFORMATION MANAGEMENT | Facility: CLINIC | Age: 89
End: 2024-04-21
Payer: COMMERCIAL

## 2024-04-23 ENCOUNTER — TELEPHONE (OUTPATIENT)
Dept: FAMILY MEDICINE | Facility: CLINIC | Age: 89
End: 2024-04-23
Payer: COMMERCIAL

## 2024-04-23 NOTE — TELEPHONE ENCOUNTER
Patient's son Phuc (consent to communicate on file) states he received a call today from someone named Mary Lou to call clinic back about a medication question for patient. This writer unable to find message from today or message from anyone named Mary Lou.    Relayed to son that [facility] attempted to contact him regarding patient on 04/04/2024, otherwise no recent messages seen.     Son, Phuc states he will wait for a return call to see if somewhere else was trying to contact him.    Thank you,  Taran Clay, Triage RN [facility]  3:42 PM 4/23/2024

## 2024-05-06 ENCOUNTER — TELEPHONE (OUTPATIENT)
Dept: FAMILY MEDICINE | Facility: CLINIC | Age: 89
End: 2024-05-06
Payer: COMMERCIAL

## 2024-05-06 DIAGNOSIS — Z53.9 DIAGNOSIS NOT YET DEFINED: Primary | ICD-10-CM

## 2024-05-06 PROCEDURE — G0179 MD RECERTIFICATION HHA PT: HCPCS | Performed by: FAMILY MEDICINE

## 2024-05-06 NOTE — TELEPHONE ENCOUNTER
Forms/Letter Request    Type of form/letter: Nursing Home/Assisted Living Orders  Home Health Care  order # [id number]  Do we have the form/letter: Yes: given to Dr Hopkins    Who is the form from? Home care    Where did/will the form come from? form was faxed in    When is form/letter needed by: when available    How would you like the form/letter returned: Fax : [phone]

## 2024-05-06 NOTE — TELEPHONE ENCOUNTER
Faxed completed form to [phone]  Sent to [facility]  And filed in [facility].

## 2024-05-07 ENCOUNTER — PATIENT OUTREACH (OUTPATIENT)
Dept: GERIATRIC MEDICINE | Facility: CLINIC | Age: 89
End: 2024-05-07
Payer: COMMERCIAL

## 2024-05-13 NOTE — PROGRESS NOTES
[facility] Care Coordination Contact    Called sonPhuc, to schedule annual HRA home visit. HRA has been scheduled for 5/14/24 at 2pm. Requested  from [employer].    BRITTNEY Avilez  [facility]   [phone] (Office)  [email or web address]

## 2024-05-13 NOTE — PROGRESS NOTES
5/13/24  PC from [employer]. Only 1  available and wondering if home visit can start at 1:30.  PC to Phuc, son, he is ok with 1:30 home visit. PC to [facility] and confirmed new appt time for 1:30 for 1.5 hrs.    BRITTNEY Avilez  [facility]   [phone] (Office)  [email or web address]

## 2024-05-14 ENCOUNTER — PATIENT OUTREACH (OUTPATIENT)
Dept: GERIATRIC MEDICINE | Facility: CLINIC | Age: 89
End: 2024-05-14
Payer: COMMERCIAL

## 2024-05-14 NOTE — Clinical Note
Dr. Hopkins, I am the [facility] Care Coordinator. I have attached a brief summary of a recent home visit assessment.  FYI only per health plan guidelines. Thank you, BRITTNEY Avilez [facility]  [phone] (Office) [email or web address]

## 2024-05-20 ENCOUNTER — MEDICAL CORRESPONDENCE (OUTPATIENT)
Dept: HEALTH INFORMATION MANAGEMENT | Facility: CLINIC | Age: 89
End: 2024-05-20

## 2024-05-22 ENCOUNTER — OFFICE VISIT (OUTPATIENT)
Dept: FAMILY MEDICINE | Facility: CLINIC | Age: 89
End: 2024-05-22
Payer: COMMERCIAL

## 2024-05-22 VITALS
WEIGHT: 148.9 LBS | HEIGHT: 64 IN | SYSTOLIC BLOOD PRESSURE: 146 MMHG | TEMPERATURE: 97.4 F | RESPIRATION RATE: 16 BRPM | BODY MASS INDEX: 25.42 KG/M2 | HEART RATE: 70 BPM | OXYGEN SATURATION: 99 % | DIASTOLIC BLOOD PRESSURE: 76 MMHG

## 2024-05-22 DIAGNOSIS — I10 HYPERTENSION GOAL BP (BLOOD PRESSURE) < 140/90: ICD-10-CM

## 2024-05-22 DIAGNOSIS — M25.562 CHRONIC PAIN OF BOTH KNEES: ICD-10-CM

## 2024-05-22 DIAGNOSIS — G89.29 CHRONIC PAIN OF BOTH KNEES: ICD-10-CM

## 2024-05-22 DIAGNOSIS — Z00.00 ENCOUNTER FOR MEDICARE ANNUAL WELLNESS EXAM: Primary | ICD-10-CM

## 2024-05-22 DIAGNOSIS — Z79.899 MEDICATION MANAGEMENT: ICD-10-CM

## 2024-05-22 DIAGNOSIS — K21.9 GASTROESOPHAGEAL REFLUX DISEASE WITHOUT ESOPHAGITIS: ICD-10-CM

## 2024-05-22 DIAGNOSIS — J44.9 CHRONIC OBSTRUCTIVE PULMONARY DISEASE, UNSPECIFIED COPD TYPE (H): ICD-10-CM

## 2024-05-22 DIAGNOSIS — I48.19 PERSISTENT ATRIAL FIBRILLATION (H): ICD-10-CM

## 2024-05-22 DIAGNOSIS — M25.561 CHRONIC PAIN OF BOTH KNEES: ICD-10-CM

## 2024-05-22 DIAGNOSIS — I48.91 ATRIAL FIBRILLATION, UNSPECIFIED TYPE (H): ICD-10-CM

## 2024-05-22 DIAGNOSIS — E11.21 TYPE 2 DIABETES MELLITUS WITH DIABETIC NEPHROPATHY, WITHOUT LONG-TERM CURRENT USE OF INSULIN (H): ICD-10-CM

## 2024-05-22 DIAGNOSIS — K59.00 CONSTIPATION, UNSPECIFIED CONSTIPATION TYPE: ICD-10-CM

## 2024-05-22 DIAGNOSIS — N18.2 CKD (CHRONIC KIDNEY DISEASE) STAGE 2, GFR 60-89 ML/MIN: ICD-10-CM

## 2024-05-22 LAB
ALBUMIN SERPL BCG-MCNC: 4.4 G/DL (ref 3.5–5.2)
ALP SERPL-CCNC: 76 U/L (ref 40–150)
ALT SERPL W P-5'-P-CCNC: 15 U/L (ref 0–70)
ANION GAP SERPL CALCULATED.3IONS-SCNC: 11 MMOL/L (ref 7–15)
AST SERPL W P-5'-P-CCNC: 16 U/L (ref 0–45)
BILIRUB SERPL-MCNC: 0.5 MG/DL
BUN SERPL-MCNC: 26.8 MG/DL (ref 8–23)
CALCIUM SERPL-MCNC: 9.5 MG/DL (ref 8.8–10.2)
CHLORIDE SERPL-SCNC: 95 MMOL/L (ref 98–107)
CHOLEST SERPL-MCNC: 127 MG/DL
CREAT SERPL-MCNC: 1.17 MG/DL (ref 0.67–1.17)
CREAT UR-MCNC: 55 MG/DL
DEPRECATED HCO3 PLAS-SCNC: 28 MMOL/L (ref 22–29)
EGFRCR SERPLBLD CKD-EPI 2021: 60 ML/MIN/1.73M2
ERYTHROCYTE [DISTWIDTH] IN BLOOD BY AUTOMATED COUNT: 13.2 % (ref 10–15)
FASTING STATUS PATIENT QL REPORTED: YES
FASTING STATUS PATIENT QL REPORTED: YES
GLUCOSE SERPL-MCNC: 207 MG/DL (ref 70–99)
HBA1C MFR BLD: 7.9 % (ref 0–5.6)
HCT VFR BLD AUTO: 38.7 % (ref 40–53)
HDLC SERPL-MCNC: 32 MG/DL
HGB BLD-MCNC: 11.7 G/DL (ref 13.3–17.7)
LDLC SERPL CALC-MCNC: 63 MG/DL
MCH RBC QN AUTO: 22.6 PG (ref 26.5–33)
MCHC RBC AUTO-ENTMCNC: 30.2 G/DL (ref 31.5–36.5)
MCV RBC AUTO: 75 FL (ref 78–100)
MICROALBUMIN UR-MCNC: 182 MG/L
MICROALBUMIN/CREAT UR: 330.91 MG/G CR (ref 0–17)
NONHDLC SERPL-MCNC: 95 MG/DL
PLATELET # BLD AUTO: 234 10E3/UL (ref 150–450)
POTASSIUM SERPL-SCNC: 3.8 MMOL/L (ref 3.4–5.3)
PROT SERPL-MCNC: 7.5 G/DL (ref 6.4–8.3)
RBC # BLD AUTO: 5.17 10E6/UL (ref 4.4–5.9)
SODIUM SERPL-SCNC: 134 MMOL/L (ref 135–145)
TRIGL SERPL-MCNC: 160 MG/DL
VIT B12 SERPL-MCNC: 937 PG/ML (ref 232–1245)
WBC # BLD AUTO: 6.7 10E3/UL (ref 4–11)

## 2024-05-22 PROCEDURE — G0439 PPPS, SUBSEQ VISIT: HCPCS | Performed by: FAMILY MEDICINE

## 2024-05-22 PROCEDURE — 82043 UR ALBUMIN QUANTITATIVE: CPT | Performed by: FAMILY MEDICINE

## 2024-05-22 PROCEDURE — 83036 HEMOGLOBIN GLYCOSYLATED A1C: CPT | Performed by: FAMILY MEDICINE

## 2024-05-22 PROCEDURE — 99214 OFFICE O/P EST MOD 30 MIN: CPT | Mod: 25 | Performed by: FAMILY MEDICINE

## 2024-05-22 PROCEDURE — 80053 COMPREHEN METABOLIC PANEL: CPT | Performed by: FAMILY MEDICINE

## 2024-05-22 PROCEDURE — 82570 ASSAY OF URINE CREATININE: CPT | Performed by: FAMILY MEDICINE

## 2024-05-22 PROCEDURE — 36415 COLL VENOUS BLD VENIPUNCTURE: CPT | Performed by: FAMILY MEDICINE

## 2024-05-22 PROCEDURE — 80061 LIPID PANEL: CPT | Performed by: FAMILY MEDICINE

## 2024-05-22 PROCEDURE — 85027 COMPLETE CBC AUTOMATED: CPT | Performed by: FAMILY MEDICINE

## 2024-05-22 PROCEDURE — 82607 VITAMIN B-12: CPT | Performed by: FAMILY MEDICINE

## 2024-05-22 RX ORDER — POLYETHYLENE GLYCOL 3350 17 G/17G
17 POWDER, FOR SOLUTION ORAL DAILY PRN
Qty: 765 G | Refills: 1 | Status: SHIPPED | OUTPATIENT
Start: 2024-05-22

## 2024-05-22 RX ORDER — POTASSIUM CHLORIDE 1500 MG/1
20 TABLET, EXTENDED RELEASE ORAL DAILY
Qty: 90 TABLET | Refills: 3 | Status: SHIPPED | OUTPATIENT
Start: 2024-05-22

## 2024-05-22 RX ORDER — DILTIAZEM HYDROCHLORIDE 120 MG/1
120 CAPSULE, COATED, EXTENDED RELEASE ORAL DAILY
Qty: 90 CAPSULE | Refills: 3 | Status: SHIPPED | OUTPATIENT
Start: 2024-05-22

## 2024-05-22 RX ORDER — ALBUTEROL SULFATE 90 UG/1
2 AEROSOL, METERED RESPIRATORY (INHALATION) EVERY 6 HOURS PRN
Qty: 18 G | Refills: 3 | Status: SHIPPED | OUTPATIENT
Start: 2024-05-22

## 2024-05-22 RX ORDER — LOSARTAN POTASSIUM AND HYDROCHLOROTHIAZIDE 12.5; 5 MG/1; MG/1
TABLET ORAL
Qty: 180 TABLET | Refills: 0 | Status: SHIPPED | OUTPATIENT
Start: 2024-05-22 | End: 2024-08-27

## 2024-05-22 RX ORDER — PANTOPRAZOLE SODIUM 40 MG/1
40 TABLET, DELAYED RELEASE ORAL DAILY
Qty: 90 TABLET | Refills: 3 | Status: SHIPPED | OUTPATIENT
Start: 2024-05-22

## 2024-05-22 SDOH — HEALTH STABILITY: PHYSICAL HEALTH: ON AVERAGE, HOW MANY DAYS PER WEEK DO YOU ENGAGE IN MODERATE TO STRENUOUS EXERCISE (LIKE A BRISK WALK)?: 4 DAYS

## 2024-05-22 ASSESSMENT — SOCIAL DETERMINANTS OF HEALTH (SDOH): HOW OFTEN DO YOU GET TOGETHER WITH FRIENDS OR RELATIVES?: MORE THAN THREE TIMES A WEEK

## 2024-05-22 NOTE — PATIENT INSTRUCTIONS
"Preventive Care Advice   This is general advice we often give to help people stay healthy. Your care team may have specific advice just for you. Please talk to your care team about your own preventive care needs.  Lifestyle  Exercise at least 150 minutes each week (30 minutes a day, 5 days a week).  Do muscle strengthening activities 2 days a week. These help control your weight and prevent disease.  No smoking.  Wear sunscreen to prevent skin cancer.  Have your home tested for radon every 2 to 5 years. Radon is a colorless, odorless gas that can harm your lungs. To learn more, go to www.health.[facility].mn. and search for \"Radon in Homes.\"  Keep guns unloaded and locked up in a safe place like a safe or gun vault, or, use a gun lock and hide the keys. Always lock away bullets separately. To learn more, visit [employer].mn.gov and search for \"safe gun storage.\"  Nutrition  Eat 5 or more servings of fruits and vegetables each day.  Try wheat bread, brown rice and whole grain pasta (instead of white bread, rice, and pasta).  Get enough calcium and vitamin D. Check the label on foods and aim for 100% of the RDA (recommended daily allowance).  Regular exams  Have a dental exam and cleaning every 6 months.  See your health care team every year to talk about:  Any changes in your health.  Any medicines your care team has prescribed.  Preventive care, family planning, and ways to prevent chronic diseases.  Shots (vaccines)   HPV shots (up to age 26), if you've never had them before.  Hepatitis B shots (up to age 59), if you've never had them before.  COVID-19 shot: Get this shot when it's due.  Flu shot: Get a flu shot every year.  Tetanus shot: Get a tetanus shot every 10 years.  Pneumococcal, hepatitis A, and RSV shots: Ask your care team if you need these based on your risk.  Shingles shot (for age 50 and up).  General health tests  Diabetes screening:  Starting at age 35, Get screened for diabetes at least every 3 years.  If " you are younger than age 35, ask your care team if you should be screened for diabetes.  Cholesterol test: At age 39, start having a cholesterol test every 5 years, or more often if advised.  Bone density scan (DEXA): At age 50, ask your care team if you should have this scan for osteoporosis (brittle bones).  Hepatitis C: Get tested at least once in your life.  Abdominal aortic aneurysm screening: Talk to your doctor about having this screening if you:  Have ever smoked; and  Are biologically male; and  Are between the ages of 65 and 75.  STIs (sexually transmitted infections)  Before age 24: Ask your care team if you should be screened for STIs.  After age 24: Get screened for STIs if you're at risk. You are at risk for STIs (including HIV) if:  You are sexually active with more than one person.  You don't use condoms every time.  You or a partner was diagnosed with a sexually transmitted infection.  If you are at risk for HIV, ask about PrEP medicine to prevent HIV.  Get tested for HIV at least once in your life, whether you are at risk for HIV or not.  Cancer screening tests  Cervical cancer screening: If you have a cervix, begin getting regular cervical cancer screening tests at age 21. Most people who have regular screenings with normal results can stop after age 65. Talk about this with your provider.  Breast cancer scan (mammogram): If you've ever had breasts, begin having regular mammograms starting at age 40. This is a scan to check for breast cancer.  Colon cancer screening: It is important to start screening for colon cancer at age 45.  Have a colonoscopy test every 10 years (or more often if you're at risk) Or, ask your provider about stool tests like a FIT test every year or Cologuard test every 3 years.  To learn more about your testing options, visit: www.[employer]/721004.pdf.  For help making a decision, visit: zunilda/hd97143.  Prostate cancer screening test: If you have a prostate and are age 55  to 69, ask your provider if you would benefit from a yearly prostate cancer screening test.  Lung cancer screening: If you are a current or former smoker age 50 to 80, ask your care team if ongoing lung cancer screenings are right for you.  For informational purposes only. Not to replace the advice of your health care provider. Copyright   2023 [employer] All rights reserved. Clinically reviewed by the [facility] Transitions Program. [employer] [id number] - REV 04/24.

## 2024-05-22 NOTE — PROGRESS NOTES
Preventive Care Visit  [facility] [place]  Bradley Hopkins DO, Family Medicine  May 22, 2024      Assessment & Plan     Encounter for Medicare annual wellness exam  Health maintenance reviewed and updated. Emphasized importance of balanced diet and regular exercise.    - Med Therapy Management Referral    Type 2 diabetes mellitus with diabetic nephropathy, without long-term current use of insulin (H)  Good control. If A1c increasing, may restart metformin.   - Lipid panel reflex to direct LDL Non-fasting; Future  - Comprehensive metabolic panel (BMP + Alb, Alk Phos, ALT, AST, Total. Bili, TP); Future  - Lipid panel reflex to direct LDL Fasting; Future  - Albumin Random Urine Quantitative with Creat Ratio; Future  - Hemoglobin A1c; Future  - CBC with platelets; Future  - Vitamin B12; Future  - metFORMIN (GLUCOPHAGE) 500 MG tablet; Take 1 tablet (500 mg) by mouth 2 times daily (with meals)  - Hemoglobin A1c  - Lipid panel reflex to direct LDL Non-fasting  - Comprehensive metabolic panel (BMP + Alb, Alk Phos, ALT, AST, Total. Bili, TP)  - Albumin Random Urine Quantitative with Creat Ratio  - CBC with platelets  - Vitamin B12    Medication management    CKD (chronic kidney disease) stage 2, GFR 60-89 ml/min  Stable, recheck kidney function  - Comprehensive metabolic panel (BMP + Alb, Alk Phos, ALT, AST, Total. Bili, TP); Future  - Comprehensive metabolic panel (BMP + Alb, Alk Phos, ALT, AST, Total. Bili, TP)    Chronic obstructive pulmonary disease, unspecified COPD type (H)  Will try adding tiotropium-olodaterol to help with cough and wheezing. Continue PRN albuterol and ICS  - albuterol (PROAIR HFA/PROVENTIL HFA/VENTOLIN HFA) 108 (90 Base) MCG/ACT inhaler; Inhale 2 puffs into the lungs every 6 hours as needed for shortness of breath  - beclomethasone HFA (QVAR REDIHALER) 40 MCG/ACT inhaler; Inhale 1 puff into the lungs 2 times daily as needed (cough, shortness of breath)  - tiotropium-olodaterol  "2.5-2.5 MCG/ACT AERS; Inhale 2 puffs into the lungs daily    Hypertension goal BP (blood pressure) < 140/90  Has been off medication. Restart antihypertensives. Follow up in 2 weeks to recheck BP.  - Comprehensive metabolic panel (BMP + Alb, Alk Phos, ALT, AST, Total. Bili, TP); Future  - losartan-hydrochlorothiazide (HYZAAR) 50-12.5 MG tablet; TAKE 2 TABLETS BY MOUTH DAILY  - potassium chloride ganga ER (KLOR-CON M20) 20 MEQ CR tablet; Take 1 tablet (20 mEq) by mouth daily  - Comprehensive metabolic panel (BMP + Alb, Alk Phos, ALT, AST, Total. Bili, TP)    Gastroesophageal reflux disease without esophagitis  stable  - pantoprazole (PROTONIX) 40 MG EC tablet; Take 1 tablet (40 mg) by mouth daily    Constipation, unspecified constipation type  stable  - polyethylene glycol (MIRALAX) 17 GM/Dose powder; Take 17 g by mouth daily as needed for constipation    Persistent atrial fibrillation (H)  Continue diltiazem  - diltiazem ER COATED BEADS (CARDIZEM CD/CARTIA XT) 120 MG 24 hr capsule; Take 1 capsule (120 mg) by mouth daily    Atrial fibrillation, unspecified type (H)  - diltiazem ER COATED BEADS (CARDIZEM CD/CARTIA XT) 120 MG 24 hr capsule; Take 1 capsule (120 mg) by mouth daily    Chronic pain of both knees  Order placed for wheelchair to help with long-distance transportation. Able to use cane in his home but more difficult for him to support himself when out in the community.  - Wheelchair Order for DME - ONLY FOR DME    Patient has been advised of split billing requirements and indicates understanding: Yes      BMI  Estimated body mass index is 25.56 kg/m  as calculated from the following:    Height as of this encounter: 1.626 m (5' 4\").    Weight as of this encounter: 67.5 kg (148 lb 14.4 oz).     Counseling  Appropriate preventive services were discussed with this patient, including applicable screening as appropriate for fall prevention, nutrition, physical activity, Tobacco-use cessation, weight loss and " cognition.  Checklist reviewing preventive services available has been given to the patient.  Reviewed patient's diet, addressing concerns and/or questions.   He is at risk for psychosocial distress and has been provided with information to reduce risk.   Updated plan of care.  Patient reported difficulty with activities of daily living were addressed today.The patient was provided with written information regarding signs of hearing loss.       Juwan Shearer is a 89 year old, presenting for the following:  Physical (Fasting )        5/22/2024     7:36 AM   Additional Questions   Roomed by Roula SANCHEZ.   Accompanied by Son     Via the Health Maintenance questionnaire, the patient has reported the following services have been completed -Eye Exam: tal 2023-11-19, this information has been sent to the abstraction team.    Health Care Directive  Patient does not have a Health Care Directive or Living Will: Discussed advance care planning with patient; however, patient declined at this time.    HPI      Diabetes Follow-up    How often are you checking your blood sugar? A few times a month  What time of day are you checking your blood sugars (select all that apply)?  Before meals  Have you had any blood sugars above 200?  Yes - over 200 in AM  Have you had any blood sugars below 70?  No  What symptoms do you notice when your blood sugar is low?  None  What concerns do you have today about your diabetes? None   Do you have any of these symptoms? (Select all that apply)  No numbness or tingling in feet.  No redness, sores or blisters on feet.  No complaints of excessive thirst.  No reports of blurry vision.  No significant changes to weight.    Checking blood sugars a couple times fasting --Usually blood sugar is around 120 but a few times has been over 200.       Hyperlipidemia Follow-Up    Are you regularly taking any medication or supplement to lower your cholesterol?   Yes-    Are you having muscle aches or other  side effects that you think could be caused by your cholesterol lowering medication?  No    Hypertension Follow-up    Do you check your blood pressure regularly outside of the clinic? No   Are you following a low salt diet? Yes  Are your blood pressures ever more than 140 on the top number (systolic) OR more   than 90 on the bottom number (diastolic), for example 140/90? No but has been off blood pressure medication for a couple weeks due to no refills     Denies any headaches, lightheadedness, dizziness, vision changes, chest pain, palpitations, shortness of breath, dyspnea, numbness/tingling.      BP Readings from Last 2 Encounters:   05/22/24 (!) 146/76   09/18/23 136/64     Hemoglobin A1C (%)   Date Value   09/18/2023 6.4 (H)   02/27/2023 5.7 (H)   01/19/2021 7.4 (H)   10/18/2019 6.5 (H)     LDL Cholesterol Calculated (mg/dL)   Date Value   02/27/2023 69   02/07/2022 35   01/19/2021 64   04/24/2019 58         5/22/2024   General Health   How would you rate your overall physical health? Good   Feel stress (tense, anxious, or unable to sleep) Only a little   (!) STRESS CONCERN      5/22/2024   Nutrition   Diet: Low salt    Low fat/cholesterol    Diabetic         5/22/2024   Exercise   Days per week of moderate/strenous exercise 4 days         5/22/2024   Social Factors   Frequency of gathering with friends or relatives More than three times a week   Worry food won't last until get money to buy more No   Food not last or not have enough money for food? No   Do you have housing?  Yes   Are you worried about losing your housing? No   Lack of transportation? No   Unable to get utilities (heat,electricity)? No         5/22/2024   Fall Risk   Fallen 2 or more times in the past year? No   Trouble with walking or balance? No          5/22/2024   Activities of Daily Living- Home Safety   Needs help with the following daily activites Shopping    Preparing meals    Housework    Bathing    Laundry    Toileting    Dressing    Safety concerns in the home None of the above         2024   Dental   Dentist two times every year? Yes         2024   Hearing Screening   Hearing concerns? (!) I NEED TO ASK PEOPLE TO SPEAK UP OR REPEAT THEMSELVES.    (!) IT'S HARD TO FOLLOW A CONVERSATION IN A NOISY RESTAURANT OR CROWDED ROOM.    (!) TROUBLE UNDESTANDING A SPEAKER IN A PUBLIC MEETING OR [employer] SERVICE.    (!) TROUBLE UNDERSTANDING SOFT OR WHISPERED SPEECH.         2024   Driving Risk Screening   Patient/family members have concerns about driving No         2024   General Alertness/Fatigue Screening   Have you been more tired than usual lately? No         2024   Urinary Incontinence Screening   Bothered by leaking urine in past 6 months No         2024   TB Screening   Were you born outside of the [place]? Yes         Today's PHQ-2 Score:       2024     7:38 AM   PHQ-2 (  [employer])   Q1: Little interest or pleasure in doing things 0   Q2: Feeling down, depressed or hopeless 0   PHQ-2 Score 0   Q1: Little interest or pleasure in doing things Not at all   Q2: Feeling down, depressed or hopeless Not at all   PHQ-2 Score 0           2024   Substance Use   Alcohol more than 3/day or more than 7/wk No   Do you have a current opioid prescription? No   How severe/bad is pain from 1 to 10? 4/10   Do you use any other substances recreationally? No     Social History     Tobacco Use    Smoking status: Former     Current packs/day: 0.00     Average packs/day: 0.5 packs/day for 20.0 years (10.0 ttl pk-yrs)     Types: Cigarettes     Start date: 1961     Quit date: 1981     Years since quittin.0    Smokeless tobacco: Never    Tobacco comments:     quit in     Vaping Use    Vaping status: Never Used   Substance Use Topics    Alcohol use: No     Alcohol/week: 0.0 standard drinks of alcohol     Comment: quit in     Drug use: No     Comment: no herbal meds either   Reviewed and updated as needed this  visit by Provider   Tobacco     Med Hx  Surg Hx  Fam Hx  Soc Hx Sexual Activity          Past Medical History:   Diagnosis Date    Acute duodenal ulcer with hemorrhage, without mention of obstruction 11/15/03    and pyloric also - required hosp and transfusion 2 units- H. pylori negative    Allergic rhinitis, cause unspecified     Calculus of gallbladder without mention of cholecystitis or obstruction 11/15/03    no residual pain- transient cholestasis for 2 days    Colon Polyps normal 5/2010 2008 = one hyperplastic & one tubular adenoma - no high grade dysplasis - repeat in 2013     Diverticulosis of colon (without mention of hemorrhage) 11/03    Dyspepsia and other specified disorders of function of stomach     dyspepsia,  h/o bleeding stomach ulcer in 1985    Esophageal reflux     Genital herpes, unspecified     Paroxysmal atrial fibrillation (H) 12/21/2016    [facility] ER.    Pneumonia, organism unspecified(486) 11/15/03    right middle and lower lobe    Pure hypercholesterolemia     Pure hyperglyceridemia     Tear meniscus knee 2009    degenerative on right     Type II or unspecified type diabetes mellitus without mention of complication, not stated as uncontrolled at age 63    Unspecified essential hypertension      Past Surgical History:   Procedure Laterality Date     UGI ENDOSCOPY DIAG W OR W/O BRUSH/WASH  11/03    for GI bleed - showed pyloric and duodenal  ulcer     ZZHC COLONOSCOPY THRU STOMA, DIAGNOSTIC  11/03    for GI bleed - diverticulosis     ZZHC COLONOSCOPY THRU STOMA, DIAGNOSTIC  5/2010    normal - repeat in 5 years      Current providers sharing in care for this patient include:  Patient Care Team:  Bradley Hopkins DO as PCP - General (Family Practice)  Bradley Hopkins DO as Assigned PCP  Chelly Wood LSW as Lead Care Coordinator (Primary Care - CC)  Selin Thompson RPH as Pharmacist (Pharmacist)    The following health maintenance items are reviewed in Epic  "and correct as of today:  Health Maintenance   Topic Date Due    ZOSTER IMMUNIZATION (1 of 2) Never done    RSV VACCINE (Pregnancy & 60+) (1 - 1-dose 60+ series) Never done    EYE EXAM  10/17/2023    Medicare Annual MTM Pharmacist Visit (once per calendar year)  Never done    BMP  02/27/2024    LIPID  02/27/2024    MICROALBUMIN  02/27/2024    A1C  03/18/2024    COVID-19 Vaccine (6 - 2023-24 season) 04/18/2024    HEMOGLOBIN  02/27/2024    DIABETIC FOOT EXAM  09/18/2024    ANNUAL REVIEW OF HM ORDERS  09/18/2024    MEDICARE ANNUAL WELLNESS VISIT  05/22/2025    FALL RISK ASSESSMENT  05/22/2025    DTAP/TDAP/TD IMMUNIZATION (2 - Td or Tdap) 07/31/2027    ADVANCE CARE PLANNING  03/05/2028    SPIROMETRY  Completed    COPD ACTION PLAN  Completed    PHQ-2 (once per calendar year)  Completed    INFLUENZA VACCINE  Completed    Pneumococcal Vaccine: 65+ Years  Completed    URINALYSIS  Completed    IPV IMMUNIZATION  Aged Out    HPV IMMUNIZATION  Aged Out    MENINGITIS IMMUNIZATION  Aged Out    RSV MONOCLONAL ANTIBODY  Aged Out         Review of Systems  Constitutional, HEENT, cardiovascular, pulmonary, gi and gu systems are negative, except as otherwise noted.     Objective    Exam  BP (!) 146/76   Pulse 70   Temp 97.4  F (36.3  C) (Tympanic)   Resp 16   Ht 1.626 m (5' 4\")   Wt 67.5 kg (148 lb 14.4 oz)   SpO2 99%   BMI 25.56 kg/m     Estimated body mass index is 25.56 kg/m  as calculated from the following:    Height as of this encounter: 1.626 m (5' 4\").    Weight as of this encounter: 67.5 kg (148 lb 14.4 oz).    Physical Exam  GENERAL: alert and no distress  EYES: Eyes grossly normal to inspection  HENT: ear canals and TM's normal, nose and mouth without ulcers or lesions  NECK: no adenopathy, no asymmetry, masses, or scars  RESP: lungs clear to auscultation - no rales, rhonchi or wheezes  CV: irregularly irregular rhythm, normal S1 S2, no S3 or S4, and no murmur, click or rub  MS: no gross musculoskeletal defects " noted, no edema  SKIN: no suspicious lesions or rashes  PSYCH: mentation appears normal, affect normal/bright      5/22/2024   Mini Cog   Mini-Cog Not Completed (choose reason) Language barrier and no  present              Signed Electronically by: Bradley Hopkins,

## 2024-05-22 NOTE — LETTER
[facility]  [address] [place] [address]  [phone]                    May 31, 2024    Manfred Caraballo  [address] [address]      Dear Manfred,    Here is a summary of your recent test results:    -Blood counts are stable. Let's recheck this in 3 months.   -Cholesterol levels (LDL,HDL, Triglycerides) are okay.  ADVISE: rechecking  in 1 year.   -Liver and gallbladder tests (ALT,AST, Alk phos,bilirubin) are normal.   -Kidney function (GFR) is decreased from your baseline but also looks like you may have been a little dehydrated. Make sure you are drinking plenty of water throughout the day and we will recheck this in 3 months.   -Sodium is decreased.  ADVISE: Let's plan on rechecking this in 3 months..   -Potassium is normal.   -Calcium is normal.   -Glucose is elevated due to your diabetes.   -A1C (test of diabetes control the last 2-3 months) is at your goal. Please continue with your current plan. Also, you should make an appointment to see me and recheck your A1C test in 6 months.   -Microalbumin (urine protein) level is elevated. 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  ADVISE: avoiding anti-inflamatory agents such as ibuprofen (Advil, Motrin) or naproxen (Aleve) as much as possible, keeping your blood pressure in a normal range, and continuing your medication (losartan) that helps protect your kidneys.  Also, this should be rechecked in 1 year.   -Vitamin B12 level is normal.     Your test results are enclosed.      Please contact me if you have any questions.    In addition, here is a list of due or overdue Health Maintenance reminders.    Health Maintenance Due   Topic Date Due    Zoster (Shingles) Vaccine (1 of 2) Never done    RSV VACCINE (Pregnancy & 60+) (1 - 1-dose 60+ series) Never done    Medicare Annual Pharmacist Medication Review  Never done    COVID-19 Vaccine (6 - 2023-24 season) 04/18/2024        Please call us at [phone] (or use [employer]) to address the above recommendations.            Thank you very much for trusting [facility].     Healthy regards,      Bradley Hopkins DO           Results for orders placed or performed in visit on 05/22/24   Hemoglobin A1c     Status: Abnormal   Result Value Ref Range    Hemoglobin A1C 7.9 (H) 0.0 - 5.6 %   Lipid panel reflex to direct LDL Non-fasting     Status: Abnormal   Result Value Ref Range    Cholesterol 127 <200 mg/dL    Triglycerides 160 (H) <150 mg/dL    Direct Measure HDL 32 (L) >=40 mg/dL    LDL Cholesterol Calculated 63 <=100 mg/dL    Non HDL Cholesterol 95 <130 mg/dL    Patient Fasting > 8hrs? Yes     Narrative    Cholesterol  Desirable:  <200 mg/dL    Triglycerides  Normal:  Less than 150 mg/dL  Borderline High:  150-199 mg/dL  High:  200-499 mg/dL  Very High:  Greater than or equal to 500 mg/dL    Direct Measure HDL  Female:  Greater than or equal to 50 mg/dL   Male:  Greater than or equal to 40 mg/dL    LDL Cholesterol  Desirable:  <100mg/dL  Above Desirable:  100-129 mg/dL   Borderline High:  130-159 mg/dL   High:  160-189 mg/dL   Very High:  >= 190 mg/dL    Non HDL Cholesterol  Desirable:  130 mg/dL  Above Desirable:  130-159 mg/dL  Borderline High:  160-189 mg/dL  High:  190-219 mg/dL  Very High:  Greater than or equal to 220 mg/dL   Comprehensive metabolic panel (BMP + Alb, Alk Phos, ALT, AST, Total. Bili, TP)     Status: Abnormal   Result Value Ref Range    Sodium 134 (L) 135 - 145 mmol/L    Potassium 3.8 3.4 - 5.3 mmol/L    Carbon Dioxide (CO2) 28 22 - 29 mmol/L    Anion Gap 11 7 - 15 mmol/L    Urea Nitrogen 26.8 (H) 8.0 - 23.0 mg/dL    Creatinine 1.17 0.67 - 1.17 mg/dL    GFR Estimate 60 (L) >60 mL/min/1.73m2    Calcium 9.5 8.8 - 10.2 mg/dL    Chloride 95 (L) 98 - 107 mmol/L    Glucose 207 (H) 70 - 99 mg/dL    Alkaline Phosphatase 76 40 - 150 U/L    AST 16 0 - 45 U/L    ALT 15 0 - 70 U/L    Protein Total 7.5 6.4  - 8.3 g/dL    Albumin 4.4 3.5 - 5.2 g/dL    Bilirubin Total 0.5 <=1.2 mg/dL    Patient Fasting > 8hrs? Yes    Albumin Random Urine Quantitative with Creat Ratio     Status: Abnormal   Result Value Ref Range    Creatinine Urine mg/dL 55.0 mg/dL    Albumin Urine mg/L 182.0 mg/L    Albumin Urine mg/g Cr 330.91 (H) 0.00 - 17.00 mg/g Cr   CBC with platelets     Status: Abnormal   Result Value Ref Range    WBC Count 6.7 4.0 - 11.0 10e3/uL    RBC Count 5.17 4.40 - 5.90 10e6/uL    Hemoglobin 11.7 (L) 13.3 - 17.7 g/dL    Hematocrit 38.7 (L) 40.0 - 53.0 %    MCV 75 (L) 78 - 100 fL    MCH 22.6 (L) 26.5 - 33.0 pg    MCHC 30.2 (L) 31.5 - 36.5 g/dL    RDW 13.2 10.0 - 15.0 %    Platelet Count 234 150 - 450 10e3/uL   Vitamin B12     Status: Normal   Result Value Ref Range    Vitamin B12 937 232 - 1,245 pg/mL

## 2024-05-28 NOTE — PROGRESS NOTES
[facility] Care Coordination Contact    [facility] Home Visit Assessment     Home visit for Health Risk Assessment with Manfred Caraballo completed on May 14, 2024    Type of residence:: Private home - stairs  Current living arrangement:: I live in a private home with family     Assessment completed with:: Patient, Children    Current Care Plan  Member currently receiving the following home care services:     Member currently receiving the following community resources: Day Care, PCA, Other (see comment) (elderly waiver)    Medication Review  Medication reconciliation completed in Epic: Yes  Medication set-up & administration: Family/informal caregiver sets up weekly.  Self-administers medications with reminding and oversight from family. Administration from family as needed.  Medication Risk Assessment Medication (1 or more, place referral to MTM): N/A: No risk factors identified  MTM Referral Placed: No: No risk factors idenified    Mental/Behavioral Health   Depression Screening:   PHQ-2 Total Score (Adult) - Positive if 3 or more points; Administer PHQ-9 if positive: 0    Falls Assessment:   Fallen 2 or more times in the past year?: No   Any fall with injury in the past year?: No    ADL/IADL Dependencies:   Dependent ADLs:: Ambulation-cane, Bathing, Dressing, Grooming, Incontinence, Transfers, Toileting  Dependent IADLs:: Cleaning, Cooking, Laundry, Shopping, Meal Preparation, Incontinence, Transportation, Money Management, Medication Management    Health Plan sponsored benefits: [employer]: Shared information regarding [employer]. Reviewed preventative health screening and health plan supplemental benefits/incentives. Reviewed medication disposal form.    PCA Assessment completed at visit: Yes Annual PCA assessment indicated 4.5  hours per day of PCA. This is an increase from the previous assessment.     Elderly Waiver Eligibility: Yes-will open to EW    Care Plan & Recommendations:  Manfred will continue to live with his son, XIOMY and two grandchildren.  He will continue with elderly waiver, PCA, adult day care and supplies. He has started to get Glucerna again after it was on back order for many months. Dental appt is recommended to review for dentures. Annual wellness exam with PCP recommended. Hearing exam recommended- he has hearing aids that were purchased by family but does not wear them. Suggested having hearing exam again and review the fit of the hearing aids. More forgetful.     See [facility] for detailed assessment information.    Follow-Up Plan: Member informed of future contact, plan to f/u with member with a 6 month telephone assessment.  Contact information shared with member and family, encouraged member to call with any questions or concerns at any time.    [place] care continuum providers: Please see Snapshot and Care Management Flowsheets for Specific details of care plan.    This CC note routed to PCP, Bradley Hopkins LSW  [facility]   [phone] (Office)  [email or web address]

## 2024-05-29 ENCOUNTER — PATIENT OUTREACH (OUTPATIENT)
Dept: GERIATRIC MEDICINE | Facility: CLINIC | Age: 89
End: 2024-05-29
Payer: COMMERCIAL

## 2024-05-29 NOTE — LETTER
May 29, 2024    ALVA SIVONGXAY  [address] [address]    Marjan Shearer,    I hope you ve been well since we last spoke. Attached is your copy of your personalized Support Plan which summarizes our conversation.   My goal is to help you keep your health on track. Your Support Plan includes the steps we agreed would help you with that. We can talk about these steps during our next meeting or sooner if you d like.   Here are additional programs and services I can help you with:  Get to appointments with [facility]  If you need a ride to medical or dental visits, [facility] can help. Call to schedule a ride.   9 [phone] (TTY:465), 8 a.m. - 6 p.m. CT, Monday - Friday  Access One-Pass to boost your health  Get a gym membership, at-home fitness classes, and free access to fun activities that help your brain. To get access, go to [employer] or call One-Pass.    2 [phone] (TTY:714), 8 a.m. - 9 p.m. CT, Monday - Friday  Get help paying for healthy food and utilities  As a member, you get these benefits:  $150 monthly allowance to buy healthy food at participating grocery stores  One $0 ride per day to participating grocery stores and back home  $100 monthly allowance to help pay your utility bills  If you don t know how to access these benefits, I'm here to help you and answer questions.  Set up your health care directive  A directive is a legal form that explains your health care wishes. You can request this form from me, and I ll walk you through it before you discuss your plans with your doctor.  Schedule your annual physical  I can help set up your annual physical at your clinic of choice. It's an important step towards good health.    Have questions? I m here to help.  Call me at [phone] (TTY:[phone]) 8am - 5pm, Monday - Friday.  I ll reach out to you again in 6 months to follow up via telephone.  Warm regards,    BRITTNEY Avilez    E-mail: [email or web address]  Phone:  [phone]      [facility]    cc: member records                                                        American Indians can continue or begin to use [facility] and [facility] ([facility] clinics. We will not require prior approval or impose any conditions for you to get services at these clinics. For elders age 65 years and older this includes Elderly Waiver (EW) services accessed through the [place]. If a doctor or other provider in a [facility] or [facility] clinic refers you to a provider in our network, we will not require you to see your primary care provider prior to the referral.      [id number]

## 2024-05-29 NOTE — LETTER
[facility] [employer] Advance Care Planning       Manfred Caraballo  [address] [place] [address]      Dear Manfred,    You shared with me your interest in receiving information on Advance Care Planning and Health Care Directives. Discussing and making decisions about this part of our health is very important.  A Health Care Directive is a written document that outlines your goals, values, beliefs and choices for health care and medical treatment in the event you are unable to speak for yourself.     We greatly value the opportunity to assist you in documenting your choices and to honor your   wishes. We ve enclosed HCD and goals worksheet to help you get started thinking about your values and goals. We have several options for additional resources:     Health Care Directives and Advance Care Planning resources can be viewed and printed   for free at our web site:  [email or web address].     Free group classes on Advance Care Planning and completing a Health Care Directive are available at multiple locations and times. These classes are led by trained staff who will provide information and guide you through a Health Care Directive.  They can also review, notarize and add your Health Care Directive to your medical record. [place] for a class at [email or web address] or by calling [employer] at [phone] or toll free [phone].    COPIES of completed Health Care Directives can be brought or mailed to any of our   locations, including the address listed below. You can also email a copy to [email or web address] .    Email or call me at the contact information listed below for questions, assistance, or to   make an appointment to discuss creating a Health Care Directive. You can also contact   our [facility] [employer] Department for questions or assistance.       Sincerely,   BRITTNEY Avilez  [employer]  [phone]

## 2024-05-29 NOTE — PROGRESS NOTES
[facility] Care Coordination Contact    Received after visit chart from care coordinator.  Completed following tasks: Mailed copy of care plan/support plan to member, Mailed [place] Choices signature sheet pages 3-4, Mailed Safe Medication Disposal , Mailed [employer] Leave Behind Letter, Submitted referrals/auths for PCA and ADC, and Updated services in Database. Mailed HCD and goals worksheet to member.   , Provider Signature - No POC Shared:  Member indicates that they do not want their POC shared with any EW providers.    , and [employer]:  Faxed completed PCA assessment to PCA Agency and mailed copies to member.  Faxed MD Communication to PCP.  Emailed referral form for auth to [employer].    /Beverly Duran  Care Management Specialist  [facility]  [phone]

## 2024-05-31 ENCOUNTER — PATIENT OUTREACH (OUTPATIENT)
Dept: GERIATRIC MEDICINE | Facility: CLINIC | Age: 89
End: 2024-05-31
Payer: COMMERCIAL

## 2024-05-31 NOTE — PROGRESS NOTES
Dr. Hopkins,    I am the [facility] Care Coordinator for Manfred.  I am continuing to work on getting him a transport wheelchair after order conflicts with the initial DME agency. A new order has been placed with [facility] but an updated face to face is needed- the visit from 9/18/23 is unfortunately no longer valid. Would you be willing to addend his office visit note from 5/22/24 to include a wheelchair face to face and update the transport wheelchair orders?  If so, notify me when it is updated and I will get it submitted to [facility].  If not, I will ask family to schedule an office visit with you to complete a new face to face.    Thank you for considering and let me know if you have any questions.    Thank you,    BRITTNEY Avilez  [facility]   [phone] (Office)  [email or web address]

## 2024-06-03 NOTE — TELEPHONE ENCOUNTER
Addendum made to office note from 5/22/24 to include recommendation for wheelchair to help with longer distance transportation when a cane will not suffice. Please let me know if anything else is needed. Thanks!    Bradley Hopkins,   6/2/2024 10:52 PM

## 2024-06-06 NOTE — PROGRESS NOTES
Sent updated office visit note and transport wheelchair orders to Thalia at [facility], [email or web address]    BRITTNEY Avilez  [employer]   [phone] (Office)  [email or web address]

## 2024-06-18 ENCOUNTER — MEDICAL CORRESPONDENCE (OUTPATIENT)
Dept: HEALTH INFORMATION MANAGEMENT | Facility: CLINIC | Age: 89
End: 2024-06-18
Payer: COMMERCIAL

## 2024-06-20 ENCOUNTER — MEDICAL CORRESPONDENCE (OUTPATIENT)
Dept: HEALTH INFORMATION MANAGEMENT | Facility: CLINIC | Age: 89
End: 2024-06-20
Payer: COMMERCIAL

## 2024-06-26 NOTE — PROGRESS NOTES
6/26/24  Checked on order status with [facility] medical. They said they need addended office visit note with specific questions answered. They said they faxed paperwork x2 to the clinic. Fax they have is different than listed in medical chart. Gave them updated fax and asked that they call the clinic to confirm they have received the fax. Also re-emailed documents to Thalia at [facility].    BRITTNEY Avilez  [employer]   [phone] (Office)  [email or web address]

## 2024-07-10 DIAGNOSIS — J44.9 CHRONIC OBSTRUCTIVE PULMONARY DISEASE, UNSPECIFIED COPD TYPE (H): ICD-10-CM

## 2024-07-10 RX ORDER — TIOTROPIUM BROMIDE AND OLODATEROL 3.124; 2.736 UG/1; UG/1
2 SPRAY, METERED RESPIRATORY (INHALATION) DAILY
Qty: 4 G | Refills: 0 | Status: SHIPPED | OUTPATIENT
Start: 2024-07-10 | End: 2024-07-30

## 2024-07-17 ENCOUNTER — TELEPHONE (OUTPATIENT)
Dept: FAMILY MEDICINE | Facility: CLINIC | Age: 89
End: 2024-07-17
Payer: COMMERCIAL

## 2024-07-17 NOTE — TELEPHONE ENCOUNTER
Form was signed by Dr Hopkins  And faxed back to [phone]  Sent to [facility]  And filed in [facility].

## 2024-07-17 NOTE — TELEPHONE ENCOUNTER
Forms/Letter Request    Type of form/letter: Nursing Home/Assisted Living Orders  Home [facility] Order [id number]  Do we have the form/letter: Yes:   Given to Dr Hopkins  Who is the form from? Home care    Where did/will the form come from? form was faxed in    When is form/letter needed by: when available    How would you like the form/letter returned: Fax : [phone]

## 2024-07-23 ENCOUNTER — TELEPHONE (OUTPATIENT)
Dept: FAMILY MEDICINE | Facility: CLINIC | Age: 89
End: 2024-07-23
Payer: COMMERCIAL

## 2024-07-23 DIAGNOSIS — E11.21 TYPE 2 DIABETES MELLITUS WITH DIABETIC NEPHROPATHY (H): Primary | ICD-10-CM

## 2024-07-23 RX ORDER — LANCETS
EACH MISCELLANEOUS
Qty: 100 EACH | Refills: 3 | Status: SHIPPED | OUTPATIENT
Start: 2024-07-23

## 2024-07-23 NOTE — TELEPHONE ENCOUNTER
Pts son calls stating pts Glucometer is broken and needs new meter. Will probably need new strips too.     Prescription approved per [facility] Refill Protocol.

## 2024-07-29 ENCOUNTER — TELEPHONE (OUTPATIENT)
Dept: FAMILY MEDICINE | Facility: CLINIC | Age: 89
End: 2024-07-29
Payer: COMMERCIAL

## 2024-07-29 DIAGNOSIS — J44.9 CHRONIC OBSTRUCTIVE PULMONARY DISEASE, UNSPECIFIED COPD TYPE (H): Primary | ICD-10-CM

## 2024-07-29 NOTE — TELEPHONE ENCOUNTER
Joshua from [facility] calling as there was a flag and message in pharmacy system stating that patient may benefit from using 1 inhaler rather than 3 different inhalers.  Right now on Stealto and Qvar.  Their system recommends the following:    COPD only:                 Breztri aerospere  Asthma and COPD:    Trelegy Ellipta    Pharmacist does not know if insurance will cover either of these but wants provider to know that there may be a simpler and possibly cost efficient way to treat patient's lung disease.   MCKENNA Osorio R.N.

## 2024-07-30 ENCOUNTER — TELEPHONE (OUTPATIENT)
Dept: FAMILY MEDICINE | Facility: CLINIC | Age: 89
End: 2024-07-30
Payer: COMMERCIAL

## 2024-07-30 RX ORDER — FLUTICASONE FUROATE, UMECLIDINIUM BROMIDE AND VILANTEROL TRIFENATATE 100; 62.5; 25 UG/1; UG/1; UG/1
1 POWDER RESPIRATORY (INHALATION) DAILY
Qty: 60 EACH | Refills: 3 | Status: SHIPPED | OUTPATIENT
Start: 2024-07-30

## 2024-07-30 NOTE — TELEPHONE ENCOUNTER
Rn called [facility] to  notify that the med was sent over and to see if it will be covered by pts insurance      Keerthi stated that the insurance covers all the inhalers at 0 cost to pt - he has already picked up the Qvar and the Stiolto. What does provider want pt to do ?     Please advise     Thank you     Paradise Khan RN, BSN  [facility]

## 2024-07-30 NOTE — TELEPHONE ENCOUNTER
We can try changing Qvar and Stiolto to Trelegy and see if it is more cost effective and a simpler regimen for him. If not, would continue current regimen.    Bradley Hopkins,   7/30/2024 1:19 AM

## 2024-07-30 NOTE — TELEPHONE ENCOUNTER
Recommend he stay with current regimen until he runs out and then can  new inhaler when needing next refill. Please let patient know of this regimen simplification.  Instead of having 2 inhalers, this one inhaler will do the job of both.    Bradley Hopkins, DO  7/30/2024 4:35 PM

## 2024-07-30 NOTE — TELEPHONE ENCOUNTER
Care coordination - Chelly K - [phone],  asking for the fax number to fax a form to be reviewed and update notes in pt chart to get a wheelchair for transport.     [facility]    Awaiting fax     Paradise Khan RN, BSN  [facility]

## 2024-07-31 NOTE — TELEPHONE ENCOUNTER
Spoke with son and advised of provider information below. Expressed understanding and acceptance of the plan. Had no further questions at this time.  Advised can call back to clinic at any time with concerns.     Kaur SIMPSON RN

## 2024-08-16 NOTE — PROGRESS NOTES
[facility] Care Coordination Contact    Per [facility], the transport WC was delivered. CC notified.     Beverly Duran  Care Management Specialist  [facility]  [phone]

## 2024-08-19 ENCOUNTER — MEDICAL CORRESPONDENCE (OUTPATIENT)
Dept: HEALTH INFORMATION MANAGEMENT | Facility: CLINIC | Age: 89
End: 2024-08-19
Payer: COMMERCIAL

## 2024-08-26 DIAGNOSIS — I10 HYPERTENSION GOAL BP (BLOOD PRESSURE) < 140/90: ICD-10-CM

## 2024-08-27 RX ORDER — LOSARTAN POTASSIUM AND HYDROCHLOROTHIAZIDE 12.5; 5 MG/1; MG/1
TABLET ORAL
Qty: 180 TABLET | Refills: 1 | Status: SHIPPED | OUTPATIENT
Start: 2024-08-27

## 2024-09-09 ENCOUNTER — TELEPHONE (OUTPATIENT)
Dept: FAMILY MEDICINE | Facility: CLINIC | Age: 89
End: 2024-09-09
Payer: COMMERCIAL

## 2024-09-09 NOTE — TELEPHONE ENCOUNTER
Forms/Letter Request    Type of form/letter: Nursing Home/Assisted Living Orders:  [facility] order # [id number]      Do we have the form/letter: Yes: given to Dr Lux    Who is the form from? Home care    Where did/will the form come from? form was faxed in    When is form/letter needed by: when available    How would you like the form/letter returned: Fax : [phone]

## 2024-09-13 ENCOUNTER — MEDICAL CORRESPONDENCE (OUTPATIENT)
Dept: HEALTH INFORMATION MANAGEMENT | Facility: CLINIC | Age: 89
End: 2024-09-13
Payer: COMMERCIAL

## 2024-09-13 NOTE — TELEPHONE ENCOUNTER
Patient's form signed, dated, and placed in TC basket.    Bradley Hopkins DO  9/13/2024 12:24 AM

## 2024-09-13 NOTE — TELEPHONE ENCOUNTER
Faxed signed forms to [facility] #[phone]    Medication verification    Copied to HIMS / Filed in [place]

## 2024-09-25 ENCOUNTER — TELEPHONE (OUTPATIENT)
Dept: FAMILY MEDICINE | Facility: CLINIC | Age: 89
End: 2024-09-25
Payer: COMMERCIAL

## 2024-09-25 NOTE — TELEPHONE ENCOUNTER
Forms/Letter Request    Type of form/letter: [facility] - Special diet Information Request    Do we have the form/letter: Yes:     Who is the form from? Patient    Where did/will the form come from? Patient or family brought in       When is form/letter needed by: asap    How would you like the form/letter returned: Fax : [phone]    Patient Notified form requests are processed in 5-7 business days:No    Okay to leave a detailed message?:  NA    Put in providers in box    Gabi LAZO

## 2024-09-26 ENCOUNTER — TELEPHONE (OUTPATIENT)
Dept: FAMILY MEDICINE | Facility: CLINIC | Age: 89
End: 2024-09-26
Payer: COMMERCIAL

## 2024-10-18 ENCOUNTER — MEDICAL CORRESPONDENCE (OUTPATIENT)
Dept: HEALTH INFORMATION MANAGEMENT | Facility: CLINIC | Age: 89
End: 2024-10-18
Payer: COMMERCIAL

## 2024-11-04 ENCOUNTER — TELEPHONE (OUTPATIENT)
Dept: FAMILY MEDICINE | Facility: CLINIC | Age: 89
End: 2024-11-04
Payer: COMMERCIAL

## 2024-11-04 NOTE — TELEPHONE ENCOUNTER
Forms/Letter Request    Type of form/letter: Nursing Home/Assisted Living Orders  Home Health Care order number [id number]  Do we have the form/letter: Yes: given to Dr Hopkins    Who is the form from? Home care    Where did/will the form come from? form was faxed in    When is form/letter needed by: when available    How would you like the form/letter returned: Fax : [phone]

## 2024-11-05 ENCOUNTER — TRANSFERRED RECORDS (OUTPATIENT)
Dept: HEALTH INFORMATION MANAGEMENT | Facility: CLINIC | Age: 89
End: 2024-11-05
Payer: COMMERCIAL

## 2024-11-06 NOTE — TELEPHONE ENCOUNTER
Faxed signed form to [facility] #    Order #[id number] - Cert Period: 10/18/24 - 12/16/24    Copied into HIMS / Filed in [place]

## 2024-12-17 ENCOUNTER — MEDICAL CORRESPONDENCE (OUTPATIENT)
Dept: HEALTH INFORMATION MANAGEMENT | Facility: CLINIC | Age: 89
End: 2024-12-17
Payer: COMMERCIAL

## 2025-01-02 ENCOUNTER — TELEPHONE (OUTPATIENT)
Dept: FAMILY MEDICINE | Facility: CLINIC | Age: OVER 89
End: 2025-01-02
Payer: COMMERCIAL

## 2025-01-02 ENCOUNTER — MEDICAL CORRESPONDENCE (OUTPATIENT)
Dept: HEALTH INFORMATION MANAGEMENT | Facility: CLINIC | Age: OVER 89
End: 2025-01-02
Payer: COMMERCIAL

## 2025-01-02 NOTE — TELEPHONE ENCOUNTER
Home Health Care      Reason for call:  Home Health Care - Order #[id number] - Cert period: 12/17/24 - 02/14/25  - SN and PCA orders    Orders are needed for this patient.  above    Pt Provider: Dr. Hopkins    Phone Number Homecare Nurse can be reached at: [phone] - fax      Okay to leave a detailed message?:  NA    Put in providers in box    Gabi K

## 2025-01-04 DIAGNOSIS — J44.9 CHRONIC OBSTRUCTIVE PULMONARY DISEASE, UNSPECIFIED COPD TYPE (H): ICD-10-CM

## 2025-01-05 RX ORDER — FLUTICASONE FUROATE, UMECLIDINIUM BROMIDE AND VILANTEROL TRIFENATATE 100; 62.5; 25 UG/1; UG/1; UG/1
1 POWDER RESPIRATORY (INHALATION) DAILY
Qty: 60 EACH | Refills: 1 | Status: SHIPPED | OUTPATIENT
Start: 2025-01-05

## 2025-01-09 ENCOUNTER — PATIENT OUTREACH (OUTPATIENT)
Dept: GERIATRIC MEDICINE | Facility: CLINIC | Age: OVER 89
End: 2025-01-09
Payer: COMMERCIAL

## 2025-01-09 NOTE — PROGRESS NOTES
[facility] Care Coordination Contact      [facility] Six-Month Telephone Assessment    6 month telephone assessment completed on 1/9/25. Spoke with son, Phuc.  He reports his dad has been stable. Nothing new to report.    ER visits: No  Hospitalizations: No  TCU stays: No  Significant health status changes: none reported  Falls/Injuries: No  ADL/IADL changes: No  Changes in services: No    Caregiver Assessment follow up:  n/a    Goals: See Support Plan for goal progress documentation.    Continue falls goal  Continue pain goal  Continue med goal  Continue hearing goal- he has hearing aids that were purchased privately by his family. Resistant to wear them, and resistant to new appt to evaluate hearing.    Will see member in 6 months for an annual health risk assessment.   Encouraged member to call CC with any questions or concerns in the meantime.     BRITTNEY Avilez  [facility]   [phone] (Office)  [email or web address]

## 2025-01-28 NOTE — TELEPHONE ENCOUNTER
Form faxed and sent to scanning.  Bell Reid      
Form has incorrect doctor listed as Dr. Karen Weiler who has not practiced at this location for quite some time. Forms returned to Home Care agency asking them to correct form.  has asked them via phone and fax previously, but it still has not been changed on form. Darleen Garcia R.N.    
Patient's form signed, dated, and placed in TC basket.    Bradley Hopkins DO  8/8/2019 5:02 PM      
Reason for Call:  Form, our goal is to have forms completed with 72 hours, however, some forms may require a visit or additional information.    Type of letter, form or note:  Home Health Certification    Who is the form from?: Home care    Where did the form come from: form was faxed in    What clinic location was the form placed at?: [facility]    Where the form was placed: Given to Darleen ORDONEZ RN    What number is listed as a contact on the form?:        Additional comments:     Call taken on 7/31/2019 at 11:09 AM by Bell Reid        
See below. Message was sent back to the home care agency regarding updating PCP. In the meantime med rec completed:    MED REC DONE     Discrepancies:      Acetaminophen            Epic: 500mg, take 1-2 tabs PO Q6H PRN for mild pain           Form:  325mg, take 2 tabs PO Q6H PRN for pain      Meds on Epic but NOT  on Form:       Miralax, mix 17g (1 capful) of powder in 8oz of liquid and drink once daily PRN for constipation     Potassium Chloride 20meq, take 1 tab PO daily      Meds on Form but NOT on Epic :     none    Routing to PCP for further review/recommendations/orders    KYREE Szymanski, RN  [facility]              
show

## 2025-02-05 ENCOUNTER — TELEPHONE (OUTPATIENT)
Dept: ANTICOAGULATION | Facility: CLINIC | Age: OVER 89
End: 2025-02-05

## 2025-02-05 DIAGNOSIS — I48.19 PERSISTENT ATRIAL FIBRILLATION (H): Primary | ICD-10-CM

## 2025-02-05 NOTE — TELEPHONE ENCOUNTER
ANTICOAGULATION DIRECT ORAL ANTICOAGULANT MONITORING    SUBJECTIVE     [facility] is evaluating Manfred Caraballo's Rivaroxaban (Xarelto) as part of its Anticoagulation Monitoring Program.    Indication:Atrial Fibrillation  Current dose per medication list: Rivaroxaban 20 mg Daily  Recent hospitalizations/ED/Office Visits for bleeding/clotting concerns: No  Other bleeding or side effect concerns: Yes: duodenal ulcer with hemorrhage noted in 01/2003  Additional findings: colon polyps; Type 2 Diabetes    I would still adjust him to 15 mg daily. he is less than 50 due to his age. looks like he has home care. I would use them to communicate the dose adjustment. previously unable to reach this paitent when assesed before     OBJECTIVE     Age: 89 year old    Adherence score:0 as of   Wt Readings from Last 4 Encounters:   05/22/24 67.5 kg (148 lb 14.4 oz)   09/18/23 69.4 kg (153 lb)   02/27/23 67.1 kg (148 lb)   06/24/22 68.2 kg (150 lb 6.4 oz)        Lab Results   Component Value Date    CR 1.17 05/22/2024    CR 1.07 02/27/2023    CR 1.08 02/07/2022     Creatinine Clearance (using actual bodyweight, mL/min): 41    Lab Results   Component Value Date    HGB 11.7 05/22/2024    HGB 12.0 10/18/2019     05/22/2024     10/18/2019       ASSESSMENT/PLAN     A chart review for Direct Oral Anticoagulant (DOAC) Stewardship has been completed for:     Dosing: recommend adjustment to Rivaroxaban 15 mg ONCE Daily for CrCl <= 50 mL/min (using actual bodyweight) (consistent with package insert dosing)  However, no weight or CR since 05/2024 and no upcoming appointments--review with [facility]    Plan made per [facility] anticoagulation protocol    Gretchen Gutierrez RN  Anticoagulation Clinic

## 2025-02-13 ENCOUNTER — MEDICAL CORRESPONDENCE (OUTPATIENT)
Dept: HEALTH INFORMATION MANAGEMENT | Facility: CLINIC | Age: OVER 89
End: 2025-02-13
Payer: COMMERCIAL

## 2025-02-15 ENCOUNTER — MEDICAL CORRESPONDENCE (OUTPATIENT)
Dept: HEALTH INFORMATION MANAGEMENT | Facility: CLINIC | Age: OVER 89
End: 2025-02-15
Payer: COMMERCIAL

## 2025-02-27 NOTE — TELEPHONE ENCOUNTER
Note from STEPHANE English:    I would still adjust him to 15 mg daily. he is less than 50 due to his age. looks like he has home care. I would use them to communicate the dose adjustment. previously unable to reach this paitent when assesed before.  Writer spoke to Nati at [facility] [employer] In [place] ([phone]), they currently only see patient for non-skilled services with a PCA through his [place] insurance. Writer informed Nati that patient and/or family will be contacted to discuss xarelto dose decrease.    Left voicemail to return call  Gretchen Gutierrez RN  Anticoagulation Clinic

## 2025-03-10 ENCOUNTER — TELEPHONE (OUTPATIENT)
Dept: FAMILY MEDICINE | Facility: CLINIC | Age: OVER 89
End: 2025-03-10
Payer: COMMERCIAL

## 2025-03-10 NOTE — TELEPHONE ENCOUNTER
Forms/Letter Request    Type of form/letter: Nursing Home/Assisted Living Orders  Home [facility] Order # [id number]  Do we have the form/letter: Yes: given to Dr Hopkins    Who is the form from? Home care    Where did/will the form come from? form was faxed in    When is form/letter needed by: when available    How would you like the form/letter returned: Fax : [phone]

## 2025-03-11 ENCOUNTER — MEDICAL CORRESPONDENCE (OUTPATIENT)
Dept: HEALTH INFORMATION MANAGEMENT | Facility: CLINIC | Age: OVER 89
End: 2025-03-11
Payer: COMMERCIAL

## 2025-03-12 NOTE — TELEPHONE ENCOUNTER
Faxed signed forms to [facility] #[phone]    Order #[id number]    Copied into HIMS  / filed in [place]

## 2025-03-16 NOTE — TELEPHONE ENCOUNTER
Prescription approved per [facility] Refill Protocol.    Ivon GREWALN, RN   [facility]      unknown

## 2025-03-18 DIAGNOSIS — I48.91 ATRIAL FIBRILLATION, UNSPECIFIED TYPE (H): ICD-10-CM

## 2025-03-18 RX ORDER — RIVAROXABAN 20 MG/1
20 TABLET, FILM COATED ORAL
Qty: 90 TABLET | Refills: 3 | Status: SHIPPED | OUTPATIENT
Start: 2025-03-18

## 2025-03-18 NOTE — TELEPHONE ENCOUNTER
Left voicemail for son Phuc requesting call back before picking up xarelto refill. Per chart review, 20 mg xarelto refill was e-faxed to the pharmacy today, dose should be decreased to 15 mg daily.    Gretchen Gutierrez RN  Anticoagulation Clinic

## 2025-03-22 DIAGNOSIS — I48.19 PERSISTENT ATRIAL FIBRILLATION (H): ICD-10-CM

## 2025-03-22 DIAGNOSIS — K21.9 GASTROESOPHAGEAL REFLUX DISEASE WITHOUT ESOPHAGITIS: ICD-10-CM

## 2025-03-22 DIAGNOSIS — I10 HYPERTENSION GOAL BP (BLOOD PRESSURE) < 140/90: ICD-10-CM

## 2025-03-22 DIAGNOSIS — I48.91 ATRIAL FIBRILLATION, UNSPECIFIED TYPE (H): ICD-10-CM

## 2025-03-24 RX ORDER — PANTOPRAZOLE SODIUM 40 MG/1
40 TABLET, DELAYED RELEASE ORAL DAILY
Qty: 90 TABLET | Refills: 3 | OUTPATIENT
Start: 2025-03-24

## 2025-03-24 RX ORDER — POTASSIUM CHLORIDE 1500 MG/1
20 TABLET, EXTENDED RELEASE ORAL DAILY
Qty: 90 TABLET | Refills: 3 | OUTPATIENT
Start: 2025-03-24

## 2025-03-24 RX ORDER — DILTIAZEM HYDROCHLORIDE 120 MG/1
120 CAPSULE, COATED, EXTENDED RELEASE ORAL DAILY
Qty: 90 CAPSULE | Refills: 3 | OUTPATIENT
Start: 2025-03-24

## 2025-03-25 NOTE — TELEPHONE ENCOUNTER
ANTICOAGULATION  STEWARDSHIP    Spoke with  son Phuc  to review advised dosage change for Rivaroxaban (Xarelto).    Education provided: how to take rivaroxaban (Xarelto) safety: take dose with evening meal (or largest meal of day); at the same time each day and may finish the supply of 20 mg tablets that you have at home before starting the new dose.     They verbalized understanding of new Rivaroxaban (Xarelto) dose/tablet strength  They were notified Rx for new dosage would be sent to preferred pharmacy    Gretchen Gutierrez RN  [facility] Anticoagulation Clinic

## 2025-04-11 ENCOUNTER — PATIENT OUTREACH (OUTPATIENT)
Dept: GERIATRIC MEDICINE | Facility: CLINIC | Age: OVER 89
End: 2025-04-11
Payer: COMMERCIAL

## 2025-04-14 ENCOUNTER — MEDICAL CORRESPONDENCE (OUTPATIENT)
Dept: HEALTH INFORMATION MANAGEMENT | Facility: CLINIC | Age: OVER 89
End: 2025-04-14
Payer: COMMERCIAL

## 2025-04-14 ENCOUNTER — TELEPHONE (OUTPATIENT)
Dept: FAMILY MEDICINE | Facility: CLINIC | Age: OVER 89
End: 2025-04-14
Payer: COMMERCIAL

## 2025-04-14 NOTE — TELEPHONE ENCOUNTER
Forms/Letter Request    Type of form/letter: Nursing Home/Assisted Living Orders- [facility]- Okay to move recert    Do we have the form/letter: Yes: Given to Dr. Hopkins    Who is the form from? Home care    Where did/will the form come from? form was faxed in    When is form/letter needed by: When Available    How would you like the form/letter returned: Fax : [phone]

## 2025-04-14 NOTE — TELEPHONE ENCOUNTER
Patient's form signed, dated, and placed in TC basket.    Bradley Hopkins DO  4/14/2025 12:30 PM

## 2025-04-16 ENCOUNTER — MEDICAL CORRESPONDENCE (OUTPATIENT)
Dept: HEALTH INFORMATION MANAGEMENT | Facility: CLINIC | Age: OVER 89
End: 2025-04-16
Payer: COMMERCIAL

## 2025-04-16 NOTE — PROGRESS NOTES
Called sonPhuc to schedule annual HRA home visit. HRA has been scheduled for 4/18/25 at 3pm.  Requested  through [employer].    BRITTNEY Avilez  [facility]   [phone] (Office)  [email or web address]

## 2025-04-18 ENCOUNTER — PATIENT OUTREACH (OUTPATIENT)
Dept: GERIATRIC MEDICINE | Facility: CLINIC | Age: OVER 89
End: 2025-04-18

## 2025-04-18 NOTE — Clinical Note
Dr. Hopkins, I am the [facility] Care Coordinator. I have attached a brief summary of his recent home visit. FYI only per health plan guidelines.  Thank you, BRITTNEY Avilez [facility]  [phone] (Office) [email or web address]

## 2025-04-21 ENCOUNTER — TELEPHONE (OUTPATIENT)
Dept: FAMILY MEDICINE | Facility: CLINIC | Age: OVER 89
End: 2025-04-21
Payer: COMMERCIAL

## 2025-04-21 NOTE — TELEPHONE ENCOUNTER
Home Health Care        Reason for call:  Home Health - Order #[id number] - Cert Period: 04/16/25 - 06/14/25     SN to eval and develop plan of care    Orders are needed for this patient.  above    Pt Provider: Dr. Hopkins    Phone Number Homecare Nurse can be reached at: [phone]      Okay to leave a detailed message?:  na    Put in providers in box with med rec and letter    Gabi LAZO

## 2025-04-22 NOTE — TELEPHONE ENCOUNTER
Faxed signed forms to [facility] #[phone]    Order #[id number]    Copied into HIMS / filed in [place]

## 2025-04-30 NOTE — PROGRESS NOTES
[facility] Care Coordination Contact    [facility] Home Visit Assessment     Home visit for Health Risk Assessment with Manfred Caraballo completed on April 18, 2025    Type of residence:: Private home - stairs  Current living arrangement:: I live in a private home with family     Assessment completed with:: Patient, Children, - Lizzie, and this CC.    Current Care Plan  Member currently receiving the following home care services:     Member currently receiving the following community resources: Day Care, PCA, elderly waiver    Medication Review  Medication reconciliation completed in Epic: Yes  Medication set-up & administration: Family/informal caregiver sets up weekly.  Self-administers medications.  Medication Risk Assessment Medication (1 or more, place referral to MTM): N/A: No risk factors identified  MTM Referral Placed: Yes: MTM Referral Placed    Mental/Behavioral Health   Depression Screening:   PHQ-2 Total Score (Adult) - Positive if 3 or more points; Administer PHQ-9 if positive: 1     Falls Assessment:   Fallen 2 or more times in the past year?: No   Any fall with injury in the past year?: No    ADL/IADL Dependencies:   Dependent ADLs:: Ambulation-cane, Bathing, Dressing, Grooming, Incontinence, Transfers, Toileting  Dependent IADLs:: Cleaning, Cooking, Laundry, Shopping, Meal Preparation, Incontinence, Transportation, Money Management, Medication Management    Health Plan sponsored benefits: [employer] [facility]: Shared information regarding [employer] Program. Reviewed preventative health screening and health plan supplemental benefits/incentives. Reviewed medication disposal form.    CFSS Assessment completed at visit: Yes Annual CFSS assessment indicated 4.5 hours per day of CFSS. This is the same as the previous assessment.     Elderly Waiver Eligibility: Yes-will continue on EW    Care Plan & Recommendations: Manfred will continue to live with his family. He will continue with  elderly waiver, PCA/CFSS, adult day care, and supplies. Family has chosen [facility] as the consultation service provider- referral made. Encouraged to schedule an annual wellness exam. Encouraged to schedule a hearing exam.     See [place] detailed [place] information.    Follow-Up Plan: Member informed of future contact, plan to f/u with member with a 6 month telephone assessment.  Contact information shared with member and family, encouraged member to call with any questions or concerns at any time.    [facility] care continuum providers: Please see Snapshot and Care Management Flowsheets for Specific details of care plan.    This CC note routed to PCP, Bradley Hopkins LSW  [facility]   [phone] (Office)  [email or web address]

## 2025-05-01 ENCOUNTER — PATIENT OUTREACH (OUTPATIENT)
Dept: GERIATRIC MEDICINE | Facility: CLINIC | Age: OVER 89
End: 2025-05-01
Payer: COMMERCIAL

## 2025-05-01 NOTE — LETTER
May 1, 2025    ALVA SIVONGXAY  [id number] [address]      Marjan Shearer,    I hope you ve been well since we last spoke. Attached is your copy of your personalized Support Plan which summarizes our conversation.   My goal is to help you keep your health on track. Your Support Plan includes the steps we agreed would help you with that. We can talk about these steps during our next meeting or sooner if you d like.   Here are additional programs and services I can help you with:    Get to appointments with [facility]    If you need a ride to medical or dental visits, [facility] can help. Call to schedule a ride. 3 [phone] ([phone]), 8 a.m. - 6 p.m. CT, Monday - Friday.    Access One-Pass to boost your health    Get a gym membership, at-home fitness classes, and free access to fun activities that help your brain. To get access, go to [employer] or call One-Pass.   [phone] ([phone]), 8 a.m. - 9 p.m. CT, Monday - Friday      Set up your health care directive  A directive is a legal form that explains your health care wishes. You can request this form from me, and I ll walk you through it before you discuss your plans with your doctor.    Schedule your annual physical  I can help set up your annual physical at your clinic of choice. It's an important step towards good health.      Have questions? I m here to help.  Call me at [phone] ([phone]) 8am - 5pm, Monday - Friday.  I ll reach out to you again in 6 months to follow up via telephone.  Warm regards,    BRITTNEY Avilez    E-mail: [email or web address]  Phone: [phone]      [employer]    cc: member records                                                                    [id number]

## 2025-05-01 NOTE — PROGRESS NOTES
[facility] Care Coordination Contact    Received after visit chart from care coordinator.  Completed following tasks: Mailed copy of support plan to member, Mailed [place] signature sheet pages 3-4, Mailed Safe Medication Disposal , Mailed [employer] Leave Behind Letter, Submitted referrals/auths for ADC, and Updated services in Database.  , Provider Signature - Full Support Plan:  Member indicates that they would like their support plan shared with the following EW providers:  [employer] [facility].  Letter and full support plan faxed to providers for signature.    , and [employer]:  Faxed completed CFSS assessment summary to CFSS Agency. Mailed member supplemental summary chart and assessment summary. Emailed referral form for auth to [employer].    Raysa Serrano  Care Management Specialist  [facility]  [phone]

## 2025-05-19 DIAGNOSIS — E11.21 TYPE 2 DIABETES MELLITUS WITH DIABETIC NEPHROPATHY, WITHOUT LONG-TERM CURRENT USE OF INSULIN (H): ICD-10-CM

## 2025-05-19 DIAGNOSIS — E78.5 HYPERLIPIDEMIA WITH TARGET LDL LESS THAN 100: ICD-10-CM

## 2025-05-19 DIAGNOSIS — I10 HYPERTENSION GOAL BP (BLOOD PRESSURE) < 140/90: ICD-10-CM

## 2025-05-20 RX ORDER — LOSARTAN POTASSIUM AND HYDROCHLOROTHIAZIDE 12.5; 5 MG/1; MG/1
2 TABLET ORAL
Qty: 180 TABLET | Refills: 1 | Status: SHIPPED | OUTPATIENT
Start: 2025-05-20

## 2025-05-20 RX ORDER — ATORVASTATIN CALCIUM 10 MG/1
10 TABLET, FILM COATED ORAL DAILY
Qty: 90 TABLET | Refills: 0 | Status: SHIPPED | OUTPATIENT
Start: 2025-05-20

## 2025-05-29 ENCOUNTER — OFFICE VISIT (OUTPATIENT)
Dept: FAMILY MEDICINE | Facility: CLINIC | Age: OVER 89
End: 2025-05-29
Payer: COMMERCIAL

## 2025-05-29 VITALS
WEIGHT: 141.4 LBS | SYSTOLIC BLOOD PRESSURE: 138 MMHG | DIASTOLIC BLOOD PRESSURE: 64 MMHG | TEMPERATURE: 97.7 F | HEART RATE: 78 BPM | BODY MASS INDEX: 23.56 KG/M2 | OXYGEN SATURATION: 99 % | RESPIRATION RATE: 14 BRPM | HEIGHT: 65 IN

## 2025-05-29 DIAGNOSIS — J44.9 CHRONIC OBSTRUCTIVE PULMONARY DISEASE, UNSPECIFIED COPD TYPE (H): ICD-10-CM

## 2025-05-29 DIAGNOSIS — I10 HYPERTENSION GOAL BP (BLOOD PRESSURE) < 140/90: ICD-10-CM

## 2025-05-29 DIAGNOSIS — I48.19 PERSISTENT ATRIAL FIBRILLATION (H): ICD-10-CM

## 2025-05-29 DIAGNOSIS — Z13.6 SCREENING FOR CARDIOVASCULAR CONDITION: ICD-10-CM

## 2025-05-29 DIAGNOSIS — Z79.899 MEDICATION MANAGEMENT: ICD-10-CM

## 2025-05-29 DIAGNOSIS — E78.5 HYPERLIPIDEMIA WITH TARGET LDL LESS THAN 100: ICD-10-CM

## 2025-05-29 DIAGNOSIS — E11.21 TYPE 2 DIABETES MELLITUS WITH DIABETIC NEPHROPATHY, WITHOUT LONG-TERM CURRENT USE OF INSULIN (H): ICD-10-CM

## 2025-05-29 DIAGNOSIS — N18.2 CKD (CHRONIC KIDNEY DISEASE) STAGE 2, GFR 60-89 ML/MIN: ICD-10-CM

## 2025-05-29 DIAGNOSIS — Z00.00 ENCOUNTER FOR MEDICARE ANNUAL WELLNESS EXAM: Primary | ICD-10-CM

## 2025-05-29 DIAGNOSIS — K21.9 GASTROESOPHAGEAL REFLUX DISEASE WITHOUT ESOPHAGITIS: ICD-10-CM

## 2025-05-29 DIAGNOSIS — H91.93 BILATERAL HEARING LOSS, UNSPECIFIED HEARING LOSS TYPE: ICD-10-CM

## 2025-05-29 DIAGNOSIS — I48.91 ATRIAL FIBRILLATION, UNSPECIFIED TYPE (H): ICD-10-CM

## 2025-05-29 LAB
ERYTHROCYTE [DISTWIDTH] IN BLOOD BY AUTOMATED COUNT: 13.4 % (ref 10–15)
EST. AVERAGE GLUCOSE BLD GHB EST-MCNC: 143 MG/DL
HBA1C MFR BLD: 6.6 % (ref 0–5.6)
HCT VFR BLD AUTO: 36.5 % (ref 40–53)
HGB BLD-MCNC: 11.4 G/DL (ref 13.3–17.7)
MCH RBC QN AUTO: 23.4 PG (ref 26.5–33)
MCHC RBC AUTO-ENTMCNC: 31.2 G/DL (ref 31.5–36.5)
MCV RBC AUTO: 75 FL (ref 78–100)
PLATELET # BLD AUTO: 231 10E3/UL (ref 150–450)
RBC # BLD AUTO: 4.88 10E6/UL (ref 4.4–5.9)
WBC # BLD AUTO: 6.9 10E3/UL (ref 4–11)

## 2025-05-29 PROCEDURE — 99207 PR FOOT EXAM NO CHARGE: CPT | Performed by: FAMILY MEDICINE

## 2025-05-29 PROCEDURE — 82570 ASSAY OF URINE CREATININE: CPT | Performed by: FAMILY MEDICINE

## 2025-05-29 PROCEDURE — 3075F SYST BP GE 130 - 139MM HG: CPT | Performed by: FAMILY MEDICINE

## 2025-05-29 PROCEDURE — G0439 PPPS, SUBSEQ VISIT: HCPCS | Performed by: FAMILY MEDICINE

## 2025-05-29 PROCEDURE — 83036 HEMOGLOBIN GLYCOSYLATED A1C: CPT | Performed by: FAMILY MEDICINE

## 2025-05-29 PROCEDURE — 80053 COMPREHEN METABOLIC PANEL: CPT | Performed by: FAMILY MEDICINE

## 2025-05-29 PROCEDURE — 3078F DIAST BP <80 MM HG: CPT | Performed by: FAMILY MEDICINE

## 2025-05-29 PROCEDURE — 80061 LIPID PANEL: CPT | Performed by: FAMILY MEDICINE

## 2025-05-29 PROCEDURE — 85027 COMPLETE CBC AUTOMATED: CPT | Performed by: FAMILY MEDICINE

## 2025-05-29 PROCEDURE — 99214 OFFICE O/P EST MOD 30 MIN: CPT | Mod: 25 | Performed by: FAMILY MEDICINE

## 2025-05-29 PROCEDURE — 82043 UR ALBUMIN QUANTITATIVE: CPT | Performed by: FAMILY MEDICINE

## 2025-05-29 PROCEDURE — G2211 COMPLEX E/M VISIT ADD ON: HCPCS | Performed by: FAMILY MEDICINE

## 2025-05-29 PROCEDURE — 36415 COLL VENOUS BLD VENIPUNCTURE: CPT | Performed by: FAMILY MEDICINE

## 2025-05-29 RX ORDER — POTASSIUM CHLORIDE 1500 MG/1
20 TABLET, EXTENDED RELEASE ORAL DAILY
Qty: 90 TABLET | Refills: 3 | Status: SHIPPED | OUTPATIENT
Start: 2025-05-29

## 2025-05-29 RX ORDER — FLUTICASONE FUROATE, UMECLIDINIUM BROMIDE AND VILANTEROL TRIFENATATE 100; 62.5; 25 UG/1; UG/1; UG/1
1 POWDER RESPIRATORY (INHALATION) DAILY
Qty: 60 EACH | Refills: 1 | Status: SHIPPED | OUTPATIENT
Start: 2025-05-29

## 2025-05-29 RX ORDER — PANTOPRAZOLE SODIUM 40 MG/1
40 TABLET, DELAYED RELEASE ORAL DAILY
Qty: 90 TABLET | Refills: 3 | Status: SHIPPED | OUTPATIENT
Start: 2025-05-29

## 2025-05-29 RX ORDER — DILTIAZEM HYDROCHLORIDE 120 MG/1
120 CAPSULE, COATED, EXTENDED RELEASE ORAL DAILY
Qty: 90 CAPSULE | Refills: 3 | Status: SHIPPED | OUTPATIENT
Start: 2025-05-29

## 2025-05-29 RX ORDER — ATORVASTATIN CALCIUM 10 MG/1
10 TABLET, FILM COATED ORAL DAILY
Qty: 90 TABLET | Refills: 0 | Status: SHIPPED | OUTPATIENT
Start: 2025-05-29

## 2025-05-29 SDOH — HEALTH STABILITY: PHYSICAL HEALTH: ON AVERAGE, HOW MANY DAYS PER WEEK DO YOU ENGAGE IN MODERATE TO STRENUOUS EXERCISE (LIKE A BRISK WALK)?: 0 DAYS

## 2025-05-29 ASSESSMENT — SOCIAL DETERMINANTS OF HEALTH (SDOH): HOW OFTEN DO YOU GET TOGETHER WITH FRIENDS OR RELATIVES?: MORE THAN THREE TIMES A WEEK

## 2025-05-29 NOTE — PROGRESS NOTES
Preventive Care Visit  [facility] [place]  Bradley Hopkins DO, Family Medicine  May 29, 2025      Assessment & Plan     Encounter for Medicare annual wellness exam  Health maintenance reviewed and updated. Emphasized importance of balanced diet and regular exercise.    Type 2 diabetes mellitus with diabetic nephropathy, without long-term current use of insulin (H)  Well controlled. Continue current plan. Recheck labs. Follow up in 6-12 months.  - Comprehensive metabolic panel (BMP + Alb, Alk Phos, ALT, AST, Total. Bili, TP); Future  - Hemoglobin A1c; Future  - Albumin Random Urine Quantitative with Creat Ratio; Future  - atorvastatin (LIPITOR) 10 MG tablet; Take 1 tablet (10 mg) by mouth daily.  - metFORMIN (GLUCOPHAGE) 500 MG tablet; Take 1 tablet (500 mg) by mouth 2 times daily (with meals).  - FOOT EXAM  - Comprehensive metabolic panel (BMP + Alb, Alk Phos, ALT, AST, Total. Bili, TP)  - Hemoglobin A1c  - Albumin Random Urine Quantitative with Creat Ratio    CKD (chronic kidney disease) stage 2, GFR 60-89 ml/min  - Comprehensive metabolic panel (BMP + Alb, Alk Phos, ALT, AST, Total. Bili, TP); Future  - CBC with platelets; Future  - Comprehensive metabolic panel (BMP + Alb, Alk Phos, ALT, AST, Total. Bili, TP)  - CBC with platelets        Bilateral hearing loss, unspecified hearing loss type   - Adult ENT  Referral; Future    Hyperlipidemia with target LDL less than 100  Stable, continue atorvastatin  - Lipid panel reflex to direct LDL Non-fasting; Future  - atorvastatin (LIPITOR) 10 MG tablet; Take 1 tablet (10 mg) by mouth daily.  - Lipid panel reflex to direct LDL Non-fasting    Hypertension goal BP (blood pressure) < 140/90  Well controlled.  - potassium chloride ganga ER (KLOR-CON M20) 20 MEQ CR tablet; Take 1 tablet (20 mEq) by mouth daily.    Gastroesophageal reflux disease without esophagitis  - pantoprazole (PROTONIX) 40 MG EC tablet; Take 1 tablet (40 mg) by mouth  daily.    Persistent atrial fibrillation (H)  Stable, continue diltiazem, Xarelto.   - diltiazem ER COATED BEADS (CARDIZEM CD/CARTIA XT) 120 MG 24 hr capsule; Take 1 capsule (120 mg) by mouth daily.    Chronic obstructive pulmonary disease, unspecified COPD type (H)  Well controlled  - beclomethasone HFA (QVAR REDIHALER) 40 MCG/ACT inhaler; Inhale 1 puff into the lungs 2 times daily as needed (cough, shortness of breath).  - Fluticasone-Umeclidin-Vilant (TRELEGY ELLIPTA) 100-62.5-25 MCG/ACT oral inhaler; Inhale 1 puff into the lungs daily.    Atrial fibrillation, unspecified type (H)  - diltiazem ER COATED BEADS (CARDIZEM CD/CARTIA XT) 120 MG 24 hr capsule; Take 1 capsule (120 mg) by mouth daily.    Patient has been advised of split billing requirements and indicates understanding: Yes        Counseling  Appropriate preventive services were addressed with this patient via screening, questionnaire, or discussion as appropriate for fall prevention, nutrition, physical activity, Tobacco-use cessation, social engagement, weight loss and cognition.  Checklist reviewing preventive services available has been given to the patient.      Juwan Shearer is a 89 year old, presenting for the following:  Physical        5/29/2025     3:20 PM   Additional Questions   Roomed by Tameka DUNHAM   Accompanied by Son          HPI    Diabetes Follow-up    How often are you checking your blood sugar? One time daily  What time of day are you checking your blood sugars (select all that apply)?  Before meals  Have you had any blood sugars above 200?  No  Have you had any blood sugars below 70?  No  What symptoms do you notice when your blood sugar is low?  Blurred vision  What concerns do you have today about your diabetes? None   Do you have any of these symptoms? (Select all that apply)  No numbness or tingling in feet.  No redness, sores or blisters on feet.  No complaints of excessive thirst.  No reports of blurry vision.  No  significant changes to weight.    Checks BG once daily in AM -- usually around 120      Hyperlipidemia Follow-Up    Are you regularly taking any medication or supplement to lower your cholesterol?   Yes- atorvastatin  Are you having muscle aches or other side effects that you think could be caused by your cholesterol lowering medication?  No    Hypertension Follow-up    Do you check your blood pressure regularly outside of the clinic? Yes   Are you following a low salt diet? Yes  Are your blood pressures ever more than 140 on the top number (systolic) OR more   than 90 on the bottom number (diastolic), for example 140/90? No    Denies any headaches, lightheadedness, dizziness, vision changes, chest pain, palpitations, shortness of breath, dyspnea, numbness/tingling.      BP Readings from Last 2 Encounters:   05/29/25 (!) 140/68   05/22/24 (!) 146/76     Hemoglobin A1C (%)   Date Value   05/22/2024 7.9 (H)   09/18/2023 6.4 (H)   01/19/2021 7.4 (H)   10/18/2019 6.5 (H)     LDL Cholesterol Calculated (mg/dL)   Date Value   05/22/2024 63   02/27/2023 69   01/19/2021 64   04/24/2019 58     Advance Care Planning    Discussed advance care planning with patient; informed AVS has link to [employer].        5/29/2025   General Health   How would you rate your overall physical health? Good   Feel stress (tense, anxious, or unable to sleep) Only a little   (!) STRESS CONCERN      5/29/2025   Nutrition   Diet: Low salt    Low fat/cholesterol    Diabetic       Multiple values from one day are sorted in reverse-chronological order         5/29/2025   Exercise   Days per week of moderate/strenous exercise 0 days   (!) EXERCISE CONCERN      5/29/2025   Social Factors   Frequency of gathering with friends or relatives More than three times a week   Worry food won't last until get money to buy more No   Food not last or not have enough money for food? No   Do you have housing? (Housing is defined as stable permanent housing and  does not include staying outside in a car, in a tent, in an abandoned building, in an overnight shelter, or couch-surfing.) Yes   Are you worried about losing your housing? No   Lack of transportation? No   Unable to get utilities (heat,electricity)? No         2025   Fall Risk   Fallen 2 or more times in the past year? No   Trouble with walking or balance? No          2025   Activities of Daily Living- Home Safety   Needs help with the following daily activites Transportation    Preparing meals    Housework    Bathing    Laundry    Medication administration    Dressing   Safety concerns in the home None of the above       Multiple values from one day are sorted in reverse-chronological order         2025   Dental   Dentist two times every year? Yes         2025   Hearing Screening   Hearing concerns? (!) I FEEL THAT PEOPLE ARE MUMBLING OR NOT SPEAKING CLEARLY.    (!) I NEED TO ASK PEOPLE TO SPEAK UP OR REPEAT THEMSELVES.    (!) IT'S HARD TO FOLLOW A CONVERSATION IN A NOISY RESTAURANT OR CROWDED ROOM.       Multiple values from one day are sorted in reverse-chronological order         2025   Driving Risk Screening   Patient/family members have concerns about driving No         2025   General Alertness/Fatigue Screening   Have you been more tired than usual lately? No         2025   Urinary Incontinence Screening   Bothered by leaking urine in past 6 months Yes             2025   Substance Use   Alcohol more than 3/day or more than 7/wk No   Do you have a current opioid prescription? No   How severe/bad is pain from 1 to 10? 5/10   Do you use any other substances recreationally? No    (!) DECLINE        Social History     Tobacco Use    Smoking status: Former     Current packs/day: 0.00     Average packs/day: 0.5 packs/day for 20.0 years (10.0 ttl pk-yrs)     Types: Cigarettes     Start date: 1961     Quit date: 1981     Years since quittin.0    Smokeless  tobacco: Never    Tobacco comments:     quit in 1981    Vaping Use    Vaping status: Never Used   Substance Use Topics    Alcohol use: No     Alcohol/week: 0.0 standard drinks of alcohol     Comment: quit in 1981    Drug use: No     Comment: no herbal meds either             Reviewed and updated as needed this visit by Provider                    Past Medical History:   Diagnosis Date    Acute duodenal ulcer with hemorrhage, without mention of obstruction 11/15/03    and pyloric also - required hosp and transfusion 2 units- H. pylori negative    Allergic rhinitis, cause unspecified     Calculus of gallbladder without mention of cholecystitis or obstruction 11/15/03    no residual pain- transient cholestasis for 2 days    Colon Polyps normal 5/2010 2008 = one hyperplastic & one tubular adenoma - no high grade dysplasis - repeat in 2013     Diverticulosis of colon (without mention of hemorrhage) 11/03    Dyspepsia and other specified disorders of function of stomach     dyspepsia,  h/o bleeding stomach ulcer in 1985    Esophageal reflux     Genital herpes, unspecified     Paroxysmal atrial fibrillation (H) 12/21/2016    [facility] ER.    Pneumonia, organism unspecified(486) 11/15/03    right middle and lower lobe    Pure hypercholesterolemia     Pure hyperglyceridemia     Tear meniscus knee 2009    degenerative on right     Type II or unspecified type diabetes mellitus without mention of complication, not stated as uncontrolled at age 63    Unspecified essential hypertension      Past Surgical History:   Procedure Laterality Date    HC UGI ENDOSCOPY DIAG W OR W/O BRUSH/WASH  11/03    for GI bleed - showed pyloric and duodenal  ulcer     [facility] COLONOSCOPY THRU STOMA, DIAGNOSTIC  11/03    for GI bleed - diverticulosis     [facility]HC COLONOSCOPY THRU STOMA, DIAGNOSTIC  5/2010    normal - repeat in 5 years      Current providers sharing in care for this patient include:  Patient Care Team:  Bradley Hopkins DO as  "PCP - General (Family Practice)  Bradley Hopkins,  as Assigned PCP  Chelly Wood, TCW as Lead Care Coordinator (Primary Care - CC)  Selin Thompson RPH as Pharmacist (Pharmacist)    The following health maintenance items are reviewed in Epic and correct as of today:  Health Maintenance   Topic Date Due    ZOSTER VACCINE (1 of 2) Never done    RSV VACCINE (1 - 1-dose 75+ series) Never done    COVID-19 VACCINE (6 - 2024-25 season) 09/01/2024    DIABETIC FOOT EXAM  09/18/2024    ANNUAL REVIEW OF HM ORDERS  09/18/2024    A1C  11/22/2024    Medicare Annual MTM Pharmacist Visit (once per calendar year)  Never done    BMP  05/22/2025    LIPID  05/22/2025    MICROALBUMIN  05/22/2025    MEDICARE ANNUAL WELLNESS VISIT  05/22/2025    HEMOGLOBIN  05/22/2025    INFLUENZA VACCINE (Season Ended) 09/01/2025    EYE EXAM  11/05/2025    FALL RISK ASSESSMENT  05/29/2026    DTAP/TDAP/TD VACCINE (2 - Td or Tdap) 07/31/2027    ADVANCE CARE PLANNING  05/22/2029    SPIROMETRY  Completed    COPD ACTION PLAN  Completed    PHQ-2 (once per calendar year)  Completed    PNEUMOCOCCAL VACCINE 50+ YEARS  Completed    URINALYSIS  Completed    HPV VACCINE  Aged Out    MENINGITIS VACCINE  Aged Out         Review of Systems  Constitutional, HEENT, cardiovascular, pulmonary, gi and gu systems are negative, except as otherwise noted.     Objective    Exam  BP (!) 140/68   Pulse 78   Temp 97.7  F (36.5  C) (Tympanic)   Resp 14   Ht 1.651 m (5' 5\")   Wt 64.1 kg (141 lb 6.4 oz)   SpO2 99%   BMI 23.53 kg/m     Estimated body mass index is 23.53 kg/m  as calculated from the following:    Height as of this encounter: 1.651 m (5' 5\").    Weight as of this encounter: 64.1 kg (141 lb 6.4 oz).    Physical Exam  GENERAL: alert and no distress  EYES: Eyes grossly normal to inspection  HENT: ear canals and TM's normal, nose and mouth without ulcers or lesions  NECK: no adenopathy, no asymmetry, masses, or scars  RESP: lungs clear to auscultation - " no rales, rhonchi or wheezes  CV: regular rate and rhythm, normal S1 S2, no S3 or S4, no murmur, click or rub, no peripheral edema  ABDOMEN: soft, nontender, no hepatosplenomegaly, no masses and bowel sounds normal  MS: no gross musculoskeletal defects noted, no edema  SKIN: no suspicious lesions or rashes  PSYCH: mentation appears normal, affect normal/bright         5/29/2025   Mini Cog   Clock Draw Score 0 Abnormal   3 Item Recall 0 objects recalled   Mini Cog Total Score 0   Language barrier       The longitudinal plan of care for the diagnosis(es)/condition(s) as documented were addressed during this visit. Due to the added complexity in care, I will continue to support Manfred in the subsequent management and with ongoing continuity of care.    Signed Electronically by: Bradley Hopkins DO

## 2025-05-30 LAB
ALBUMIN SERPL BCG-MCNC: 4.5 G/DL (ref 3.5–5.2)
ALP SERPL-CCNC: 63 U/L (ref 40–150)
ALT SERPL W P-5'-P-CCNC: 14 U/L (ref 0–70)
ANION GAP SERPL CALCULATED.3IONS-SCNC: 8 MMOL/L (ref 7–15)
AST SERPL W P-5'-P-CCNC: 19 U/L (ref 0–45)
BILIRUB SERPL-MCNC: 0.8 MG/DL
BUN SERPL-MCNC: 15.5 MG/DL (ref 8–23)
CALCIUM SERPL-MCNC: 9.8 MG/DL (ref 8.8–10.4)
CHLORIDE SERPL-SCNC: 95 MMOL/L (ref 98–107)
CHOLEST SERPL-MCNC: 161 MG/DL
CREAT SERPL-MCNC: 1.11 MG/DL (ref 0.67–1.17)
CREAT UR-MCNC: 29.5 MG/DL
EGFRCR SERPLBLD CKD-EPI 2021: 63 ML/MIN/1.73M2
FASTING STATUS PATIENT QL REPORTED: NO
FASTING STATUS PATIENT QL REPORTED: NO
GLUCOSE SERPL-MCNC: 146 MG/DL (ref 70–99)
HCO3 SERPL-SCNC: 28 MMOL/L (ref 22–29)
HDLC SERPL-MCNC: 40 MG/DL
LDLC SERPL CALC-MCNC: 85 MG/DL
MICROALBUMIN UR-MCNC: 30.9 MG/L
MICROALBUMIN/CREAT UR: 104.75 MG/G CR (ref 0–17)
NONHDLC SERPL-MCNC: 121 MG/DL
POTASSIUM SERPL-SCNC: 4.5 MMOL/L (ref 3.4–5.3)
PROT SERPL-MCNC: 7.4 G/DL (ref 6.4–8.3)
SODIUM SERPL-SCNC: 131 MMOL/L (ref 135–145)
TRIGL SERPL-MCNC: 179 MG/DL

## 2025-06-05 ENCOUNTER — RESULTS FOLLOW-UP (OUTPATIENT)
Dept: FAMILY MEDICINE | Facility: CLINIC | Age: OVER 89
End: 2025-06-05

## 2025-06-05 DIAGNOSIS — E87.1 HYPONATREMIA: Primary | ICD-10-CM

## 2025-06-05 DIAGNOSIS — D50.9 MICROCYTIC ANEMIA: ICD-10-CM

## 2025-06-09 NOTE — PATIENT INSTRUCTIONS
Patient Education   Preventive Care Advice   This is general advice given by our system to help you stay healthy. However, your care team may have specific advice just for you. Please talk to your care team about your preventive care needs.  Nutrition  Eat 5 or more servings of fruits and vegetables each day.  Try wheat bread, brown rice and whole grain pasta (instead of white bread, rice, and pasta).  Get enough calcium and vitamin D. Check the label on foods and aim for 100% of the RDA (recommended daily allowance).  Lifestyle  Exercise at least 150 minutes each week  (30 minutes a day, 5 days a week).  Do muscle strengthening activities 2 days a week. These help control your weight and prevent disease.  No smoking.  Wear sunscreen to prevent skin cancer.  Have a dental exam and cleaning every 6 months.  Yearly exams  See your health care team every year to talk about:  Any changes in your health.  Any medicines your care team has prescribed.  Preventive care, family planning, and ways to prevent chronic diseases.  Shots (vaccines)   HPV shots (up to age 26), if you've never had them before.  Hepatitis B shots (up to age 59), if you've never had them before.  COVID-19 shot: Get this shot when it's due.  Flu shot: Get a flu shot every year.  Tetanus shot: Get a tetanus shot every 10 years.  Pneumococcal, hepatitis A, and RSV shots: Ask your care team if you need these based on your risk.  Shingles shot (for age 50 and up)  General health tests  Diabetes screening:  Starting at age 35, Get screened for diabetes at least every 3 years.  If you are younger than age 35, ask your care team if you should be screened for diabetes.  Cholesterol test: At age 39, start having a cholesterol test every 5 years, or more often if advised.  Bone density scan (DEXA): At age 50, ask your care team if you should have this scan for osteoporosis (brittle bones).  Hepatitis C: Get tested at least once in your life.  STIs (sexually  transmitted infections)  Before age 24: Ask your care team if you should be screened for STIs.  After age 24: Get screened for STIs if you're at risk. You are at risk for STIs (including HIV) if:  You are sexually active with more than one person.  You don't use condoms every time.  You or a partner was diagnosed with a sexually transmitted infection.  If you are at risk for HIV, ask about PrEP medicine to prevent HIV.  Get tested for HIV at least once in your life, whether you are at risk for HIV or not.  Cancer screening tests  Cervical cancer screening: If you have a cervix, begin getting regular cervical cancer screening tests starting at age 21.  Breast cancer scan (mammogram): If you've ever had breasts, begin having regular mammograms starting at age 40. This is a scan to check for breast cancer.  Colon cancer screening: It is important to start screening for colon cancer at age 45.  Have a colonoscopy test every 10 years (or more often if you're at risk) Or, ask your provider about stool tests like a FIT test every year or Cologuard test every 3 years.  To learn more about your testing options, visit:   .  For help making a decision, visit:   https://bit.ly/dz69893.  Prostate cancer screening test: If you have a prostate, ask your care team if a prostate cancer screening test (PSA) at age 55 is right for you.  Lung cancer screening: If you are a current or former smoker ages 50 to 80, ask your care team if ongoing lung cancer screenings are right for you.  For informational purposes only. Not to replace the advice of your health care provider. Copyright   2023 [employer]. All rights reserved. Clinically reviewed by the [facility] Transitions Program. [employer] [id number] - REV 01/24.

## 2025-06-09 NOTE — PROGRESS NOTES
Medication Therapy Management (MTM) Encounter    ASSESSMENT:                            Medication Adherence/Access: No issues identified.     Diabetes  Patient is meeting A1c goal of <8%. Self monitoring of blood glucose is at goal of post prandial <200 mg/dL. Could consider discontinuing metformin due to well controlled blood glucose.     Hypertension/Afib  Patient is meeting blood pressure goal of < 140/90 mmHg. Continue current regimen.     Hyperlipidemia   Stable. Patient is meeting goal LDL <100 mg/dL (could consider tighter goal <70, but <100 ok due to age).     COPD   Stable. Educated patient that he should take Trelegy as directed because he was taking more than the maximum dose. Recommend patient rinse his mouth out after using Trelegy to avoid oral thrush. Recommend patient only use Qvar as needed for trouble breathing due to overlap of medication with Trelegy (both contain ICS). Could consider switching QVAR to albuterol, which is used more for as needed cough and SOB vs duplicated ICS therapy.     Constipation  Stable.     GERD  Current therapy is effective.     Pain   Stable. Pain is well controlled and no changes recommended.    Immunizations  Due for COVID, Shingles, RSV vaccine(s) per CDC/ACIP recommendations. Patient declines at this time.       PLAN:                            Remember to only inhale 1 puff of Trelegy once daily.    Rinse mouth out with water after using Trelegy inhaler to prevent oral thrush.     Follow-up: Return in about 1 year (around 6/11/2026) for Medication Therapy Management or earlier if questions/concerns.    SUBJECTIVE/OBJECTIVE:                          Manfred Caraballo is a 89 year old male seen for a yearly medication review. He was referred to MTM by their insurance plan. Patient was accompanied by his son and used a NPR .     PCP: Dr. Bradley Hopkins at [facility], on [address]  Reason for visit: Medicare annual visit, no specific patient  concerns    Allergies/ADRs: Reviewed in chart  Past Medical History: Reviewed in chart  Tobacco: He reports that he quit smoking about 44 years ago. His smoking use included cigarettes. He started smoking about 64 years ago. He has a 10 pack-year smoking history. He has never used smokeless tobacco.  Alcohol: none    Medication Adherence/Access: no issues reported. Son sets up his pill box and follows medication labels.     Diabetes   Metformin 500 mg twice daily     Side effects: none reported. Denies dizziness and fatigue.   Current diabetes symptoms: none reported.  Diet/Exercise: Typically eats bread in the morning, rice for lunch and dinner, along with fish and vegetables throughout the day. Drinks a protein drink daily. Walks for 15 minutes every day.      Blood sugar monitoring: Checks at home once daily after eating breakfast. Usually around 120-130 mg/dL  Eye exam is up to date  Foot exam is up to date    Hypertension /Afib  Diltiazem  mg daily   Losartan-hydrochlorothiazide 100-25 daily  Rivaroxaban 15 mg daily   Potassium chloride 20 meq daily     Side effects: none reported. Denies dizziness, bleeding, or bruising.   Monitors blood pressure at home. Patient reports 130/70 mmHg yesterday. Son reports it is usually 120/70 mmHg.      Hyperlipidemia   Atorvastatin 10 mg daily     Patient reports no significant myalgias or other side effects.     COPD   Beclomethasone (Qvar redihaler) 1 puff 2 times daily as needed - hasn't used yet   Fluticasone-ume-vilant (Trelegy ellipta) 2 puffs daily - prescribed as 1 puff daily   Ipratropium-albuterol (Duoneb) as needed - hasn't used for a while    Side effects: none reported.   Patient reports inhaling 2 puffs of Trelegy daily. He does not rinse his mouth out after use. His breathing has been okay and denies SOB and coughing. He denies any symptoms of allergies. Son reports he used to hear his breathing, this has quieted down in the last 6 months.     "  Constipation   Polyethylene glycol 17 grams daily once daily    Side effects: none  Patient feels that current therapy is effective. He reports 1 bowel movement daily.         GERD    Pantoprazole 40 mg daily    Patient reports occasional burning feeling closer to stomach. Hasn't felt this in 3 weeks. It never requires him to take anything else over the counter, reports it is manageable. He had a stomach ulcer \"many years ago\". Chart review indicates this happened in 1985.      Pain   Acetaminophen 325 mg as needed - usually 1 tablet every 2 days    Side effects: none.   Pain type/location: Knee and lower back   Patient feels that current therapy is effective.         Immunizations  Most Recent Immunizations   Administered Date(s) Administered    COVID-19 12+ (MODERNA) 12/18/2023    COVID-19 Bivalent 12+ (Pfizer) 10/04/2022    COVID-19 MONOVALENT 12+ (Pfizer) 11/01/2021    Hepatitis A (VAQTA)(ADULT 19+) 01/06/2020    INFLUENZA,TRIVALENT (FLUCELVAX) 10/14/2015    Influenza (High Dose) Trivalent,PF (Fluzone) 10/18/2019    Influenza (IIV3) PF 08/31/2014    Influenza Vaccine 65+ (FLUAD) 10/04/2022    Influenza Vaccine 65+ (Fluzone HD) 09/18/2023    Influenza Vaccine >6 months,quad, PF 10/02/2018    Pneumo Conj 13-V (2010&after) 11/20/2015    Pneumococcal 23 valent 11/22/2002    TD,PF 7+ (Tenivac) 02/09/2005    TDAP Vaccine (Adacel) 07/31/2017    Typhoid IM 01/06/2020           Today's Vitals: There were no vitals taken for this visit.  ----------------    I spent 45 minutes with this patient today. I offer these suggestions for consideration by the patient and his PCP.     A summary of these recommendations was sent via [employer].    Leonila Jimenez  [facility]   4th Year Pharmacy Student     Karen Gary, PharmD   Medication Therapy Management   Pharmacy Resident    Manfred Caraballo was seen independently by Dr. Lillian Gray.  I have reviewed and agree with the resident note and plan of care.      Harmony " Sumit, PharmD [facility]  Medication Therapy Management Provider  [phone]    Telemedicine Visit Details  The patient's medications can be safely assessed via a telemedicine encounter.  Type of service:  Telephone visit  Originating Location (pt. Location): Home    Distant Location (provider location):  On-site  Start Time: 3:00 pm  End Time: 3:45 pm      Medication Therapy Recommendations  COPD (chronic obstructive pulmonary disease) (H)   1 Current Medication: Fluticasone-Umeclidin-Vilant (TRELEGY ELLIPTA) 100-62.5-25 MCG/ACT oral inhaler   Current Medication Sig: Inhale 1 puff into the lungs daily.   Rationale: Dose too high - Dosage too high - Safety   Recommendation: Decrease Dose - Decrease to prescribed dose 1 puff daily   Status: Accepted - no CPA Needed   Identified Date: 6/11/2025 Completed Date: 6/11/2025         2 Current Medication: Fluticasone-Umeclidin-Vilant (TRELEGY ELLIPTA) 100-62.5-25 MCG/ACT oral inhaler   Current Medication Sig: Inhale 1 puff into the lungs daily.   Rationale: Treating avoidable adverse medication reaction - Unnecessary medication therapy - Indication   Recommendation: Provide Education - Rinse mouth out after using ICS   Status: Patient Agreed - Adherence/Education   Identified Date: 6/11/2025 Completed Date: 6/11/2025         Vaccine counseling   1 Rationale: Preventive therapy - Needs additional medication therapy - Indication   Recommendation: Order Vaccine - COVID-19 MRNA VIRUS VACCINE   Status: Declined per Patient   Identified Date: 6/11/2025 Completed Date: 6/11/2025

## 2025-06-11 ENCOUNTER — PATIENT OUTREACH (OUTPATIENT)
Dept: GERIATRIC MEDICINE | Facility: CLINIC | Age: OVER 89
End: 2025-06-11
Payer: COMMERCIAL

## 2025-06-11 ENCOUNTER — VIRTUAL VISIT (OUTPATIENT)
Dept: PHARMACY | Facility: CLINIC | Age: OVER 89
End: 2025-06-11
Payer: COMMERCIAL

## 2025-06-11 DIAGNOSIS — Z71.85 VACCINE COUNSELING: ICD-10-CM

## 2025-06-11 DIAGNOSIS — E78.5 HYPERLIPIDEMIA WITH TARGET LDL LESS THAN 100: ICD-10-CM

## 2025-06-11 DIAGNOSIS — R52 PAIN: ICD-10-CM

## 2025-06-11 DIAGNOSIS — J44.9 CHRONIC OBSTRUCTIVE PULMONARY DISEASE, UNSPECIFIED COPD TYPE (H): ICD-10-CM

## 2025-06-11 DIAGNOSIS — K59.00 CONSTIPATION, UNSPECIFIED CONSTIPATION TYPE: ICD-10-CM

## 2025-06-11 DIAGNOSIS — E11.21 TYPE 2 DIABETES MELLITUS WITH DIABETIC NEPHROPATHY, WITHOUT LONG-TERM CURRENT USE OF INSULIN (H): Primary | ICD-10-CM

## 2025-06-11 DIAGNOSIS — I10 HYPERTENSION GOAL BP (BLOOD PRESSURE) < 140/90: ICD-10-CM

## 2025-06-11 DIAGNOSIS — I48.19 PERSISTENT ATRIAL FIBRILLATION (H): ICD-10-CM

## 2025-06-11 DIAGNOSIS — K21.9 GASTROESOPHAGEAL REFLUX DISEASE WITHOUT ESOPHAGITIS: ICD-10-CM

## 2025-06-11 PROCEDURE — 99605 MTMS BY PHARM NP 15 MIN: CPT | Mod: 93

## 2025-06-11 PROCEDURE — 99607 MTMS BY PHARM ADDL 15 MIN: CPT | Mod: 93

## 2025-06-11 NOTE — PROGRESS NOTES
Spoke to sonMehdi. His dad had an audiology exam last week. Needs hearing aids but the place he had the exam does not offer hearing aids.  CC will look at provider options and will call back.  Reviewed [facility] provider list- compiled list.  PC to son. Found a few providers in the area that may be an option to fill the hearing aid order. He will need to call the preferred clinics to inquire. Will email him the list.   Emailed list of providers to son:  [facility] [address] [place] [address] [phone] [email or web address]  [facility] [address] [place] [address] [phone] [email or web address]  [facility] [address] [place] [address] [phone] [email or web address]  [facility] [address] [place] [address] [phone] [email or web address]  [facility] [address] [place] [address] [phone] [facility] [address] [place] [address] [phone] [email or web address]    BRITTNEY Avilez  [employer]   [phone] (Office)  [email or web address]

## 2025-06-11 NOTE — PATIENT INSTRUCTIONS
"Recommendations from today's MTM visit:                                                    MTM (medication therapy management) is a service provided by a clinical pharmacist designed to help you get the most of out of your medicines.   Today we reviewed what your medicines are for, how to know if they are working, that your medicines are safe and how to make your medicine regimen as easy as possible.      Remember to only inhale 1 puff of Trelegy once daily.    Rinse mouth out with water after using Trelegy inhaler to prevent oral thrush.     Follow-up: Return in about 1 year (around 6/11/2026) for Medication Therapy Management or earlier if questions/concerns.    It was great speaking with you today.  I value your experience and would be very thankful for your time in providing feedback in our clinic survey. In the next few days, you may receive an email or text message from [employer] with a link to a survey related to your  clinical pharmacist.\"     To schedule another MTM appointment, please call the clinic directly or you may call the MTM scheduling line at [phone] or toll-free at [phone].     My Clinical Pharmacist's contact information:                                                      Please feel free to contact me with any questions or concerns you have.      Karen Gray PharmD   Medication Therapy Management   Pharmacy Resident    Direct: [phone]  [facility]: [phone]  [facility]: [phone]    "

## 2025-06-11 NOTE — LETTER
_  Medication List        Prepared on: Jun 11, 2025     Bring your Medication List when you go to the doctor, hospital, or   emergency room. And, share it with your family or caregivers.     Note any changes to how you take your medications.  Cross out medications when you no longer use them.    Medication How I take it Why I use it Prescriber   acetaminophen (TYLENOL) 325 MG tablet TAKE 2 TABLETS(650 MG) BY MOUTH EVERY 6 HOURS AS NEEDED FOR MILD PAIN Pain Bradley Hopkins DO   atorvastatin (LIPITOR) 10 MG tablet Take 1 tablet (10 mg) by mouth daily. Type 2 diabetes mellitus with diabetic nephropathy, without long-term current use of insulin (H); Hyperlipidemia with Target LDL Less than 100 Bradley Hopkins DO   beclomethasone HFA (QVAR REDIHALER) 40 MCG/ACT inhaler Inhale 1 puff into the lungs 2 times daily as needed (cough, shortness of breath). Chronic obstructive pulmonary disease, unspecified COPD type (H) Bradley Hopkins DO   diltiazem ER COATED BEADS (CARDIZEM CD/CARTIA XT) 120 MG 24 hr capsule Take 1 capsule (120 mg) by mouth daily. Persistent Atrial Fibrillation (H); Atrial fibrillation, unspecified type (H) Bradley Hopkins DO   Fluticasone-Umeclidin-Vilant (TRELEGY ELLIPTA) 100-62.5-25 MCG/ACT oral inhaler Inhale 1 puff into the lungs daily. Chronic obstructive pulmonary disease, unspecified COPD type (H) Bradley Hopkins DO   ipratropium - albuterol 0.5 mg/2.5 mg/3 mL (DUONEB) 0.5-2.5 (3) MG/3ML neb solution Take 1 vial (3 mLs) by nebulization every 6 hours as needed for shortness of breath or wheezing Cough, unspecified type Bradley Hopkins DO   losartan-hydrochlorothiazide (HYZAAR) 50-12.5 MG tablet TAKE 2 TABLETS BY MOUTH DAILY Hypertension Goal BP (Blood Pressure) < 140/90 Bradley Hopkins DO   metFORMIN (GLUCOPHAGE) 500 MG tablet Take 1 tablet (500 mg) by mouth 2 times daily (with meals). Type 2 diabetes mellitus with diabetic nephropathy, without long-term current use of insulin (H) Bradley SALINAS  DO Kellie   pantoprazole (PROTONIX) 40 MG EC tablet Take 1 tablet (40 mg) by mouth daily. Gastroesophageal Reflux Disease without Esophagitis Bradley Hopkins DO   polyethylene glycol (MIRALAX) 17 GM/Dose powder Take 17 g by mouth daily as needed for constipation Constipation, unspecified constipation type Bradley Hopkins DO   potassium chloride ganga ER (KLOR-CON M20) 20 MEQ CR tablet Take 1 tablet (20 mEq) by mouth daily. Hypertension Goal BP (Blood Pressure) < 140/90 Bradley Hopkins DO   rivaroxaban ANTICOAGULANT (XARELTO) 15 MG TABS tablet Take 1 tablet (15 mg) by mouth daily (with dinner). Persistent Atrial Fibrillation (H) Bradley Hopkins DO         Add new medications, over-the-counter drugs, herbals, vitamins, or  minerals in the blank rows below.    Medication How I take it Why I use it Prescriber                                      Allergies:      - Aspirin  - Salicylates        Side effects I have had:      Not on File        Other Information:              My notes and questions:

## 2025-06-11 NOTE — Clinical Note
Hi Dr. Hopkins,   I saw Johnphyllis for an MTM pharmacist visit yesterday. No major changes needed, but I have a couple considerations for the future.  1. Patient may no longer need metformin, could consider discontinue if A1c continues to be well below goal.  2. QVAR inhaler overlaps with Trelegy, both being inhaled corticosteroids. A GRETCHEN like albuterol may be more helpful for acute cough/SOB in addition to the Trelegy maintenance therapy.   Let me know if you have questions. Thanks! Karen Gray, PharmD  Medication Therapy Management  Pharmacy Resident

## 2025-06-11 NOTE — LETTER
"Recommended To-Do List      Prepared on: Jun 11, 2025       You can get the best results from your medications by completing the items on this \"To-Do List.\"      Bring your To-Do List when you go to your doctor. And, share it with your family or caregivers.    My To-Do List:  What we talked about: What I should do:   Your medication dosage being too high    Decrease your dosage of Trelegy Ellipta to 1 puff once daily as prescribed          What we talked about: What I should do:   Education about your medication   Rinse mouth out after using Trelegy to avoid oral thrush        What we talked about: What I should do:                     "

## 2025-06-11 NOTE — LETTER
June 11, 2025  Manfred Caraballo  [id number] [address]    Dear James FanaugustinMARCIO [facility] SARY     Thank you for talking with me on Jun 11, 2025 about your health and medications. As a follow-up to our conversation, I have included two documents:      Your Recommended To-Do List has steps you should take to get the best results from your medications.  Your Medication List will help you keep track of your medications and how to take them.    If you want to talk about these documents, please call Lillian Gray RPH at phone: [phone], Monday-Friday 8-4:30pm.    I look forward to working with you and your doctors to make sure your medications work well for you.    Sincerely,  Lillian Gray RPH  [facility] Pharmacist, [facility]

## 2025-06-11 NOTE — LETTER
June 12, 2025  Manfred Caraballo  [address] [address]    Dear James TashirachelaugustinMARCIO [facility] SARY     Thank you for talking with me on Jun 11, 2025 about your health and medications. As a follow-up to our conversation, I have included two documents:      Your Recommended To-Do List has steps you should take to get the best results from your medications.  Your Medication List will help you keep track of your medications and how to take them.    If you want to talk about these documents, please call Lillian Gray RPH at phone: [phone], Monday-Friday 8-4:30pm.    I look forward to working with you and your doctors to make sure your medications work well for you.    Sincerely,  Lillian Gray RPH  [facility] Pharmacist, [facility]

## 2025-06-12 ENCOUNTER — TELEPHONE (OUTPATIENT)
Dept: FAMILY MEDICINE | Facility: CLINIC | Age: OVER 89
End: 2025-06-12
Payer: COMMERCIAL

## 2025-06-12 NOTE — TELEPHONE ENCOUNTER
Reviewed recent visit with [facility] pharmacist. Agree with recommendations.    He can discontinue QVAR and continue just Trelegy. We can also trial off of metformin since his blood sugars have been so good. We can then follow up at his next appointment to see how he is doing.    Please call patient with recommendations.    Bradley Hopkins DO  6/12/2025 7:46 AM

## 2025-06-15 ENCOUNTER — MEDICAL CORRESPONDENCE (OUTPATIENT)
Dept: HEALTH INFORMATION MANAGEMENT | Facility: CLINIC | Age: OVER 89
End: 2025-06-15
Payer: COMMERCIAL

## 2025-06-18 ENCOUNTER — LAB (OUTPATIENT)
Dept: LAB | Facility: CLINIC | Age: OVER 89
End: 2025-06-18
Payer: COMMERCIAL

## 2025-06-18 DIAGNOSIS — E87.1 HYPONATREMIA: ICD-10-CM

## 2025-06-18 DIAGNOSIS — D50.9 MICROCYTIC ANEMIA: ICD-10-CM

## 2025-06-18 LAB
ERYTHROCYTE [DISTWIDTH] IN BLOOD BY AUTOMATED COUNT: 13.6 % (ref 10–15)
HCT VFR BLD AUTO: 32.1 % (ref 40–53)
HGB BLD-MCNC: 9.8 G/DL (ref 13.3–17.7)
MCH RBC QN AUTO: 23.1 PG (ref 26.5–33)
MCHC RBC AUTO-ENTMCNC: 30.5 G/DL (ref 31.5–36.5)
MCV RBC AUTO: 76 FL (ref 78–100)
PLATELET # BLD AUTO: 174 10E3/UL (ref 150–450)
RBC # BLD AUTO: 4.25 10E6/UL (ref 4.4–5.9)
WBC # BLD AUTO: 6.8 10E3/UL (ref 4–11)

## 2025-06-18 PROCEDURE — 83550 IRON BINDING TEST: CPT

## 2025-06-18 PROCEDURE — 82728 ASSAY OF FERRITIN: CPT

## 2025-06-18 PROCEDURE — 83540 ASSAY OF IRON: CPT

## 2025-06-18 PROCEDURE — 80048 BASIC METABOLIC PNL TOTAL CA: CPT

## 2025-06-18 PROCEDURE — 85027 COMPLETE CBC AUTOMATED: CPT

## 2025-06-18 PROCEDURE — 36415 COLL VENOUS BLD VENIPUNCTURE: CPT

## 2025-06-19 LAB
ANION GAP SERPL CALCULATED.3IONS-SCNC: 11 MMOL/L (ref 7–15)
BUN SERPL-MCNC: 27.8 MG/DL (ref 8–23)
CALCIUM SERPL-MCNC: 9.4 MG/DL (ref 8.8–10.4)
CHLORIDE SERPL-SCNC: 100 MMOL/L (ref 98–107)
CREAT SERPL-MCNC: 1.29 MG/DL (ref 0.67–1.17)
EGFRCR SERPLBLD CKD-EPI 2021: 53 ML/MIN/1.73M2
FERRITIN SERPL-MCNC: 71 NG/ML (ref 31–409)
GLUCOSE SERPL-MCNC: 151 MG/DL (ref 70–99)
HCO3 SERPL-SCNC: 22 MMOL/L (ref 22–29)
IRON BINDING CAPACITY (ROCHE): 261 UG/DL (ref 240–430)
IRON SATN MFR SERPL: 27 % (ref 15–46)
IRON SERPL-MCNC: 70 UG/DL (ref 61–157)
POTASSIUM SERPL-SCNC: 4.4 MMOL/L (ref 3.4–5.3)
SODIUM SERPL-SCNC: 133 MMOL/L (ref 135–145)

## 2025-06-20 ENCOUNTER — RESULTS FOLLOW-UP (OUTPATIENT)
Dept: FAMILY MEDICINE | Facility: CLINIC | Age: OVER 89
End: 2025-06-20

## 2025-06-20 DIAGNOSIS — D50.9 MICROCYTIC ANEMIA: Primary | ICD-10-CM

## 2025-06-23 ENCOUNTER — TELEPHONE (OUTPATIENT)
Dept: FAMILY MEDICINE | Facility: CLINIC | Age: OVER 89
End: 2025-06-23
Payer: COMMERCIAL

## 2025-06-23 NOTE — TELEPHONE ENCOUNTER
Forms/Letter Request    Type of form/letter: Home Health Care Order#[id number]      Do we have the form/letter: Yes: Given to Dr. Hopkins    Who is the form from? Home care    Where did/will the form come from? form was faxed in    When is form/letter needed by: 5-7 bus days    How would you like the form/letter returned: Fax : [phone]

## 2025-06-25 ENCOUNTER — MEDICAL CORRESPONDENCE (OUTPATIENT)
Dept: HEALTH INFORMATION MANAGEMENT | Facility: CLINIC | Age: OVER 89
End: 2025-06-25
Payer: COMMERCIAL

## 2025-07-03 ENCOUNTER — TELEPHONE (OUTPATIENT)
Dept: FAMILY MEDICINE | Facility: CLINIC | Age: OVER 89
End: 2025-07-03
Payer: COMMERCIAL

## 2025-07-03 NOTE — TELEPHONE ENCOUNTER
Forms/Letter Request    Type of form/letter: Nursing Home/Assisted Living Orders:  Home Health Care  Do we have the form/letter: Yes: given to Dr Hopkins    Who is the form from? Home care    Where did/will the form come from? form was faxed in    When is form/letter needed by: when available    How would you like the form/letter returned: Fax : [phone]

## 2025-07-03 NOTE — TELEPHONE ENCOUNTER
Form signed by YVETTE Lopez for Dr Hopkins  Faxed to [phone]  Sent to [facility]  And filed in [facility].

## 2025-07-07 ENCOUNTER — TELEPHONE (OUTPATIENT)
Dept: FAMILY MEDICINE | Facility: CLINIC | Age: OVER 89
End: 2025-07-07
Payer: COMMERCIAL

## 2025-07-07 NOTE — TELEPHONE ENCOUNTER
Forms/Letter Request    Type of form/letter: Nursing Home/Assisted Living Orders  Home Health Care # [id number]  Do we have the form/letter: Yes: given to Dr Hopkins    Who is the form from? Home care    Where did/will the form come from? form was faxed in    When is form/letter needed by: when available    How would you like the form/letter returned: Fax : [phone]

## 2025-07-08 ENCOUNTER — MEDICAL CORRESPONDENCE (OUTPATIENT)
Dept: HEALTH INFORMATION MANAGEMENT | Facility: CLINIC | Age: OVER 89
End: 2025-07-08
Payer: COMMERCIAL

## 2025-07-08 ENCOUNTER — TELEPHONE (OUTPATIENT)
Dept: FAMILY MEDICINE | Facility: CLINIC | Age: OVER 89
End: 2025-07-08
Payer: COMMERCIAL

## 2025-07-08 NOTE — TELEPHONE ENCOUNTER
Faxed signed forms to [facility] #[phone]    Order #[id number]    Copied into [facility] / [place]

## 2025-07-08 NOTE — TELEPHONE ENCOUNTER
Home Health Care      Reason for call:  Home Health Care - Order #[id number] - cert period 06/15/25 - 08/13/25  Tupe 2 Diabetes mellitus w/o complications    Orders are needed for this patient.  above    Pt Provider: Dr. Hopkins    Phone Number Homecare Nurse can be reached at: fax       Okay to leave a detailed message?:  NA    Put in providers in box    Gabi K

## 2025-07-10 ENCOUNTER — MEDICAL CORRESPONDENCE (OUTPATIENT)
Dept: HEALTH INFORMATION MANAGEMENT | Facility: CLINIC | Age: OVER 89
End: 2025-07-10
Payer: COMMERCIAL

## 2025-07-10 NOTE — TELEPHONE ENCOUNTER
Patient's form signed, dated, and placed in TC basket.    Bradley Hopkins DO  7/10/2025 11:35 AM

## 2025-07-10 NOTE — TELEPHONE ENCOUNTER
Signed by Dr Hopkins  Faxed back to [phone]  Sent to [facility]  And filed in [facility].

## 2025-08-21 ENCOUNTER — TELEPHONE (OUTPATIENT)
Dept: FAMILY MEDICINE | Facility: CLINIC | Age: OVER 89
End: 2025-08-21
Payer: COMMERCIAL

## 2025-08-22 ENCOUNTER — MEDICAL CORRESPONDENCE (OUTPATIENT)
Dept: HEALTH INFORMATION MANAGEMENT | Facility: CLINIC | Age: OVER 89
End: 2025-08-22
Payer: COMMERCIAL

## 2025-09-03 DIAGNOSIS — E11.21 TYPE 2 DIABETES MELLITUS WITH DIABETIC NEPHROPATHY, WITHOUT LONG-TERM CURRENT USE OF INSULIN (H): ICD-10-CM

## 2025-09-03 DIAGNOSIS — E78.5 HYPERLIPIDEMIA WITH TARGET LDL LESS THAN 100: ICD-10-CM

## 2025-09-03 RX ORDER — ATORVASTATIN CALCIUM 10 MG/1
10 TABLET, FILM COATED ORAL DAILY
Qty: 90 TABLET | Refills: 1 | Status: SHIPPED | OUTPATIENT
Start: 2025-09-03